# Patient Record
Sex: FEMALE | Race: WHITE | NOT HISPANIC OR LATINO | Employment: UNEMPLOYED | URBAN - METROPOLITAN AREA
[De-identification: names, ages, dates, MRNs, and addresses within clinical notes are randomized per-mention and may not be internally consistent; named-entity substitution may affect disease eponyms.]

---
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
## 2021-03-07 NOTE — PROGRESS NOTES
"3/30/2019 Dimension 3, 4, 5 and 6  Group Chart Note - Co-facilitated with Jaqui Barroso RN.  Number of clients attending the group:  4      Ej Dunaway attended 3 hour Dual Process group covering the following topics Relapse Prevention.  Client was Actively participating, Attentive and Engaged.  Client's response:  Ct engaged in the viewing and processing of \"Beautiful Boy.\" Post-viewing of the film, ct elaborated on how the film resonated with her substance use patterns, lifestyle choices, and family relationships. Ct participated in the development of listing out relapse warning signs and signs of relapse. Ct also began the development of her own personal relapse prevention plan.    " The patient's HeartMate LVAD was interrogated 3/7/2021  * Speed 9600 rpm   * Pulsatility index 6-6.5   * Power 4.2-6.3 Manuel   * Flow 5.2-6.4 L/minute   Fluid status: euvolemic   Alarms were reviewed, with no LVAD alarms or signs of pump malfunction.   The driveline exit site was covered, with dressing c/d/i.   All external components were inspected and showed no evidence of damage or malfunction, none replaced.   No changes to VAD settings made.      Shelia Means DNP, FNP-BC, CHFN  Advanced Heart Failure Nurse Practitioner  [facility]

## 2021-04-09 NOTE — PROGRESS NOTES
"      Assessment & Plan     Chest pain, unspecified type  Does not appear cardiac from EKG and history.  - EKG 12-lead complete w/read - Clinics    Costochondritis  Discussed at home treatment strategies.      Screen for STD (sexually transmitted disease)    - HIV Antigen Antibody Combo  - Hepatitis Antibody A IgG  - Hepatitis A antibody IgM  - Hepatitis C antibody  - Hepatitis B Surface Antibody  - Hepatitis B surface antigen    Screening examination for pulmonary tuberculosis    - Quantiferon TB Gold Plus    Encounter for pregnancy test, result unknown    - HCG Qual, Urine ([id number]); Future             BMI:   Estimated body mass index is 35.51 kg/m  as calculated from the following:    Height as of this encounter: 1.676 m (5' 6\").    Weight as of this encounter: 99.8 kg (220 lb).           No follow-ups on file.    Shaheen Montero PA-C  [facility]   Ej is a 20 year old who presents for the following health issues     HPI     Chest Pain  Onset/Duration: 4 days   Description:   Location: right side   Character: sharp and achey  Radiation: abdomen one episode   Duration: pt pt mainly in the evening not sure about duration maybe 30 mins   Intensity: moderate  Progression of Symptoms: same  Accompanying Signs & Symptoms:  Shortness of breath: YES  Sweating: no  Nausea/vomiting: YES- not hungry   Lightheadedness: no  Palpitations: no   Fever/Chills: no  Cough: no           Heartburn: maybe not sure   History:   Family history of heart disease: does not know   Tobacco use: no  Previous similar symptoms: no   Precipitating factors:   Worse with exertion: no  Worse with deep breaths: YES- when walking one flight of stair pt states she feels out of breath            Related to eating: no           Better with burping: no  Alleviating factors:   Therapies tried and outcome: pt has been taking ibuprofen     Does need some lab work for [facility]      Review of Systems " "  Constitutional, HEENT, cardiovascular, pulmonary, gi and gu systems are negative, except as otherwise noted.      Objective    /72   Pulse 77   Temp 97.7  F (36.5  C) (Skin)   Resp 16   Ht 1.676 m (5' 6\")   Wt 99.8 kg (220 lb)   SpO2 97%   BMI 35.51 kg/m    Body mass index is 35.51 kg/m .  Physical Exam   GENERAL: alert and no distress  EYES: Eyes grossly normal to inspection  RESP: lungs clear to auscultation - no rales, rhonchi or wheezes  CV: regular rate and rhythm, normal S1 S2, no S3 or S4, no murmur, click or rub, no peripheral edema and peripheral pulses strong  MS: some intercostal tenderness  SKIN: no suspicious lesions or rashes  PSYCH: mentation appears normal, affect normal/bright    EKG - Reviewed and interpreted by me sinus bradycardia, normal axis, normal intervals, no acute ST/T changes c/w ischemia, no LVH by voltage criteria                  "

## 2021-04-12 ENCOUNTER — OFFICE VISIT (OUTPATIENT)
Dept: FAMILY MEDICINE | Facility: CLINIC | Age: 21
End: 2021-04-12
Payer: MEDICAID

## 2021-04-12 VITALS
BODY MASS INDEX: 35.36 KG/M2 | WEIGHT: 220 LBS | DIASTOLIC BLOOD PRESSURE: 72 MMHG | HEIGHT: 66 IN | RESPIRATION RATE: 16 BRPM | SYSTOLIC BLOOD PRESSURE: 113 MMHG | TEMPERATURE: 97.7 F | OXYGEN SATURATION: 97 % | HEART RATE: 77 BPM

## 2021-04-12 DIAGNOSIS — M94.0 COSTOCHONDRITIS: ICD-10-CM

## 2021-04-12 DIAGNOSIS — Z32.00 ENCOUNTER FOR PREGNANCY TEST, RESULT UNKNOWN: ICD-10-CM

## 2021-04-12 DIAGNOSIS — Z11.3 SCREEN FOR STD (SEXUALLY TRANSMITTED DISEASE): ICD-10-CM

## 2021-04-12 DIAGNOSIS — R07.9 CHEST PAIN, UNSPECIFIED TYPE: Primary | ICD-10-CM

## 2021-04-12 DIAGNOSIS — Z11.1 SCREENING EXAMINATION FOR PULMONARY TUBERCULOSIS: ICD-10-CM

## 2021-04-12 PROCEDURE — 87389 HIV-1 AG W/HIV-1&-2 AB AG IA: CPT | Performed by: PHYSICIAN ASSISTANT

## 2021-04-12 PROCEDURE — 86481 TB AG RESPONSE T-CELL SUSP: CPT | Performed by: PHYSICIAN ASSISTANT

## 2021-04-12 PROCEDURE — 86706 HEP B SURFACE ANTIBODY: CPT | Performed by: PHYSICIAN ASSISTANT

## 2021-04-12 PROCEDURE — 86709 HEPATITIS A IGM ANTIBODY: CPT | Performed by: PHYSICIAN ASSISTANT

## 2021-04-12 PROCEDURE — 93000 ELECTROCARDIOGRAM COMPLETE: CPT | Performed by: PHYSICIAN ASSISTANT

## 2021-04-12 PROCEDURE — 86803 HEPATITIS C AB TEST: CPT | Performed by: PHYSICIAN ASSISTANT

## 2021-04-12 PROCEDURE — 87340 HEPATITIS B SURFACE AG IA: CPT | Performed by: PHYSICIAN ASSISTANT

## 2021-04-12 PROCEDURE — 99214 OFFICE O/P EST MOD 30 MIN: CPT | Performed by: PHYSICIAN ASSISTANT

## 2021-04-12 PROCEDURE — 86708 HEPATITIS A ANTIBODY: CPT | Performed by: PHYSICIAN ASSISTANT

## 2021-04-12 PROCEDURE — 36415 COLL VENOUS BLD VENIPUNCTURE: CPT | Performed by: PHYSICIAN ASSISTANT

## 2021-04-12 RX ORDER — PANTOPRAZOLE SODIUM 20 MG/1
40 TABLET, DELAYED RELEASE ORAL DAILY
COMMUNITY
End: 2021-10-26

## 2021-04-12 RX ORDER — IBUPROFEN 800 MG/1
800 TABLET, FILM COATED ORAL EVERY 8 HOURS PRN
COMMUNITY
End: 2021-10-26

## 2021-04-12 RX ORDER — BENZTROPINE MESYLATE 2 MG/1
2 TABLET ORAL 2 TIMES DAILY
COMMUNITY
End: 2021-10-26

## 2021-04-12 RX ORDER — CLOZAPINE 100 MG/1
100 TABLET ORAL
COMMUNITY
Start: 2021-02-01 | End: 2022-03-23

## 2021-04-12 RX ORDER — SENNOSIDES 8.6 MG
650 CAPSULE ORAL EVERY 8 HOURS PRN
COMMUNITY
End: 2021-10-26

## 2021-04-12 ASSESSMENT — MIFFLIN-ST. JEOR: SCORE: 1789.66

## 2021-04-14 LAB
GAMMA INTERFERON BACKGROUND BLD IA-ACNC: 0.16 IU/ML
M TB IFN-G CD4+ BCKGRND COR BLD-ACNC: 9.84 IU/ML
M TB TUBERC IFN-G BLD QL: NEGATIVE
MITOGEN IGNF BCKGRD COR BLD-ACNC: 0 IU/ML
MITOGEN IGNF BCKGRD COR BLD-ACNC: 0 IU/ML

## 2021-04-15 LAB
HAV IGG SER QL IA: REACTIVE
HAV IGM SERPL QL IA: NONREACTIVE
HBV SURFACE AB SERPL IA-ACNC: 2.9 M[IU]/ML
HBV SURFACE AG SERPL QL IA: NONREACTIVE
HCV AB SERPL QL IA: NONREACTIVE
HIV 1+2 AB+HIV1 P24 AG SERPL QL IA: NONREACTIVE

## 2021-04-16 NOTE — RESULT ENCOUNTER NOTE
Please call with results.    All normal.  The Hep A IgI is due to vaccination.  These do need to be faxed to [facility]:    Attn: Kamilah Corbett RN  [phone]    Jesus Montero PA-C

## 2021-06-03 NOTE — PROGRESS NOTES
DERMATOLOGY EXCISION PROCEDURE NOTE    Dermatology Problem List:  1.Atypical and dysplastic nevi  -Compound nevus with junctional atypia, extends to margin, right mid back s/p excision 6/3/21  - Compound nevus with moderate dysplasia, right lateral ankle  -s/p biopsy 7/18/2016  2. Actinic keratosis  -Efudex initiated 4/9/21  -s/p cryotherapy  3. Erythematous scaly patch on the bilateral cheeks  -KATHRINE ordered  -hydrocortisone 2.5% cream BID x 2 weeks    NAME OF PROCEDURE: Excision intermediate layered linear closure  Staff surgeon: Rudolph Presley DO  Fellow: Lamont Mejia MD  Resident: Keren Bradley MD  Scrub Nurse: Meagan Ashby LPN    PRE-OPERATIVE DIAGNOSIS:  Compound nevus with junctional atypia  POST-OPERATIVE DIAGNOSIS: same   FINAL EXCISION SIZE(DEFECT SIZE): 1.6X1.6 cm, with 5 mm margin   FINAL REPAIR LENGTH: 3.9 cm     INDICATIONS: This patient presented with a 0.6cm compound nevus with junctional atypia of the right mid back. Excision was indicated. We discussed the principles of treatment and most likely complications including scarring, bleeding, infection, incomplete excision, wound dehiscence, pain, nerve damage, and recurrence. Informed consent was obtained and the patient underwent the procedure as follows:    PROCEDURE: The patient was taken to the operative suite. Time-out was performed.  The treatment area was anesthetized with 1% lidocaine and epinephrine (1:100,000). The area was prepped with Chlorhexidine and rinsed with sterile saline and draped with sterile towels. The lesion was delineated and excised down to subcutaneous fat in a elliptical manner. Hemostasis was obtained by electrocoagulation.     REPAIR: An intermediate layered linear closure was selected as the procedure which would maximally preserve both function and cosmesis.    After the excision of the tumor, the area was carefully undermined. Hemostasis was obtained with electrocoagulation.  Closure was oriented so that the wound  Writer was approached by pt during dual process group, pt noted that she had urine incontinence and needed to go change. Ct also asked if she could shower. Writer allowed this, and  staff assisted ct with this process.    was in the patient's natural skin tension lines. The subcutaneous and dermal layers were then closed with 4-0 Monocryl sutures. The epidermis was then carefully approximated along the length of the wound using 4-0 Monocryl running subcuticular sutures.     The final wound length was 3.9 cm. A total of 6 ml of anesthesia was administered for all surgical sites. Estimated blood loss was less than 10 ml for all surgical sites. A sterile pressure dressing was applied and wound care instructions, with a written handout, were given. The [facility] and ambulatory.    Follow-up as needed for wound evaluation.       Anatomic Pathology Results: pending      Staff Involved:  Staff/residnet    Dr. Presley present for entire procedure    Keren Bradley PGY3  Dermatology Resident  pager      Staff Physician Comments:   I saw and evaluated the patient with the resident and I agree with the assessment and plan. I was present for the key portions of the above major procedure and examination.    Rudolph Presley DO    Department of Dermatology  [facility]: Phone: [phone], Fax:[phone]  [facility]: Phone: [phone], Fax: [phone]

## 2021-06-09 NOTE — PROGRESS NOTES
"    Assessment & Plan     Dysuria    - UA reflex to Microscopic and Culture  - Wet prep  - Urine Microscopic    Yeast infection of the vagina  Is improving today, so will give another 24-48 hours.  If symptoms persist or worsen, will treat symptomatically for yeast.  - fluconazole (DIFLUCAN) 150 MG tablet; Take 1 tablet (150 mg) by mouth once for 1 dose             BMI:   Estimated body mass index is 36.15 kg/m  as calculated from the following:    Height as of this encounter: 1.676 m (5' 6\").    Weight as of this encounter: 101.6 kg (224 lb).           Return in about 1 week (around 6/17/2021) for If symptoms persist or worsen.    [facility] is [facility] 20 year [facility] who presents for the following health issues     HPI     Vaginal Symptoms  Onset/Duration: 6 days  Description:  Vaginal Discharge: white   Itching (Pruritis): YES  Burning sensation:  no  Odor: YES  Accompanying Signs & Symptoms:  Urinary symptoms: no  Abdominal pain: no  Fever: no  History:   Sexually active: no  New Partner: no  Possibility of Pregnancy:  no  Recent antibiotic use: no  Previous vaginitis issues: YES  Precipitating or alleviating factors: None  Therapies tried and outcome: none          Review of Systems   Constitutional, HEENT, cardiovascular, pulmonary, gi and gu systems are negative, except as otherwise noted.      Objective    /74   Pulse 76   Resp 16   Ht 1.676 m (5' 6\")   Wt 101.6 kg (224 lb)   LMP 06/01/2021 (Exact Date)   SpO2 94%   Breastfeeding No   BMI 36.15 kg/m    Body mass index is 36.15 kg/m .  Physical Exam   GENERAL: alert and no distress  EYES: Eyes grossly normal to inspection  PSYCH: mentation appears normal and affect normal/bright    Results for orders placed or performed in visit on 06/10/21 (from the past 24 hour(s))   UA reflex to Microscopic and Culture    Specimen: Midstream Urine   Result Value Ref Range    Color Urine Yellow     " Appearance Urine Clear     Glucose Urine Negative NEG^Negative mg/dL    Bilirubin Urine Negative NEG^Negative    Ketones Urine Negative NEG^Negative mg/dL    Specific Gravity Urine 1.020 1.003 - 1.035    Blood Urine Small (A) NEG^Negative    pH Urine 7.0 5.0 - 7.0 pH    Protein Albumin Urine Negative NEG^Negative mg/dL    Urobilinogen Urine 0.2 0.2 - 1.0 EU/dL    Nitrite Urine Negative NEG^Negative    Leukocyte Esterase Urine Negative NEG^Negative    Source Midstream Urine    Urine Microscopic   Result Value Ref Range    WBC Urine 0 - 5 OTO5^0 - 5 /HPF    RBC Urine 2-5 (A) OTO2^O - 2 /HPF    Squamous Epithelial /LPF Urine Moderate (A) FEW^Few /LPF   Wet prep    Specimen: Vagina   Result Value Ref Range    Specimen Description Vagina     Wet Prep No Trichomonas seen     Wet Prep No clue cells seen     Wet Prep No yeast seen     Wet Prep Few  WBC'S seen

## 2021-06-10 ENCOUNTER — OFFICE VISIT (OUTPATIENT)
Dept: FAMILY MEDICINE | Facility: CLINIC | Age: 21
End: 2021-06-10
Payer: MEDICAID

## 2021-06-10 VITALS
SYSTOLIC BLOOD PRESSURE: 118 MMHG | OXYGEN SATURATION: 94 % | BODY MASS INDEX: 36 KG/M2 | WEIGHT: 224 LBS | HEIGHT: 66 IN | RESPIRATION RATE: 16 BRPM | HEART RATE: 76 BPM | DIASTOLIC BLOOD PRESSURE: 74 MMHG

## 2021-06-10 DIAGNOSIS — B37.31 YEAST INFECTION OF THE VAGINA: ICD-10-CM

## 2021-06-10 DIAGNOSIS — R30.0 DYSURIA: Primary | ICD-10-CM

## 2021-06-10 LAB
ALBUMIN UR-MCNC: NEGATIVE MG/DL
APPEARANCE UR: CLEAR
BILIRUB UR QL STRIP: NEGATIVE
COLOR UR AUTO: YELLOW
GLUCOSE UR STRIP-MCNC: NEGATIVE MG/DL
HGB UR QL STRIP: ABNORMAL
KETONES UR STRIP-MCNC: NEGATIVE MG/DL
LEUKOCYTE ESTERASE UR QL STRIP: NEGATIVE
NITRATE UR QL: NEGATIVE
NON-SQ EPI CELLS #/AREA URNS LPF: ABNORMAL /LPF
PH UR STRIP: 7 PH (ref 5–7)
RBC #/AREA URNS AUTO: ABNORMAL /HPF
SOURCE: ABNORMAL
SP GR UR STRIP: 1.02 (ref 1–1.03)
SPECIMEN SOURCE: NORMAL
UROBILINOGEN UR STRIP-ACNC: 0.2 EU/DL (ref 0.2–1)
WBC #/AREA URNS AUTO: ABNORMAL /HPF
WET PREP SPEC: NORMAL

## 2021-06-10 PROCEDURE — 81001 URINALYSIS AUTO W/SCOPE: CPT | Performed by: PHYSICIAN ASSISTANT

## 2021-06-10 PROCEDURE — 87210 SMEAR WET MOUNT SALINE/INK: CPT | Performed by: PHYSICIAN ASSISTANT

## 2021-06-10 PROCEDURE — 99213 OFFICE O/P EST LOW 20 MIN: CPT | Performed by: PHYSICIAN ASSISTANT

## 2021-06-10 RX ORDER — FLUCONAZOLE 150 MG/1
150 TABLET ORAL ONCE
Qty: 1 TABLET | Refills: 0 | Status: SHIPPED | OUTPATIENT
Start: 2021-06-10 | End: 2021-06-10

## 2021-06-10 ASSESSMENT — MIFFLIN-ST. JEOR: SCORE: 1807.81

## 2021-06-10 ASSESSMENT — PATIENT HEALTH QUESTIONNAIRE - PHQ9
SUM OF ALL RESPONSES TO PHQ QUESTIONS 1-9: 3
SUM OF ALL RESPONSES TO PHQ QUESTIONS 1-9: 3
10. IF YOU CHECKED OFF ANY PROBLEMS, HOW DIFFICULT HAVE THESE PROBLEMS MADE IT FOR YOU TO DO YOUR WORK, TAKE CARE OF THINGS AT HOME, OR GET ALONG WITH OTHER PEOPLE: NOT DIFFICULT AT ALL

## 2021-07-26 ENCOUNTER — OFFICE VISIT (OUTPATIENT)
Dept: FAMILY MEDICINE | Facility: CLINIC | Age: 21
End: 2021-07-26
Payer: MEDICAID

## 2021-07-26 VITALS
HEIGHT: 66 IN | SYSTOLIC BLOOD PRESSURE: 112 MMHG | WEIGHT: 235.6 LBS | HEART RATE: 72 BPM | TEMPERATURE: 98.6 F | DIASTOLIC BLOOD PRESSURE: 71 MMHG | BODY MASS INDEX: 37.86 KG/M2 | OXYGEN SATURATION: 98 %

## 2021-07-26 DIAGNOSIS — F39 MOOD DISORDER (H): ICD-10-CM

## 2021-07-26 DIAGNOSIS — F19.20 CHEMICAL DEPENDENCY (H): ICD-10-CM

## 2021-07-26 DIAGNOSIS — K13.70 ORAL LESION: ICD-10-CM

## 2021-07-26 DIAGNOSIS — M72.2 PLANTAR FASCIITIS: Primary | ICD-10-CM

## 2021-07-26 PROCEDURE — 99214 OFFICE O/P EST MOD 30 MIN: CPT | Performed by: FAMILY MEDICINE

## 2021-07-26 RX ORDER — IBUPROFEN 400 MG/1
400 TABLET, FILM COATED ORAL EVERY 6 HOURS PRN
Qty: 30 TABLET | Refills: 0 | Status: SHIPPED | OUTPATIENT
Start: 2021-07-26 | End: 2023-01-02

## 2021-07-26 RX ORDER — BENZTROPINE MESYLATE 1 MG/1
1 TABLET ORAL 2 TIMES DAILY
COMMUNITY
Start: 2021-05-18 | End: 2021-10-26

## 2021-07-26 ASSESSMENT — MIFFLIN-ST. JEOR: SCORE: 1852.48

## 2021-07-26 NOTE — PROGRESS NOTES
"    Assessment & Plan     Chemical dependency (H)  Is in a program ([employer]) and doing really well. Continue to support    Mood disorder (H)  Per chart review, she was diagnosed with a thought disorder in January while she was also using. At this point doing really well with Clozaril. Would suggest she get CBC at her next appointment with us.     Plantar fasciitis  Classic and likely due to her increased exercise, particularly running on the treadmill without shoes on.  See AVS for details of plan, including continuous arch support, achilles stretches and decrease in her running  - ibuprofen (ADVIL/MOTRIN) 400 MG tablet; Take 1 tablet (400 mg) by mouth every 6 hours as needed for moderate pain    Oral lesion  Needs to see dentistry.         32 minutes spent on the date of the encounter doing chart review, history and exam, documentation and further activities per the note       BMI:   Estimated body mass index is 38.61 kg/m  as calculated from the following:    Height as of this encounter: 1.664 m (5' 5.5\").    Weight as of this encounter: 106.9 kg (235 lb 9.6 oz).           No follow-ups on file.    Argelia Bennett MD  [facility] EDEL Pagan is a 20 year old who presents for the following health issues     HPI     At [facility] - this is going well. Sober since been there and her mental health is much improved.     Feet: when wakes up in the am, and goes to put her feet down, to stand up, hurts a bit and not all the right feelings are there. If she sits for a long time, her feet have to readjust and it hurts a bit. Worst in the ams. Couple of weeks.   Goes on a lot of walks, now is running more as of a week.  Can do about 1.75 miles at a time.  20 min walking and 10 min running.   Is not using any medications for this.     Teeth -   Need wisdom teeth work.  Her gum between her teeth seem swollen and when she eats she chews on it.  Wondering what we can do. " "      PMHx:  Admission to [facility] in 1/2021 - prior to her admission to [facility]. Is doing really well on Clozaril 100 mg daily:    PRINCIPAL DISCHARGE DIAGNOSIS:   Schizophrenia     SECONDARY DIAGNOSES:  1. Unspecified depression.  2. History of attention deficit/hyperactivity disorder, history of reactive attachment disorder, and history of Posttraumatic stress disorder.  3. Methamphetamine use disorder, in early remission.  4. Alcohol use disorder, in early remission.   5. Cannabis use disorder, in early remission.          Review of Systems         Objective    /71   Pulse 72   Temp 98.6  F (37  C) (Oral)   Ht 1.664 m (5' 5.5\")   Wt 106.9 kg (235 lb 9.6 oz)   LMP 06/01/2021 (Within Days)   SpO2 98%   Breastfeeding No   BMI 38.61 kg/m    Body mass index is 38.61 kg/m .  Physical Exam   OP - right posterior oral bucal mucosa, right between the upper and lower wisdom teeth shows some mild whitening and swelling. No sign of infection. Both teeth look a bit impacted.   Feet - normal pulses, no deformities  Tender under the arch, more anteriorly, for both feet. Able to dorsiflex to about 90' at the ankle.                   "

## 2021-07-26 NOTE — PATIENT INSTRUCTIONS
Patient Education   Here is the plan from today's visit    1. Chemical dependency (H)  Doing well!!    2. Mood disorder (H)  Doing well!    3. Plantar fasciitis  Going to do stretches - twice a day about 10 stretches on each side, 5 with the knee bent and 5 with the knee straight  Stop the running. Once better, slowly start adding running back  Arch support whenever you are up and walking  Also can use ice to cool things off and massage to stretch it.   - ibuprofen (ADVIL/MOTRIN) 400 MG tablet; Take 1 tablet (400 mg) by mouth every 6 hours as needed for moderate pain  Dispense: 30 tablet; Refill: 0    4. Oral lesion  Need to see dentist unfortunately.  Listerine if you want to rinse out regularly       Please call or return to clinic if your symptoms don't go away.    Follow up plan  No follow-ups on file.    Thank you for coming to [facility] today.  COVID-19 Vaccine:  If you are eligible for the COVID-19 vaccine, you can schedule via [employer] or call [facility] at [phone]. If you need assistance with scheduling, please speak to a Care Coordinator or your provider.   Lab Testing:  **If you had lab testing today and your results are reassuring or normal they will be mailed to you or sent through [employer] within 7 days.   **If the lab tests need quick action we will call you with the results.  **If you are having labs done on a different day, please call [phone] to schedule at [facility] or [phone] for other [facility] locations.   The phone number we will call with results is # [phone] (home) . If this is not the best number please call our clinic and change the number.  Medication Refills:  If you need any refills please call your pharmacy and they will contact us.   If you need to  your refill at a new pharmacy, please contact the new pharmacy directly. The new pharmacy will help you get your medications transferred faster.   Scheduling:  If you have any  concerns about today's visit or wish to schedule another appointment please call our office during normal business hours [phone] (8-5:00 M-F)  If a referral was made to a [facility] and you don't get a call from central scheduling please call [phone].  If a Mammogram was ordered for you at [facility] call [phone] to schedule or change your appointment.  If you had an EKG/XRay/CT/Ultrasound/MRI ordered the number is [phone] to schedule or change your radiology appointment.   Medical Concerns:  If you have urgent medical concerns please call [phone] at any time of the day.    Argelia Bennett MD

## 2021-09-19 ENCOUNTER — HEALTH MAINTENANCE LETTER (OUTPATIENT)
Age: 21
End: 2021-09-19

## 2021-10-19 PROBLEM — F32.9 MAJOR DEPRESSION: Status: ACTIVE | Noted: 2019-12-12

## 2021-11-01 ENCOUNTER — OFFICE VISIT (OUTPATIENT)
Dept: FAMILY MEDICINE | Facility: CLINIC | Age: 21
End: 2021-11-01
Payer: MEDICAID

## 2021-11-01 VITALS
HEART RATE: 70 BPM | OXYGEN SATURATION: 94 % | DIASTOLIC BLOOD PRESSURE: 78 MMHG | BODY MASS INDEX: 40.27 KG/M2 | TEMPERATURE: 99.1 F | SYSTOLIC BLOOD PRESSURE: 122 MMHG | WEIGHT: 250.6 LBS | HEIGHT: 66 IN

## 2021-11-01 DIAGNOSIS — E66.813 CLASS 3 SEVERE OBESITY DUE TO EXCESS CALORIES WITHOUT SERIOUS COMORBIDITY WITH BODY MASS INDEX (BMI) OF 40.0 TO 44.9 IN ADULT (H): Primary | ICD-10-CM

## 2021-11-01 DIAGNOSIS — R52 PAIN: ICD-10-CM

## 2021-11-01 DIAGNOSIS — E66.01 CLASS 3 SEVERE OBESITY DUE TO EXCESS CALORIES WITHOUT SERIOUS COMORBIDITY WITH BODY MASS INDEX (BMI) OF 40.0 TO 44.9 IN ADULT (H): Primary | ICD-10-CM

## 2021-11-01 RX ORDER — TOPIRAMATE 25 MG/1
25 TABLET, FILM COATED ORAL DAILY
COMMUNITY
End: 2022-03-23

## 2021-11-01 ASSESSMENT — ANXIETY QUESTIONNAIRES
IF YOU CHECKED OFF ANY PROBLEMS ON THIS QUESTIONNAIRE, HOW DIFFICULT HAVE THESE PROBLEMS MADE IT FOR YOU TO DO YOUR WORK, TAKE CARE OF THINGS AT HOME, OR GET ALONG WITH OTHER PEOPLE: NOT DIFFICULT AT ALL
1. FEELING NERVOUS, ANXIOUS, OR ON EDGE: NOT AT ALL
5. BEING SO RESTLESS THAT IT IS HARD TO SIT STILL: NOT AT ALL
6. BECOMING EASILY ANNOYED OR IRRITABLE: SEVERAL DAYS
3. WORRYING TOO MUCH ABOUT DIFFERENT THINGS: NOT AT ALL
2. NOT BEING ABLE TO STOP OR CONTROL WORRYING: NOT AT ALL
GAD7 TOTAL SCORE: 1
7. FEELING AFRAID AS IF SOMETHING AWFUL MIGHT HAPPEN: NOT AT ALL

## 2021-11-01 ASSESSMENT — MIFFLIN-ST. JEOR: SCORE: 1923.46

## 2021-11-01 ASSESSMENT — PATIENT HEALTH QUESTIONNAIRE - PHQ9
5. POOR APPETITE OR OVEREATING: NOT AT ALL
SUM OF ALL RESPONSES TO PHQ QUESTIONS 1-9: 6

## 2021-11-01 NOTE — NURSING NOTE
"ROOM:1    Preferred Name: Ej     21 year old  Chief Complaint   Patient presents with     Flank Pain     stopped last week, had pins and needles, comes and goes, no known injury       Blood pressure 122/78, pulse 70, temperature 99.1  F (37.3  C), temperature source Oral, height 1.676 m (5' 6\"), weight 113.7 kg (250 lb 9.6 oz), SpO2 94 %, not currently breastfeeding. Body mass index is 40.45 kg/m .      Patient Active Problem List   Diagnosis     Urinary incontinence, unspecified type     Frequent UTI     ADHD (attention deficit hyperactivity disorder), combined type     Obesity     Foster care child     Chemical dependency (H)     Depo-Provera contraceptive status     Anxiety disorder     Moderate major depression (H)     BV (bacterial vaginosis)     Mixed stress and urge urinary incontinence       Wt Readings from Last 2 Encounters:   11/01/21 113.7 kg (250 lb 9.6 oz)   07/26/21 106.9 kg (235 lb 9.6 oz) (>99 %, Z= 2.38)*     * Growth percentiles are based on CDC (Girls, 2-20 Years) data.     BP Readings from Last 3 Encounters:   11/01/21 122/78   07/26/21 112/71   06/10/21 118/74       Allergies   Allergen Reactions     Seasonal Allergies        Current Outpatient Medications   Medication     cloZAPine (CLOZARIL) 100 MG tablet     ibuprofen (ADVIL/MOTRIN) 400 MG tablet     omeprazole (PRILOSEC) 20 MG DR capsule     topiramate (TOPAMAX) 25 MG tablet     No current facility-administered medications for this visit.       Social History     Tobacco Use     Smoking status: Former Smoker     Packs/day: 0.50     Types: Cigarettes     Smokeless tobacco: Never Used   Vaping Use     Vaping Use: Former   Substance Use Topics     Alcohol use: Not Currently     Alcohol/week: 0.0 standard drinks     Drug use: Not Currently     Types: Marijuana, Methamphetamines, Other       Honoring Choices - Health Care Directive Guide offered to patient at time of visit.    Health Maintenance Due   Topic Date Due     ADVANCE CARE PLANNING  " Never done     DEPRESSION ACTION PLAN  Never done     Pneumococcal Vaccine: Pediatrics (0 to 5 Years) and At-Risk Patients (6 to 64 Years) (1 of 2 - PPSV23) 09/21/2007     COVID-19 Vaccine (1) Never done     PREVENTIVE CARE VISIT  09/13/2020     INFLUENZA VACCINE (1) 09/01/2021       Immunization History   Administered Date(s) Administered     Comvax (HIB/HepB) 2001, 01/23/2002, 10/08/2002     DTAP (<7y) 09/04/2007     DTaP / Hep B / IPV 2001, 01/23/2002, 04/11/2002, 01/23/2003, 04/03/2003     HEPA 11/18/2013, 08/29/2014     HPV 11/18/2013, 08/29/2014, 11/24/2014     Influenza (IIV3) PF 11/05/2012, 10/01/2013     Influenza Vaccine IM > 6 months Valent IIV4 (Alfuria,Fluzone) 09/24/2014, 12/03/2018, 09/13/2019     MMR 09/04/2007     MMR/V 10/08/2002     Meningococcal (Menactra ) 11/18/2013, 09/13/2019     Pedvax-hib 2000, 01/23/2002, 10/08/2002     Pneumo Conj 13-V (2010&after) 2001, 04/11/2002, 04/03/2003     Poliovirus, inactivated (IPV) 2001, 01/23/2002, 04/03/2003, 09/04/2007     TDAP Vaccine (Adacel) 11/05/2012, 03/15/2017     Tdap (Adacel,Boostrix) 03/15/2017     Varicella 10/08/2002, 09/04/2007       No results found for: PAP      Recent Labs   Lab Test 06/03/20  1227 09/17/19  0834 09/17/19  0831 09/29/15  2010 06/11/15  1032 06/11/15  1032 11/24/14  1551   A1C 5.3  --   --   --   --  5.7  --    LDL  --  79  --   --   --  72  --    HDL  --  39*  --   --   --  45*  --    TRIG  --  90*  --   --   --  84  --    ALT 19 20  --   --   --  21  --    CR 0.69  --   --  0.63*  --   --   --    GFRESTIMATED >90  --   --   --   --   --   --    GFRESTBLACK >90  --   --   --   --   --   --    ALBUMIN 4.0  --   --  4.3   < > 3.7  --    POTASSIUM 3.5  --   --  4.0  --   --   --    TSH  --   --  3.62  --   --   --  2.10    < > = values in this interval not displayed.       PHQ-2 ( 1999 Pfizer) 7/26/2021 4/19/2019   Q1: Little interest or pleasure in doing things 1 0   Q2: Feeling down, depressed or  hopeless 1 0   PHQ-2 Score 2 0       PHQ-9 SCORE 6/3/2020 12/16/2020 6/10/2021 11/1/2021   PHQ-9 Total Score MyChart - - 3 (Minimal depression) -   PHQ-9 Total Score 14 9 3 6       NICO-7 SCORE 12/12/2019 6/3/2020 11/1/2021   Total Score 9 (mild anxiety) - -   Total Score 9 14 1       No flowsheet data found.      Sheri Spence, [facility]  November 1, 2021 10:25 AM

## 2021-11-01 NOTE — PROGRESS NOTES
"Patient: Ej Dunaway YOB: 2000    Date of Exam: November/1/2021    Please be advised that this client resides in a facility in which narcotic medications are not permitted. If pain management is needed, please prescribe an alternative medication.     Ej Dunaway is a 21 year old who presents for the following    Patient presents with:  Flank Pain: stopped last week, had pins and needles, comes and goes, no known injury  Ej states the right side pain started about 11 days ago, it was a sensation on her right side and slightly on her right lower abdomen.  This lasted about 4 days and she has not had this sensation for 7 days.   When asked if it felt external or internal, it was difficult for her to define.  She denied any change in bowel or bladder habits, she had no blood in the urine.  She does have a history of UTIs, she said this is very different than those symptoms.    Ej is concerned about a 70# weight gain in the past 9 months.  She states that she spoke to the doctor who prescribes her clozapine and they stated it could be that medication and recommended she check in with a primary provider.  Ej is requesting a medication to \"counteract\" her clozapine.  She does not eat bread or noodles, she goes for a walk about 3 times per week.  She does not believe that she has access to nutrition counseling at [facility].      Do you need any refills on your Medications today? No    Review Of Systems   ROS: 10 point ROS neg other than the symptoms noted above in the HPI.      General Physical Exam:  Vitals: /78   Pulse 70   Temp 99.1  F (37.3  C) (Oral)   Ht 1.676 m (5' 6\")   Wt 113.7 kg (250 lb 9.6 oz)   LMP  (LMP Unknown)   SpO2 94%   BMI 40.45 kg/m      Skin:  No visible or palpable abnormality of the skin or abdomen in the area pointed out by Ej.  She pointed to the right side and a small area of the right lower abdomen.      If prescribed a controlled " substance, may client take medication home when discharged from [facility]? No.    Additional Comments:N/A    Assessment / Plan:     Diagnosis Comments   1. Class 3 severe obesity due to excess calories without serious comorbidity with body mass index (BMI) of 40.0 to 44.9 in adult (H)  Nutrition Referral    2. Pain  resolved     We talked about the possibility of varicella zoster causing a sensation, but I see no evidence of that.  Because there are no symptoms today and haven't been for a week, especially no urinary tract symptoms, Ej prefers not to do a UA today.      I gave Ej the number to call to set up the nutrition counseling appointment.     Referrals Made:   NO REFERRALS MADE TODAY  If a referral was made to a [facility] and you don't get a call from central scheduling please call [phone].  If a Mammogram was ordered for you at [facility] call [phone] to schedule or change your appointment.  If you had an XRay/CT/Ultrasound/MRI ordered the number is [phone] to schedule or change your radiology appointment.     Follow up as needed.    Medication changes made at today's visit: MEDICATIONS:        - Continue other medications without change    Dali Maloney NP November 1, 2021     Arrival Time: 10 15 AM  Departure Time: 11 00 AM

## 2021-11-02 ASSESSMENT — ANXIETY QUESTIONNAIRES: GAD7 TOTAL SCORE: 1

## 2021-11-24 ENCOUNTER — NURSE TRIAGE (OUTPATIENT)
Dept: NURSING | Facility: CLINIC | Age: 21
End: 2021-11-24
Payer: MEDICAID

## 2021-11-24 NOTE — TELEPHONE ENCOUNTER
Triage call    Patient called to report she has burning and frequency with urination.  She has been drinking a lot of water  which has helped with the burning.  She also complains of  Pins and needles in her abdomen it is intermittent and she thinks it is the bladder infection.    Per Protocol  see in the office today or tomorrow,Care advice given.  Verbalizes understanding and agrees with plan.  Transferred to scheduling wants to wait till Monday to go to the doctor rather than go to Urgent care.  Transferred to scheduling    Renate Delaney RN   [facility] Nurse Advisor  1:03 PM 11/24/2021    COVID 19 Nurse Triage Plan/Patient Instructions    Please be aware that novel coronavirus (COVID-19) may be circulating in the community. If you develop symptoms such as fever, cough, or SOB or if you have concerns about the presence of another infection including coronavirus (COVID-19), please contact your health care provider or visit [email or web address].     Disposition/Instructions    In-Person Visit with provider recommended. Reference Visit Selection Guide.    Thank you for taking steps to prevent the spread of this virus.  o Limit your contact with others.  o Wear a simple mask to cover your cough.  o Wash your hands well and often.    Resources    [facility]: About COVID-19: [email or web address]    CDC: What to Do If You're Sick: www.cdc.gov/coronavirus/2019-ncov/about/steps-when-sick.html    CDC: Ending Home Isolation: www.cdc.gov/coronavirus/2019-ncov/hcp/disposition-in-home-patients.html     CDC: Caring for Someone: www.cdc.gov/coronavirus/2019-ncov/if-you-are-sick/care-for-someone.html     [facility]: Interim Guidance for Hospital Discharge to Home: www.health.[facility].[place]/diseases/coronavirus/hcp/hospdischarge.pdf    [facility] clinical trials (COVID-19 research studies): [email or web address]/umn-clinical-trials     Below are the COVID-19 hotlines at the [place]  Department of Health ([facility]). Interpreters are available.   o For health questions: Call 072-146-0193 or 1-304.734.8239 (7 a.m. to 7 p.m.)  o For questions about schools and childcare: Call 918-824-5447 or 1-149.990.6593 (7 a.m. to 7 p.m.)    Reason for Disposition    Patient wants to be seen    Additional Information    Negative: Followed a genital area injury    Negative: Followed a genital area injury (penis, scrotum)    Negative: Vaginal discharge    Negative: Pus (white, yellow) or bloody discharge from end of penis    Negative: Discomfort (pain, burning or stinging) when passing urine and pregnant    Negative: Discomfort (pain, burning or stinging) when passing urine and female    Negative: Discomfort (pain, burning or stinging) when passing urine and male    Negative: Pain or itching in the vulvar area    Negative: Pain in scrotum is main symptom    Negative: Blood in the urine is main symptom    Negative: Symptoms arising from use of a urinary catheter (Kimbrough or Coude)    Negative: Unable to urinate (or only a few drops) > 4 hours and bladder feels very full (e.g., palpable bladder or strong urge to urinate)    Negative: Decreased urination and drinking very little and dehydration suspected (e.g., dark urine, no urine > 12 hours, very dry mouth, very lightheaded)    Negative: Patient sounds very sick or weak to the triager    Negative: Side (flank) or lower back pain present    Negative: Can't control passage of urine (i.e., urinary incontinence) and new onset (< 2 weeks) or worsening    Negative: Urinating more frequently than usual (i.e., frequency)    Negative: Bad or foul-smelling urine    Negative: Fever > 100.4 F (38.0 C)    Protocols used: URINARY SYMPTOMS-A-OH

## 2022-01-09 ENCOUNTER — HEALTH MAINTENANCE LETTER (OUTPATIENT)
Age: 22
End: 2022-01-09

## 2022-02-04 NOTE — PROGRESS NOTES
Assessment & Plan     Dysuria  We checked a urinalysis today which showed small leukocyte Estrace, but no blood or nitrites.  We also checked a wet prep which was unremarkable.  I recommended we send the urine for culture and I encouraged her to stay well-hydrated since this helps relieve her symptoms.  She will be notified of the culture results when they are available.  - UA with Microscopic reflex to Culture; Future  - UA with Microscopic reflex to Culture  - Urine Microscopic  - Urine Culture  - Wet prep - lab collect; Future  - Wet prep - lab collect    Chemical dependency (H)  She will continue treatment through [facility].    Class 3 severe obesity due to excess calories without serious comorbidity with body mass index (BMI) of 40.0 to 44.9 in adult (H)  She will continue to work on lifestyle modifications to help with weight loss.                     Return in about 4 weeks (around 3/9/2022) for Follow up if symptoms not improving..    Link Retana, [facility]   Ej is a 21 year old who presents for the following health issues     HPI   Answers for HPI/ROS submitted by the patient on 2/9/2022  If you checked off any problems, how difficult have these problems made it for you to do your work, take care of things at home, or get along with other people?: Somewhat difficult  PHQ9 TOTAL SCORE: 9  Chronicity: chronic  Onset: more than 1 month ago  Frequency: intermittently  Progression since onset: waxing and waning  Pain quality: burning  Pain - numeric: 5/10  Fever: no fever  Fever duration: less than 1 day  Sexually active?: No  History of pyelonephritis?: No  discharge: No  hesitancy: No  possible pregnancy: No  sweats: No      Pt. States that she already tested positive for a bladder infection several months ago and was taking antibiotics. But was unable to get refills.      Genitourinary - Female  Onset/Duration: Approximately 9 months ago she reports  being diagnosed with a bladder infection.  She took antibiotics, but the symptoms never resolved.  She describes having dysuria with urinary frequency symptoms off and on.  When she drinks more fluids the symptoms improve.  She denies vaginal discharge or vaginal odor.  She denies seeing blood in her urine or any vaginal irritation or itching symptoms.  She is not currently feeling ill.  She is not having fevers.  Occasionally she has pain in the lower back that tends to come and go.  Symptoms are not radiating down the legs.  She reports not being sexually active for over a year.  Description:   Painful urination (Dysuria): no           Frequency: YES  Blood in urine (Hematuria): no  Delay in urine (Hesitency): no  Intensity: 5/10  Progression of Symptoms:  waxing and waning  Accompanying Signs & Symptoms:  Fever/chills: no  Flank pain: YES  Nausea and vomiting: no  Vaginal symptoms: none  Abdominal/Pelvic Pain: no  History:   History of frequent UTI s: YES  History of kidney stones: no  Sexually Active: no  Possibility of pregnancy: No  Precipitating or alleviating factors: Drinking a lot of water, alleviates the burning sensation   Therapies tried and outcome: None         Review of Systems   Constitutional: Negative for chills.   Gastrointestinal: Negative for nausea and vomiting.   Genitourinary: Positive for flank pain, frequency and urgency. Negative for hematuria.            Objective    /82   Pulse 103   Temp 98.9  F (37.2  C) (Temporal)   LMP 02/02/2022   SpO2 95%   There is no height or weight on file to calculate BMI.  Physical Exam   GENERAL: healthy, alert and no distress  EYES: Eyes grossly normal to inspection, PERRL and conjunctivae and sclerae normal  NECK: no adenopathy, no asymmetry, masses, or scars and thyroid normal to palpation  RESP: lungs clear to auscultation - no rales, rhonchi or wheezes  CV: regular rate and rhythm, normal S1 S2, no S3 or S4, no murmur, click or rub, no  peripheral edema and peripheral pulses strong  ABDOMEN: soft, trace tenderness in the suprapubic region without rebounding, guarding or rigidity.  No hepatosplenomegaly, no masses and bowel sounds normal  MS: no gross musculoskeletal defects noted, no edema.  No CVA tenderness.  Nontender over the thoracic and lumbar spine.  There is trace tenderness in the lower lumbar paraspinal muscles bilaterally.  SKIN: no suspicious lesions or rashes  NEURO: Normal strength and tone, mentation intact and speech normal  PSYCH: mentation appears normal, affect normal/bright

## 2022-02-09 ENCOUNTER — OFFICE VISIT (OUTPATIENT)
Dept: FAMILY MEDICINE | Facility: CLINIC | Age: 22
End: 2022-02-09
Payer: MEDICAID

## 2022-02-09 VITALS
HEART RATE: 103 BPM | TEMPERATURE: 98.9 F | OXYGEN SATURATION: 95 % | SYSTOLIC BLOOD PRESSURE: 134 MMHG | DIASTOLIC BLOOD PRESSURE: 82 MMHG

## 2022-02-09 DIAGNOSIS — E66.813 CLASS 3 SEVERE OBESITY DUE TO EXCESS CALORIES WITHOUT SERIOUS COMORBIDITY WITH BODY MASS INDEX (BMI) OF 40.0 TO 44.9 IN ADULT (H): ICD-10-CM

## 2022-02-09 DIAGNOSIS — F19.20 CHEMICAL DEPENDENCY (H): ICD-10-CM

## 2022-02-09 DIAGNOSIS — E66.01 CLASS 3 SEVERE OBESITY DUE TO EXCESS CALORIES WITHOUT SERIOUS COMORBIDITY WITH BODY MASS INDEX (BMI) OF 40.0 TO 44.9 IN ADULT (H): ICD-10-CM

## 2022-02-09 DIAGNOSIS — R30.0 DYSURIA: Primary | ICD-10-CM

## 2022-02-09 LAB
ALBUMIN UR-MCNC: NEGATIVE MG/DL
APPEARANCE UR: ABNORMAL
BILIRUB UR QL STRIP: NEGATIVE
CLUE CELLS: ABNORMAL
COLOR UR AUTO: YELLOW
GLUCOSE UR STRIP-MCNC: NEGATIVE MG/DL
HGB UR QL STRIP: NEGATIVE
KETONES UR STRIP-MCNC: NEGATIVE MG/DL
LEUKOCYTE ESTERASE UR QL STRIP: ABNORMAL
NITRATE UR QL: NEGATIVE
PH UR STRIP: 5.5 [PH] (ref 5–7)
RBC #/AREA URNS AUTO: ABNORMAL /HPF
SP GR UR STRIP: >=1.03 (ref 1–1.03)
SQUAMOUS #/AREA URNS AUTO: ABNORMAL /LPF
TRICHOMONAS, WET PREP: ABNORMAL
UROBILINOGEN UR STRIP-ACNC: 0.2 E.U./DL
WBC #/AREA URNS AUTO: ABNORMAL /HPF
WBC'S/HIGH POWER FIELD, WET PREP: ABNORMAL
YEAST, WET PREP: ABNORMAL

## 2022-02-09 PROCEDURE — 87086 URINE CULTURE/COLONY COUNT: CPT | Performed by: FAMILY MEDICINE

## 2022-02-09 PROCEDURE — 87088 URINE BACTERIA CULTURE: CPT | Performed by: FAMILY MEDICINE

## 2022-02-09 PROCEDURE — 99214 OFFICE O/P EST MOD 30 MIN: CPT | Performed by: FAMILY MEDICINE

## 2022-02-09 PROCEDURE — 87210 SMEAR WET MOUNT SALINE/INK: CPT | Performed by: FAMILY MEDICINE

## 2022-02-09 PROCEDURE — 81001 URINALYSIS AUTO W/SCOPE: CPT | Performed by: FAMILY MEDICINE

## 2022-02-09 ASSESSMENT — PATIENT HEALTH QUESTIONNAIRE - PHQ9
SUM OF ALL RESPONSES TO PHQ QUESTIONS 1-9: 9
10. IF YOU CHECKED OFF ANY PROBLEMS, HOW DIFFICULT HAVE THESE PROBLEMS MADE IT FOR YOU TO DO YOUR WORK, TAKE CARE OF THINGS AT HOME, OR GET ALONG WITH OTHER PEOPLE: SOMEWHAT DIFFICULT
SUM OF ALL RESPONSES TO PHQ QUESTIONS 1-9: 9

## 2022-02-09 ASSESSMENT — ENCOUNTER SYMPTOMS
FLANK PAIN: 1
FREQUENCY: 1
VOMITING: 0
CHILLS: 0
HEMATURIA: 0
NAUSEA: 0
SWEATS: 0

## 2022-02-10 ASSESSMENT — PATIENT HEALTH QUESTIONNAIRE - PHQ9: SUM OF ALL RESPONSES TO PHQ QUESTIONS 1-9: 9

## 2022-02-11 ENCOUNTER — TELEPHONE (OUTPATIENT)
Dept: FAMILY MEDICINE | Facility: CLINIC | Age: 22
End: 2022-02-11
Payer: MEDICAID

## 2022-02-11 LAB
BACTERIA UR CULT: ABNORMAL
BACTERIA UR CULT: ABNORMAL

## 2022-02-11 RX ORDER — AMOXICILLIN 875 MG
875 TABLET ORAL 2 TIMES DAILY
Qty: 14 TABLET | Refills: 0 | Status: SHIPPED | OUTPATIENT
Start: 2022-02-11 | End: 2022-02-18

## 2022-02-11 NOTE — TELEPHONE ENCOUNTER
Tried to call pt - VM not setup  Will need to recall  PATI Lloyd, DO   2/11/2022  2:45 PM CST Back to Top        Please contact this patient and let her know that the urine culture is positive for group B strep. This can be present in the normal maru, but I recommend treating since she is having symptoms consistent with a UTI. I sent the Rx to [facility].  Thank you, DE

## 2022-03-23 ENCOUNTER — OFFICE VISIT (OUTPATIENT)
Dept: FAMILY MEDICINE | Facility: CLINIC | Age: 22
End: 2022-03-23
Payer: MEDICAID

## 2022-03-23 VITALS — SYSTOLIC BLOOD PRESSURE: 137 MMHG | DIASTOLIC BLOOD PRESSURE: 83 MMHG | TEMPERATURE: 99.8 F | HEART RATE: 116 BPM

## 2022-03-23 DIAGNOSIS — Z11.3 SCREENING FOR STDS (SEXUALLY TRANSMITTED DISEASES): Primary | ICD-10-CM

## 2022-03-23 DIAGNOSIS — N39.0 URINARY TRACT INFECTION WITHOUT HEMATURIA, SITE UNSPECIFIED: ICD-10-CM

## 2022-03-23 DIAGNOSIS — R11.0 NAUSEA: ICD-10-CM

## 2022-03-23 DIAGNOSIS — R10.9 FLANK PAIN: ICD-10-CM

## 2022-03-23 LAB
ALBUMIN UR-MCNC: NEGATIVE MG/DL
APPEARANCE UR: CLEAR
BACTERIA #/AREA URNS HPF: ABNORMAL /HPF
BILIRUB UR QL STRIP: NEGATIVE
CLUE CELLS: ABNORMAL
COLOR UR AUTO: YELLOW
GLUCOSE UR STRIP-MCNC: NEGATIVE MG/DL
HGB UR QL STRIP: ABNORMAL
KETONES UR STRIP-MCNC: NEGATIVE MG/DL
LEUKOCYTE ESTERASE UR QL STRIP: ABNORMAL
NITRATE UR QL: NEGATIVE
PH UR STRIP: 6.5 [PH] (ref 5–7)
RBC #/AREA URNS AUTO: ABNORMAL /HPF
SP GR UR STRIP: <=1.005 (ref 1–1.03)
SQUAMOUS #/AREA URNS AUTO: ABNORMAL /LPF
TRICHOMONAS, WET PREP: ABNORMAL
UROBILINOGEN UR STRIP-ACNC: 0.2 E.U./DL
WBC #/AREA URNS AUTO: ABNORMAL /HPF
WBC'S/HIGH POWER FIELD, WET PREP: ABNORMAL
YEAST, WET PREP: ABNORMAL

## 2022-03-23 PROCEDURE — 99213 OFFICE O/P EST LOW 20 MIN: CPT | Performed by: FAMILY MEDICINE

## 2022-03-23 PROCEDURE — 87591 N.GONORRHOEAE DNA AMP PROB: CPT | Performed by: FAMILY MEDICINE

## 2022-03-23 PROCEDURE — 87210 SMEAR WET MOUNT SALINE/INK: CPT | Performed by: FAMILY MEDICINE

## 2022-03-23 PROCEDURE — 81001 URINALYSIS AUTO W/SCOPE: CPT | Performed by: FAMILY MEDICINE

## 2022-03-23 PROCEDURE — 87186 SC STD MICRODIL/AGAR DIL: CPT | Mod: 91 | Performed by: FAMILY MEDICINE

## 2022-03-23 PROCEDURE — 87491 CHLMYD TRACH DNA AMP PROBE: CPT | Performed by: FAMILY MEDICINE

## 2022-03-23 PROCEDURE — 87086 URINE CULTURE/COLONY COUNT: CPT | Performed by: FAMILY MEDICINE

## 2022-03-23 RX ORDER — PROMETHAZINE HYDROCHLORIDE 25 MG/1
25 TABLET ORAL EVERY 6 HOURS PRN
Qty: 20 TABLET | Refills: 0 | Status: SHIPPED | OUTPATIENT
Start: 2022-03-23 | End: 2023-01-02

## 2022-03-23 RX ORDER — AMOXICILLIN 500 MG/1
1000 CAPSULE ORAL 2 TIMES DAILY
Qty: 40 CAPSULE | Refills: 0 | Status: SHIPPED | OUTPATIENT
Start: 2022-03-23 | End: 2022-04-02

## 2022-03-23 ASSESSMENT — PAIN SCALES - GENERAL: PAINLEVEL: WORST PAIN (10)

## 2022-03-23 NOTE — PATIENT INSTRUCTIONS
Nausea med as needed    Continue good fluid intake    Start the antibiotics today    We will send you urine culture results    Be seen promptly if symptoms acutely worsen

## 2022-03-23 NOTE — PROGRESS NOTES
Gerardo Pagan is a 21 year old who presents for the following health issues    HPI     Genitourinary - Female  Onset/Duration: Friday  Description:   Painful urination (Dysuria): YES           Frequency: no  Blood in urine (Hematuria): YES  Delay in urine (Hesitency): no  Intensity: moderate  Progression of Symptoms:  worsening  Accompanying Signs & Symptoms:  Fever/chills: YES  Flank pain: YES  Nausea and vomiting: YES  Vaginal symptoms: odor  Abdominal/Pelvic Pain: no  History:   History of frequent UTI s: no  History of kidney stones: no  Sexually Active: YES  Possibility of pregnancy: Don't Know  Precipitating or alleviating factors: None  Therapies tried and outcome:  None      some urinary symptoms a week ago but bad symptoms 5  Days ago    Review of Systems     Pain on left side    Not moving much    No vomiting    Some nausea this am    Drinking lots of cranberry juice and water    Not eating much    Had a few uti in past, mayb more than  Normal    A few months ago last one    No vaginal discharge    Periods normal    Last period finished up 9 days ago, normal               Objective    /83 (BP Location: Left arm, Patient Position: Chair, Cuff Size: Adult Large)   Pulse 116   Temp 99.8  F (37.7  C) (Temporal)   Breastfeeding No   There is no height or weight on file to calculate BMI.  Physical Exam  Constitutional:       Appearance: She is well-developed.   HENT:      Head: Normocephalic and atraumatic.   Eyes:      Conjunctiva/sclera: Conjunctivae normal.   Neck:      Vascular: No carotid bruit.   Cardiovascular:      Rate and Rhythm: Normal rate and regular rhythm.      Heart sounds: Normal heart sounds.   Pulmonary:      Effort: Pulmonary effort is normal. No respiratory distress.      Breath sounds: Normal breath sounds.   Neurological:      Mental Status: She is alert and oriented to person, place, and time.      abd soft, some subj tenderness left upper quadrant and left  flank  Patient denies any rash on torso    No peritoneal signs    Some discomfort in suprapubic area also           ua is dilute but shows a lot of wbc  Also reviewed the ucx from Feb    .ASSESSMENT / PLAN:  (Z11.3) Screening for STDs (sexually transmitted diseases)  (primary encounter diagnosis)  Comment: this pending  ;patient declined blood test std testing  Plan: NEISSERIA GONORRHOEA PCR, CHLAMYDIA TRACHOMATIS        PCR             (R10.9) Flank pain  Comment: basically no red cells.  Patient not tender over kidney proper    Plan: UA macro with reflex to Microscopic and Culture        - Clinc Collect, Wet prep - lab collect, Urine         Microscopic Exam, Urine Culture             (N39.0) Urinary tract infection without hematuria, site unspecified  Comment: will treat.  If urine culture comes back similar to last time this would be appropriate antibiotic   Plan: amoxicillin (AMOXIL) 500 MG capsule             (R11.0) Nausea  Comment: use this prn  Plan: promethazine (PHENERGAN) 25 MG tablet             Be seen promptly if symptoms acutely worsen       I reviewed the patient's medications, allergies, medical history, family history, and social history.    Jonathan Grier MD

## 2022-03-24 LAB
C TRACH DNA SPEC QL NAA+PROBE: NEGATIVE
N GONORRHOEA DNA SPEC QL NAA+PROBE: NEGATIVE

## 2022-03-25 LAB
BACTERIA UR CULT: ABNORMAL
BACTERIA UR CULT: ABNORMAL

## 2022-03-26 ENCOUNTER — TELEPHONE (OUTPATIENT)
Dept: FAMILY MEDICINE | Facility: CLINIC | Age: 22
End: 2022-03-26
Payer: MEDICAID

## 2022-03-26 DIAGNOSIS — N39.0 URINARY TRACT INFECTION WITHOUT HEMATURIA, SITE UNSPECIFIED: Primary | ICD-10-CM

## 2022-03-26 NOTE — RESULT ENCOUNTER NOTE
I tried to call you with these results.  We need to change the antibiotic based on final urine culture results.    You can call me on my cell number [phone].    Jonathan Grier MD

## 2022-03-26 NOTE — RESULT ENCOUNTER NOTE
The urine culture did show a bacteria but we are still awaiting the final result to know if we need to change the antibiotic or not    No evidence of gonorrhea or chlamydia    Jonathan Grier MD

## 2022-03-26 NOTE — TELEPHONE ENCOUNTER
I tried to call patient with culture results.  Patient not available.  I left her my cell number to call back.    Jonathan Grier MD

## 2022-03-28 RX ORDER — NITROFURANTOIN 25; 75 MG/1; MG/1
100 CAPSULE ORAL 2 TIMES DAILY
Qty: 10 CAPSULE | Refills: 0 | Status: SHIPPED | OUTPATIENT
Start: 2022-03-28 | End: 2022-04-08

## 2022-03-28 NOTE — TELEPHONE ENCOUNTER
Patient called me back  Discussed in detail  She does feel better, but prudent to treat with alternative antibiotic given the findings on urine culture  Sent in prescription  Follow up prn symptoms   Stay well hydrated  Jonathan Grier MD

## 2022-04-08 ENCOUNTER — TELEPHONE (OUTPATIENT)
Dept: FAMILY MEDICINE | Facility: CLINIC | Age: 22
End: 2022-04-08
Payer: MEDICAID

## 2022-04-08 DIAGNOSIS — N39.0 URINARY TRACT INFECTION WITHOUT HEMATURIA, SITE UNSPECIFIED: ICD-10-CM

## 2022-04-08 RX ORDER — NITROFURANTOIN 25; 75 MG/1; MG/1
100 CAPSULE ORAL 2 TIMES DAILY
Qty: 10 CAPSULE | Refills: 0 | Status: SHIPPED | OUTPATIENT
Start: 2022-04-08 | End: 2023-01-02

## 2022-04-08 NOTE — TELEPHONE ENCOUNTER
Patient had sent me a message  I called her  She never picked up the prescription  Sent in again  Follow up prn symptoms  Jonathan Grier MD

## 2022-04-29 ENCOUNTER — TRANSFERRED RECORDS (OUTPATIENT)
Dept: HEALTH INFORMATION MANAGEMENT | Facility: CLINIC | Age: 22
End: 2022-04-29
Payer: MEDICAID

## 2022-10-12 ENCOUNTER — E-VISIT (OUTPATIENT)
Dept: URGENT CARE | Facility: CLINIC | Age: 22
End: 2022-10-12
Payer: MEDICAID

## 2022-10-12 ENCOUNTER — NURSE TRIAGE (OUTPATIENT)
Dept: NURSING | Facility: CLINIC | Age: 22
End: 2022-10-12

## 2022-10-12 DIAGNOSIS — R30.0 DIFFICULT OR PAINFUL URINATION: Primary | ICD-10-CM

## 2022-10-12 PROCEDURE — 99207 PR NON-BILLABLE SERV PER CHARTING: CPT | Performed by: NURSE PRACTITIONER

## 2022-10-13 ENCOUNTER — OFFICE VISIT (OUTPATIENT)
Dept: URGENT CARE | Facility: URGENT CARE | Age: 22
End: 2022-10-13
Payer: MEDICAID

## 2022-10-13 VITALS
TEMPERATURE: 98.4 F | HEART RATE: 91 BPM | WEIGHT: 220.2 LBS | BODY MASS INDEX: 35.54 KG/M2 | SYSTOLIC BLOOD PRESSURE: 136 MMHG | OXYGEN SATURATION: 99 % | DIASTOLIC BLOOD PRESSURE: 82 MMHG

## 2022-10-13 DIAGNOSIS — B37.31 YEAST INFECTION OF THE VAGINA: ICD-10-CM

## 2022-10-13 DIAGNOSIS — Z72.51 UNPROTECTED SEX: ICD-10-CM

## 2022-10-13 DIAGNOSIS — N30.00 ACUTE CYSTITIS WITHOUT HEMATURIA: Primary | ICD-10-CM

## 2022-10-13 DIAGNOSIS — R30.0 DYSURIA: ICD-10-CM

## 2022-10-13 LAB
ALBUMIN UR-MCNC: NEGATIVE MG/DL
APPEARANCE UR: ABNORMAL
BACTERIA #/AREA URNS HPF: ABNORMAL /HPF
BILIRUB UR QL STRIP: NEGATIVE
CLUE CELLS: PRESENT
COLOR UR AUTO: YELLOW
GLUCOSE UR STRIP-MCNC: NEGATIVE MG/DL
HCG UR QL: NEGATIVE
HGB UR QL STRIP: ABNORMAL
KETONES UR STRIP-MCNC: NEGATIVE MG/DL
LEUKOCYTE ESTERASE UR QL STRIP: ABNORMAL
NITRATE UR QL: POSITIVE
PH UR STRIP: 6.5 [PH] (ref 5–7)
RBC #/AREA URNS AUTO: ABNORMAL /HPF
SP GR UR STRIP: 1.01 (ref 1–1.03)
SQUAMOUS #/AREA URNS AUTO: ABNORMAL /LPF
TRICHOMONAS, WET PREP: ABNORMAL
UROBILINOGEN UR STRIP-ACNC: 0.2 E.U./DL
WBC #/AREA URNS AUTO: ABNORMAL /HPF
WBC CLUMPS #/AREA URNS HPF: PRESENT /HPF
WBC'S/HIGH POWER FIELD, WET PREP: ABNORMAL
YEAST, WET PREP: PRESENT

## 2022-10-13 PROCEDURE — 87210 SMEAR WET MOUNT SALINE/INK: CPT | Performed by: PHYSICIAN ASSISTANT

## 2022-10-13 PROCEDURE — 81025 URINE PREGNANCY TEST: CPT | Performed by: PHYSICIAN ASSISTANT

## 2022-10-13 PROCEDURE — 87086 URINE CULTURE/COLONY COUNT: CPT | Performed by: PHYSICIAN ASSISTANT

## 2022-10-13 PROCEDURE — 87186 SC STD MICRODIL/AGAR DIL: CPT | Performed by: PHYSICIAN ASSISTANT

## 2022-10-13 PROCEDURE — 81001 URINALYSIS AUTO W/SCOPE: CPT | Performed by: PHYSICIAN ASSISTANT

## 2022-10-13 PROCEDURE — 99214 OFFICE O/P EST MOD 30 MIN: CPT | Performed by: PHYSICIAN ASSISTANT

## 2022-10-13 RX ORDER — FLUCONAZOLE 150 MG/1
150 TABLET ORAL
Qty: 3 TABLET | Refills: 0 | Status: SHIPPED | OUTPATIENT
Start: 2022-10-13 | End: 2022-10-20

## 2022-10-13 RX ORDER — NITROFURANTOIN 25; 75 MG/1; MG/1
100 CAPSULE ORAL 2 TIMES DAILY
Qty: 14 CAPSULE | Refills: 0 | Status: SHIPPED | OUTPATIENT
Start: 2022-10-13 | End: 2022-10-20

## 2022-10-13 ASSESSMENT — ENCOUNTER SYMPTOMS
MUSCULOSKELETAL NEGATIVE: 1
FATIGUE: 0
NAUSEA: 0
FEVER: 0
NEUROLOGICAL NEGATIVE: 1
NECK PAIN: 0
VOMITING: 0
SHORTNESS OF BREATH: 0
FREQUENCY: 1
DYSURIA: 1
MYALGIAS: 0
ACTIVITY CHANGE: 0
RESPIRATORY NEGATIVE: 1
NECK STIFFNESS: 0
CARDIOVASCULAR NEGATIVE: 1
POLYDIPSIA: 0
PALPITATIONS: 0
DIZZINESS: 0
HEMATURIA: 0
ADENOPATHY: 0
CONSTITUTIONAL NEGATIVE: 1
ABDOMINAL PAIN: 0
FLANK PAIN: 0
WEAKNESS: 0
ENDOCRINE NEGATIVE: 1
DIARRHEA: 0
CHILLS: 0
SORE THROAT: 0
GASTROINTESTINAL NEGATIVE: 1
COUGH: 0
LIGHT-HEADEDNESS: 0
HEADACHES: 0
RHINORRHEA: 0
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
## 2022-10-13 NOTE — PATIENT INSTRUCTIONS
Dear Ej Dunaway,    We are sorry you are not feeling well. Based on the responses you provided, it is recommended that you be seen in-person in urgent care so we can better evaluate your symptoms. Please click here to find the nearest urgent care location to you.   You will not be charged for this Visit. Thank you for trusting us with your care.    Arielle Mauricio, CNP

## 2022-10-15 LAB — BACTERIA UR CULT: ABNORMAL

## 2022-11-21 ENCOUNTER — HEALTH MAINTENANCE LETTER (OUTPATIENT)
Age: 22
End: 2022-11-21

## 2022-12-31 ENCOUNTER — E-VISIT (OUTPATIENT)
Dept: FAMILY MEDICINE | Facility: CLINIC | Age: 22
End: 2022-12-31
Payer: MEDICAID

## 2022-12-31 DIAGNOSIS — J02.9 SORE THROAT: Primary | ICD-10-CM

## 2022-12-31 PROCEDURE — 99207 PR NON-BILLABLE SERV PER CHARTING: CPT | Performed by: PHYSICIAN ASSISTANT

## 2022-12-31 NOTE — PATIENT INSTRUCTIONS
Thank you for choosing us for your care. Based on the information provided, I believe you need to be seen in person.  WIth this being the weekend, I do think that urgent care would be your best option. You will not be charged for this eVisit.

## 2023-01-02 ENCOUNTER — VIRTUAL VISIT (OUTPATIENT)
Dept: PEDIATRICS | Facility: CLINIC | Age: 23
End: 2023-01-02
Payer: MEDICAID

## 2023-01-02 DIAGNOSIS — J02.9 SORE THROAT: Primary | ICD-10-CM

## 2023-01-02 PROCEDURE — 99213 OFFICE O/P EST LOW 20 MIN: CPT | Mod: GT | Performed by: PEDIATRICS

## 2023-01-02 NOTE — PATIENT INSTRUCTIONS
What should I do?  We would like to test you for Covid-19 virus and Strep Throat. I have placed orders for these tests.   To schedule: go to your [employer] home page and scroll down to the section that says 'You have an appointment that needs to be scheduled' and click the large green button that says 'Schedule Now' and follow the steps to find the next available openings. It is important that when you are asked what the reason for your appointment is that you mention you need BOTH Covid and Strep tests.    If you are unable to complete these Resermap scheduling steps, please call [phone] to schedule your testing.

## 2023-01-02 NOTE — PROGRESS NOTES
Ej is a 22 year old who is being evaluated via a billable video visit.      How would you like to obtain your AVS? MyChart  If the video visit is dropped, the invitation should be resent by: Text to cell phone: [phone]  Will anyone else be joining your video visit? No          Assessment & Plan     Sore throat  - COVID-19 testing declined  - Streptococcus A Rapid Screen w/Reflex to PCR - Clinic Collect         If tests positive would call in Amoxicillin 500 mg tabs po bid for 10 days  - Influenza A & B Antigen - Clinic Collect        If tests positive, symptomatic treatment only recommended.  Encourage fluids.  Patient education provided, including expected course of illness and symptoms that may occur which would require urgent evalution.         22 minutes spent on the date of the encounter doing chart review, history and exam, documentation and further activities per the note           Return in about 1 week (around 1/9/2023) for recheck, if not improving (face to face).    Cara Matute MD  [facility]   Ej is a 22 year old, presenting for the following health issues:  Throat Problem      HPI     Acute Illness  Acute illness concerns: sore throat   Onset/Duration: 1 day   Symptoms:  Fever: No  Chills/Sweats: No  Headache (location?): YES  Sinus Pressure: No  Conjunctivitis:  No  Ear Pain: no  Rhinorrhea: YES  Congestion: YES  Sore Throat: YES  Cough: no  Wheeze: No  Decreased Appetite: No  Nausea: No  Vomiting: No  Diarrhea: No  Dysuria/Freq.: No  Dysuria or Hematuria: No  Fatigue/Achiness: YES  Sick/Strep Exposure: No  Therapies tried and outcome: tylenol  =====================================================    Ej has had pain and swelling on her right lower jaw/neck and a sore throat for 3 days.  The sore throat started as a pain on the roof of her mouth, and now is a more typical sore throat.  She saw some drainage in the back of her throat.  She has  "felt hot and cold, but has NOT taken her temperature because she did not have a thermometer available.  She is also congested but does not report cough.  She is eating normally, and sleeping normally.  She does report some pain in her lower teeth when she chews.    Ej reports that she is trying to cut down on vaping, working on \"hitting it every hour instead of every minute.\"    Review of Systems   Constitutional, HEENT, cardiovascular, pulmonary, gi and gu systems are negative, except as otherwise noted.      Objective           Vitals:  No vitals were obtained today due to virtual visit.    Physical Exam   GENERAL: Healthy, alert and no distress  EYES: Eyes grossly normal to inspection.  No discharge or erythema, or obvious scleral/conjunctival abnormalities.  RESP: No audible wheeze, cough, or visible cyanosis.  No visible retractions or increased work of breathing.    SKIN: Visible skin clear. No significant rash, abnormal pigmentation or lesions.  NEURO: Cranial nerves grossly intact.  Mentation and speech appropriate for age.  PSYCH: Mentation appears normal, affect normal/bright, judgement and insight intact, normal speech and appearance well-groomed.    Diagnostics: pending            Video-Visit Details    Type of service:  Video Visit   Video Start Time: 8:28 AM  Video End Time:8:40 AM    Originating Location (pt. Location): Home    Distant Location (provider location):  On-site  Platform used for Video Visit: Dante    "

## 2023-01-08 ENCOUNTER — E-VISIT (OUTPATIENT)
Dept: URGENT CARE | Facility: CLINIC | Age: 23
End: 2023-01-08
Payer: MEDICAID

## 2023-01-08 DIAGNOSIS — R09.89 CHEST WALL SYMPTOM OR COMPLAINT: Primary | ICD-10-CM

## 2023-01-08 PROCEDURE — 99207 PR NON-BILLABLE SERV PER CHARTING: CPT | Performed by: PHYSICIAN ASSISTANT

## 2023-01-09 NOTE — PATIENT INSTRUCTIONS
After reviewing your responses, I am unable to make a diagnosis that can be treated online.    You will not be charged for this eVisit.     We are dedicated to helping you achieve your best health and would like to see you in one of our many clinic locations - a primary care provider would be ideal for your concern.    Please use [employer] to schedule a visit with a provider or call [phone] ([phone]) to schedule at any of our locations.    Thanks for choosing?us?as your health care partner,?   ?  Kiara Quijano PA-C, [facility]  1/8/2023  11:50 PM

## 2023-01-26 ENCOUNTER — OFFICE VISIT (OUTPATIENT)
Dept: INTERNAL MEDICINE | Facility: CLINIC | Age: 23
End: 2023-01-26
Payer: MEDICAID

## 2023-01-26 VITALS
TEMPERATURE: 97.9 F | SYSTOLIC BLOOD PRESSURE: 98 MMHG | RESPIRATION RATE: 18 BRPM | HEART RATE: 71 BPM | BODY MASS INDEX: 35.54 KG/M2 | DIASTOLIC BLOOD PRESSURE: 56 MMHG | HEIGHT: 66 IN | OXYGEN SATURATION: 98 %

## 2023-01-26 DIAGNOSIS — N39.0 ACUTE UTI (URINARY TRACT INFECTION): ICD-10-CM

## 2023-01-26 DIAGNOSIS — R31.9 URINARY TRACT INFECTION WITH HEMATURIA, SITE UNSPECIFIED: ICD-10-CM

## 2023-01-26 DIAGNOSIS — N39.0 URINARY TRACT INFECTION WITH HEMATURIA, SITE UNSPECIFIED: ICD-10-CM

## 2023-01-26 DIAGNOSIS — R30.0 DYSURIA: Primary | ICD-10-CM

## 2023-01-26 LAB
RBC #/AREA URNS AUTO: ABNORMAL /HPF
SQUAMOUS #/AREA URNS AUTO: ABNORMAL /LPF
WBC #/AREA URNS AUTO: ABNORMAL /HPF

## 2023-01-26 PROCEDURE — 81015 MICROSCOPIC EXAM OF URINE: CPT | Performed by: INTERNAL MEDICINE

## 2023-01-26 PROCEDURE — 87088 URINE BACTERIA CULTURE: CPT | Performed by: INTERNAL MEDICINE

## 2023-01-26 PROCEDURE — 87086 URINE CULTURE/COLONY COUNT: CPT | Performed by: INTERNAL MEDICINE

## 2023-01-26 PROCEDURE — 99213 OFFICE O/P EST LOW 20 MIN: CPT | Performed by: INTERNAL MEDICINE

## 2023-01-26 RX ORDER — NITROFURANTOIN 25; 75 MG/1; MG/1
100 CAPSULE ORAL 2 TIMES DAILY
Qty: 10 CAPSULE | Refills: 0 | Status: SHIPPED | OUTPATIENT
Start: 2023-01-26 | End: 2023-03-10

## 2023-01-26 RX ORDER — NITROFURANTOIN 25; 75 MG/1; MG/1
100 CAPSULE ORAL 2 TIMES DAILY
Qty: 10 CAPSULE | Refills: 0 | Status: SHIPPED | OUTPATIENT
Start: 2023-01-26 | End: 2023-01-31

## 2023-01-26 ASSESSMENT — ANXIETY QUESTIONNAIRES
7. FEELING AFRAID AS IF SOMETHING AWFUL MIGHT HAPPEN: NOT AT ALL
3. WORRYING TOO MUCH ABOUT DIFFERENT THINGS: SEVERAL DAYS
6. BECOMING EASILY ANNOYED OR IRRITABLE: MORE THAN HALF THE DAYS
7. FEELING AFRAID AS IF SOMETHING AWFUL MIGHT HAPPEN: NOT AT ALL
4. TROUBLE RELAXING: NOT AT ALL
IF YOU CHECKED OFF ANY PROBLEMS ON THIS QUESTIONNAIRE, HOW DIFFICULT HAVE THESE PROBLEMS MADE IT FOR YOU TO DO YOUR WORK, TAKE CARE OF THINGS AT HOME, OR GET ALONG WITH OTHER PEOPLE: NOT DIFFICULT AT ALL
5. BEING SO RESTLESS THAT IT IS HARD TO SIT STILL: NOT AT ALL
GAD7 TOTAL SCORE: 5
8. IF YOU CHECKED OFF ANY PROBLEMS, HOW DIFFICULT HAVE THESE MADE IT FOR YOU TO DO YOUR WORK, TAKE CARE OF THINGS AT HOME, OR GET ALONG WITH OTHER PEOPLE?: NOT DIFFICULT AT ALL
GAD7 TOTAL SCORE: 5
1. FEELING NERVOUS, ANXIOUS, OR ON EDGE: SEVERAL DAYS
GAD7 TOTAL SCORE: 5
2. NOT BEING ABLE TO STOP OR CONTROL WORRYING: SEVERAL DAYS

## 2023-01-26 ASSESSMENT — PATIENT HEALTH QUESTIONNAIRE - PHQ9
SUM OF ALL RESPONSES TO PHQ QUESTIONS 1-9: 7
SUM OF ALL RESPONSES TO PHQ QUESTIONS 1-9: 7
10. IF YOU CHECKED OFF ANY PROBLEMS, HOW DIFFICULT HAVE THESE PROBLEMS MADE IT FOR YOU TO DO YOUR WORK, TAKE CARE OF THINGS AT HOME, OR GET ALONG WITH OTHER PEOPLE: NOT DIFFICULT AT ALL

## 2023-01-26 NOTE — PROGRESS NOTES
Assessment & Plan     Dysuria  This patient has had some recurrent urinary tract infections.  Her current symptoms and an abrupt onset roughly 2 days ago. She's got urinary urgency like gangbalejandro and had to take azo [ phenazopyridine ] just to make it through the day until she had her appointment with me today. Due to the azo [ phenazopyridine ] some aspects of her urine evaluation aren't possibly however there's clearly evidence of pyuria with limited red blood cells, most consistent with cystitis. We discussed the diagnosis and will prescribe antibiotics , we do need to review the urine culture as soon as possible to rule out any chance of a resistant organism  - Urine Microscopic; Future  - Urine Microscopic  - Urine Culture    Urinary tract infection with hematuria, site unspecified  As detailed above   - nitroFURantoin macrocrystal-monohydrate (MACROBID) 100 MG capsule; Take 1 capsule (100 mg) by mouth 2 times daily    Review of the result(s) of each unique test - todays urine studies  Prescription drug management  10 minutes spent on the date of the encounter doing chart review, history and exam, documentation and further activities per the note      No follow-ups on file.    Darien Mejia MD  [facility] CARRILLO Pagan is a 22 year old accompanied by her none, presenting for the following health issues:  Urinary Pain      History of Present Illness       Reason for visit:  Bladder infection  Symptom onset:  1-3 days ago  Symptoms include:  Burning urgency uncomfortble  Symptom intensity:  Moderate  Symptom progression:  Worsening  Had these symptoms before:  No  What makes it worse:  No  What makes it better:  No    She eats 0-1 servings of fruits and vegetables daily.She consumes 5 sweetened beverage(s) daily.She exercises with enough effort to increase her heart rate 10 to 19 minutes per day.  She exercises with enough effort to increase her heart rate 3 or less days per  "week.   She is taking medications regularly.    Today's PHQ-9         PHQ-9 Total Score: 7    PHQ-9 Q9 Thoughts of better off dead/self-harm past 2 weeks :   Not at all    How difficult have these problems made it for you to do your work, take care of things at home, or get along with other people: Not difficult at all  Today's NICO-7 Score: 5       Genitourinary - Female  Onset/Duration: yesterday   Description:   Painful urination (Dysuria): YES           Frequency: YES  Blood in urine (Hematuria): No  Delay in urine (Hesitency): No  Progression of Symptoms:  worsening  Accompanying Signs & Symptoms:  Fever/chills: No  Flank pain: No  Nausea and vomiting: No  Vaginal symptoms: none  Abdominal/Pelvic Pain: No  History:   History of frequent UTI s: YES  History of kidney stones: No  Sexually Active: YES  Possibility of pregnancy: No  Therapies tried and outcome:  Urinary relief otc pill       Review of Systems   Constitutional, HEENT, cardiovascular, pulmonary, gi and gu systems are negative, except as otherwise noted.      Objective    BP 98/56   Pulse 71   Temp 97.9  F (36.6  C) (Oral)   Resp 18   Ht 1.676 m (5' 6\")   LMP 01/23/2023 (Exact Date)   SpO2 98%   BMI 35.54 kg/m    Body mass index is 35.54 kg/m .  Physical Exam   GENERAL: healthy, alert and no distress  EYES: Eyes grossly normal to inspection, PERRL and conjunctivae and sclerae normal  ABDOMEN: soft, nontender, no hepatosplenomegaly, no masses and bowel sounds normal  MS: no gross musculoskeletal defects noted, no edema    Orders Placed This Encounter   Procedures     Urine Microscopic       "

## 2023-01-27 ENCOUNTER — E-VISIT (OUTPATIENT)
Dept: URGENT CARE | Facility: CLINIC | Age: 23
End: 2023-01-27
Payer: MEDICAID

## 2023-01-27 ENCOUNTER — MYC MEDICAL ADVICE (OUTPATIENT)
Dept: PEDIATRICS | Facility: OTHER | Age: 23
End: 2023-01-27

## 2023-01-27 DIAGNOSIS — B00.1 HERPES LABIALIS: Primary | ICD-10-CM

## 2023-01-27 PROCEDURE — 99207 PR NO CHARGE LOS: CPT | Performed by: PHYSICIAN ASSISTANT

## 2023-01-27 RX ORDER — VALACYCLOVIR HYDROCHLORIDE 1 G/1
2000 TABLET, FILM COATED ORAL 2 TIMES DAILY
Qty: 4 TABLET | Refills: 1 | Status: SHIPPED | OUTPATIENT
Start: 2023-01-27 | End: 2023-03-10

## 2023-01-28 LAB
BACTERIA UR CULT: ABNORMAL
BACTERIA UR CULT: NORMAL

## 2023-02-26 ENCOUNTER — E-VISIT (OUTPATIENT)
Dept: URGENT CARE | Facility: CLINIC | Age: 23
End: 2023-02-26
Payer: MEDICAID

## 2023-02-26 DIAGNOSIS — K12.0 CANKER SORE: Primary | ICD-10-CM

## 2023-02-26 PROCEDURE — 99207 PR NO CHARGE LOS: CPT | Performed by: PHYSICIAN ASSISTANT

## 2023-02-28 RX ORDER — TRIAMCINOLONE ACETONIDE 0.1 %
PASTE (GRAM) DENTAL 2 TIMES DAILY
Qty: 5 G | Refills: 1 | Status: SHIPPED | OUTPATIENT
Start: 2023-02-28 | End: 2023-04-28

## 2023-03-01 NOTE — PATIENT INSTRUCTIONS
Dear Gino Flaehrty PA-C    After reviewing your responses, I've been able to diagnose you with cankersores which are common sores inside your mouth that are painful and the exact cause is often unknown. In the future, please include a photo so we are able to appropriately diagnose you.    Based on your responses, I have prescribed topical treatment to treat this. Please follow the instructions on the medication. If you experience irritation of your skin, new rash, or any other new symptoms, you should stop using this medication and contact your primary care provider.    If this treatment does not work for you or your sores are worsening instead of improving or do not resolve in 7 days, please plan to follow-up with your primary care provider. They may be able to offer refills for you for future outbreaks as well.    Thanks for choosing?us?as your health care partner,?   ?   Gino Flaherty PA-C?

## 2023-03-10 ENCOUNTER — VIRTUAL VISIT (OUTPATIENT)
Dept: FAMILY MEDICINE | Facility: CLINIC | Age: 23
End: 2023-03-10
Payer: MEDICAID

## 2023-03-10 DIAGNOSIS — B00.1 HERPES LABIALIS: ICD-10-CM

## 2023-03-10 PROCEDURE — 99213 OFFICE O/P EST LOW 20 MIN: CPT | Mod: VID | Performed by: PHYSICIAN ASSISTANT

## 2023-03-10 RX ORDER — VALACYCLOVIR HYDROCHLORIDE 1 G/1
2000 TABLET, FILM COATED ORAL 2 TIMES DAILY
Qty: 4 TABLET | Refills: 2 | Status: SHIPPED | OUTPATIENT
Start: 2023-03-10 | End: 2023-04-28

## 2023-03-10 ASSESSMENT — PATIENT HEALTH QUESTIONNAIRE - PHQ9
SUM OF ALL RESPONSES TO PHQ QUESTIONS 1-9: 1
10. IF YOU CHECKED OFF ANY PROBLEMS, HOW DIFFICULT HAVE THESE PROBLEMS MADE IT FOR YOU TO DO YOUR WORK, TAKE CARE OF THINGS AT HOME, OR GET ALONG WITH OTHER PEOPLE: NOT DIFFICULT AT ALL
SUM OF ALL RESPONSES TO PHQ QUESTIONS 1-9: 1

## 2023-03-10 NOTE — PROGRESS NOTES
Ej is a 22 year old who is being evaluated via a billable video visit.      How would you like to obtain your AVS? MyChart  If the video visit is dropped, the invitation should be resent by: Text to cell phone: [phone]  Will anyone else be joining your video visit? No          Assessment & Plan     Herpes labialis  Refilled valtrex- helpful in past.  Follow up with pcp for annual exam and pap smear as soon as possible   - valACYclovir (VALTREX) 1000 mg tablet  Dispense: 4 tablet; Refill: 2      Prescription drug management         Patient Instructions   Take valtrex 2 g twice a day for one day  Return urgently if any change in symptoms.    Schedule your annual exam and pap smear as soon as possible        Follow up in 4 week(s) if not improving or any change in symptoms.      HALLE Martinez [facility]    Gerardo Pagan is a 22 year old, presenting for the following health issues:  coldsore      HPI     Concern - Cold sore ( inside of mouth)  Onset: yesterday   Description: bump in mouth   Intensity: mild  Progression of Symptoms:  worsening  Accompanying Signs & Symptoms: none  Previous history of similar problem: yes, use valtrex  Precipitating factors:        Worsened by: none  Alleviating factors:        Improved by: none  Therapies tried and outcome: None      Cold sore - pretty sure starting one on lip as well. Last  outbreak 3 weeks ago and took last  valtrex.  Outbreak every time I have my period. Approximately once a month  First outbreak and took all of them  Stressed about rent but declines other stressors.  Not on any medications for depression or anxiety   chipotle   Review of Systems   Constitutional, HEENT, cardiovascular, pulmonary, gi and gu systems are negative, except as otherwise noted.      Objective           Vitals:  No vitals were obtained today due to virtual visit.    Physical Exam   GENERAL: Healthy, alert and no distress  EYES: Eyes grossly  normal to inspection.  No discharge or erythema, or obvious scleral/conjunctival abnormalities.  RESP: No audible wheeze, cough, or visible cyanosis.  No visible retractions or increased work of breathing.    SKIN: Visible skin clear. No significant rash, abnormal pigmentation or lesions.  NEURO: Cranial nerves grossly intact.  Mentation and speech appropriate for age.  PSYCH: Mentation appears normal, affect normal/bright, judgement and insight intact, normal speech and appearance well-groomed.                Video-Visit Details    Type of service:  Video Visit   Video Start Time: 8:40 AM  Video End Time:8:44 AM    Originating Location (pt. Location): Home    Distant Location (provider location):  On-site  Platform used for Video Visit: [employer]    Answers for HPI/ROS submitted by the patient on 3/10/2023  If you checked off any problems, how difficult have these problems made it for you to do your work, take care of things at home, or get along with other people?: Not difficult at all  PHQ9 TOTAL SCORE: 1

## 2023-03-10 NOTE — PATIENT INSTRUCTIONS
Take valtrex 2 g twice a day for one day  Return urgently if any change in symptoms or lack of improvement over the next week   Schedule your annual exam and pap smear as soon as possible

## 2023-03-17 ENCOUNTER — E-VISIT (OUTPATIENT)
Dept: URGENT CARE | Facility: CLINIC | Age: 23
End: 2023-03-17
Payer: MEDICAID

## 2023-03-17 DIAGNOSIS — R21 RASH AND NONSPECIFIC SKIN ERUPTION: Primary | ICD-10-CM

## 2023-03-17 PROCEDURE — 99207 PR NON-BILLABLE SERV PER CHARTING: CPT | Performed by: FAMILY MEDICINE

## 2023-03-18 ENCOUNTER — OFFICE VISIT (OUTPATIENT)
Dept: URGENT CARE | Facility: URGENT CARE | Age: 23
End: 2023-03-18
Payer: MEDICAID

## 2023-03-18 VITALS
OXYGEN SATURATION: 97 % | WEIGHT: 230 LBS | HEART RATE: 74 BPM | DIASTOLIC BLOOD PRESSURE: 66 MMHG | TEMPERATURE: 97.8 F | SYSTOLIC BLOOD PRESSURE: 129 MMHG | BODY MASS INDEX: 37.12 KG/M2

## 2023-03-18 DIAGNOSIS — L01.00 IMPETIGO: Primary | ICD-10-CM

## 2023-03-18 DIAGNOSIS — H00.014 HORDEOLUM EXTERNUM OF LEFT UPPER EYELID: ICD-10-CM

## 2023-03-18 PROCEDURE — 99214 OFFICE O/P EST MOD 30 MIN: CPT | Performed by: PHYSICIAN ASSISTANT

## 2023-03-18 RX ORDER — MUPIROCIN 20 MG/G
OINTMENT TOPICAL 3 TIMES DAILY
Qty: 15 G | Refills: 0 | Status: SHIPPED | OUTPATIENT
Start: 2023-03-18 | End: 2023-03-28

## 2023-03-18 RX ORDER — ERYTHROMYCIN 5 MG/G
0.5 OINTMENT OPHTHALMIC 3 TIMES DAILY
Qty: 10.5 G | Refills: 0 | Status: SHIPPED | OUTPATIENT
Start: 2023-03-18 | End: 2023-03-25

## 2023-03-18 NOTE — PATIENT INSTRUCTIONS
Dear Ej Dunaway,  I am concerned with spots close to the eyes - best to be seen in person   We are sorry you are not feeling well. Based on the responses you provided, it is recommended that you be seen in-person in urgent care so we can better evaluate your symptoms. Please click here to find the nearest urgent care location to you.   You will not be charged for this Visit. Thank you for trusting us with your care.    Sofía Alvarado MD

## 2023-03-22 NOTE — PROGRESS NOTES
SUBJECTIVE:  Chief Complaint:   Chief Complaint   Patient presents with     Urgent Care     Sore     C/O sore on left eye for 2 days     History of Present Illness:  Ej Dunaway is a 22 year old female who presents complaining of mild left eye eyelid redness, swelling and lump noted for 1 day(s).   Onset/timing: sudden.    Associated Signs and Symptoms: none  Treatment measures tried include: none  Contact wearer : No      SUBJECTIVE:  Ej Dunaway is a 22 year old female who presents to the clinic today for a rash.  Onset of rash was 1 day(s) ago.   Rash is still present.  Location of the rash: face.  Quality/symptoms of rash: blistering   Symptoms are mild and rash seems to be stable.  Previous history of a similar rash? No  Recent exposure history: none known    Associated symptoms include: nothing.    Past Medical History:   Diagnosis Date     Abnormal lead level in blood     10/15/2002     ADHD (attention deficit hyperactivity disorder), combined type      Adjustment disorder with mixed disturbance of emotions and conduct     Suicidal ideation 6/22/12, placed with new foster parents     Anxiety      Chlamydial infection     8/2015     Conduct disorder     5/2007     Constipation      Depressive disorder      Foster child     Adopted 2013     Personal history of other medical treatment (CODE)     No Comments Provided     Recurrent UTI      Toxic effect of lead and its compounds, accidental (unintentional), initial encounter     2004     Urinary leakage      Urinary tract infection     No Comments Provided     Current Outpatient Medications   Medication Sig Dispense Refill     erythromycin (ROMYCIN) 5 MG/GM ophthalmic ointment Place 0.5 inches Into the left eye 3 times daily for 7 days 10.5 g 0     mupirocin (BACTROBAN) 2 % external ointment Apply topically 3 times daily for 10 days Cream if ointment is not covered 15 g 0     triamcinolone (KENALOG) 0.1 % paste Take by mouth 2 times  daily (Patient not taking: Reported on 3/10/2023) 5 g 1     valACYclovir (VALTREX) 1000 mg tablet Take 2 tablets (2,000 mg) by mouth 2 times daily (Patient not taking: Reported on 3/18/2023) 4 tablet 2     Social History     Tobacco Use     Smoking status: Former     Packs/day: 0.50     Types: Cigarettes     Smokeless tobacco: Never   Substance Use Topics     Alcohol use: Not Currently     Alcohol/week: 0.0 standard drinks       ROS:  Review of systems negative except as stated above.    EXAM:   /66   Pulse 74   Temp 97.8  F (36.6  C) (Tympanic)   Wt 104.3 kg (230 lb)   LMP 03/01/2023 (Exact Date)   SpO2 97%   BMI 37.12 kg/m    GENERAL: alert, no acute distress.  SKIN: small gold crusting rash  GENERAL APPEARANCE: healthy, alert and no distress  EYES:left upper lid with small hordeuolum x2 conjunctiva clear  NECK: supple, non-tender to palpation, no adenopathy noted  RESP: lungs clear to auscultation - no rales, rhonchi or wheezes  CV: regular rates and rhythm, normal S1 S2, no murmur noted  NEURO: Normal strength and tone, sensory exam grossly normal,  normal speech and mentation    ASSESSMENT:  (L01.00) Impetigo  (primary encounter diagnosis)  Plan: mupirocin (BACTROBAN) 2 % external ointment    (H00.014) Hordeolum externum of left upper eyelid  Plan: erythromycin (ROMYCIN) 5 MG/GM ophthalmic         ointment          Follow up with PCP if symptoms worsen or fail to improve

## 2023-04-16 ENCOUNTER — HEALTH MAINTENANCE LETTER (OUTPATIENT)
Age: 23
End: 2023-04-16

## 2023-04-27 ENCOUNTER — HOSPITAL ENCOUNTER (EMERGENCY)
Facility: CLINIC | Age: 23
Discharge: PSYCHIATRIC OR SUBSTANCE USE FACILITY | End: 2023-04-29
Attending: EMERGENCY MEDICINE | Admitting: EMERGENCY MEDICINE
Payer: MEDICAID

## 2023-04-27 ENCOUNTER — TELEPHONE (OUTPATIENT)
Dept: BEHAVIORAL HEALTH | Facility: CLINIC | Age: 23
End: 2023-04-27

## 2023-04-27 DIAGNOSIS — F25.9 SCHIZOAFFECTIVE DISORDER, UNSPECIFIED TYPE (H): ICD-10-CM

## 2023-04-27 DIAGNOSIS — F15.10 METHAMPHETAMINE USE (H): ICD-10-CM

## 2023-04-27 LAB — SARS-COV-2 RNA RESP QL NAA+PROBE: NEGATIVE

## 2023-04-27 PROCEDURE — U0005 INFEC AGEN DETEC AMPLI PROBE: HCPCS | Performed by: EMERGENCY MEDICINE

## 2023-04-27 PROCEDURE — 99285 EMERGENCY DEPT VISIT HI MDM: CPT | Mod: 25 | Performed by: EMERGENCY MEDICINE

## 2023-04-27 PROCEDURE — 99285 EMERGENCY DEPT VISIT HI MDM: CPT | Performed by: EMERGENCY MEDICINE

## 2023-04-27 PROCEDURE — C9803 HOPD COVID-19 SPEC COLLECT: HCPCS | Performed by: EMERGENCY MEDICINE

## 2023-04-27 PROCEDURE — 90791 PSYCH DIAGNOSTIC EVALUATION: CPT

## 2023-04-27 PROCEDURE — 250N000013 HC RX MED GY IP 250 OP 250 PS 637: Performed by: FAMILY MEDICINE

## 2023-04-27 RX ORDER — OLANZAPINE 10 MG/1
10 TABLET, ORALLY DISINTEGRATING ORAL ONCE
Status: COMPLETED | OUTPATIENT
Start: 2023-04-27 | End: 2023-04-27

## 2023-04-27 RX ORDER — HALOPERIDOL 5 MG/1
5 TABLET ORAL AT BEDTIME
Status: DISCONTINUED | OUTPATIENT
Start: 2023-04-27 | End: 2023-04-28

## 2023-04-27 RX ORDER — HALOPERIDOL 5 MG/1
1 TABLET ORAL AT BEDTIME
Status: ON HOLD | COMMUNITY
Start: 2023-04-26 | End: 2023-05-09

## 2023-04-27 RX ADMIN — OLANZAPINE 10 MG: 10 TABLET, ORALLY DISINTEGRATING ORAL at 19:58

## 2023-04-27 ASSESSMENT — COLUMBIA-SUICIDE SEVERITY RATING SCALE - C-SSRS
REASONS FOR IDEATION LIFETIME: COMPLETELY TO END OR STOP THE PAIN (YOU COULDN'T GO ON LIVING WITH THE PAIN OR HOW YOU WERE FEELING)
REASONS FOR IDEATION PAST MONTH: DOES NOT APPLY
TOTAL  NUMBER OF INTERRUPTED ATTEMPTS LIFETIME: NO
1. HAVE YOU WISHED YOU WERE DEAD OR WISHED YOU COULD GO TO SLEEP AND NOT WAKE UP?: YES
ATTEMPT PAST THREE MONTHS: NO
TOTAL  NUMBER OF ABORTED OR SELF INTERRUPTED ATTEMPTS LIFETIME: NO
4. HAVE YOU HAD THESE THOUGHTS AND HAD SOME INTENTION OF ACTING ON THEM?: NO
2. HAVE YOU ACTUALLY HAD ANY THOUGHTS OF KILLING YOURSELF?: NO
6. HAVE YOU EVER DONE ANYTHING, STARTED TO DO ANYTHING, OR PREPARED TO DO ANYTHING TO END YOUR LIFE?: NO
4. HAVE YOU HAD THESE THOUGHTS AND HAD SOME INTENTION OF ACTING ON THEM?: YES
3. HAVE YOU BEEN THINKING ABOUT HOW YOU MIGHT KILL YOURSELF?: YES
2. HAVE YOU ACTUALLY HAD ANY THOUGHTS OF KILLING YOURSELF?: YES
5. HAVE YOU STARTED TO WORK OUT OR WORKED OUT THE DETAILS OF HOW TO KILL YOURSELF? DO YOU INTEND TO CARRY OUT THIS PLAN?: YES
1. IN THE PAST MONTH, HAVE YOU WISHED YOU WERE DEAD OR WISHED YOU COULD GO TO SLEEP AND NOT WAKE UP?: NO
ATTEMPT LIFETIME: YES
5. HAVE YOU STARTED TO WORK OUT OR WORKED OUT THE DETAILS OF HOW TO KILL YOURSELF? DO YOU INTEND TO CARRY OUT THIS PLAN?: NO

## 2023-04-27 ASSESSMENT — ACTIVITIES OF DAILY LIVING (ADL)
ADLS_ACUITY_SCORE: 35
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
## 2023-04-27 NOTE — ED PROVIDER NOTES
[facility] EMERGENCY DEPARTMENT ([facility])    4/27/23      ED PROVIDER NOTE  Demetrius RAMIREZ  History     Chief Complaint   Patient presents with     Hallucinations     BIBA per report pt was picked up at the Rehab center pt has been refusing to take Haldol. Pt was brought to [facility] for progressively worsening Worship hallucinations. Pt was cleared from [facility] and d'cd back to Rehab center. Today the same symptoms happened thus pt is in the ED. Pt took oral Haldol 5mg at 2:45 PM. Per EMS Rehab center will take pt back.     The history is provided by the patient and medical records.     Ej Dunaway is a 22 year old female with prior history of depression, anxiety, RAD, ADHD, and schizoaffective disorder, substance use disorder (Fentanyl, codeine, benzodiazepines, methamphetamines) who presents via ambulance for mental health evaluation.  Per EMS report patient has been at chemical dependency treatment [facility] for 4 days where she refused to take Haldol.  She developed worsening Worship delusions, was brought to [facility] yesterday evening for evaluation of possible psychosis.  She had an assessment.  She was discharged back to [facility] after she was cleared in the emergency department.  Today she had a recurrence of Worship delusions, they tried to get her to take Haldol but she refused this.  Her treatment facility is willing to take her back.  She says that she is Hood's wife, that the devil came forward to steal and destroy, that she is suppose to take down the antichrist.  She is worried she will be prosecuted like Hood. She denies si/hi.  Staff at [facility] feel she is responding to internal stimuli and see her talking out loud to something that is not there.  She has similar admission last May.  She has hx of meth use and last used 4 days ago.         Per [facility] records, patient is currently on probation and has a PO through [place] [place].     Past  "Medical and Surgical History, Medications, Allergies, and Social History were reviewed in the electronic medical record. Review with the patient was attempted but limited due to patient's acute psychiatric illness.      A complete review of systems was attempted but limited due to patient's acute psychiatric illness.    Physical Exam   BP: 131/70  Pulse: 85  Temp: 98.4  F (36.9  C)  Resp: 16  Height: 167.6 cm (5' 6\")  Weight: 104.8 kg (231 lb)  SpO2: 98 %  Physical Exam  Vitals and nursing note reviewed.   HENT:      Head: Normocephalic and atraumatic.      Nose: Nose normal.   Eyes:      General: No scleral icterus.     Extraocular Movements: Extraocular movements intact.   Cardiovascular:      Rate and Rhythm: Normal rate.   Pulmonary:      Effort: Pulmonary effort is normal.   Musculoskeletal:         General: Normal range of motion.      Cervical back: Normal range of motion.   Skin:     Coloration: Skin is not jaundiced or pale.   Neurological:      General: No focal deficit present.      Mental Status: She is alert and oriented to person, place, and time.   Psychiatric:         Attention and Perception: She is inattentive. She perceives auditory hallucinations.         Mood and Affect: Mood is anxious. Mood is not depressed or elated. Affect is not labile, blunt, flat, angry, tearful or inappropriate.         Speech: Speech normal.         Behavior: Behavior is cooperative.         Thought Content: Thought content is paranoid and delusional. Thought content does not include homicidal or suicidal ideation.         Cognition and Memory: Cognition and memory normal.         Judgment: Judgment is inappropriate.           ED Course, Procedures, & Data      Procedures                     Results for orders placed or performed during the hospital encounter of 04/27/23   Asymptomatic COVID-19 Virus (Coronavirus) by PCR Nose     Status: Normal    Specimen: Nose; Swab   Result Value Ref Range    SARS CoV2 PCR Negative " Negative    Narrative    Testing was performed using the Xpert Xpress SARS-CoV-2 Assay on the Cepheid Gene-Xpert Instrument Systems. Additional information about this Emergency Use Authorization (EUA) assay can be found via the Lab Guide. This test should be ordered for the detection of SARS-CoV-2 in individuals who meet SARS-CoV-2 clinical and/or epidemiological criteria as well as from individuals without symptoms or other reasons to suspect COVID-19. Test performance for asymptomatic patients has only been established in anterior nasal swab specimens. This test is for in vitro diagnostic use under the FDA EUA for laboratories certified under CLIA to perform high complexity testing. This test has not been FDA cleared or approved. A negative result does not rule out the presence of PCR inhibitors in the specimen or target RNA concentration below the limit of detection for the assay. The possibility of a false negative should be considered if the patient's recent exposure or clinical presentation suggests COVID-19. This test was validated by the [facility]. This laboratory is certified under the Clinical Laboratory Improvement Amendments (CLIA) as qualified to perform high complexity laboratory testing.       Medications   haloperidol (HALDOL) tablet 5 mg (has no administration in time range)   OLANZapine zydis (zyPREXA) ODT tab 10 mg (10 mg Oral $Given 4/27/23 1958)     Labs Ordered and Resulted from Time of ED Arrival to Time of ED Departure   COVID-19 VIRUS (CORONAVIRUS) BY PCR - Normal       Result Value    SARS CoV2 PCR Negative     HCG QUALITATIVE URINE     No orders to display          Critical care was not performed.     Medical Decision Making  The patient's presentation was of high complexity (a chronic illness severe exacerbation, progression, or side effect of treatment).    The patient's evaluation involved:  an assessment requiring an independent historian (see  separate area of note for details)  discussion of management or test interpretation with another health professional (see separate area of note for details)    The patient's management necessitated high risk (a decision regarding hospitalization).      Assessment & Plan    Ej Dunaway is a 22 year old female with prior history of depression, anxiety, RAD, ADHD, and schizoaffective disorder, substance use disorder (Fentanyl, codeine, benzodiazepines, methamphetamines) who presents via ambulance for mental health evaluation.  She has been at [facility] for a few days and was sent here for evaluation.  She is paranoid and delusional and has no insight into her condition. She last used meth 4 days ago.  Based on her presentation admission was recommended by myself and the DEC .  She wants to leave but is vulnerable in her current state, so 72 hour hold was placed.  She will board in the ED until an inpatient bed is available. She is medically cleared for admission.  Her routine meds were ordered though she has been refusing to take them (haldol).     I have reviewed the nursing notes. I have reviewed the findings, diagnosis, plan and need for follow up with the patient.    New Prescriptions    No medications on file       Final diagnoses:   Schizoaffective disorder, unspecified type (H)   Methamphetamine use (H)     I, Gunjan Mcleod, am serving as a trained medical scribe to document services personally performed by Emily Monzon MD based on the provider's statements to me on April 27, 2023.  This document has been checked and approved by the attending provider.    IEmily MD, was physically present and have reviewed and verified the accuracy of this note documented by Gunjan Mcleod, medical scribe.      Emily Monzon MD     [facility] EMERGENCY DEPARTMENT  4/27/2023     Emily Monzon MD  04/27/23 1803

## 2023-04-27 NOTE — ED TRIAGE NOTES
BIBA per report pt was picked up at the Rehab center pt has been refusing to take Haldol. Pt was brought to [facility] for progressively worsening Anglican hallucinations. Pt was cleared from [facility] and d'cd back to Rehab center. Today the same symptoms happened thus pt is in the ED. Pt took oral Haldol 5mg at 2:45 PM. Per EMS Rehab center will take pt back.   Triage Assessment     Row Name 04/27/23 1019       Triage Assessment (Adult)    Airway WDL WDL       Respiratory WDL    Respiratory WDL WDL       Skin Circulation/Temperature WDL    Skin Circulation/Temperature WDL WDL       Cardiac WDL    Cardiac WDL WDL       Peripheral/Neurovascular WDL    Peripheral Neurovascular WDL WDL       Cognitive/Neuro/Behavioral WDL    Cognitive/Neuro/Behavioral WDL WDL

## 2023-04-28 LAB
AMPHETAMINES UR QL SCN: NORMAL
ANION GAP SERPL CALCULATED.3IONS-SCNC: 9 MMOL/L (ref 7–15)
APAP SERPL-MCNC: <5 UG/ML (ref 10–30)
BARBITURATES UR QL SCN: NORMAL
BASOPHILS # BLD AUTO: 0.1 10E3/UL (ref 0–0.2)
BASOPHILS NFR BLD AUTO: 0 %
BENZODIAZ UR QL SCN: NORMAL
BUN SERPL-MCNC: 14.4 MG/DL (ref 6–20)
BZE UR QL SCN: NORMAL
CALCIUM SERPL-MCNC: 8.9 MG/DL (ref 8.6–10)
CANNABINOIDS UR QL SCN: NORMAL
CHLORIDE SERPL-SCNC: 105 MMOL/L (ref 98–107)
CREAT SERPL-MCNC: 0.67 MG/DL (ref 0.51–0.95)
DEPRECATED HCO3 PLAS-SCNC: 24 MMOL/L (ref 22–29)
EOSINOPHIL # BLD AUTO: 0.1 10E3/UL (ref 0–0.7)
EOSINOPHIL NFR BLD AUTO: 1 %
ERYTHROCYTE [DISTWIDTH] IN BLOOD BY AUTOMATED COUNT: 12 % (ref 10–15)
ETHANOL SERPL-MCNC: <0.01 G/DL
GFR SERPL CREATININE-BSD FRML MDRD: >90 ML/MIN/1.73M2
GLUCOSE SERPL-MCNC: 99 MG/DL (ref 70–99)
HCG UR QL: NEGATIVE
HCT VFR BLD AUTO: 39.7 % (ref 35–47)
HGB BLD-MCNC: 13.2 G/DL (ref 11.7–15.7)
IMM GRANULOCYTES # BLD: 0 10E3/UL
IMM GRANULOCYTES NFR BLD: 0 %
LYMPHOCYTES # BLD AUTO: 4.6 10E3/UL (ref 0.8–5.3)
LYMPHOCYTES NFR BLD AUTO: 36 %
MCH RBC QN AUTO: 28.4 PG (ref 26.5–33)
MCHC RBC AUTO-ENTMCNC: 33.2 G/DL (ref 31.5–36.5)
MCV RBC AUTO: 86 FL (ref 78–100)
MONOCYTES # BLD AUTO: 0.9 10E3/UL (ref 0–1.3)
MONOCYTES NFR BLD AUTO: 7 %
NEUTROPHILS # BLD AUTO: 7 10E3/UL (ref 1.6–8.3)
NEUTROPHILS NFR BLD AUTO: 56 %
NRBC # BLD AUTO: 0 10E3/UL
NRBC BLD AUTO-RTO: 0 /100
OPIATES UR QL SCN: NORMAL
PLATELET # BLD AUTO: 371 10E3/UL (ref 150–450)
POTASSIUM SERPL-SCNC: 4.3 MMOL/L (ref 3.4–5.3)
RBC # BLD AUTO: 4.64 10E6/UL (ref 3.8–5.2)
SALICYLATES SERPL-MCNC: <0.3 MG/DL
SODIUM SERPL-SCNC: 138 MMOL/L (ref 136–145)
WBC # BLD AUTO: 12.6 10E3/UL (ref 4–11)

## 2023-04-28 PROCEDURE — 250N000013 HC RX MED GY IP 250 OP 250 PS 637: Performed by: PSYCHIATRY & NEUROLOGY

## 2023-04-28 PROCEDURE — 36415 COLL VENOUS BLD VENIPUNCTURE: CPT | Performed by: FAMILY MEDICINE

## 2023-04-28 PROCEDURE — 81025 URINE PREGNANCY TEST: CPT | Performed by: EMERGENCY MEDICINE

## 2023-04-28 PROCEDURE — 99222 1ST HOSP IP/OBS MODERATE 55: CPT | Performed by: PSYCHIATRY & NEUROLOGY

## 2023-04-28 PROCEDURE — 80048 BASIC METABOLIC PNL TOTAL CA: CPT | Performed by: FAMILY MEDICINE

## 2023-04-28 PROCEDURE — 80179 DRUG ASSAY SALICYLATE: CPT | Performed by: FAMILY MEDICINE

## 2023-04-28 PROCEDURE — 80307 DRUG TEST PRSMV CHEM ANLYZR: CPT | Performed by: EMERGENCY MEDICINE

## 2023-04-28 PROCEDURE — 250N000013 HC RX MED GY IP 250 OP 250 PS 637: Performed by: FAMILY MEDICINE

## 2023-04-28 PROCEDURE — 85025 COMPLETE CBC W/AUTO DIFF WBC: CPT | Performed by: FAMILY MEDICINE

## 2023-04-28 PROCEDURE — 80143 DRUG ASSAY ACETAMINOPHEN: CPT | Performed by: FAMILY MEDICINE

## 2023-04-28 PROCEDURE — 82077 ASSAY SPEC XCP UR&BREATH IA: CPT | Performed by: FAMILY MEDICINE

## 2023-04-28 RX ORDER — HALOPERIDOL 5 MG/1
5 TABLET ORAL ONCE
Status: COMPLETED | OUTPATIENT
Start: 2023-04-28 | End: 2023-04-28

## 2023-04-28 RX ORDER — BENZTROPINE MESYLATE 0.5 MG/1
0.5 TABLET ORAL EVERY 4 HOURS PRN
Status: DISCONTINUED | OUTPATIENT
Start: 2023-04-28 | End: 2023-04-29 | Stop reason: HOSPADM

## 2023-04-28 RX ORDER — HALOPERIDOL 5 MG/1
5 TABLET ORAL 2 TIMES DAILY
Status: DISCONTINUED | OUTPATIENT
Start: 2023-04-28 | End: 2023-04-29 | Stop reason: HOSPADM

## 2023-04-28 RX ADMIN — HALOPERIDOL 5 MG: 5 TABLET ORAL at 19:32

## 2023-04-28 RX ADMIN — HALOPERIDOL 5 MG: 5 TABLET ORAL at 14:48

## 2023-04-28 ASSESSMENT — ACTIVITIES OF DAILY LIVING (ADL)
ADLS_ACUITY_SCORE: 35

## 2023-04-28 NOTE — PROGRESS NOTES
Triage & Transition Services, Extended Care    Client Name: Ej Dunaway    Date: April 28, 2023  Service Type:  Group Therapy  Session Start Time:  1130   Session End Time: 1140  Session Length: 10  Site Location: [facility]  Attendees: Patient and other group members  Facilitator: Art Therapy     Topic:   Art Therapy    Intervention:    Group process: support, challenge, affirm, psycho-education.     Response:  Patient did participate in group. Behavior in group was appropriate. Patient shared she had to leave group due to rude peer.       Adela Tobias LMFT

## 2023-04-28 NOTE — ED NOTES
Call received from patient placement, spoke with Michael, states [facility] is will to review patient once results for their requested lab work is resulted. [facility] is requesting in addition to the UPT and Dau6 to have blood work drawn to include an ETOH level, Tylenol and ASA levels, CMP and a CBC.

## 2023-04-28 NOTE — PHARMACY
This writer spoke with Brandy at [facility] (phone number [phone]). Brandy reported Ej had not been given any medications at that facility and had arrived only a few days prior to her presentation to the ED. Brandy also reported her facility did not give Ej the Haldol decanoate injections and was not able to given any injections.    Randi Macedo, PharmD, BCPP  Behavioral Health Pharmacist  [facility] ([facility])  Emergency Room Ascom: *[phone]

## 2023-04-28 NOTE — ED NOTES
Pt given transport form Notice of non covered service to sign for transport to [facility] facility.Pt refused signature.

## 2023-04-28 NOTE — ED NOTES
Report to next shift. Updated about treatment plan and need for a urine in order to be evaluated by [place].

## 2023-04-28 NOTE — CONSULTS
PSYCHIATRIC CONSULTATION    Requesting Physician: Cruzito Pa MD    Admission Date: 04/27/2023  Date of Service: 04/38/2023    The patient was seen, her chart reviewed, report to follow    Dx: Substance-induced Psychotic Disorder        R/O Schizoaffective Disorder, bipolar type        MDD by history        ADHD        Stimulant Use Disorder        Opioid Use Disorder    Plan: The patient should be admitted to Inpatient Psychiatry for further evaluation and treatment. I will hold Strattera and give her Haldol 5 mg BID. Cogentin will also be ordered 0.5 mg Q4H PRN for EPSE.    Thanks,    James Matamoros MD

## 2023-04-28 NOTE — ED NOTES
" is at the patient's bedside to draw labs. Pt is calm, cooperative and consenting. Pt informed of need for urine and patient responded, \"I can give one when I need to go.\" Pt given lemon lime soda upon request.   "
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
## 2023-04-28 NOTE — PHARMACY-ADMISSION MEDICATION HISTORY
Pharmacist Admission Medication History    Admission medication history is complete. The information provided in this note is only as accurate as the sources available at the time of the update.    Medication reconciliation/reorder completed by provider prior to medication history? Yes    Information Source(s): Caregiver and Hospital records via phone    Pertinent Information: Maureen reported Ej had not been given any medications at [facility]. (See previous note) On 04/26/2026, visit at [facility] Emergency Department prescribed Haldol 5 mg PO at bedtime upon discharge.    Changes made to PTA medication list:    Added: None    Deleted: None    Changed: None     Allergies reviewed with patient and updates made in EHR: no    Medication History Completed By: Randi Macedo RPH 4/28/2023 3:20 PM    Prior to Admission medications    Medication Sig Last Dose Taking? Auth Provider Long Term End Date   haloperidol (HALDOL) 5 MG tablet Take 1 tablet by mouth At Bedtime Not started yet Yes Reported, Patient Yes

## 2023-04-28 NOTE — TELEPHONE ENCOUNTER
R: No appropriate beds within [place].  Bed search update 11:20PM    [facility]: @ cap per website- Per Flavia, no beds available  [facility]: @ cap per website  [facility]: @ cap per website- Per Lud[facility], no beds available  [facility]: @ cap per website  [facility]: @ cap per website  [facility]: @ cap per website  [facility]: @ cap per website  [facility]:  @ cap per website  [facility]: 5 beds posted.  Mixed unit with children.   Per Antoinette, no intakes at this time due to acuity on unit.   [facility]: @ cap per website  [facility]: @ cap per website  [facility]: @ cap per website  [facility]:  @ cap per website  [facility]: @ cap per website  [facility]: 2 beds posted- [facility] is willing to review pt for placement when we get labs  [facility]: @ cap per website   [facility]:5 LOW ACUITY beds available.  No 72 HH.  Voluntary only.   [facility]: 6 beds posted  [facility]: @ cap per website  [facility]: 2 beds posted.  Per Sherry on [facility]: 1 bed available.  Voluntary pts only.  Mixed unit with children.   Not appropriate  [facility]: 10 beds posted  Out of state.    [facility]: 1 beds available.   Mixed unit with children.      Pt remains on PPS work list awaiting appropriate placement.

## 2023-04-28 NOTE — TELEPHONE ENCOUNTER
4:25am Intake received a call from [facility]. This pt was declined by Cal[facility]by and Dana due to pt acuity.
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
## 2023-04-28 NOTE — TELEPHONE ENCOUNTER
S: [facility]go[facility] , DEC  Lupe calling at 7:30PM about a 22 year old/Female presenting with psychosis, pt is religiously preoccupied.        B: Pt arrived via EMS. Presenting problem, stressors: Psychosis.  Pt believes she is jeremias's .  Pt reports she is going to be persecuted like jeremias was because it is almost the end of times.  Pt thinks some people are the antichrist.  Pt reports her thoughts of the devil occur when she takes her medications.    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 Yes: [facility] May 2022  Pt denies SI   Hx of suicide attempt? Yes  Pt has a remote hx of SIB via cutting  Pt denies HI   Pt denies hallucinations .  Substance use facility reports she is having hallucinations and screaming at the devil out of her window and is having conversation with entities that are not there.  Pt RARS Score: 3    Hx of aggression/violence, sexual offenses, legal concerns, Epic care plan? describe: No  Current concerns for aggression this visit? No  Does pt have a history of Civil Commitment? Yes, most recent commitment [facility] 2021 and 2022  Is Pt their own guardian? Yes    Pt is prescribed medication. Is patient medication compliant? No  Pt denies OP services   CD concerns: Actively using/consuming Pt used meth 4 days ago and Fentanyl 2 weeks ago.  Acute or chronic medical concerns: None  Does Pt present with specific needs, assistive devices, or exclusionary criteria? None      Pt is ambulatory  Pt is able to perform ADLs independently      A: Pt to be reviewed for [facility] admission. Pt is on a 72HH, initiated 5:53PM 4/27  Preferred placement: Statewide    COVID:Negative  Utox: Ordered, not yet collected   CMP: N/A  CBC: N/A  HCG: Ordered, not yet collected    R: Patient cleared and ready for behavioral bed placement: Yes  Pt placed  on [facility] worklist? Yes

## 2023-04-28 NOTE — ED NOTES
IP MH Referral Acuity Rating Score (RARS)    LMHP complete at referral to IP MH, with DEC; and, daily while awaiting IP MH placement. Call score to PPS.    CRITERIA SCORING Total    New 72 HH and Involuntary for IP MH (not adolescent) 1/1   Boarding over 24 hours 0/1   Vulnerable adult at least 55+ with multiple co morbidities; or, Patient age 11 or under 0/1   Suicide ideation without relief of precipitating factors 0/1   Current plan for suicide 0/1   Current plan for homicide 0/1   Imminent risk or actual attempt to seriously harm another without relief of factors precipitating the attempt 0/1   Severe dysfunction in daily living (ex: complete neglect for self care, extreme disruption in vegetative function, extreme deterioration in social interactions) 1/1   Recent (last 2 weeks) or current physical aggression in the ED 0/1   Restraints or seclusion episode in ED 0/1   Verbal aggression, agitation, yelling, etc., while in the ED 0/1   Active psychosis with psychomotor agitation or catatonia 0/1   Need for constant or near constant redirection (from leaving, from others, etc).  1/1   Intrusive or disruptive behaviors 0/1   TOTAL Acuity Total Score: 3     SHAHNAZ Rouse

## 2023-04-28 NOTE — ED NOTES
Spoke with ER RN charge to update about necessary lab work to review by [place]. ER charge will speak with the provider to have the orders placed.

## 2023-04-28 NOTE — ED PROVIDER NOTES
"[facility] ED Mental Health Handoff Note:       Brief HPI:  This is a 22 year old female signed out to me by Dr. Monzon.  See initial ED Provider note for full details of the presentation. Interval history is pertinent for no new events.    Home meds reviewed and ordered/administered: Yes    Medically stable for inpatient mental health admission: Yes.    Evaluated by mental health: Yes. The recommendation is for inpatient mental health treatment. Bed search in process    Safety concerns: At the time I received sign out, there were no safety concerns.    Hold Status:  Active Orders   Legal    Emergency Hospitalization Hold (72 Hr Hold)     Frequency: Effective Now     Start Date/Time: 04/27/23 0347      Number of Occurrences: Until Specified            Exam:   Patient Vitals for the past 24 hrs:   BP Temp Temp src Pulse Resp SpO2 Height Weight   04/27/23 1612 131/70 98.4  F (36.9  C) Oral 85 16 98 % 1.676 m (5' 6\") 104.8 kg (231 lb)           ED Course:    Medications   haloperidol (HALDOL) tablet 5 mg (has no administration in time range)            There were no significant events during my shift.    Patient was signed out to the oncoming provider      Impression:    ICD-10-CM    1. Schizoaffective disorder, unspecified type (H)  F25.9       2. Methamphetamine use (H)  F15.10           Plan:    1. Awaiting inpatient mental health admission/transfer.      RESULTS:   Results for orders placed or performed during the hospital encounter of 04/27/23 (from the past 24 hour(s))   Asymptomatic COVID-19 Virus (Coronavirus) by PCR Nose     Status: Normal    Collection Time: 04/27/23  4:18 PM    Specimen: Nose; Swab   Result Value Ref Range    SARS CoV2 PCR Negative Negative    Narrative    Testing was performed using the Xpert Xpress SARS-CoV-2 Assay on the Cepheid Gene-Xpert Instrument Systems. Additional information about this Emergency Use Authorization (EUA) assay can be found via the Lab Guide. This test should be " ordered for the detection of SARS-CoV-2 in individuals who meet SARS-CoV-2 clinical and/or epidemiological criteria as well as from individuals without symptoms or other reasons to suspect COVID-19. Test performance for asymptomatic patients has only been established in anterior nasal swab specimens. This test is for in vitro diagnostic use under the FDA EUA for laboratories certified under CLIA to perform high complexity testing. This test has not been FDA cleared or approved. A negative result does not rule out the presence of PCR inhibitors in the specimen or target RNA concentration below the limit of detection for the assay. The possibility of a false negative should be considered if the patient's recent exposure or clinical presentation suggests COVID-19. This test was validated by the [facility]. This laboratory is certified under the Clinical Laboratory Improvement Amendments (CLIA) as qualified to perform high complexity laboratory testing.               MD Jaylan Del Cid David, MD  04/27/23 1902

## 2023-04-28 NOTE — ED NOTES
Pt has had an uneventful night. Pt has slept through the shift with no s/s of acute distress. Pt was woken twice with verbal stimuli as which time the patient remained calm and cooperative. Patient allowed her blood to be drawn and has continued to deny needing to urinate and thus provide a urine sample. No safety events or concerns this shift. Will pass onto day shift the need for a urine sample. Will continue to monitor.

## 2023-04-28 NOTE — ED NOTES
Pt is alert and ambulatory and has been pacing the unit today. Pt denies SI/HI/AVH but appears to be responding to internal stimuli and intermittently covers her appearing to try to block sound. Pt ate well for meals and is able to make needs known. No distress noted.   Additional Safety/Bands:

## 2023-04-28 NOTE — PLAN OF CARE
jE Dunaway  April 27, 2023  Plan of Care Hand-off Note     Patient Care Path: Inpatient Mental Health    Plan for Care:     It is the recommendation of this writer that pt be admitted to the hospital for acute stabilization of mental health symptoms due to her current level of psychosis, including auditory hallucinations, delusions, and paranoia, and inability to care for herself in the community at this time.    Critical Safety Issues: psychosis    Overview:  This patient is a child/adolescent: No    This patient has additional special visitor precautions: No    Legal Status: 72 HH expiring 05/02/23 @ 5:53 PM    Contacts:   Ana Bazzi, , [facility], PH: [phone]    Psychiatry Consult:  Adult Psychiatry Consult requested related to 72 HH. Psychiatry IP Consult Order Placed: Yes    Updated RN and Attending Provider regarding plan of care.    Lupe Verdugo LGSW

## 2023-04-28 NOTE — ED NOTES
"Pt presents to [facility] calm, pleasant, and cooperative. Pt oriented to unit, BEC process explained on arrival. Pt denies SI/HI/SIB and hallucinations although does appear to be responding to internal stimuli. When writer asked Pt if they had any negative emotions or feelings, Pt explained \"no, I'm just dealing with a spiritual war between me, the antichrist, the devil, and demons, but God is protecting me so I'm good\". Pt requested a PRN to help \"calm her mind down\", so Pt was given Zyprexa.   Pt has been behaviorally in control throughout shift.   "

## 2023-04-28 NOTE — PROGRESS NOTES
"Triage & Transition Services, Extended Care     Ej Dunaway  April 28, 2023    Ej is followed related to Long wait time for admission. Please see initial DEC Crisis Assessment completed for complete assessment information. Medical record is reviewed. While patient is in the ED, care team is working towards Learn and Demonstrate at [employer] Focused on Crisis Stabilization.     Individually met with Patient. She was engaged but guarded in interaction and presented with a flat affect. She reported she is \"better\" and \"no longer think I am Hood's wife\". However, as interaction continued, she started to decompensate. Delusions remain present as she states she is involved in \"spiritual warfare\" and referenced \"drugs opening me up to the devil\". She spontaneously gets up from her chair and shifts her weight between her feet. She becomes increasingly closed off throughout interaction responding with one word answers.She does states [facility].    She is observed on the unit to be responding to internal stimuli. Her hands are covering her ears and she is pacing on the unit. She is observed to be leaning face first into walls at times.    [facility] requesting return call for Ana Bazzi, , [facility], PH: [phone] at 10:48am  [facility] requesting return call for Brandy Crane Director of Mental Health Services, [facility], at [phone] at 10:53am    Received callback from Brandy. She reported the treatment team has significant concerns for Patient. States prior to coming to the hospital, the staff did not trust Patient to be by herself or alone with staff so she was on a 2:1. Patient was observed to be \"screaming at the devil\", having an \"intense gaze\" and experiencing auditory hallucinations. She kept thinking Brandy was talking to her when Rafaelaia was not and stating that Miguel Angelysia was \"speaking to " "me spiritually\".  Patient was also preoccupied making sure she was not hurting people asking staff and other residents \"I didn't hurt you did I?\" Patient also had a marked decreased in hygiene and has not been taking care of herself. She was experiencing paranoia thinking everying was related to the antichrist.   Brandy notes that Patient has done well on a Haldol injection in the past and does better on the injectable rather than pills. Last date of known drug use per Brandy was testing positive for alcohol and meth on 4/22 at [facility].  Patient is able to return to [facility] when she is stable.    Plan:  Inpatient Mental Health: Patient continues to be experiencing delusions with Jew preoccupations present. She continues to experience auditory hallucinations. She does not appear to have insight into symptomology. She is on a 72HH that expires 05/02 at 1753. Patient has been accepted to [facility].    Plan for Care reviewed with Assigned Medical Provider? Yes. Provider, Dr. Pa, response: agreeable.    Extended Care will follow and meet with patient/family/care team as able or requested.     Corinne Romitti, [facility], Extended Care   [phone]          "

## 2023-04-28 NOTE — ED NOTES
Report received from previous shift. Assumed care of patient sleeping with no s/s of acute distress. + Equal chest rise and fall. Will continue to monitor.

## 2023-04-29 ENCOUNTER — HOSPITAL ENCOUNTER (INPATIENT)
Facility: HOSPITAL | Age: 23
LOS: 10 days | Discharge: NURSING, REHAB OR LONG-TERM CARE | End: 2023-05-09
Attending: STUDENT IN AN ORGANIZED HEALTH CARE EDUCATION/TRAINING PROGRAM | Admitting: STUDENT IN AN ORGANIZED HEALTH CARE EDUCATION/TRAINING PROGRAM
Payer: MEDICAID

## 2023-04-29 VITALS
OXYGEN SATURATION: 97 % | SYSTOLIC BLOOD PRESSURE: 137 MMHG | DIASTOLIC BLOOD PRESSURE: 93 MMHG | HEIGHT: 66 IN | RESPIRATION RATE: 16 BRPM | WEIGHT: 231 LBS | HEART RATE: 82 BPM | TEMPERATURE: 98.2 F | BODY MASS INDEX: 37.12 KG/M2

## 2023-04-29 DIAGNOSIS — F29 PSYCHOSIS, UNSPECIFIED PSYCHOSIS TYPE (H): Primary | ICD-10-CM

## 2023-04-29 PROCEDURE — 204N000001 HC R&B MH ICU

## 2023-04-29 PROCEDURE — 250N000013 HC RX MED GY IP 250 OP 250 PS 637: Performed by: PSYCHIATRY & NEUROLOGY

## 2023-04-29 PROCEDURE — 99223 1ST HOSP IP/OBS HIGH 75: CPT | Mod: AI | Performed by: NURSE PRACTITIONER

## 2023-04-29 PROCEDURE — 250N000013 HC RX MED GY IP 250 OP 250 PS 637: Performed by: NURSE PRACTITIONER

## 2023-04-29 RX ORDER — BENZTROPINE MESYLATE 0.5 MG/1
0.5 TABLET ORAL 3 TIMES DAILY PRN
Status: DISCONTINUED | OUTPATIENT
Start: 2023-04-29 | End: 2023-05-01

## 2023-04-29 RX ORDER — MAGNESIUM HYDROXIDE/ALUMINUM HYDROXICE/SIMETHICONE 120; 1200; 1200 MG/30ML; MG/30ML; MG/30ML
30 SUSPENSION ORAL EVERY 4 HOURS PRN
Status: DISCONTINUED | OUTPATIENT
Start: 2023-04-29 | End: 2023-05-09 | Stop reason: HOSPADM

## 2023-04-29 RX ORDER — HALOPERIDOL 5 MG/1
5 TABLET ORAL AT BEDTIME
Status: DISCONTINUED | OUTPATIENT
Start: 2023-04-29 | End: 2023-05-01

## 2023-04-29 RX ORDER — OLANZAPINE 10 MG/1
10 TABLET ORAL 3 TIMES DAILY PRN
Status: DISCONTINUED | OUTPATIENT
Start: 2023-04-29 | End: 2023-04-29

## 2023-04-29 RX ORDER — ACETAMINOPHEN 325 MG/1
650 TABLET ORAL EVERY 4 HOURS PRN
Status: DISCONTINUED | OUTPATIENT
Start: 2023-04-29 | End: 2023-05-09 | Stop reason: HOSPADM

## 2023-04-29 RX ORDER — DIPHENHYDRAMINE HCL 50 MG
50 CAPSULE ORAL EVERY 8 HOURS PRN
Status: DISCONTINUED | OUTPATIENT
Start: 2023-04-29 | End: 2023-05-09 | Stop reason: HOSPADM

## 2023-04-29 RX ORDER — DIPHENHYDRAMINE HYDROCHLORIDE 50 MG/ML
50 INJECTION INTRAMUSCULAR; INTRAVENOUS EVERY 8 HOURS PRN
Status: DISCONTINUED | OUTPATIENT
Start: 2023-04-29 | End: 2023-05-09 | Stop reason: HOSPADM

## 2023-04-29 RX ORDER — LORAZEPAM 1 MG/1
2 TABLET ORAL EVERY 8 HOURS PRN
Status: DISCONTINUED | OUTPATIENT
Start: 2023-04-29 | End: 2023-05-09 | Stop reason: HOSPADM

## 2023-04-29 RX ORDER — LORAZEPAM 2 MG/ML
2 INJECTION INTRAMUSCULAR EVERY 8 HOURS PRN
Status: DISCONTINUED | OUTPATIENT
Start: 2023-04-29 | End: 2023-05-09 | Stop reason: HOSPADM

## 2023-04-29 RX ORDER — HALOPERIDOL 5 MG/1
5 TABLET ORAL EVERY 8 HOURS PRN
Status: DISCONTINUED | OUTPATIENT
Start: 2023-04-29 | End: 2023-05-09 | Stop reason: HOSPADM

## 2023-04-29 RX ORDER — HALOPERIDOL 5 MG/ML
5 INJECTION INTRAMUSCULAR EVERY 8 HOURS PRN
Status: DISCONTINUED | OUTPATIENT
Start: 2023-04-29 | End: 2023-05-09 | Stop reason: HOSPADM

## 2023-04-29 RX ORDER — LANOLIN ALCOHOL/MO/W.PET/CERES
3 CREAM (GRAM) TOPICAL
Status: DISCONTINUED | OUTPATIENT
Start: 2023-04-29 | End: 2023-05-09 | Stop reason: HOSPADM

## 2023-04-29 RX ORDER — OLANZAPINE 10 MG/2ML
10 INJECTION, POWDER, FOR SOLUTION INTRAMUSCULAR 3 TIMES DAILY PRN
Status: DISCONTINUED | OUTPATIENT
Start: 2023-04-29 | End: 2023-04-29

## 2023-04-29 RX ORDER — HYDROXYZINE HYDROCHLORIDE 25 MG/1
50 TABLET, FILM COATED ORAL EVERY 4 HOURS PRN
Status: DISCONTINUED | OUTPATIENT
Start: 2023-04-29 | End: 2023-05-09 | Stop reason: HOSPADM

## 2023-04-29 RX ADMIN — HALOPERIDOL 5 MG: 5 TABLET ORAL at 13:29

## 2023-04-29 RX ADMIN — LORAZEPAM 2 MG: 1 TABLET ORAL at 13:29

## 2023-04-29 RX ADMIN — HYDROXYZINE HYDROCHLORIDE 50 MG: 25 TABLET, FILM COATED ORAL at 12:26

## 2023-04-29 RX ADMIN — DIPHENHYDRAMINE HYDROCHLORIDE 50 MG: 50 CAPSULE ORAL at 13:29

## 2023-04-29 RX ADMIN — HALOPERIDOL 5 MG: 5 TABLET ORAL at 08:02

## 2023-04-29 ASSESSMENT — ACTIVITIES OF DAILY LIVING (ADL)
DRESS: SCRUBS (BEHAVIORAL HEALTH)
ADLS_ACUITY_SCORE: 28
HYGIENE/GROOMING: INDEPENDENT
ADLS_ACUITY_SCORE: 35
ADLS_ACUITY_SCORE: 35
ADLS_ACUITY_SCORE: 28
ADLS_ACUITY_SCORE: 45
ADLS_ACUITY_SCORE: 28
ADLS_ACUITY_SCORE: 35
ADLS_ACUITY_SCORE: 35
ADLS_ACUITY_SCORE: 28
ADLS_ACUITY_SCORE: 28

## 2023-04-29 ASSESSMENT — LIFESTYLE VARIABLES
AUDIT-C TOTAL SCORE: 4
SKIP TO QUESTIONS 9-10: 0

## 2023-04-29 NOTE — ED NOTES
Pt is alert, ambulatory and calm. Pt discharging to [facility], leaving with transport. Pt denies SI/HI but appears to be responding to internal stimuli. Leaving unit with all belongings. No distress noted.

## 2023-04-29 NOTE — ED NOTES
Pt had an uneventful night. Pt slept soundly throughout the night waking only once to utilize the restroom and have a snack. Pt has been tolerating oral intake. Pt has remained calm and cooperative and displayed no s/s of acute distress during 15 minute rounds. Pt has had no safety events or concerns this shift. Transport to [facility] pending. Will continue to monitor patient.

## 2023-04-29 NOTE — ED PROVIDER NOTES
[facility] ED Mental Health Handoff Note:       Brief HPI:  This is a 22 year old female signed out to me.  See initial ED Provider note for full details of the presentation. Interval history is pertinent for continued paranoia plan for possible admission to [place]..    Home meds reviewed and ordered/administered: Yes    Medically stable for inpatient mental health admission: Yes.    Evaluated by mental health: Yes. The recommendation is for inpatient mental health treatment. Bed search in process    Safety concerns: At the time I received sign out, there were no safety concerns.    Hold Status:  Active Orders   Legal    Emergency Hospitalization Hold (72 Hr Hold)     Frequency: Effective Now     Start Date/Time: 04/27/23 8843      Number of Occurrences: Until Specified           Exam:   Patient Vitals for the past 24 hrs:   BP Temp Temp src Pulse Resp SpO2   04/28/23 1927 128/76 98.2  F (36.8  C) Oral 94 16 97 %   04/28/23 1023 120/64 98.6  F (37  C) Oral 91 16 97 %           ED Course:    Medications   haloperidol (HALDOL) tablet 5 mg (5 mg Oral $Given 4/28/23 1932)   benztropine (COGENTIN) tablet 0.5 mg (has no administration in time range)   OLANZapine zydis (zyPREXA) ODT tab 10 mg (10 mg Oral $Given 4/27/23 1958)   haloperidol (HALDOL) tablet 5 mg (5 mg Oral $Given 4/28/23 1448)            There were no significant events during my shift.    Patient was signed out to the oncoming provider, Dr. Garcia      Impression:    ICD-10-CM    1. Schizoaffective disorder, unspecified type (H)  F25.9       2. Methamphetamine use (H)  F15.10           Plan:    1. Awaiting inpatient mental health admission/transfer.      RESULTS:   Results for orders placed or performed during the hospital encounter of 04/27/23 (from the past 24 hour(s))   Ethyl Alcohol Level     Status: Normal    Collection Time: 04/28/23  1:13 AM   Result Value Ref Range    Alcohol ethyl <0.01 <=0.01 g/dL   Acetaminophen level     Status: Abnormal     Collection Time: 04/28/23  1:13 AM   Result Value Ref Range    Acetaminophen <5.0 (L) 10.0 - 30.0 ug/mL   Salicylate level     Status: Normal    Collection Time: 04/28/23  1:13 AM   Result Value Ref Range    Salicylate <0.3   mg/dL   Basic metabolic panel     Status: Normal    Collection Time: 04/28/23  1:13 AM   Result Value Ref Range    Sodium 138 136 - 145 mmol/L    Potassium 4.3 3.4 - 5.3 mmol/L    Chloride 105 98 - 107 mmol/L    Carbon Dioxide (CO2) 24 22 - 29 mmol/L    Anion Gap 9 7 - 15 mmol/L    Urea Nitrogen 14.4 6.0 - 20.0 mg/dL    Creatinine 0.67 0.51 - 0.95 mg/dL    Calcium 8.9 8.6 - 10.0 mg/dL    Glucose 99 70 - 99 mg/dL    GFR Estimate >90 >60 mL/min/1.73m2   CBC with platelets differential     Status: Abnormal    Collection Time: 04/28/23  1:13 AM    Narrative    The following orders were created for panel order CBC with platelets differential.  Procedure                               Abnormality         Status                     ---------                               -----------         ------                     CBC with platelets and d...[[id number]]  Abnormal            Final result                 Please view results for these tests on the individual orders.   CBC with platelets and differential     Status: Abnormal    Collection Time: 04/28/23  1:13 AM   Result Value Ref Range    WBC Count 12.6 (H) 4.0 - 11.0 10e3/uL    RBC Count 4.64 3.80 - 5.20 10e6/uL    Hemoglobin 13.2 11.7 - 15.7 g/dL    Hematocrit 39.7 35.0 - 47.0 %    MCV 86 78 - 100 fL    MCH 28.4 26.5 - 33.0 pg    MCHC 33.2 31.5 - 36.5 g/dL    RDW 12.0 10.0 - 15.0 %    Platelet Count 371 150 - 450 10e3/uL    % Neutrophils 56 %    % Lymphocytes 36 %    % Monocytes 7 %    % Eosinophils 1 %    % Basophils 0 %    % Immature Granulocytes 0 %    NRBCs per 100 WBC 0 <1 /100    Absolute Neutrophils 7.0 1.6 - 8.3 10e3/uL    Absolute Lymphocytes 4.6 0.8 - 5.3 10e3/uL    Absolute Monocytes 0.9 0.0 - 1.3 10e3/uL    Absolute Eosinophils 0.1 0.0  - 0.7 10e3/uL    Absolute Basophils 0.1 0.0 - 0.2 10e3/uL    Absolute Immature Granulocytes 0.0 <=0.4 10e3/uL    Absolute NRBCs 0.0 10e3/uL   Urine Drugs of Abuse Screen     Status: Normal    Collection Time: 04/28/23 10:37 AM    Narrative    The following orders were created for panel order Urine Drugs of Abuse Screen.  Procedure                               Abnormality         Status                     ---------                               -----------         ------                     Drug abuse screen 1 urin...[[id number]]  Normal              Final result                 Please view results for these tests on the individual orders.   HCG qualitative urine     Status: Normal    Collection Time: 04/28/23 10:37 AM   Result Value Ref Range    hCG Urine Qualitative Negative Negative   Drug abuse screen 1 urine (ED)     Status: Normal    Collection Time: 04/28/23 10:37 AM   Result Value Ref Range    Amphetamines Urine Screen Negative Screen Negative    Barbituates Urine Screen Negative Screen Negative    Benzodiazepine Urine Screen Negative Screen Negative    Cannabinoids Urine Screen Negative Screen Negative    Cocaine Urine Screen Negative Screen Negative    Opiates Urine Screen Negative Screen Negative             MD Ember Mesa Eric Girard, MD  04/28/23 9955
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
## 2023-04-29 NOTE — PLAN OF CARE
ADMISSION NOTE    Reason for admission psychosis.  Safety concerns no.  Risk for or history of violence not at this time.   Full skin assessment: Yes-superficial healed scars to bilat arms and thighs.     Patient arrived on unit from [facility] ED accompanied by transport staff and [facility] security officers. on 4/29/2023  2:16 PM.   Status on arrival: appears visibly anxious--appears responding to internal stimuli-states her voices are demon's that tell her she has the ema of satroxy. Pt asks this writer if she believes in Hood and asks if this writer hears angels or other voices.   Pt is visibly anxious and agrees to take Hydroxyzine 50 mg.   Asks to use phone to call Yisel (staff at [facility]). Rec'd. Pt states hearing her voice helped. States she is also having visual hallucinations. Shows some insight to her illness while y  talking with provider.   Worried about upcoming court date on 5/4 for DUI in [employer].  Pt denies SI or self harm at this time.   Cooperative with nursing assessment and medications.   Lets needs be known.     /77   Pulse 88   Temp 98  F (36.7  C) (Tympanic)   Resp 16   LMP 03/01/2023 (Exact Date)   SpO2 97%   Patient given tour of unit and Welcome to  unit papers given to patient, wanding completed, belongings inventoried, and admission assessment completed.   Patient's legal status on arrival is 72 hr hold. Appropriate legal rights discussed with and copy given to patient. Patient Bill of Rights discussed with and copy given to patient.   Patient denies SI, HI, and thoughts of self harm and contracts for safety while on unit.      Rola Johnston, PATI  4/29/2023  2:16 PM      Problem: Adult Behavioral Health Plan of Care  Goal: Patient-Specific Goal (Individualization)  Description: Pt. Will follow recommendations of treatment team during hospital stay.  Pt. Will sleep 6-8 hours a night during hospital stay.   Pt. Will maintain ADLs with out prompting during  hospital stay.  Pt. Will attend > 50% of unit programing when appropriate.   Pt. Will be free from self-harm during hospital stay.     4/29/23 Pt. Is a do not wean at this time do to needing decreased stimuli. To be assessed daily by treatment team.   Outcome: Progressing      Problem: Psychotic Symptoms  Goal: Psychotic Symptoms  Description: Signs and symptoms of listed problems will be absent or manageable.  Outcome: Progressing     Problem: Thought Process Alteration  Goal: Optimal Thought Clarity  Outcome: Progressing   Goal Outcome Evaluation:    Plan of Care Reviewed With: patient      Face to face end of shift report communicated to oncoming shift.     Rola Johnston RN  4/29/2023  2:26 PM

## 2023-04-29 NOTE — PROGRESS NOTES
04/29/23 1320   Patient Belongings   Did you bring any home meds/supplements to the hospital?  No   Patient Belongings locker;sent to security per site process   Patient Belongings Put in Hospital Secure Location (Security or Locker, etc.) other (see comments)  (Shoes, Socks, Misc Papers)   Belongings Search Yes   Clothing Search Yes   Second Staff Vianney BALTAZAR     List items sent to safe: none  All other belongings put in assigned cubby in belongings room.     I have reviewed my belongings list on admission and verify that it is correct.     Patient signature_______________________________    Second staff witness (if patient unable to sign) ______________________________       I have received all my belongings at discharge.    Patient signature________________________________    MARIPOSA  4/29/2023  1:21 PM

## 2023-04-29 NOTE — PLAN OF CARE
Problem: Adult Behavioral Health Plan of Care  Goal: Patient-Specific Goal (Individualization)  Description: Pt. Will follow recommendations of treatment team during hospital stay.  Pt. Will sleep 6-8 hours a night during hospital stay.   Pt. Will maintain ADLs with out prompting during hospital stay.  Pt. Will attend > 50% of unit programing when appropriate.   Pt. Will be free from self-harm during hospital stay.     4/29/23 Pt. Is a do not wean at this time do to needing decreased stimuli. To be assessed daily by treatment team.   Outcome: Progressing  Note: 15:40: Received end of shift report from Rola BRAVO RN. Pt displaying s/s of sleep upon arrival---     18:00: Pt awoke for dinner, polite demeanor, denies any  concerns or complaints, returned back to sleep--- ate 100% of dinner-    22:40: Pt has been displaying s/s of quality, sound sleep since approximately 18:15-- Held scheduled haldol r/t pt sleeping--     Face to face end of shift report to be communicated to on-coming [facility] staff.     Dora Sotelo RN  4/29/2023  11:09 PM      Pt awoke from sleep requesting pudding et ice water-- Thought process is clear, linear, logical            Problem: Psychotic Symptoms  Goal: Psychotic Symptoms  Description: Signs and symptoms of listed problems will be absent or manageable.  Outcome: Progressing     Problem: Thought Process Alteration  Goal: Optimal Thought Clarity  Outcome: Progressing   Goal Outcome Evaluation:

## 2023-04-29 NOTE — ED NOTES
Pt appears to be sleeping soundly. Transport rescheduled for 7:30am Sat morning to [facility].

## 2023-04-29 NOTE — ED PROVIDER NOTES
[facility] ED Mental Health Handoff Note:       Brief HPI:  This is a 22 year old female signed out to me by Dr. Garcia.  See initial ED Provider note for full details of the presentation. Interval history is pertinent for anticipated transfer to [place] today.    Home meds reviewed and ordered/administered: Yes    Medically stable for inpatient mental health admission: Yes.    Evaluated by mental health: Yes. The recommendation is for inpatient mental health treatment. Bed search in process    Safety concerns: At the time I received sign out, there were no safety concerns.    Hold Status:  Active Orders   Legal    Emergency Hospitalization Hold (72 Hr Hold)     Frequency: Effective Now     Start Date/Time: 04/27/23 1753      Number of Occurrences: Until Specified            Exam:   Patient Vitals for the past 24 hrs:   BP Temp Temp src Pulse Resp SpO2   04/28/23 1927 128/76 98.2  F (36.8  C) Oral 94 16 97 %   04/28/23 1023 120/64 98.6  F (37  C) Oral 91 16 97 %         General: patient is resting comfortably   Cardiovascular: regular rate and rhythm  Pulmonary:no respiratory distress    ED Course:    Medications   haloperidol (HALDOL) tablet 5 mg (5 mg Oral $Given 4/28/23 1932)   benztropine (COGENTIN) tablet 0.5 mg (has no administration in time range)   OLANZapine zydis (zyPREXA) ODT tab 10 mg (10 mg Oral $Given 4/27/23 1958)   haloperidol (HALDOL) tablet 5 mg (5 mg Oral $Given 4/28/23 1448)            There were no significant events during my shift.        Impression:    ICD-10-CM    1. Schizoaffective disorder, unspecified type (H)  F25.9       2. Methamphetamine use (H)  F15.10           Plan:    1. Admitted/transferred to inpatient mental health bed.      RESULTS:   Results for orders placed or performed during the hospital encounter of 04/27/23 (from the past 24 hour(s))   Urine Drugs of Abuse Screen     Status: Normal    Collection Time: 04/28/23 10:37 AM    Narrative    The following orders were  created for panel order Urine Drugs of Abuse Screen.  Procedure                               Abnormality         Status                     ---------                               -----------         ------                     Drug abuse screen 1 urin...[[id number]]  Normal              Final result                 Please view results for these tests on the individual orders.   HCG qualitative urine     Status: Normal    Collection Time: 04/28/23 10:37 AM   Result Value Ref Range    hCG Urine Qualitative Negative Negative   Drug abuse screen 1 urine (ED)     Status: Normal    Collection Time: 04/28/23 10:37 AM   Result Value Ref Range    Amphetamines Urine Screen Negative Screen Negative    Barbituates Urine Screen Negative Screen Negative    Benzodiazepine Urine Screen Negative Screen Negative    Cannabinoids Urine Screen Negative Screen Negative    Cocaine Urine Screen Negative Screen Negative    Opiates Urine Screen Negative Screen Negative             MD Dewey Sampson, Magaly Guerra MD  04/29/23 155

## 2023-04-30 PROCEDURE — 250N000013 HC RX MED GY IP 250 OP 250 PS 637: Performed by: PSYCHIATRY & NEUROLOGY

## 2023-04-30 PROCEDURE — 124N000001 HC R&B MH

## 2023-04-30 PROCEDURE — 250N000013 HC RX MED GY IP 250 OP 250 PS 637: Performed by: NURSE PRACTITIONER

## 2023-04-30 RX ORDER — HALOPERIDOL 10 MG/1
10 TABLET ORAL ONCE
Status: COMPLETED | OUTPATIENT
Start: 2023-04-30 | End: 2023-04-30

## 2023-04-30 RX ADMIN — HALOPERIDOL 5 MG: 5 TABLET ORAL at 20:17

## 2023-04-30 RX ADMIN — HALOPERIDOL 10 MG: 10 TABLET ORAL at 13:12

## 2023-04-30 RX ADMIN — HYDROXYZINE HYDROCHLORIDE 50 MG: 25 TABLET, FILM COATED ORAL at 11:01

## 2023-04-30 RX ADMIN — MELATONIN 3 MG: at 20:43

## 2023-04-30 RX ADMIN — LORAZEPAM 2 MG: 1 TABLET ORAL at 20:17

## 2023-04-30 RX ADMIN — HALOPERIDOL 5 MG: 5 TABLET ORAL at 09:58

## 2023-04-30 RX ADMIN — DIPHENHYDRAMINE HYDROCHLORIDE 50 MG: 50 CAPSULE ORAL at 20:18

## 2023-04-30 RX ADMIN — HALOPERIDOL 5 MG: 5 TABLET ORAL at 17:54

## 2023-04-30 RX ADMIN — DIPHENHYDRAMINE HYDROCHLORIDE 50 MG: 50 CAPSULE ORAL at 12:36

## 2023-04-30 RX ADMIN — LORAZEPAM 2 MG: 1 TABLET ORAL at 12:36

## 2023-04-30 ASSESSMENT — ACTIVITIES OF DAILY LIVING (ADL)
ADLS_ACUITY_SCORE: 28
HYGIENE/GROOMING: INDEPENDENT
ADLS_ACUITY_SCORE: 28
ADLS_ACUITY_SCORE: 28
DRESS: SCRUBS (BEHAVIORAL HEALTH)
ADLS_ACUITY_SCORE: 28

## 2023-04-30 NOTE — PLAN OF CARE
Face to face end of shift report will be communicated to oncyogesh RN.     Problem: Adult Behavioral Health Plan of Care  Goal: Patient-Specific Goal (Individualization)  Description: Pt. Will follow recommendations of treatment team during hospital stay.  Pt. Will sleep 6-8 hours a night during hospital stay.   Pt. Will maintain ADLs with out prompting during hospital stay.  Pt. Will attend > 50% of unit programing when appropriate.   Pt. Will be free from self-harm during hospital stay.     4/29/23 Pt. Is a do not wean at this time do to needing decreased stimuli. To be assessed daily by treatment team.   Outcome: Progressing     Problem: Psychotic Symptoms  Goal: Psychotic Symptoms  Description: Signs and symptoms of listed problems will be absent or manageable.  Outcome: Progressing     Problem: Thought Process Alteration  Goal: Optimal Thought Clarity  Outcome: Progressing   Face to face end of shift report obtained from PATI John. Pt observed resting in bed.  Pt appears to be sleeping in bed with eyes closed. 15 minutes and PRN safety checks completed with no noted complains. No delusional comments noted or reported so far this shift.    0600-Pt appeared to had slept 6.5 hours. Pt got up once, asked for a snack and returned to bed.

## 2023-04-30 NOTE — PLAN OF CARE
Problem: Adult Behavioral Health Plan of Care  Goal: Patient-Specific Goal (Individualization)  Description: Pt. Will follow recommendations of treatment team during hospital stay.  Pt. Will sleep 6-8 hours a night during hospital stay.   Pt. Will maintain ADLs with out prompting during hospital stay.  Pt. Will attend > 50% of unit programing when appropriate.   Pt. Will be free from self-harm during hospital stay.     Outcome: Progressing    Alert, VSS, denies pain.  Endorses auditory hallucinations-states she continues to hear demons and angels. Pt states she is unsure what is real.  Requests Haldol-rec'd --states the med is helping but the dose is to low.   Restless and anxious-pt is allowed to wean to open unit to attend a group. Pt does well and is moved to private room.  Continues to report high anxiety and states the voices are loud this afternoon. Pt is frequently seeking staff asking for direction. Provider notified-one time dose for prn Haldol 10 mg rec'd.   Cooperative with nursing assessment and medications.   Denies SI, SIB or hI.      Problem: Psychotic Symptoms  Goal: Psychotic Symptoms  Description: Signs and symptoms of listed problems will be absent or manageable.  Outcome: Progressing     Problem: Thought Process Alteration  Goal: Optimal Thought Clarity  Outcome: Progressing   Goal Outcome Evaluation:    Plan of Care Reviewed With: patient        Face to face end of shift report communicated to oncoming shift.     Rola Johnston RN  4/30/2023  1:54 PM

## 2023-04-30 NOTE — PLAN OF CARE
"  Problem: Adult Behavioral Health Plan of Care  Goal: Patient-Specific Goal (Individualization)  Description: Pt. Will follow recommendations of treatment team during hospital stay.  Pt. Will sleep 6-8 hours a night during hospital stay.   Pt. Will maintain ADLs with out prompting during hospital stay.  Pt. Will attend > 50% of unit programing when appropriate.   Pt. Will be free from self-harm during hospital stay.     Outcome: Progressing  Note: 15:40: Received end of shift report from Rola BRAVO RN. Pt out in dayroom working on coloring project.   17:55: PRN haldol 5 mg given for increasing voices-- describes voices as \"good et evil' \"Hood is telling me we need to get \" \"Hood is my one et only\" music helps, \" Meth helps me get brings me get closer to the spiritual world\"      SPIRITUAL CONSULT ordered per pt request for spiritual regious concerns    20:25: PRN lorazepam 2 mg, PRN diphenhydramine 50 mg given with scheduled 5 mg haldol--- participated in majority of PM groups. States \"haldol is helping, they are not as bad\" requesting medication for depression, concentration \"mood stabilizer or something\" Pt returned to room for bed.     Observed behavior appropriate with staff et peers. Withdrawn activity, somewhat anxious re: social anxiety et noise.     Face to face end of shift report communicated to on-coming [facility] staff.     Dora Sotelo RN  4/30/2023  11:23 PM      Problem: Psychotic Symptoms  Goal: Psychotic Symptoms  Description: Signs and symptoms of listed problems will be absent or manageable.  Outcome: Progressing     Problem: Thought Process Alteration  Goal: Optimal Thought Clarity  Outcome: Progressing   Goal Outcome Evaluation:                        "

## 2023-05-01 PROCEDURE — 250N000013 HC RX MED GY IP 250 OP 250 PS 637: Performed by: PSYCHIATRY & NEUROLOGY

## 2023-05-01 PROCEDURE — 99233 SBSQ HOSP IP/OBS HIGH 50: CPT | Mod: GT | Performed by: PSYCHIATRY & NEUROLOGY

## 2023-05-01 PROCEDURE — 124N000001 HC R&B MH

## 2023-05-01 PROCEDURE — 250N000013 HC RX MED GY IP 250 OP 250 PS 637: Performed by: NURSE PRACTITIONER

## 2023-05-01 RX ORDER — HALOPERIDOL DECANOATE 100 MG/ML
100 INJECTION INTRAMUSCULAR ONCE
Status: COMPLETED | OUTPATIENT
Start: 2023-05-05 | End: 2023-05-05

## 2023-05-01 RX ORDER — BENZTROPINE MESYLATE 0.5 MG/1
0.5 TABLET ORAL 3 TIMES DAILY
Status: DISCONTINUED | OUTPATIENT
Start: 2023-05-01 | End: 2023-05-09 | Stop reason: HOSPADM

## 2023-05-01 RX ORDER — HALOPERIDOL DECANOATE 100 MG/ML
100 INJECTION INTRAMUSCULAR ONCE
Status: COMPLETED | OUTPATIENT
Start: 2023-05-02 | End: 2023-05-02

## 2023-05-01 RX ORDER — GABAPENTIN 300 MG/1
600 CAPSULE ORAL 3 TIMES DAILY PRN
Status: DISCONTINUED | OUTPATIENT
Start: 2023-05-01 | End: 2023-05-09 | Stop reason: HOSPADM

## 2023-05-01 RX ADMIN — DIPHENHYDRAMINE HYDROCHLORIDE 50 MG: 50 CAPSULE ORAL at 18:08

## 2023-05-01 RX ADMIN — HALOPERIDOL 7.5 MG: 5 TABLET ORAL at 10:29

## 2023-05-01 RX ADMIN — BENZTROPINE MESYLATE 0.5 MG: 0.5 TABLET ORAL at 13:21

## 2023-05-01 RX ADMIN — BENZTROPINE MESYLATE 0.5 MG: 0.5 TABLET ORAL at 11:10

## 2023-05-01 RX ADMIN — HALOPERIDOL 5 MG: 5 TABLET ORAL at 18:08

## 2023-05-01 RX ADMIN — HALOPERIDOL 7.5 MG: 5 TABLET ORAL at 20:10

## 2023-05-01 RX ADMIN — GABAPENTIN 600 MG: 300 CAPSULE ORAL at 11:10

## 2023-05-01 RX ADMIN — MELATONIN 3 MG: at 20:10

## 2023-05-01 RX ADMIN — HYDROXYZINE HYDROCHLORIDE 50 MG: 25 TABLET, FILM COATED ORAL at 12:31

## 2023-05-01 RX ADMIN — HALOPERIDOL 7.5 MG: 5 TABLET ORAL at 13:21

## 2023-05-01 RX ADMIN — LORAZEPAM 2 MG: 1 TABLET ORAL at 18:08

## 2023-05-01 RX ADMIN — BENZTROPINE MESYLATE 0.5 MG: 0.5 TABLET ORAL at 20:10

## 2023-05-01 ASSESSMENT — ACTIVITIES OF DAILY LIVING (ADL)
ADLS_ACUITY_SCORE: 28
DRESS: SCRUBS (BEHAVIORAL HEALTH)
HYGIENE/GROOMING: INDEPENDENT
ADLS_ACUITY_SCORE: 28

## 2023-05-01 NOTE — PLAN OF CARE
Problem: Adult Behavioral Health Plan of Care  Goal: Patient-Specific Goal (Individualization)  Description: Pt. Will follow recommendations of treatment team during hospital stay.  Pt. Will sleep 6-8 hours a night during hospital stay.   Pt. Will maintain ADLs with out prompting during hospital stay.  Pt. Will attend > 50% of unit programing when appropriate.   Pt. Will be free from self-harm during hospital stay.     Outcome: Progressing     Problem: Thought Process Alteration  Goal: Optimal Thought Clarity  Outcome: Progressing     Face to face shift report received from Dora KRUEGER. Rounding completed, pt observed.     Pt appeared to sleep most of this shift.    Face to face report will be communicated to oncyogesh KRUEGER.    Magaly Maloney RN  5/1/2023  5:46 AM
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
## 2023-05-01 NOTE — PROGRESS NOTES
"  [facility] PSYCHIATRY  -  PROGRESS NOTE     ID   Name: Ej Dunaway  MRN#: [id number]     SUBJECTIVE   Prior to interviewing the patient, I met with nursing and reviewed patient's clinical condition. We discussed clinical care both before and after the interview. I have reviewed the patient's clinical course by review of records including previous notes, labs, and vital signs.     Per nursing, the patient had the following behavioral events over the last 24-hours: requiring haldol PRN    On psychiatric interview, she is met in the milieu. Reports haloperidol is helping. Wishes to transition to haldol dec.  Denies SI.  Willing to sign in voluntary and then transition to [facility] thereafter. No outward delusions.         MEDICATIONS   Scheduled Meds:    haloperidol  7.5 mg Oral TID     PRN Meds:.acetaminophen, alum & mag hydroxide-simethicone, benztropine, haloperidol **AND** LORazepam **AND** diphenhydrAMINE, haloperidol lactate **AND** LORazepam **AND** diphenhydrAMINE, hydrOXYzine, melatonin, nicotine     ALLERGIES   Allergies   Allergen Reactions     Seasonal Allergies         VITALS   Vitals: /88   Pulse 84   Temp 97.5  F (36.4  C) (Tympanic)   Resp 18   LMP 03/01/2023 (Exact Date)   SpO2 98%      MENTAL STATUS EXAM   Appearance:  awake, alert, adequately groomed, dressed in hospital scrubs and appeared as age stated  Attitude:  cooperative  Eye Contact:  good  Mood:  \"okay\"  Affect:  mood congruent  Speech:  clear, coherent  Psychomotor Behavior:  no evidence of tardive dyskinesia, dystonia, or tics  Thought Process:  tangental  Associations:  no loose associations  Thought Content:  appears to be responding to internal stimuli  Insight:  limited  Judgment:  limited  Oriented to:  time, person, and place  Attention Span and Concentration:  limited  Recent and Remote Memory:  fair  Fund of Knowledge: appropriate  Muscle Strength and Tone: normal  Gait and Station: " Normal       LABS   No results found for this or any previous visit (from the past 24 hour(s)).      ASSESSMENT   Ej is a 22 year old single female who was brought to the [facility] ED by staff from her  treatment facility. Has been at [facility] since 4/24. Today she had a recurrence of Spiritism delusions, they tried to get her to take Haldol but she refused this.  Her treatment facility is willing to take her back.  She says that she is Hood's wife, that the devil came forward to steal and destroy, that she is suppose to take down the antichrist.  She is worried she will be prosecuted like Hood. She denies si/hi.  Staff at [facility] feel she is responding to internal stimuli and see her talking out loud to something that is not there.  She has similar admission last May.  She has hx of meth use and last used 4 days ago.      Daily Progress: responding well to haloperidol. Wishes to transition to haldol dec. Will initiate 7.5 mg haldol TID.            DIAGNOSTIC FORMULATION   1.  Unspecified psychosis  2.  Rule out schizoaffective disorder, bipolar type vs substance induced psychosis  3.  History of Attention-deficit hyperactivity disorder.  4.  Amphetamine use disorder.  5.  Opiate use disorder.     PLAN     Location: Unit 5  Legal Status: Orders Placed This Encounter      Legal status Voluntary    Safety Assessment:    Behavioral Orders   Procedures     Code 1 - Restrict to Unit     Routine Programming     As clinically indicated     Status 15     Every 15 minutes.          PTA medications continued/changed:   -none    New medications tried and stopped:   -haldol --> increased to 7.5 mg TID (5/1/23)  -gabapentin PRN    New medications initiated:   -none    Today's Changes:  - drop 72 hour hold, willing to sign in  -increase gabapentin to 600 mg  -haloperidol 7.5 mg TID with plan to transition to haldol dec    Programming: Patient will be treated in a therapeutic milieu with appropriate  individual and group therapies. Education will be provided on diagnoses, medications, and treatments. Encouraged behavioral activation and participation in group programming.     Medical diagnoses:  Per medicine    Disposition: pending clinical course        TREATMENT TEAM CARE PLAN     Progress: Continued symptoms.    Continued Stay Criteria/Rationale: Continued symptoms without sufficient improvement/resolution.    Medical/Physical: See above.    Precautions: See above.     Plan: Continue inpatient care with unit support and medication management.    Rationale for change in precautions or plan: NA due to no change.    Participants: Sam Coombs MD, Nursing, SW, OT.    The patient's care was discussed with the treatment team and chart notes were reviewed.       ATTESTATION    Sam Coombs MD  [facility]   Psychiatry    Video Visit: Patient has given verbal consent for video visit?: Yes  Type of Service: video visit for mental health treatment  Reason for Video Visit: COVID-19 [place]  Originating Site (patient location): [facility]  Distant Site (provider location): Remote Location  Mode of Communication: Video Conference via [employer]  Time of Service: Date: May 1, 2023 , Start: 08:00 end: 09:00

## 2023-05-01 NOTE — PLAN OF CARE
"  Problem: Adult Behavioral Health Plan of Care  Goal: Patient-Specific Goal (Individualization)  Description: Pt. Will follow recommendations of treatment team during hospital stay.  Pt. Will sleep 6-8 hours a night during hospital stay.   Pt. Will maintain ADLs with out prompting during hospital stay.  Pt. Will attend > 50% of unit programing when appropriate.   Pt. Will be free from self-harm during hospital stay.     Outcome: Progressing    A&O, VSS, denies pain. Endorses auditory hallucinations states the demons are telling her\" dark things\".   Pt requests Haldol be increased as she would like to get back on Haldol dec inj. States the haldol is helping but it's not high enough dose. MD aware-pt has new orders and will start Haldol dec tomorrow. Pt is seeking out staff frequently today. Much time spent with pt; also direct pt to attend groups.   Pt [facility]. Signs voluntary paperwork.  Lets needs be known.   Problem: Thought Process Alteration  Goal: Optimal Thought Clarity  Outcome: Progressing     Problem: Psychotic Symptoms  Goal: Psychotic Symptoms  Description: Signs and symptoms of listed problems will be absent or manageable.  Outcome: Progressing   Goal Outcome Evaluation:    Plan of Care Reviewed With: patient                   "

## 2023-05-01 NOTE — MEDICATION SCRIBE - ADMISSION MEDICATION HISTORY
"Medication Scribe Admission Medication History    Admission medication history is complete. The information provided in this note is only as accurate as the sources available at the time of the update.    Medication reconciliation/reorder completed by provider prior to medication history? Yes    Information Source(s): Clinic records via N/A    Pertinent Information: see below- med review performed by [facility] from transferring facility. No conflicting data from any available outside sources.      Med rec performed by [facility] at transferring ED 4/28/23: \"Pertinent Information: Maureen reported Ej had not been given any medications at [facility]. (See previous note) On 04/26/2026, visit at [facility] Emergency Department prescribed Haldol 5 mg PO at bedtime upon discharge\"    Changes made to PTA medication list:    Added: None    Deleted: None    Changed: None    Medication Affordability: did not assess.     Allergies reviewed with patient and updates made in EHR: no    Medication History Completed By: Aneta Vidales 5/1/2023 10:17 AM    Prior to Admission medications    Medication Sig Last Dose Taking? Auth Provider Long Term End Date   haloperidol (HALDOL) 5 MG tablet Take 1 tablet by mouth At Bedtime 4/29/2023 at 0802 ER dose  Reported, Patient Yes        "

## 2023-05-01 NOTE — DISCHARGE INSTRUCTIONS
Behavioral Discharge Planning and Instructions    Summary: Pt was brought to the [facility] ED by staff from her CD treatment facility due to a recurrence of Anabaptist delusions.    Main Diagnosis: 1.  Unspecified psychosis  2.  Rule out schizoaffective disorder, bipolar type vs substance induced psychosis  3.  History of Attention-deficit hyperactivity disorder.  4.  Amphetamine use disorder.  5.  Opiate use disorder.    Health Care Follow-up:     [facility]  Psychiatry- Miladys Holmstron- Thursday June 1st @ 10:00 AM  [address] [place] [address] [address]  [place] [address]  Phone: [phone]    Attend all scheduled appointments with your outpatient providers. Call at least 24 hours in advance if you need to reschedule an appointment to ensure continued access to your outpatient providers.     Major Treatments, Procedures and Findings:  You were provided with: a psychiatric assessment, assessed for medical stability, medication evaluation and/or management, group therapy, family therapy, individual therapy, CD evaluation/assessment, milieu management, and medical interventions    Symptoms to Report: feeling more aggressive, increased confusion, losing more sleep, mood getting worse, or thoughts of suicide    Early warning signs can include: increased depression or anxiety sleep disturbances increased thoughts or behaviors of suicide or self-harm  increased unusual thinking, such as paranoia or hearing voices    Safety and Wellness:  Take all medicines as directed.  Make no changes unless your doctor suggests them.      Follow treatment recommendations.  Refrain from alcohol and non-prescribed drugs.  Ask your support system to help you reduce your access to items that could harm yourself or others. Items could include:  Firearms  Medicines (both prescribed and over-the-counter)  Knives and other sharp objects  Ropes and like materials  Car keys  If there is a concern for safety,  "call 911. If there is a concern for safety, call 911.    Resources:   Crisis Intervention: 710.233.7515 or 357-990-7357 (TTY: 714.959.6866).  Call anytime for help.  National [place] on Mental Illness (www.[place].corona.org): 256.533.8857 or 018-068-5249.  [place] Association for Children's Mental Health (www.mac.org): 808.397.4685.  Alcoholics Anonymous (www.alcoholics-anonymous.org): Check your phone book for your local chapter.  Suicide Awareness Voices of Education (SAVE) (www.save.org): 562-786-JTBB (2593)  National Suicide Prevention Line (www.mentalhealthmn.org): 351-240-HNOP (1544)  Mental Health Consumer/Survivor Network of [place] (www.mhcsn.net): 177.733.2745 or 159-929-9878  Mental Health Association of [place] (www.mentalhealth.org): 331.679.5693 or 878-902-6428  Self- Management and Recovery Training., [employer]-- Toll free: 460.649.2006  www.[employer]  Text 4 Life: txt \"LIFE\" to 83816 for immediate support and crisis intervention  Crisis text line: Text \"[place]\" to 334562. Free, confidential, 24/7.  Crisis Intervention: 825.333.7433 or 166-222-3602. Call anytime for help.     General Medication Instructions:   See your medication sheet(s) for instructions.   Take all medicines as directed.  Make no changes unless your doctor suggests them.   Go to all your doctor visits.  Be sure to have all your required lab tests. This way, your medicines can be refilled on time.  Do not use any drugs not prescribed by your doctor.  Avoid alcohol.    Advance Directives:   Scanned document on file with [place]? No scanned doc  Is document scanned? Pt states no documents  Honoring Choices Your Rights Handout: Informed and given  Was more information offered? Pt declined    The Treatment team has appreciated the opportunity to work with you. If you have any questions or concerns about your recent admission, you can contact the unit which can receive your call 24 hours a day, 7 days a week. They will be able to get in touch with a " Provider if needed. The unit number is [phone] .

## 2023-05-02 PROCEDURE — 250N000013 HC RX MED GY IP 250 OP 250 PS 637: Performed by: PSYCHIATRY & NEUROLOGY

## 2023-05-02 PROCEDURE — 250N000013 HC RX MED GY IP 250 OP 250 PS 637: Performed by: NURSE PRACTITIONER

## 2023-05-02 PROCEDURE — 124N000001 HC R&B MH

## 2023-05-02 PROCEDURE — 99233 SBSQ HOSP IP/OBS HIGH 50: CPT | Mod: GT | Performed by: PSYCHIATRY & NEUROLOGY

## 2023-05-02 PROCEDURE — 250N000011 HC RX IP 250 OP 636: Performed by: PSYCHIATRY & NEUROLOGY

## 2023-05-02 RX ORDER — HALOPERIDOL 5 MG/1
5 TABLET ORAL 2 TIMES DAILY
Status: COMPLETED | OUTPATIENT
Start: 2023-05-02 | End: 2023-05-06

## 2023-05-02 RX ADMIN — HYDROXYZINE HYDROCHLORIDE 50 MG: 25 TABLET, FILM COATED ORAL at 17:32

## 2023-05-02 RX ADMIN — HALOPERIDOL 5 MG: 5 TABLET ORAL at 20:11

## 2023-05-02 RX ADMIN — HALOPERIDOL 5 MG: 5 TABLET ORAL at 18:42

## 2023-05-02 RX ADMIN — HALOPERIDOL 7.5 MG: 5 TABLET ORAL at 14:34

## 2023-05-02 RX ADMIN — BENZTROPINE MESYLATE 0.5 MG: 0.5 TABLET ORAL at 14:34

## 2023-05-02 RX ADMIN — LORAZEPAM 2 MG: 1 TABLET ORAL at 18:42

## 2023-05-02 RX ADMIN — BENZTROPINE MESYLATE 0.5 MG: 0.5 TABLET ORAL at 08:41

## 2023-05-02 RX ADMIN — DIPHENHYDRAMINE HYDROCHLORIDE 50 MG: 50 CAPSULE ORAL at 18:42

## 2023-05-02 RX ADMIN — LORAZEPAM 2 MG: 1 TABLET ORAL at 10:19

## 2023-05-02 RX ADMIN — DIPHENHYDRAMINE HYDROCHLORIDE 50 MG: 50 CAPSULE ORAL at 10:19

## 2023-05-02 RX ADMIN — HYDROXYZINE HYDROCHLORIDE 50 MG: 25 TABLET, FILM COATED ORAL at 09:35

## 2023-05-02 RX ADMIN — HALOPERIDOL DECANOATE 100 MG: 100 INJECTION INTRAMUSCULAR at 11:54

## 2023-05-02 RX ADMIN — GABAPENTIN 600 MG: 300 CAPSULE ORAL at 08:41

## 2023-05-02 RX ADMIN — HALOPERIDOL 5 MG: 5 TABLET ORAL at 10:19

## 2023-05-02 RX ADMIN — GABAPENTIN 600 MG: 300 CAPSULE ORAL at 17:32

## 2023-05-02 RX ADMIN — HALOPERIDOL 7.5 MG: 5 TABLET ORAL at 08:41

## 2023-05-02 RX ADMIN — BENZTROPINE MESYLATE 0.5 MG: 0.5 TABLET ORAL at 20:11

## 2023-05-02 ASSESSMENT — ACTIVITIES OF DAILY LIVING (ADL)
DRESS: SCRUBS (BEHAVIORAL HEALTH)
ADLS_ACUITY_SCORE: 28
HYGIENE/GROOMING: INDEPENDENT
ADLS_ACUITY_SCORE: 28

## 2023-05-02 NOTE — PLAN OF CARE
"  Problem: Adult Behavioral Health Plan of Care  Goal: Patient-Specific Goal (Individualization)  Description: Pt. Will follow recommendations of treatment team during hospital stay.  Pt. Will sleep 6-8 hours a night during hospital stay.   Pt. Will maintain ADLs with out prompting during hospital stay.  Pt. Will attend > 50% of unit programing when appropriate.   Pt. Will be free from self-harm during hospital stay.     Outcome: Progressing     Problem: Adult Behavioral Health Plan of Care  Goal: Adheres to Safety Considerations for Self and Others  Intervention: Develop and Maintain Individualized Safety Plan  Recent Flowsheet Documentation  Taken 5/2/2023 1600 by Quiana Juarez RN  Safety Measures: safety rounds completed     Problem: Adult Behavioral Health Plan of Care  Goal: Absence of New-Onset Illness or Injury  Intervention: Identify and Manage Fall Risk  Recent Flowsheet Documentation  Taken 5/2/2023 1600 by Quiana Juarez RN  Safety Measures: safety rounds completed     Problem: Adult Behavioral Health Plan of Care  Goal: Develops/Participates in Therapeutic [place] to Support Successful Transition  Intervention: Foster Therapeutic [place]  Recent Flowsheet Documentation  Taken 5/2/2023 1600 by Quiana Juarez RN  Trust Relationship/Rapport:    care explained    choices provided    emotional support provided    empathic listening provided    questions answered    questions encouraged    reassurance provided    thoughts/feelings acknowledged     Problem: Psychotic Symptoms  Goal: Psychotic Symptoms  Description: Signs and symptoms of listed problems will be absent or manageable.  Outcome: Progressing     Problem: Thought Process Alteration  Goal: Optimal Thought Clarity  Outcome: Progressing    15:45 - Face to face end of shift report was received from day shift rn, pt observed out in [facility] on unit.    17:30 - pt requesting to have \"the B meds that she had before\", clarified as B52, pt not due for " that yet so agrees to take atarax and neurontin for voices, pt declines to say what the voices are saying.    18:45 - pt back at nurses station, had attempted to take shower for her anxiety and was given stress ball, but states even after meds, still having anxiety and voices and feeling upset, requests the b52 now, medicated po.  Pt requesting phone number for [facility] and left message for them to call her here.   20:00 - pt out in lounge for snack, states meds did help her anxiety, is wanting her hs meds soon.  20:30 - pt lying with eyes closed in her bed, respirations non labored.  Face to face end of shift report will be communicated to night shift rn.   Goal Outcome Evaluation:    Plan of Care Reviewed With: patient

## 2023-05-02 NOTE — PLAN OF CARE
Problem: Adult Behavioral Health Plan of Care  Goal: Patient-Specific Goal (Individualization)  Description: Pt. Will follow recommendations of treatment team during hospital stay.  Pt. Will sleep 6-8 hours a night during hospital stay.   Pt. Will maintain ADLs with out prompting during hospital stay.  Pt. Will attend > 50% of unit programing when appropriate.   Pt. Will be free from self-harm during hospital stay.     Outcome: Progressing     Problem: Thought Process Alteration  Goal: Optimal Thought Clarity  Outcome: Progressing     Face to face shift report received from Rola KRUEGER. Rounding completed, pt observed.     Pt appeared to sleep most of this shift.     Face to face report will be communicated to oncyogesh RN.    Magaly Maloney RN  5/2/2023  5:57 AM

## 2023-05-02 NOTE — PLAN OF CARE
"  Problem: Adult Behavioral Health Plan of Care  Goal: Patient-Specific Goal (Individualization)  Description: Pt. Will follow recommendations of treatment team during hospital stay.  Pt. Will sleep 6-8 hours a night during hospital stay.   Pt. Will maintain ADLs with out prompting during hospital stay.  Pt. Will attend > 50% of unit programing when appropriate.   Pt. Will be free from self-harm during hospital stay.     Outcome: Progressing    A&O, VSS, denies pain.   Endorses chronic auditory hallucinations-states they never go away completely but become manageable. Voices she hears are demons that tell her she has \"the ema of the beast\". Pt states I know it is a lie because I'm filled with Gods spirit.\" Remains religiously preoccupied-asks this writer if people on Earth are angels or human.   Pt encouraged to attend groups. Pt usually leaves group after a few minutes-states she leaves because the voices are loud. Pt has a goal of attending an entire roup today which she accomplishes. Pt states I will need to sit through entire group at [facility].    Frequently seeks out staff-pt is redirectable and given coping skills such as writing in  journal, coloring, reading and music for distractions. Prn medication rec'd per request-(see MAR).  Denies : SI, SIB, HI.  Pleasant demeanor. Behaviors appropriate with staff and peers.   Haldol Dec 100 mg rec'd to [facility]. Pt tolerates well.        Problem: Psychotic Symptoms  Goal: Psychotic Symptoms  Description: Signs and symptoms of listed problems will be absent or manageable.  Outcome: Progressing     Problem: Thought Process Alteration  Goal: Optimal Thought Clarity  Outcome: Progressing   Goal Outcome Evaluation:    Plan of Care Reviewed With: patient        Face to face end of shift report communicated to oncoming shift.     Rola Johnston RN  5/2/2023  12:54 PM                 "

## 2023-05-02 NOTE — PROGRESS NOTES
"Spoke with pt's  this morning. He would like to set up a video meeting with pt. Email provided. Gave update per request.       Spoke with [facility] this afternoon. They state they need to speak with the director but will be open to taking the pt back once \"stabalization is achieved\". Let them know the earliest will be Friday for discharge.   "

## 2023-05-02 NOTE — H&P
[facility]    History and Physical  Medical Services       Date of Admission:  4/29/2023  Date of Service (when I saw the patient): 05/02/23    Assessment & Plan      Principal Problem:    Psychosis (H)    Pt medically stable, no acute medical concerns. No chronic medical problems identified. Will sign off. Please consult for any new medical issues or concerns.       Code Status: Full Code    Yana Ding, CNP    Primary Care Physician   Shanika Mo    Chief Complaint   Psych evaluation     History is obtained from the patient and medical chart     History of Present Illness   (per ED) Ej Dunaway is a 22 year old female with prior history of depression, anxiety, RAD, ADHD, and schizoaffective disorder, substance use disorder (Fentanyl, codeine, benzodiazepines, methamphetamines) who presents via ambulance for mental health evaluation.  Per EMS report patient has been at chemical dependency treatment [facility] for 4 days where she refused to take Haldol.  She developed worsening Rastafarian delusions, was brought to [facility] yesterday evening for evaluation of possible psychosis.  She had an assessment.  She was discharged back to [facility] after she was cleared in the emergency department.  Today she had a recurrence of Rastafarian delusions, they tried to get her to take Haldol but she refused this.  Her treatment facility is willing to take her back.  She says that she is Hood's wife, that the devil came forward to steal and destroy, that she is suppose to take down the antichrist.  She is worried she will be prosecuted like Hood. She denies si/hi.  Staff at [facility] feel she is responding to internal stimuli and see her talking out loud to something that is not there.  She has similar admission last May.  She has hx of meth use and last used 4 days ago.      Past Medical History    I have reviewed this patient's medical history and updated it with  pertinent information if needed.   Past Medical History:   Diagnosis Date     Abnormal lead level in blood     10/15/2002     ADHD (attention deficit hyperactivity disorder), combined type      Adjustment disorder with mixed disturbance of emotions and conduct     Suicidal ideation 6/22/12, placed with new foster parents     Anxiety      Chlamydial infection     8/2015     Conduct disorder     5/2007     Constipation      Depressive disorder      Foster child     Adopted 2013     Personal history of other medical treatment (CODE)     No Comments Provided     Recurrent UTI      Toxic effect of lead and its compounds, accidental (unintentional), initial encounter     2004     Urinary leakage      Urinary tract infection     No Comments Provided       Past Surgical History   I have reviewed this patient's surgical history and updated it with pertinent information if needed.  Past Surgical History:   Procedure Laterality Date     NO HISTORY OF SURGERY         Prior to Admission Medications   Prior to Admission Medications   Prescriptions Last Dose Informant Patient Reported? Taking?   haloperidol (HALDOL) 5 MG tablet 4/29/2023 at 0802 ER dose  Yes No   Sig: Take 1 tablet by mouth At Bedtime      Facility-Administered Medications: None     Allergies   Allergies   Allergen Reactions     Seasonal Allergies        Social History   I have reviewed this patient's social history and updated it with pertinent information if needed. Landyjosselyn EMMA Dunaway  reports that she has quit smoking. Her smoking use included cigarettes. She smoked an average of .5 packs per day. She has never used smokeless tobacco. She reports that she does not currently use alcohol. She reports that she does not currently use drugs after having used the following drugs: Marijuana, Methamphetamines, and Other.    Family History   I have reviewed this patient's family history and updated it with pertinent information if needed.   Family History    Problem Relation Age of Onset     Family History Negative Father         Good Health     Substance Abuse Father      Mental Illness Father      Substance Abuse Sister      Substance Abuse Brother      Other - See Comments Mother         Psychiatric illness,schizophrenia     Cervical Cancer Mother      Cancer Mother      Substance Abuse Mother      Mental Illness Mother      Cancer Maternal Grandmother         Cancer,brain     Breast Cancer Maternal Grandmother      Asthma No family hx of      Coronary Artery Disease No family hx of      Mental Illness No family hx of        Review of Systems   CONSTITUTIONAL:  negative  EYES:  negative  HEENT:  negative  RESPIRATORY:  negative  CARDIOVASCULAR:  negative  GASTROINTESTINAL:  negative  GENITOURINARY:  negative  INTEGUMENT/BREAST:  negative  HEMATOLOGIC/LYMPHATIC:  negative  ALLERGIC/IMMUNOLOGIC:  negative  ENDOCRINE:  negative  MUSCULOSKELETAL:  negative  NEUROLOGICAL:  negative    Physical Exam   Temp: 99.1  F (37.3  C) Temp src: Tympanic BP: (!) 138/47 Pulse: 69   Resp: 14 SpO2: 97 % O2 Device: None (Room air)    Vital Signs with Ranges  Temp:  [99  F (37.2  C)-99.1  F (37.3  C)] 99.1  F (37.3  C)  Pulse:  [69-80] 69  Resp:  [14] 14  BP: (138)/(47-63) 138/47  SpO2:  [97 %-98 %] 97 %  0 lbs 0 oz    Constitutional: awake, alert, cooperative, no apparent distress, and appears stated age, vitals stable   Eyes: Lids and lashes normal, pupils equal, round and reactive to light, extra ocular muscles intact, sclera clear, conjunctiva normal  ENT: Normocephalic, without obvious abnormality, atraumatic, external ears without lesions, oral pharynx with moist mucous membranes, no erythema or exudates  Hematologic / Lymphatic: no cervical lymphadenopathy  Respiratory: No increased work of breathing, good air exchange, clear to auscultation bilaterally, no crackles or wheezing  Cardiovascular: Normal apical impulse, regular rate and rhythm, normal S1 and S2, no S3 or S4, and no  murmur noted  GI: normal bowel sounds, soft, non-distended, non-tender, no masses palpated, no hepatosplenomegally  Genitounirinary: deferred  Skin: normal skin color, texture, turgor and no redness, warmth, or swelling  Musculoskeletal: There is no redness, warmth, or swelling of the joints.  Full range of motion noted.   Neurologic: Awake, alert, oriented to name, place and time.    Neuropsychiatric: General:  Blunted, calm and normal eye contact    Data   Data reviewed today:   Recent Labs   Lab 04/28/23  0113   WBC 12.6*   HGB 13.2   MCV 86         POTASSIUM 4.3   CHLORIDE 105   CO2 24   BUN 14.4   CR 0.67   ANIONGAP 9   CHELI 8.9   GLC 99       No results found for this or any previous visit (from the past 24 hour(s)).

## 2023-05-03 PROCEDURE — 250N000013 HC RX MED GY IP 250 OP 250 PS 637: Performed by: PSYCHIATRY & NEUROLOGY

## 2023-05-03 PROCEDURE — 250N000013 HC RX MED GY IP 250 OP 250 PS 637: Performed by: NURSE PRACTITIONER

## 2023-05-03 PROCEDURE — 99232 SBSQ HOSP IP/OBS MODERATE 35: CPT | Performed by: NURSE PRACTITIONER

## 2023-05-03 PROCEDURE — 124N000001 HC R&B MH

## 2023-05-03 RX ADMIN — HALOPERIDOL 5 MG: 5 TABLET ORAL at 20:15

## 2023-05-03 RX ADMIN — GABAPENTIN 600 MG: 300 CAPSULE ORAL at 09:32

## 2023-05-03 RX ADMIN — LORAZEPAM 2 MG: 1 TABLET ORAL at 18:10

## 2023-05-03 RX ADMIN — BENZTROPINE MESYLATE 0.5 MG: 0.5 TABLET ORAL at 20:15

## 2023-05-03 RX ADMIN — DIPHENHYDRAMINE HYDROCHLORIDE 50 MG: 50 CAPSULE ORAL at 18:10

## 2023-05-03 RX ADMIN — HALOPERIDOL 5 MG: 5 TABLET ORAL at 18:10

## 2023-05-03 RX ADMIN — LORAZEPAM 2 MG: 1 TABLET ORAL at 10:11

## 2023-05-03 RX ADMIN — MELATONIN 3 MG: at 20:16

## 2023-05-03 RX ADMIN — HALOPERIDOL 5 MG: 5 TABLET ORAL at 08:20

## 2023-05-03 RX ADMIN — BENZTROPINE MESYLATE 0.5 MG: 0.5 TABLET ORAL at 14:57

## 2023-05-03 RX ADMIN — GABAPENTIN 600 MG: 300 CAPSULE ORAL at 17:16

## 2023-05-03 RX ADMIN — BENZTROPINE MESYLATE 0.5 MG: 0.5 TABLET ORAL at 08:20

## 2023-05-03 RX ADMIN — DIPHENHYDRAMINE HYDROCHLORIDE 50 MG: 50 CAPSULE ORAL at 10:12

## 2023-05-03 RX ADMIN — HYDROXYZINE HYDROCHLORIDE 50 MG: 25 TABLET, FILM COATED ORAL at 19:07

## 2023-05-03 RX ADMIN — HALOPERIDOL 5 MG: 5 TABLET ORAL at 10:12

## 2023-05-03 ASSESSMENT — ACTIVITIES OF DAILY LIVING (ADL)
DRESS: SCRUBS (BEHAVIORAL HEALTH)
HYGIENE/GROOMING: INDEPENDENT
ADLS_ACUITY_SCORE: 28
HYGIENE/GROOMING: INDEPENDENT
ADLS_ACUITY_SCORE: 28

## 2023-05-03 NOTE — PLAN OF CARE
Problem: Adult Behavioral Health Plan of Care  Goal: Patient-Specific Goal (Individualization)  Description: Pt. Will follow recommendations of treatment team during hospital stay.  Pt. Will sleep 6-8 hours a night during hospital stay.   Pt. Will maintain ADLs with out prompting during hospital stay.  Pt. Will attend > 50% of unit programing when appropriate.   Pt. Will be free from self-harm during hospital stay.     Outcome: Progressing     Problem: Thought Process Alteration  Goal: Optimal Thought Clarity  Outcome: Progressing     Face to face shift report received from Quiana KRUEGER. Rounding completed, pt observed.     Pt appeared to sleep 6 hours on and off this shift.     Face to face report will be communicated to oncoming RN.    Magaly Maloney RN  5/3/2023  5:52 AM

## 2023-05-03 NOTE — PROGRESS NOTES
"  [facility] PSYCHIATRY  -  PROGRESS NOTE     ID   Name: Ej Dunaway  MRN#: [id number]     SUBJECTIVE   Prior to interviewing the patient, I met with nursing and reviewed patient's clinical condition. We discussed clinical care both before and after the interview. I have reviewed the patient's clinical course by review of records including previous notes, labs, and vital signs.     Per nursing, the patient had the following behavioral events over the last 24-hours: none    On psychiatric interview, she is met in the milieu. She states she is doing better. She notes that her thinking is \"Clearer\" and states \"I feel more like myself\". She remains agreealbe to go back to [place] [facility]. Willing to get haldol dec today. Understands second loading will be this upcoming Friday.        MEDICATIONS   Scheduled Meds:    benztropine  0.5 mg Oral TID     haloperidol  5 mg Oral BID     [START ON 5/5/2023] haloperidol decanoate  100 mg Intramuscular Once     PRN Meds:.acetaminophen, alum & mag hydroxide-simethicone, haloperidol **AND** LORazepam **AND** diphenhydrAMINE, haloperidol lactate **AND** LORazepam **AND** diphenhydrAMINE, gabapentin, hydrOXYzine, melatonin, nicotine     ALLERGIES   Allergies   Allergen Reactions     Seasonal Allergies         VITALS   Vitals: /58   Pulse 83   Temp 98  F (36.7  C) (Tympanic)   Resp 14   LMP 03/01/2023 (Exact Date)   SpO2 97%      MENTAL STATUS EXAM   Appearance:  awake, alert, adequately groomed, dressed in hospital scrubs and appeared as age stated  Attitude:  cooperative  Eye Contact:  good  Mood:  \"okaygood\"  Affect:  mood congruent  Speech:  clear, coherent  Psychomotor Behavior:  no evidence of tardive dyskinesia, dystonia, or tics  Thought Process:  tangental  Associations:  no loose associations  Thought Content:  appears to be responding to internal stimuli  Insight:  limited  Judgment:  limited  Oriented to:  time, person, and place  Attention " Span and Concentration:  limited  Recent and Remote Memory:  fair  Fund of Knowledge: appropriate  Muscle Strength and Tone: normal  Gait and Station: Normal       LABS   No results found for this or any previous visit (from the past 24 hour(s)).      ASSESSMENT   Ej is a 22 year old single female who was brought to the [facility] ED by staff from her  treatment facility. Has been at [facility] since 4/24. Today she had a recurrence of Spiritism delusions, they tried to get her to take Haldol but she refused this.  Her treatment facility is willing to take her back.  She says that she is Hood's wife, that the devil came forward to steal and destroy, that she is suppose to take down the antichrist.  She is worried she will be prosecuted like Hood. She denies si/hi.  Staff at [facility] feel she is responding to internal stimuli and see her talking out loud to something that is not there.  She has similar admission last May.  She has hx of meth use and last used 4 days ago.      Daily Progress: will order haldol dec at 100 mg IM today. Lower haldol oral to 5 mg BID for 8 doses and give second loading dose on Friday.            DIAGNOSTIC FORMULATION   1.  Unspecified psychosis  2.  Rule out schizoaffective disorder, bipolar type vs substance induced psychosis  3.  History of Attention-deficit hyperactivity disorder.  4.  Amphetamine use disorder.  5.  Opiate use disorder.     PLAN     Location: Unit 5  Legal Status: Orders Placed This Encounter      Legal status Voluntary    Safety Assessment:    Behavioral Orders   Procedures     Code 1 - Restrict to Unit     Routine Programming     As clinically indicated     Status 15     Every 15 minutes.          PTA medications continued/changed:   -none    New medications tried and stopped:   -haldol --> increased to 7.5 mg TID (5/1/23)  -gabapentin PRN    New medications initiated:   -none    Today's Changes:  Haldol dec 100 mg q daily starting today (5/2/23),  next haldol dec 100 mg on Friday 5/, then 200 mg dec q monthly thereafter  -reduce haldol oral to 5 mg BID for 8  More doses  -anticipate transfer back to [place] [facility]     Programming: Patient will be treated in a therapeutic milieu with appropriate individual and group therapies. Education will be provided on diagnoses, medications, and treatments. Encouraged behavioral activation and participation in group programming.     Medical diagnoses:  Per medicine    Disposition: pending clinical course        TREATMENT TEAM CARE PLAN     Progress: Continued symptoms.    Continued Stay Criteria/Rationale: Continued symptoms without sufficient improvement/resolution.    Medical/Physical: See above.    Precautions: See above.     Plan: Continue inpatient care with unit support and medication management.    Rationale for change in precautions or plan: NA due to no change.    Participants: Sam Coombs MD, Nursing, SW, OT.    The patient's care was discussed with the treatment team and chart notes were reviewed.       ATTESTATION    Sam Coombs MD  [facility]   Psychiatry    Video Visit: Patient has given verbal consent for video visit?: Yes  Type of Service: video visit for mental health treatment  Reason for Video Visit: COVID-19 [place]  Originating Site (patient location): [facility]  Distant Site (provider location): Remote Location  Mode of Communication: Video Conference via [employer]  Time of Service: Date: May 2, 2023 , Start: 08:00 end: 09:00

## 2023-05-03 NOTE — PROGRESS NOTES
"VAISHNAVI MEJIA  Patient requested visit with .  Patient was cooperative to share some of her life story.  Previously chemically dependant. She stayed one year with [facility] and was released.  Now back again at  but only a few days. Patient says she is \"spiritual\" in a relationship with [employer] but not [employer].  Patient did not continue further exploration at this time.  "

## 2023-05-03 NOTE — PLAN OF CARE
"  Problem: Adult Behavioral Health Plan of Care  Goal: Patient-Specific Goal (Individualization)  Description: Pt. Will follow recommendations of treatment team during hospital stay.  Pt. Will sleep 6-8 hours a night during hospital stay.   Pt. Will maintain ADLs with out prompting during hospital stay.  Pt. Will attend > 50% of unit programing when appropriate.   Pt. Will be free from self-harm during hospital stay.     Outcome: Progressing    A&O, VSS, denies pain.   Pt is sleeping well at night-states when she wakes the voices start. Pt requesting medications appropriately.   Attending and participating in groups.  interactions with peers and staff appropriate-though frequently seeks staff \"due to boredom.\" hopeful to discharge back to [facility] on Friday.   denies SI, SIB, or HI.        Problem: Psychotic Symptoms  Goal: Psychotic Symptoms  Description: Signs and symptoms of listed problems will be absent or manageable.  Outcome: Progressing     Problem: Thought Process Alteration  Goal: Optimal Thought Clarity  Outcome: Progressing   Goal Outcome Evaluation:    Plan of Care Reviewed With: patient         Face to face end of shift report communicated to oncoming shift.     Rola Johnston RN  5/3/2023  11:40 AM                   "

## 2023-05-03 NOTE — PROGRESS NOTES
"  [facility] PSYCHIATRY  -  PROGRESS NOTE     ID   Name: Ej Dunaway  MRN#: [id number]     SUBJECTIVE   Prior to interviewing the patient, I met with nursing and reviewed patient's clinical condition. We discussed clinical care both before and after the interview. I have reviewed the patient's clinical course by review of records including previous notes, labs, and vital signs.     Per nursing, the patient had the following behavioral events over the last 24-hours: none    On psychiatric interview, she is met with in her room. She states she is doing \"way better.\". She notes that her thinking is \"Clearer\" and states \"I feel more like myself\". She denies auditory hallucinations at this time though nursing notes indicate auditory hallucinations and delusional thinking. She remains agreeable to go back to [facility]. Understands second loading will be this upcoming Friday. Denies side effects or physical pain.         MEDICATIONS   Scheduled Meds:    benztropine  0.5 mg Oral TID     haloperidol  5 mg Oral BID     [START ON 5/5/2023] haloperidol decanoate  100 mg Intramuscular Once     PRN Meds:.acetaminophen, alum & mag hydroxide-simethicone, haloperidol **AND** LORazepam **AND** diphenhydrAMINE, haloperidol lactate **AND** LORazepam **AND** diphenhydrAMINE, gabapentin, hydrOXYzine, melatonin, nicotine     ALLERGIES   Allergies   Allergen Reactions     Seasonal Allergies         VITALS   Vitals: /55 (BP Location: Right arm)   Pulse 78   Temp 99.3  F (37.4  C) (Tympanic)   Resp 14   LMP 03/01/2023 (Exact Date)   SpO2 98%      MENTAL STATUS EXAM   Appearance:  awake, alert, adequately groomed, dressed in hospital scrubs and appeared as age stated  Attitude:  cooperative  Eye Contact:  good  Mood:  \"better\"  Affect:  mood congruent  Speech:  clear, coherent  Psychomotor Behavior:  no evidence of tardive dyskinesia, dystonia, or tics  Thought Process:  tangental  Associations:  no loose " associations  Thought Content:  appears to be responding to internal stimuli  Insight:  limited  Judgment:  limited  Oriented to:  time, person, and place  Attention Span and Concentration:  limited  Recent and Remote Memory:  fair  Fund of Knowledge: appropriate  Muscle Strength and Tone: normal  Gait and Station: Normal       LABS   No results found for this or any previous visit (from the past 24 hour(s)).      ASSESSMENT   Ej is a 22 year old single female who was brought to the [facility] ED by staff from her  treatment facility. Has been at [facility] since 4/24. Today she had a recurrence of Baptist delusions, they tried to get her to take Haldol but she refused this.  Her treatment facility is willing to take her back.  She says that she is Hood's wife, that the devil came forward to steal and destroy, that she is suppose to take down the antichrist.  She is worried she will be prosecuted like Hood. She denies si/hi.  Staff at [facility] feel she is responding to internal stimuli and see her talking out loud to something that is not there.  She has similar admission last May.  She has hx of meth use and last used 4 days ago.      Daily Progress: Tolerating medication. Improving though still having auditory hallucinations and delusional thinking.        DIAGNOSTIC FORMULATION   1.  Unspecified psychosis  2.  Rule out schizoaffective disorder, bipolar type vs substance induced psychosis  3.  History of Attention-deficit hyperactivity disorder.  4.  Amphetamine use disorder.  5.  Opiate use disorder.     PLAN     Location: Unit 5  Legal Status: Orders Placed This Encounter      Legal status Voluntary    Safety Assessment:    Behavioral Orders   Procedures     Code 1 - Restrict to Unit     Routine Programming     As clinically indicated     Status 15     Every 15 minutes.          PTA medications continued/changed:   -none    New medications tried and stopped:   -haldol --> increased to 7.5 mg  TID (5/1/23)  -gabapentin PRN    New medications initiated:   -none    Today's Changes:  -Haldol dec 100 mg q daily starting today (5/2/23), next haldol dec 100 mg on Friday 5/5, then 200 mg dec q monthly thereafter  -reduce haldol oral to 5 mg BID for 8  More doses  -anticipate transfer back to [facility]- No earlier than Friday 5/4  No changes made to above plan    Programming: Patient will be treated in a therapeutic milieu with appropriate individual and group therapies. Education will be provided on diagnoses, medications, and treatments. Encouraged behavioral activation and participation in group programming.     Medical diagnoses:  Per medicine    Disposition: Return to [facility], no earlier than Friday       ATTESTATION    Rosi Lima NP

## 2023-05-04 PROCEDURE — 99232 SBSQ HOSP IP/OBS MODERATE 35: CPT | Performed by: NURSE PRACTITIONER

## 2023-05-04 PROCEDURE — 250N000013 HC RX MED GY IP 250 OP 250 PS 637: Performed by: PSYCHIATRY & NEUROLOGY

## 2023-05-04 PROCEDURE — 250N000013 HC RX MED GY IP 250 OP 250 PS 637: Performed by: NURSE PRACTITIONER

## 2023-05-04 PROCEDURE — 124N000001 HC R&B MH

## 2023-05-04 RX ADMIN — HYDROXYZINE HYDROCHLORIDE 50 MG: 25 TABLET, FILM COATED ORAL at 17:31

## 2023-05-04 RX ADMIN — HALOPERIDOL 5 MG: 5 TABLET ORAL at 08:03

## 2023-05-04 RX ADMIN — LORAZEPAM 2 MG: 1 TABLET ORAL at 16:21

## 2023-05-04 RX ADMIN — DIPHENHYDRAMINE HYDROCHLORIDE 50 MG: 50 CAPSULE ORAL at 09:20

## 2023-05-04 RX ADMIN — GABAPENTIN 600 MG: 300 CAPSULE ORAL at 14:05

## 2023-05-04 RX ADMIN — GABAPENTIN 600 MG: 300 CAPSULE ORAL at 08:04

## 2023-05-04 RX ADMIN — MELATONIN 3 MG: at 20:10

## 2023-05-04 RX ADMIN — HALOPERIDOL 5 MG: 5 TABLET ORAL at 20:09

## 2023-05-04 RX ADMIN — BENZTROPINE MESYLATE 0.5 MG: 0.5 TABLET ORAL at 14:05

## 2023-05-04 RX ADMIN — BENZTROPINE MESYLATE 0.5 MG: 0.5 TABLET ORAL at 08:04

## 2023-05-04 RX ADMIN — BENZTROPINE MESYLATE 0.5 MG: 0.5 TABLET ORAL at 20:09

## 2023-05-04 RX ADMIN — LORAZEPAM 2 MG: 1 TABLET ORAL at 09:20

## 2023-05-04 RX ADMIN — DIPHENHYDRAMINE HYDROCHLORIDE 50 MG: 50 CAPSULE ORAL at 16:21

## 2023-05-04 RX ADMIN — HALOPERIDOL 5 MG: 5 TABLET ORAL at 09:20

## 2023-05-04 RX ADMIN — HALOPERIDOL 5 MG: 5 TABLET ORAL at 16:21

## 2023-05-04 ASSESSMENT — ACTIVITIES OF DAILY LIVING (ADL)
ADLS_ACUITY_SCORE: 28
ADLS_ACUITY_SCORE: 28
DRESS: SCRUBS (BEHAVIORAL HEALTH);INDEPENDENT
ADLS_ACUITY_SCORE: 28
ORAL_HYGIENE: INDEPENDENT
ADLS_ACUITY_SCORE: 28
HYGIENE/GROOMING: INDEPENDENT
ADLS_ACUITY_SCORE: 28
LAUNDRY: UNABLE TO COMPLETE
DRESS: SCRUBS (BEHAVIORAL HEALTH)
ADLS_ACUITY_SCORE: 28
ADLS_ACUITY_SCORE: 28
HYGIENE/GROOMING: INDEPENDENT
ADLS_ACUITY_SCORE: 28

## 2023-05-04 NOTE — PROGRESS NOTES
Spoke with [facility], they state they won't be able to do a Friday discharge but are open to Monday or Tuesday depending on if she has a psychiatrist set up. Provider notified.

## 2023-05-04 NOTE — PLAN OF CARE
Problem: Adult Behavioral Health Plan of Care  Goal: Patient-Specific Goal (Individualization)  Description: Pt. Will follow recommendations of treatment team during hospital stay.  Pt. Will sleep 6-8 hours a night during hospital stay.   Pt. Will maintain ADLs with out prompting during hospital stay.  Pt. Will attend > 50% of unit programing when appropriate.   Pt. Will be free from self-harm during hospital stay.     Outcome: Progressing     Face to face shift report received from Melisa KRUEGER. Rounding completed, pt observed.     Pt appeared to sleep 5 hours on and off this shift.     Face to face report will be communicated to oncoming RN.    Magaly Maloney RN  5/4/2023  6:12 AM

## 2023-05-04 NOTE — PLAN OF CARE
"PT denies SI/HI. She does endorse feeling like Hood's wife but is open to the idea that it can be a delusion and part of her psychosis. She asked me if \"you nurses communicate with each other with hand gestures\" and I said do you think we do, and she said \"I think its my psychosis making me think weird sometimes.\"     She had several requests for PRN medications   1716-She took gabapentin for anxiety   1810-Benadryl, halidol, and ativan for \"hearing demons\"  1907 -She took atarax for continued anxiety  2016- She took melatonin with her HS medications    She does get bored and restless and was encouraged to attend groups.     Face to face end of shift report communicated to oncoming RN     Melisa Isabel RN  5/3/2023  10:48 PM                         Problem: Adult Behavioral Health Plan of Care  Goal: Patient-Specific Goal (Individualization)  Description: Pt. Will follow recommendations of treatment team during hospital stay.  Pt. Will sleep 6-8 hours a night during hospital stay.   Pt. Will maintain ADLs with out prompting during hospital stay.  Pt. Will attend > 50% of unit programing when appropriate.   Pt. Will be free from self-harm during hospital stay.     Outcome: Progressing     Problem: Psychotic Symptoms  Goal: Psychotic Symptoms  Description: Signs and symptoms of listed problems will be absent or manageable.  Outcome: Progressing     Problem: Thought Process Alteration  Goal: Optimal Thought Clarity  Outcome: Progressing   Goal Outcome Evaluation:    Plan of Care Reviewed With: patient                   "

## 2023-05-04 NOTE — PLAN OF CARE
"  Problem: Adult Behavioral Health Plan of Care  Goal: Patient-Specific Goal (Individualization)  Description: Pt. Will follow recommendations of treatment team during hospital stay.  Pt. Will sleep 6-8 hours a night during hospital stay.   Pt. Will maintain ADLs with out prompting during hospital stay.  Pt. Will attend > 50% of unit programing when appropriate.   Pt. Will be free from self-harm during hospital stay.     Outcome: Progressing    A&O, VSS, denies pain.   Pt requests prn medication upon waking. Pt given AM medications and pt attends group-following groups pt states she feels she needs prn medication. Prn rec'd.   Later in shift pt states that she is worried because the voices are quiet. Pt states if is weird because her \"head is quiet\". Reinforce that this is a good sign medication is working.   denies  Si,  Sib, or HI. Pt hoping she can discharge back to [facility] soon.      Problem: Psychotic Symptoms  Goal: Psychotic Symptoms  Description: Signs and symptoms of listed problems will be absent or manageable.  Outcome: Progressing     Problem: Thought Process Alteration  Goal: Optimal Thought Clarity  Outcome: Progressing   Goal Outcome Evaluation:    Plan of Care Reviewed With: patient         Face to face end of shift report communicated to oncoming shift.     Rola Johnston RN  5/4/2023  1:19 PM                   "

## 2023-05-04 NOTE — PROGRESS NOTES
"  [facility] PSYCHIATRY  -  PROGRESS NOTE     ID   Name: Ej Dunaway  MRN#: [id number]     SUBJECTIVE   Prior to interviewing the patient, I met with nursing and reviewed patient's clinical condition. We discussed clinical care both before and after the interview. I have reviewed the patient's clinical course by review of records including previous notes, labs, and vital signs.     Per nursing, the patient had the following behavioral events over the last 24-hours: none    On psychiatric interview, she is met with in her room. She continues to report doing well, slight increase in anxiety with group attendance. Describes her head as \"quiet\" and states it is \"weird.\" She is hopeful she can return to [facility] soon. Understands she will receive her second Haldol Dec loading dose tomorrow. Denies side effects or physical pain.         MEDICATIONS   Scheduled Meds:    benztropine  0.5 mg Oral TID     haloperidol  5 mg Oral BID     [START ON 5/5/2023] haloperidol decanoate  100 mg Intramuscular Once     PRN Meds:.acetaminophen, alum & mag hydroxide-simethicone, haloperidol **AND** LORazepam **AND** diphenhydrAMINE, haloperidol lactate **AND** LORazepam **AND** diphenhydrAMINE, gabapentin, hydrOXYzine, melatonin, nicotine     ALLERGIES   Allergies   Allergen Reactions     Seasonal Allergies         VITALS   Vitals: /98   Pulse 102   Temp 97.6  F (36.4  C) (Tympanic)   Resp 15   LMP 03/01/2023 (Exact Date)   SpO2 98%      MENTAL STATUS EXAM   Appearance:  awake, alert, adequately groomed, dressed in hospital scrubs and appeared as age stated  Attitude:  cooperative  Eye Contact:  good  Mood:  \"better\"  Affect:  mood congruent  Speech:  clear, coherent  Psychomotor Behavior:  no evidence of tardive dyskinesia, dystonia, or tics  Thought Process:  linear, goal oriented   Associations:  no loose associations  Thought Content:  hallucinations improving  Insight:  limited  Judgment:  " limited  Oriented to:  time, person, and place  Attention Span and Concentration:  limited  Recent and Remote Memory:  fair  Fund of Knowledge: appropriate  Muscle Strength and Tone: normal  Gait and Station: Normal       LABS   No results found for this or any previous visit (from the past 24 hour(s)).      ASSESSMENT   Ej is a 22 year old single female who was brought to the [facility] ED by staff from her  treatment facility. Has been at [facility] since 4/24. Today she had a recurrence of Rastafarian delusions, they tried to get her to take Haldol but she refused this.  Her treatment facility is willing to take her back.  She says that she is Hood's wife, that the devil came forward to steal and destroy, that she is suppose to take down the antichrist.  She is worried she will be prosecuted like Hood. She denies si/hi.  Staff at [facility] feel she is responding to internal stimuli and see her talking out loud to something that is not there.  She has similar admission last May.  She has hx of meth use and last used 4 days ago.      Daily Progress: Tolerating medication. Improving though still having intermittent auditory hallucinations and delusional thinking.        DIAGNOSTIC FORMULATION   1.  Unspecified psychosis  2.  Rule out schizoaffective disorder, bipolar type vs substance induced psychosis  3.  History of Attention-deficit hyperactivity disorder.  4.  Amphetamine use disorder.  5.  Opiate use disorder.     PLAN     Location: Unit 5  Legal Status: Orders Placed This Encounter      Legal status Voluntary    Safety Assessment:    Behavioral Orders   Procedures     Code 1 - Restrict to Unit     Routine Programming     As clinically indicated     Status 15     Every 15 minutes.          PTA medications continued/changed:   -none    New medications tried and stopped:   -haldol --> increased to 7.5 mg TID (5/1/23)  -gabapentin PRN    New medications initiated:   -none    Today's  Changes:  -Haldol dec 100 mg q daily starting today (5/2/23), next haldol dec 100 mg on Friday 5/5, then 200 mg dec q monthly thereafter  -reduce haldol oral to 5 mg BID for 8  More doses  -anticipate transfer back to [facility]- No earlier than Friday 5/4  No changes made to above plan    Programming: Patient will be treated in a therapeutic milieu with appropriate individual and group therapies. Education will be provided on diagnoses, medications, and treatments. Encouraged behavioral activation and participation in group programming.     Medical diagnoses:  Per medicine    Disposition: Return to [facility], likely Monday or Tuesday       ATTESTATION    Rosi Lima NP

## 2023-05-04 NOTE — PLAN OF CARE
"SHIFT SUMMARY:   Anxious, restless and cooperative. Religiously fixated - asked if writer has \"the ema of the beast, you know, a chip attached to your soul. Do you believe artificial intelligence has a conscience?\" At 16:21, administered by other, PRN PO Haldol 5 mg, ativan 2 mg and benadryl 50 mg related to feeling agitated. Administered PRN PO 50 mg hydroxyzine related to 5/10 anxiety.     Problem: Adult Behavioral Health Plan of Care  Goal: Patient-Specific Goal (Individualization)  Description: Pt. Will follow recommendations of treatment team during hospital stay.  Pt. Will sleep 6-8 hours a night during hospital stay.   Pt. Will maintain ADLs with out prompting during hospital stay.  Pt. Will attend > 50% of unit programing when appropriate.   Pt. Will be free from self-harm during hospital stay.     Outcome: Progressing     Problem: Psychotic Symptoms  Goal: Psychotic Symptoms  Description: Signs and symptoms of listed problems will be absent or manageable.  Outcome: Progressing     Problem: Thought Process Alteration  Goal: Optimal Thought Clarity  Outcome: Progressing   Goal Outcome Evaluation:                        "

## 2023-05-05 PROCEDURE — 99232 SBSQ HOSP IP/OBS MODERATE 35: CPT | Mod: GT | Performed by: PSYCHIATRY & NEUROLOGY

## 2023-05-05 PROCEDURE — 250N000013 HC RX MED GY IP 250 OP 250 PS 637: Performed by: NURSE PRACTITIONER

## 2023-05-05 PROCEDURE — 250N000013 HC RX MED GY IP 250 OP 250 PS 637: Performed by: PSYCHIATRY & NEUROLOGY

## 2023-05-05 PROCEDURE — 124N000001 HC R&B MH

## 2023-05-05 PROCEDURE — 250N000011 HC RX IP 250 OP 636: Performed by: PSYCHIATRY & NEUROLOGY

## 2023-05-05 RX ORDER — ATOMOXETINE 25 MG/1
25 CAPSULE ORAL DAILY
Status: DISCONTINUED | OUTPATIENT
Start: 2023-05-05 | End: 2023-05-07

## 2023-05-05 RX ADMIN — HYDROXYZINE HYDROCHLORIDE 50 MG: 25 TABLET, FILM COATED ORAL at 10:15

## 2023-05-05 RX ADMIN — ATOMOXETINE 25 MG: 25 CAPSULE ORAL at 09:11

## 2023-05-05 RX ADMIN — BENZTROPINE MESYLATE 0.5 MG: 0.5 TABLET ORAL at 14:58

## 2023-05-05 RX ADMIN — DIPHENHYDRAMINE HYDROCHLORIDE 50 MG: 50 CAPSULE ORAL at 15:56

## 2023-05-05 RX ADMIN — LORAZEPAM 2 MG: 1 TABLET ORAL at 15:56

## 2023-05-05 RX ADMIN — HALOPERIDOL DECANOATE 100 MG: 100 INJECTION INTRAMUSCULAR at 10:10

## 2023-05-05 RX ADMIN — HALOPERIDOL 5 MG: 5 TABLET ORAL at 08:32

## 2023-05-05 RX ADMIN — MELATONIN 3 MG: at 20:11

## 2023-05-05 RX ADMIN — HALOPERIDOL 5 MG: 5 TABLET ORAL at 15:56

## 2023-05-05 RX ADMIN — BENZTROPINE MESYLATE 0.5 MG: 0.5 TABLET ORAL at 08:32

## 2023-05-05 RX ADMIN — HALOPERIDOL 5 MG: 5 TABLET ORAL at 20:11

## 2023-05-05 RX ADMIN — BENZTROPINE MESYLATE 0.5 MG: 0.5 TABLET ORAL at 20:11

## 2023-05-05 RX ADMIN — HYDROXYZINE HYDROCHLORIDE 50 MG: 25 TABLET, FILM COATED ORAL at 19:44

## 2023-05-05 RX ADMIN — GABAPENTIN 600 MG: 300 CAPSULE ORAL at 19:44

## 2023-05-05 RX ADMIN — ACETAMINOPHEN 325MG 650 MG: 325 TABLET ORAL at 08:32

## 2023-05-05 ASSESSMENT — ACTIVITIES OF DAILY LIVING (ADL)
DRESS: SCRUBS (BEHAVIORAL HEALTH);INDEPENDENT
ADLS_ACUITY_SCORE: 28
LAUNDRY: UNABLE TO COMPLETE
ADLS_ACUITY_SCORE: 28
HYGIENE/GROOMING: INDEPENDENT
ADLS_ACUITY_SCORE: 28
ORAL_HYGIENE: INDEPENDENT
ADLS_ACUITY_SCORE: 28
HYGIENE/GROOMING: INDEPENDENT
DRESS: SCRUBS (BEHAVIORAL HEALTH)
ADLS_ACUITY_SCORE: 28

## 2023-05-05 NOTE — PLAN OF CARE
Problem: Adult Behavioral Health Plan of Care  Goal: Patient-Specific Goal (Individualization)  Description: Pt. Will follow recommendations of treatment team during hospital stay.  Pt. Will sleep 6-8 hours a night during hospital stay.   Pt. Will maintain ADLs with out prompting during hospital stay.  Pt. Will attend > 50% of unit programing when appropriate.   Pt. Will be free from self-harm during hospital stay.     Outcome: Progressing     Problem: Psychotic Symptoms  Goal: Psychotic Symptoms  Description: Signs and symptoms of listed problems will be absent or manageable.  Outcome: Progressing    A&O, VSS, pain 1/10 described as HA--Tylenol 650 mg with pt denying pain on recheck.   Full range affect, with clear conversations. States she is feeling happy today. Active on unit, participating in groups.   States the AH are much improved and she is only hearing them intermittently anymore.  [facility].   Haldol Dec 100 mg to L-hip.  Denies criteria including: SI, SIB, HI.     Problem: Thought Process Alteration  Goal: Optimal Thought Clarity  Outcome: Progressing   Goal Outcome Evaluation:    Plan of Care Reviewed With: patient      Face to face end of shift report communicated to oncoming shift.     Rola Johnston RN  5/5/2023  11:17 AM

## 2023-05-05 NOTE — PROGRESS NOTES
"  [facility] PSYCHIATRY  -  PROGRESS NOTE     ID   Name: Ej Dunaway  MRN#: [id number]     SUBJECTIVE   Prior to interviewing the patient, I met with nursing and reviewed patient's clinical condition. We discussed clinical care both before and after the interview. I have reviewed the patient's clinical course by review of records including previous notes, labs, and vital signs.     Per nursing, the patient had the following behavioral events over the last 24-hours: none    On psychiatric interview, she is met in the milieu. Reports she is doing \"good\". dissapointed she can't return to [facility] today and has to wait the weekend.  She is aware of second loading shot today. No side effects from haldol. Requests to restart Strattera. Will plan to do this this weekend and have her dismiss early next week. Denies SI.        MEDICATIONS   Scheduled Meds:    atomoxetine  25 mg Oral Daily     benztropine  0.5 mg Oral TID     haloperidol  5 mg Oral BID     haloperidol decanoate  100 mg Intramuscular Once     PRN Meds:.acetaminophen, alum & mag hydroxide-simethicone, haloperidol **AND** LORazepam **AND** diphenhydrAMINE, haloperidol lactate **AND** LORazepam **AND** diphenhydrAMINE, gabapentin, hydrOXYzine, melatonin, nicotine     ALLERGIES   Allergies   Allergen Reactions     Seasonal Allergies         VITALS   Vitals: BP (!) 119/49   Pulse 73   Temp 97.6  F (36.4  C) (Tympanic)   Resp 14   LMP 03/01/2023 (Exact Date)   SpO2 98%      MENTAL STATUS EXAM   Appearance:  awake, alert, adequately groomed, dressed in hospital scrubs and appeared as age stated  Attitude:  cooperative  Eye Contact:  good  Mood:  \"good\"  Affect:  mood congruent  Speech:  clear, coherent  Psychomotor Behavior:  no evidence of tardive dyskinesia, dystonia, or tics  Thought Process:  linear, goal oriented   Associations:  no loose associations  Thought Content:  hallucinations improving  Insight:  limited  Judgment:  " limited  Oriented to:  time, person, and place  Attention Span and Concentration:  limited  Recent and Remote Memory:  fair  Fund of Knowledge: appropriate  Muscle Strength and Tone: normal  Gait and Station: Normal       LABS   No results found for this or any previous visit (from the past 24 hour(s)).      ASSESSMENT   Ej is a 22 year old single female who was brought to the [facility] ED by staff from her  treatment facility. Has been at [facility] since 4/24. Today she had a recurrence of Taoism delusions, they tried to get her to take Haldol but she refused this.  Her treatment facility is willing to take her back.  She says that she is Hood's wife, that the devil came forward to steal and destroy, that she is suppose to take down the antichrist.  She is worried she will be prosecuted like Hood. She denies si/hi.  Staff at [facility] feel she is responding to internal stimuli and see her talking out loud to something that is not there.  She has similar admission last May.  She has hx of meth use and last used 4 days ago.      Daily Progress: restart strattera today. Will get second loading dose of haldol IM today. No side effects       DIAGNOSTIC FORMULATION   1.  Unspecified psychosis  2.  Rule out schizoaffective disorder, bipolar type vs substance induced psychosis  3.  History of Attention-deficit hyperactivity disorder.  4.  Amphetamine use disorder.  5.  Opiate use disorder.     PLAN     Location: Unit 5  Legal Status: Orders Placed This Encounter      Legal status Voluntary    Safety Assessment:    Behavioral Orders   Procedures     Code 1 - Restrict to Unit     Routine Programming     As clinically indicated     Status 15     Every 15 minutes.          PTA medications continued/changed:   -none    New medications tried and stopped:   -haldol --> increased to 7.5 mg TID (5/1/23)  -gabapentin PRN    New medications initiated:   -none    Today's Changes:  -Haldol dec 100 mg q daily  starting today (5/2/23), next haldol dec 100 mg on Friday 5/5, then 200 mg dec q monthly thereafter  -reduce haldol oral to 5 mg BID for 8  More doses  -anticipate transfer back to [facility]- next Monday or tuesday      Programming: Patient will be treated in a therapeutic milieu with appropriate individual and group therapies. Education will be provided on diagnoses, medications, and treatments. Encouraged behavioral activation and participation in group programming.     Medical diagnoses:  Per medicine    Disposition: Return to [facility], likely Monday or Tuesday       ATTESTATION    Sam Coombs MD  [facility]   Psychiatry    Video Visit: Patient has given verbal consent for video visit?: Yes  Type of Service: video visit for mental health treatment  Reason for Video Visit: COVID-19 [place]  Originating Site (patient location): [facility]  Distant Site (provider location): Remote Location  Mode of Communication: Video Conference via [employer]  Time of Service: Date: 05/05/2023 , Start: 08:00 end: 08:30

## 2023-05-05 NOTE — PLAN OF CARE
Problem: Adult Behavioral Health Plan of Care  Goal: Patient-Specific Goal (Individualization)  Description: Pt. Will follow recommendations of treatment team during hospital stay.  Pt. Will sleep 6-8 hours a night during hospital stay.   Pt. Will maintain ADLs with out prompting during hospital stay.  Pt. Will attend > 50% of unit programing when appropriate.   Pt. Will be free from self-harm during hospital stay.     Outcome: Progressing     Face to face shift report received from Marquis KRUEGER. Rounding completed, pt observed.     Pt appeared to sleep most of this shift.    Face to face report will be communicated to oncoming RN.    Magaly Maloney RN  5/5/2023  6:18 AM

## 2023-05-05 NOTE — PLAN OF CARE
"SHIFT SUMMARY:  Anxious and cooperative. PRN PO benadryl 50 mg, ativan 2 mg with haldol 5 mg administered per order related to increased auditory hallucinations and anxiety. Declined to describe what the voices are saying other than \"I know what they're saying.\"    CARE CONTINUES FROM PM SHIFT.    Observed resting with eyes closed, respirations even, unlabored and WNL. Able to reposition self and make needs known. 15-minute checks in place. Slept approximately 6 hours.       Problem: Adult Behavioral Health Plan of Care  Goal: Patient-Specific Goal (Individualization)  Description: Pt. Will follow recommendations of treatment team during hospital stay.  Pt. Will sleep 6-8 hours a night during hospital stay.   Pt. Will maintain ADLs with out prompting during hospital stay.  Pt. Will attend > 50% of unit programing when appropriate.   Pt. Will be free from self-harm during hospital stay.     Outcome: Progressing     Problem: Psychotic Symptoms  Goal: Psychotic Symptoms  Description: Signs and symptoms of listed problems will be absent or manageable.  Outcome: Progressing     Problem: Thought Process Alteration  Goal: Optimal Thought Clarity  Outcome: Progressing   Goal Outcome Evaluation:    Plan of Care Reviewed With: patient                   "

## 2023-05-05 NOTE — PROGRESS NOTES
Scheduled follow up with [facility],  Psychiatry- Miladys Morales- Thursday June 1st @ 10:00 AM. Pt will receive her injection June 1st at [facility] by the nurse. Called [facility] and left message regarding update.

## 2023-05-06 PROCEDURE — 250N000013 HC RX MED GY IP 250 OP 250 PS 637: Performed by: NURSE PRACTITIONER

## 2023-05-06 PROCEDURE — 99232 SBSQ HOSP IP/OBS MODERATE 35: CPT | Mod: GT | Performed by: PSYCHIATRY & NEUROLOGY

## 2023-05-06 PROCEDURE — 250N000013 HC RX MED GY IP 250 OP 250 PS 637: Performed by: PSYCHIATRY & NEUROLOGY

## 2023-05-06 PROCEDURE — 124N000001 HC R&B MH

## 2023-05-06 RX ADMIN — HALOPERIDOL 5 MG: 5 TABLET ORAL at 07:44

## 2023-05-06 RX ADMIN — LORAZEPAM 2 MG: 1 TABLET ORAL at 16:19

## 2023-05-06 RX ADMIN — ACETAMINOPHEN 325MG 650 MG: 325 TABLET ORAL at 18:47

## 2023-05-06 RX ADMIN — GABAPENTIN 600 MG: 300 CAPSULE ORAL at 18:47

## 2023-05-06 RX ADMIN — HYDROXYZINE HYDROCHLORIDE 50 MG: 25 TABLET, FILM COATED ORAL at 17:37

## 2023-05-06 RX ADMIN — LORAZEPAM 2 MG: 1 TABLET ORAL at 07:44

## 2023-05-06 RX ADMIN — BENZTROPINE MESYLATE 0.5 MG: 0.5 TABLET ORAL at 08:54

## 2023-05-06 RX ADMIN — DIPHENHYDRAMINE HYDROCHLORIDE 50 MG: 50 CAPSULE ORAL at 16:19

## 2023-05-06 RX ADMIN — BENZTROPINE MESYLATE 0.5 MG: 0.5 TABLET ORAL at 20:16

## 2023-05-06 RX ADMIN — HALOPERIDOL 5 MG: 5 TABLET ORAL at 16:19

## 2023-05-06 RX ADMIN — BENZTROPINE MESYLATE 0.5 MG: 0.5 TABLET ORAL at 14:05

## 2023-05-06 RX ADMIN — DIPHENHYDRAMINE HYDROCHLORIDE 50 MG: 50 CAPSULE ORAL at 07:45

## 2023-05-06 RX ADMIN — ATOMOXETINE 25 MG: 25 CAPSULE ORAL at 08:54

## 2023-05-06 RX ADMIN — HALOPERIDOL 5 MG: 5 TABLET ORAL at 08:54

## 2023-05-06 RX ADMIN — MELATONIN 3 MG: at 20:17

## 2023-05-06 ASSESSMENT — ACTIVITIES OF DAILY LIVING (ADL)
ORAL_HYGIENE: INDEPENDENT
ADLS_ACUITY_SCORE: 28
ADLS_ACUITY_SCORE: 28
ORAL_HYGIENE: INDEPENDENT
DRESS: SCRUBS (BEHAVIORAL HEALTH);INDEPENDENT
ADLS_ACUITY_SCORE: 28
ADLS_ACUITY_SCORE: 28
HYGIENE/GROOMING: INDEPENDENT
DRESS: SCRUBS (BEHAVIORAL HEALTH)
ADLS_ACUITY_SCORE: 28
ADLS_ACUITY_SCORE: 28
HYGIENE/GROOMING: INDEPENDENT
ADLS_ACUITY_SCORE: 28
LAUNDRY: UNABLE TO COMPLETE
ADLS_ACUITY_SCORE: 28
LAUNDRY: UNABLE TO COMPLETE
ADLS_ACUITY_SCORE: 28
ADLS_ACUITY_SCORE: 28

## 2023-05-06 NOTE — PLAN OF CARE
Problem: Adult Behavioral Health Plan of Care  Goal: Patient-Specific Goal (Individualization)  Description: Pt. Will follow recommendations of treatment team during hospital stay.  Pt. Will sleep 6-8 hours a night during hospital stay.   Pt. Will maintain ADLs with out prompting during hospital stay.  Pt. Will attend > 50% of unit programing when appropriate.   Pt. Will be free from self-harm during hospital stay.     Outcome: Progressing   Goal Outcome Evaluation:    Plan of Care Reviewed With: patient       Patient is Alert, able to make needs known. VSS, afebrile. Assessment and vitals as charted. Denies pain. Patient has been in room much of shift. Reports anxiety and irritability. States she does not want to be here anymore, and wants to move on the the next phase of care. Wants to be around more Christians.  She did receive PRN haldol, benadryl, ativan x 1 for agitation, racing thoughts. Endorses auditory hallucinations. Denies command hallucinations. Does report it is has improved. Steady gait. No falls. Appetite fair. Has not attended a group thus far in shift.     Face to face report given with opportunity to observe patient.    Report given to PM, RN.     Olimpia Carey RN   5/6/2023  2:57 PM

## 2023-05-06 NOTE — PROGRESS NOTES
"  [facility] PSYCHIATRY  -  PROGRESS NOTE     ID   Name: Ej Dunaway  MRN#: [id number]     SUBJECTIVE   Prior to interviewing the patient, I met with nursing and reviewed patient's clinical condition. We discussed clinical care both before and after the interview. I have reviewed the patient's clinical course by review of records including previous notes, labs, and vital signs.     Per nursing, the patient had the following behavioral events over the last 24-hours: none    On psychiatric interview, she is met within her room. She states she is \"tired\" this AM. Tolerated initiation of strattera. Denies SI. Looking forward to transition to [facility]. Denies any physical pain.        MEDICATIONS   Scheduled Meds:    atomoxetine  25 mg Oral Daily     benztropine  0.5 mg Oral TID     PRN Meds:.acetaminophen, alum & mag hydroxide-simethicone, haloperidol **AND** LORazepam **AND** diphenhydrAMINE, haloperidol lactate **AND** LORazepam **AND** diphenhydrAMINE, gabapentin, hydrOXYzine, melatonin, nicotine     ALLERGIES   Allergies   Allergen Reactions     Seasonal Allergies         VITALS   Vitals: /62   Pulse 78   Temp 97.1  F (36.2  C) (Tympanic)   Resp 18   Wt 109.1 kg (240 lb 8 oz)   LMP 03/01/2023 (Exact Date)   SpO2 98%   BMI 38.82 kg/m       MENTAL STATUS EXAM   Appearance:  awake, alert, adequately groomed, dressed in hospital scrubs and appeared as age stated  Attitude:  cooperative  Eye Contact:  good  Mood:  \"tired\"  Affect: euthymic  Speech:  clear, coherent  Psychomotor Behavior:  no evidence of tardive dyskinesia, dystonia, or tics  Thought Process:  linear, goal oriented   Associations:  no loose associations  Thought Content:  hallucinations improving  Insight:  limited  Judgment:  limited  Oriented to:  time, person, and place  Attention Span and Concentration:  limited  Recent and Remote Memory:  fair  Fund of Knowledge: appropriate  Muscle Strength and Tone: " normal  Gait and Station: Normal       LABS   No results found for this or any previous visit (from the past 24 hour(s)).      ASSESSMENT   Ej is a 22 year old single female who was brought to the [facility] ED by staff from her  treatment facility. Has been at [facility] since 4/24. Today she had a recurrence of Evangelical delusions, they tried to get her to take Haldol but she refused this.  Her treatment facility is willing to take her back.  She says that she is Hood's wife, that the devil came forward to steal and destroy, that she is suppose to take down the antichrist.  She is worried she will be prosecuted like Hood. She denies si/hi.  Staff at [facility] feel she is responding to internal stimuli and see her talking out loud to something that is not there.  She has similar admission last May.  She has hx of meth use and last used 4 days ago.      Daily Progress: toelrated initiation of strattera. Tolerated second loading dose of haldol dec 100 mg on 05/06/23       DIAGNOSTIC FORMULATION   1.  Unspecified psychosis  2.  Rule out schizoaffective disorder, bipolar type vs substance induced psychosis  3.  History of Attention-deficit hyperactivity disorder.  4.  Amphetamine use disorder.  5.  Opiate use disorder.     PLAN     Location: Unit 5  Legal Status: Orders Placed This Encounter      Legal status Voluntary    Safety Assessment:    Behavioral Orders   Procedures     Code 1 - Restrict to Unit     Routine Programming     As clinically indicated     Status 15     Every 15 minutes.          PTA medications continued/changed:   -none    New medications tried and stopped:   -haldol --> increased to 7.5 mg TID (5/1/23)  -gabapentin PRN    New medications initiated:   -none    Today's Changes:  -Haldol dec 100 mg q daily starting today (5/2/23), next haldol dec 100 mg  Friday 5/5, then 200 mg dec q monthly thereafter  -reduce haldol oral to 5 mg BID for 8  More doses  -anticipate transfer back to  [facility]- next Monday or tuesday      Programming: Patient will be treated in a therapeutic milieu with appropriate individual and group therapies. Education will be provided on diagnoses, medications, and treatments. Encouraged behavioral activation and participation in group programming.     Medical diagnoses:  Per medicine    Disposition: Return to [facility], likely Monday or Tuesday       ATTESTATION    Sam Coombs MD  [facility]   Psychiatry    Video Visit: Patient has given verbal consent for video visit?: Yes  Type of Service: video visit for mental health treatment  Reason for Video Visit: COVID-19 [place]  Originating Site (patient location): [facility]  Distant Site (provider location): Remote Location  Mode of Communication: Video Conference via [employer]  Time of Service: Date: 05/06/2023 , Start: 08:00 end: 08:30

## 2023-05-07 PROCEDURE — 250N000013 HC RX MED GY IP 250 OP 250 PS 637: Performed by: PSYCHIATRY & NEUROLOGY

## 2023-05-07 PROCEDURE — 99232 SBSQ HOSP IP/OBS MODERATE 35: CPT | Mod: GT | Performed by: PSYCHIATRY & NEUROLOGY

## 2023-05-07 PROCEDURE — 250N000013 HC RX MED GY IP 250 OP 250 PS 637: Performed by: NURSE PRACTITIONER

## 2023-05-07 PROCEDURE — 124N000001 HC R&B MH

## 2023-05-07 RX ORDER — ATOMOXETINE 18 MG/1
18 CAPSULE ORAL ONCE
Status: COMPLETED | OUTPATIENT
Start: 2023-05-07 | End: 2023-05-07

## 2023-05-07 RX ORDER — ATOMOXETINE 40 MG/1
40 CAPSULE ORAL DAILY
Status: DISCONTINUED | OUTPATIENT
Start: 2023-05-08 | End: 2023-05-09 | Stop reason: HOSPADM

## 2023-05-07 RX ADMIN — BENZTROPINE MESYLATE 0.5 MG: 0.5 TABLET ORAL at 20:01

## 2023-05-07 RX ADMIN — MELATONIN 3 MG: at 20:01

## 2023-05-07 RX ADMIN — GABAPENTIN 600 MG: 300 CAPSULE ORAL at 19:09

## 2023-05-07 RX ADMIN — DIPHENHYDRAMINE HYDROCHLORIDE 50 MG: 50 CAPSULE ORAL at 19:48

## 2023-05-07 RX ADMIN — HALOPERIDOL 5 MG: 5 TABLET ORAL at 07:52

## 2023-05-07 RX ADMIN — GABAPENTIN 600 MG: 300 CAPSULE ORAL at 13:11

## 2023-05-07 RX ADMIN — LORAZEPAM 2 MG: 1 TABLET ORAL at 19:48

## 2023-05-07 RX ADMIN — DIPHENHYDRAMINE HYDROCHLORIDE 50 MG: 50 CAPSULE ORAL at 07:52

## 2023-05-07 RX ADMIN — HYDROXYZINE HYDROCHLORIDE 50 MG: 25 TABLET, FILM COATED ORAL at 14:53

## 2023-05-07 RX ADMIN — BENZTROPINE MESYLATE 0.5 MG: 0.5 TABLET ORAL at 08:33

## 2023-05-07 RX ADMIN — ATOMOXETINE 18 MG: 18 CAPSULE ORAL at 09:27

## 2023-05-07 RX ADMIN — LORAZEPAM 2 MG: 1 TABLET ORAL at 07:52

## 2023-05-07 RX ADMIN — BENZTROPINE MESYLATE 0.5 MG: 0.5 TABLET ORAL at 13:11

## 2023-05-07 RX ADMIN — HALOPERIDOL 5 MG: 5 TABLET ORAL at 19:48

## 2023-05-07 RX ADMIN — ATOMOXETINE 25 MG: 25 CAPSULE ORAL at 08:33

## 2023-05-07 ASSESSMENT — ACTIVITIES OF DAILY LIVING (ADL)
HYGIENE/GROOMING: INDEPENDENT
ADLS_ACUITY_SCORE: 28
LAUNDRY: UNABLE TO COMPLETE
HYGIENE/GROOMING: INDEPENDENT
ORAL_HYGIENE: INDEPENDENT
ADLS_ACUITY_SCORE: 28
DRESS: SCRUBS (BEHAVIORAL HEALTH);INDEPENDENT
ADLS_ACUITY_SCORE: 28

## 2023-05-07 NOTE — PLAN OF CARE
"SHIFT SUMMARY: Religiously fixated - \" to Hood.\"  Dismissive when attempting therapeutic communication. Denies suicidal ideation and pain. Free of self-harm. Restless. Flat affect.     PRN PO neurontin 600 mg administered related to increased anxiety. PRN PO haldol 5 mg, benadryl 50 mg and ativan 2 mg administered for feelings of agitation/anxiety; Effective.       Problem: Adult Behavioral Health Plan of Care  Goal: Patient-Specific Goal (Individualization)  Description: Pt. Will follow recommendations of treatment team during hospital stay.  Pt. Will sleep 6-8 hours a night during hospital stay.   Pt. Will maintain ADLs with out prompting during hospital stay.  Pt. Will attend > 50% of unit programing when appropriate.   Pt. Will be free from self-harm during hospital stay.     Outcome: Progressing     Problem: Psychotic Symptoms  Goal: Psychotic Symptoms  Description: Signs and symptoms of listed problems will be absent or manageable.  Outcome: Progressing     Problem: Thought Process Alteration  Goal: Optimal Thought Clarity  Outcome: Progressing   Goal Outcome Evaluation:    Plan of Care Reviewed With: patient                   "

## 2023-05-07 NOTE — PLAN OF CARE
Problem: Adult Behavioral Health Plan of Care  Goal: Adheres to Safety Considerations for Self and Others  Outcome: Progressing     Problem: Psychotic Symptoms  Goal: Psychotic Symptoms  Description: Signs and symptoms of listed problems will be absent or manageable.  Outcome: Progressing     Problem: Psychotic Symptoms  Goal: Social and Therapeutic (Psychotic Symptoms)  Description: Signs and symptoms of listed problems will be absent or manageable.  Outcome: Progressing     Problem: Thought Process Alteration  Goal: Optimal Thought Clarity  Outcome: Progressing  07:30 - Face to face end of shift report received from night shift rn, pt observed out in hallway ambulating.  09:00 - pt ambulating in hallway with staff for c/o restlessness, states she wants to sleep today away so tomorrow will come faster and she may be discharged to [facility].  States she is looking forward to going there.  Reports seeing demons in some people's eye here states that she only wants to talk to people who have been saved and when asked if she may get lonely if she puts such restrictions on people she states she is the wife of Hood and she can talk with him.  Denies being suicidal or depressed, states just feels restless today.  Note that pt's room with laundry and items scattered around, encouraged her to organize her room and that may help with her restlessness.  Was medicated with B52 per her request this am, she states this does help with the voices especially the demons. Denies pain, cooperative with plan of care and takes meds as prescribed.  09:30 - Pt out in lounge with peers, states that voices are gone now.  13:30 - Pt was medicated with prn neurontin for c/o anxiety 5/10  Then presented back at nurses' station and states she would like to go home today, states she could get her dad to come and get her.  Explained to pt she would have to give 12 hour notice, so pt states she will wait until tomorrow.  C/O being bored  "here and not having anyone to talk to as she won't talk to anyone who isn't \"saved\".  Pt to her room with headphones.  14:20 - face to face end of shift report will be communicated to evening shift rn.  15:00 = Pt medicated with atarax po, states voices are gone now but still endorses that Hood talks to her and she is his wife.                        "

## 2023-05-07 NOTE — PROGRESS NOTES
"  [facility] PSYCHIATRY  -  PROGRESS NOTE     ID   Name: Ej Dunaway  MRN#: [id number]     SUBJECTIVE   Prior to interviewing the patient, I met with nursing and reviewed patient's clinical condition. We discussed clinical care both before and after the interview. I have reviewed the patient's clinical course by review of records including previous notes, labs, and vital signs.     Per nursing, the patient had the following behavioral events over the last 24-hours: none    On psychiatric interview, she is met within her room. She states she is less tired. Reports she is better able to focus with STrattera. Agrees to increase to 40 mg. She is hopeful she can go to [facility] tomorrow. She is in agreement with monthly haldol dec injections. Encouraged to go to groups today. Denies SI. Denies physical pain.        MEDICATIONS   Scheduled Meds:    atomoxetine  18 mg Oral Once     [START ON 5/8/2023] atomoxetine  40 mg Oral Daily     benztropine  0.5 mg Oral TID     PRN Meds:.acetaminophen, alum & mag hydroxide-simethicone, haloperidol **AND** LORazepam **AND** diphenhydrAMINE, haloperidol lactate **AND** LORazepam **AND** diphenhydrAMINE, gabapentin, hydrOXYzine, melatonin, nicotine     ALLERGIES   Allergies   Allergen Reactions     Seasonal Allergies         VITALS   Vitals: /56 (BP Location: Left arm)   Pulse 81   Temp 98.2  F (36.8  C) (Tympanic)   Resp 14   Wt 109.1 kg (240 lb 8 oz)   LMP 03/01/2023 (Exact Date)   SpO2 98%   BMI 38.82 kg/m       MENTAL STATUS EXAM   Appearance:  awake, alert, adequately groomed, dressed in hospital scrubs and appeared as age stated  Attitude:  cooperative  Eye Contact:  good  Mood:  good\"  Affect: euthymic  Speech:  clear, coherent  Psychomotor Behavior:  no evidence of tardive dyskinesia, dystonia, or tics  Thought Process:  linear, goal oriented   Associations:  no loose associations  Thought Content:  hallucinations improving  Insight:  " limited  Judgment:  limited  Oriented to:  time, person, and place  Attention Span and Concentration:  limited  Recent and Remote Memory:  fair  Fund of Knowledge: appropriate  Muscle Strength and Tone: normal  Gait and Station: Normal       LABS   No results found for this or any previous visit (from the past 24 hour(s)).      ASSESSMENT   Ej is a 22 year old single female who was brought to the [facility] ED by staff from her  treatment facility. Has been at [facility] since 4/24. Today she had a recurrence of Latter-day delusions, they tried to get her to take Haldol but she refused this.  Her treatment facility is willing to take her back.  She says that she is Hood's wife, that the devil came forward to steal and destroy, that she is suppose to take down the antichrist.  She is worried she will be prosecuted like Hood. She denies si/hi.  Staff at [facility] feel she is responding to internal stimuli and see her talking out loud to something that is not there.  She has similar admission last May.  She has hx of meth use and last used 4 days ago.      Daily Progress: increase Strattera to 40 mg q daily, will gvie her another 18 mg dose this AM (already received 25)     DIAGNOSTIC FORMULATION   1.  Unspecified psychosis  2.  Rule out schizoaffective disorder, bipolar type vs substance induced psychosis  3.  History of Attention-deficit hyperactivity disorder.  4.  Amphetamine use disorder.  5.  Opiate use disorder.     PLAN     Location: Unit 5  Legal Status: Orders Placed This Encounter      Legal status Voluntary    Safety Assessment:    Behavioral Orders   Procedures     Code 1 - Restrict to Unit     Routine Programming     As clinically indicated     Status 15     Every 15 minutes.          PTA medications continued/changed:   -none    New medications tried and stopped:   -haldol --> increased to 7.5 mg TID (5/1/23)  -gabapentin PRN    New medications initiated:   -none    Today's  Changes:  -Haldol dec 100 mg q daily starting today (5/2/23), next haldol dec 100 mg  Friday 5/5, then 200 mg dec q monthly thereafter  -reduce haldol oral to 5 mg BID for 8  More doses  -anticipate transfer back to [facility]- next Monday or tuesday      Programming: Patient will be treated in a therapeutic milieu with appropriate individual and group therapies. Education will be provided on diagnoses, medications, and treatments. Encouraged behavioral activation and participation in group programming.     Medical diagnoses:  Per medicine    Disposition: Return to [facility], likely Monday or Tuesday       ATTESTATION    Sam Coombs MD  [facility]   Psychiatry    Video Visit: Patient has given verbal consent for video visit?: Yes  Type of Service: video visit for mental health treatment  Reason for Video Visit: COVID-19 [place]  Originating Site (patient location): [facility]  Distant Site (provider location): Remote Location  Mode of Communication: Video Conference via [employer]  Time of Service: Date: 05/07/2023 , Start: 08:00 end: 08:30

## 2023-05-07 NOTE — PLAN OF CARE
SHIFT SUMMARY: Anxious and restless. Frequent requests for PRNs. Auditory hallucinations continue, but declines to describe the content. Does not  appear responding to internal stimuli. Flat affect.     At 16:19, administered PRN PO haldol 5 mg, ativan 2 mg and benadryl 50 mg related to feeling agitated and anxious.  At 17:37, administered PRN PO hydroxyzine 50 mg related to increased anxiety.  PRN PO tylenol and gabapentin for pain relief and anxiety at 18:47.     Observed resting with eyes closed, respirations even, unlabored and WNL. Able to reposition self and make needs known. 15-minute checks in place. Slept approximately 6 hours.       Problem: Adult Behavioral Health Plan of Care  Goal: Patient-Specific Goal (Individualization)  Description: Pt. Will follow recommendations of treatment team during hospital stay.  Pt. Will sleep 6-8 hours a night during hospital stay.   Pt. Will maintain ADLs with out prompting during hospital stay.  Pt. Will attend > 50% of unit programing when appropriate.   Pt. Will be free from self-harm during hospital stay.     Outcome: Progressing     Problem: Psychotic Symptoms  Goal: Psychotic Symptoms  Description: Signs and symptoms of listed problems will be absent or manageable.  Outcome: Progressing  Goal: Social and Therapeutic (Psychotic Symptoms)  Description: Signs and symptoms of listed problems will be absent or manageable.  Outcome: Progressing     Problem: Thought Process Alteration  Goal: Optimal Thought Clarity  Outcome: Progressing   Goal Outcome Evaluation:

## 2023-05-08 PROCEDURE — 124N000001 HC R&B MH

## 2023-05-08 PROCEDURE — 250N000013 HC RX MED GY IP 250 OP 250 PS 637: Performed by: NURSE PRACTITIONER

## 2023-05-08 PROCEDURE — 250N000013 HC RX MED GY IP 250 OP 250 PS 637: Performed by: PSYCHIATRY & NEUROLOGY

## 2023-05-08 RX ADMIN — BENZTROPINE MESYLATE 0.5 MG: 0.5 TABLET ORAL at 13:23

## 2023-05-08 RX ADMIN — GABAPENTIN 600 MG: 300 CAPSULE ORAL at 15:44

## 2023-05-08 RX ADMIN — HYDROXYZINE HYDROCHLORIDE 50 MG: 25 TABLET, FILM COATED ORAL at 17:58

## 2023-05-08 RX ADMIN — HALOPERIDOL 5 MG: 5 TABLET ORAL at 16:31

## 2023-05-08 RX ADMIN — BENZTROPINE MESYLATE 0.5 MG: 0.5 TABLET ORAL at 08:06

## 2023-05-08 RX ADMIN — LORAZEPAM 2 MG: 1 TABLET ORAL at 16:31

## 2023-05-08 RX ADMIN — LORAZEPAM 2 MG: 1 TABLET ORAL at 08:37

## 2023-05-08 RX ADMIN — HALOPERIDOL 5 MG: 5 TABLET ORAL at 08:36

## 2023-05-08 RX ADMIN — ATOMOXETINE 40 MG: 40 CAPSULE ORAL at 08:06

## 2023-05-08 RX ADMIN — DIPHENHYDRAMINE HYDROCHLORIDE 50 MG: 50 CAPSULE ORAL at 08:37

## 2023-05-08 RX ADMIN — DIPHENHYDRAMINE HYDROCHLORIDE 50 MG: 50 CAPSULE ORAL at 16:31

## 2023-05-08 ASSESSMENT — ACTIVITIES OF DAILY LIVING (ADL)
ORAL_HYGIENE: INDEPENDENT
LAUNDRY: UNABLE TO COMPLETE
ADLS_ACUITY_SCORE: 28
DRESS: SCRUBS (BEHAVIORAL HEALTH);INDEPENDENT
HYGIENE/GROOMING: INDEPENDENT
ADLS_ACUITY_SCORE: 28
ADLS_ACUITY_SCORE: 28
ORAL_HYGIENE: INDEPENDENT
ADLS_ACUITY_SCORE: 28
DRESS: SCRUBS (BEHAVIORAL HEALTH);INDEPENDENT
ADLS_ACUITY_SCORE: 28
HYGIENE/GROOMING: INDEPENDENT

## 2023-05-08 NOTE — PLAN OF CARE
"  Problem: Adult Behavioral Health Plan of Care  Goal: Patient-Specific Goal (Individualization)  Description: Pt. Will follow recommendations of treatment team during hospital stay.  Pt. Will sleep 6-8 hours a night during hospital stay.   Pt. Will maintain ADLs with out prompting during hospital stay.  Pt. Will attend > 50% of unit programing when appropriate.   Pt. Will be free from self-harm during hospital stay.     Outcome: Progressing  Note: 07:35: Received end of shift report from PATI Jay. Pt awake amb in hallway.     08:10: Pt requesting to try go without prn haldol, prn lorazepam, prn diphenhydramine, \"I don't want to go back to sleep.\" Compliant with scheduled AM medications. Statements of wanting to discharge--     08:30: Pt requesting prn b52 for \"voices\"     09:30: Effective prn relief; displaying s/s of sleep--     13:45: Naps on et off majority of shift, participating in group @ present, denies SI/HI, frequently questions  re: discharge time.     Face to face end of shift report to be communicated to on-coming PM staff.     Dora Sotelo RN  5/8/2023  2:48 PM      Problem: Psychotic Symptoms  Goal: Psychotic Symptoms  Description: Signs and symptoms of listed problems will be absent or manageable.  Outcome: Progressing     Problem: Thought Process Alteration  Goal: Optimal Thought Clarity  Outcome: Progressing   Goal Outcome Evaluation:                        "

## 2023-05-08 NOTE — PLAN OF CARE
Face to face end of shift report received from PATI Cho. Rounding completed. Patient observed in bed with eyes closed and regular respirations.     Sheila Ledesma RN  5/8/2023  12:33 AM

## 2023-05-08 NOTE — CARE PLAN
Face to face end of shift report received from Sheila Ledesma RN. Rounding completed. Pt observed in bed with eyes closed and regular respirations. Appears to have slept approximately 8h this shift.

## 2023-05-08 NOTE — PROGRESS NOTES
Called and scheduled ride (no other options available) with [facility] for tomorrow 5/9 @ 8:00 AM.     Called [facility] and left messaged regarding discharge.

## 2023-05-09 VITALS
OXYGEN SATURATION: 98 % | TEMPERATURE: 96.6 F | DIASTOLIC BLOOD PRESSURE: 44 MMHG | HEART RATE: 101 BPM | BODY MASS INDEX: 38.82 KG/M2 | WEIGHT: 240.5 LBS | RESPIRATION RATE: 16 BRPM | SYSTOLIC BLOOD PRESSURE: 121 MMHG

## 2023-05-09 PROCEDURE — 250N000013 HC RX MED GY IP 250 OP 250 PS 637: Performed by: PSYCHIATRY & NEUROLOGY

## 2023-05-09 PROCEDURE — 250N000013 HC RX MED GY IP 250 OP 250 PS 637: Performed by: NURSE PRACTITIONER

## 2023-05-09 PROCEDURE — 99239 HOSP IP/OBS DSCHRG MGMT >30: CPT | Mod: GT | Performed by: PSYCHIATRY & NEUROLOGY

## 2023-05-09 RX ORDER — ATOMOXETINE 40 MG/1
40 CAPSULE ORAL DAILY
Qty: 30 CAPSULE | Refills: 0 | Status: ON HOLD | OUTPATIENT
Start: 2023-05-09 | End: 2023-06-05

## 2023-05-09 RX ORDER — BENZTROPINE MESYLATE 0.5 MG/1
0.5 TABLET ORAL 3 TIMES DAILY
Qty: 90 TABLET | Refills: 0 | Status: ON HOLD | OUTPATIENT
Start: 2023-05-09 | End: 2023-06-05

## 2023-05-09 RX ORDER — HALOPERIDOL DECANOATE 100 MG/ML
200 INJECTION INTRAMUSCULAR
Qty: 2 ML | Refills: 0 | Status: SHIPPED | OUTPATIENT
Start: 2023-06-02 | End: 2023-05-09

## 2023-05-09 RX ORDER — HALOPERIDOL DECANOATE 100 MG/ML
200 INJECTION INTRAMUSCULAR
Qty: 2 ML | Refills: 0 | Status: ON HOLD | OUTPATIENT
Start: 2023-06-02 | End: 2023-06-05

## 2023-05-09 RX ORDER — ATOMOXETINE 40 MG/1
40 CAPSULE ORAL DAILY
Qty: 30 CAPSULE | Refills: 0 | Status: SHIPPED | OUTPATIENT
Start: 2023-05-09 | End: 2023-05-09

## 2023-05-09 RX ORDER — BENZTROPINE MESYLATE 0.5 MG/1
0.5 TABLET ORAL 3 TIMES DAILY
Qty: 90 TABLET | Refills: 0 | Status: SHIPPED | OUTPATIENT
Start: 2023-05-09 | End: 2023-05-09

## 2023-05-09 RX ORDER — HALOPERIDOL 5 MG/1
5 TABLET ORAL DAILY PRN
Qty: 30 TABLET | Refills: 0 | Status: ON HOLD | OUTPATIENT
Start: 2023-05-09 | End: 2023-06-05

## 2023-05-09 RX ORDER — HALOPERIDOL 5 MG/1
5 TABLET ORAL DAILY PRN
Qty: 30 TABLET | Refills: 0 | Status: SHIPPED | OUTPATIENT
Start: 2023-05-09 | End: 2023-05-09

## 2023-05-09 RX ADMIN — LORAZEPAM 2 MG: 1 TABLET ORAL at 08:02

## 2023-05-09 RX ADMIN — ATOMOXETINE 40 MG: 40 CAPSULE ORAL at 06:51

## 2023-05-09 RX ADMIN — HALOPERIDOL 5 MG: 5 TABLET ORAL at 08:02

## 2023-05-09 RX ADMIN — DIPHENHYDRAMINE HYDROCHLORIDE 50 MG: 50 CAPSULE ORAL at 08:02

## 2023-05-09 RX ADMIN — BENZTROPINE MESYLATE 0.5 MG: 0.5 TABLET ORAL at 06:52

## 2023-05-09 ASSESSMENT — ACTIVITIES OF DAILY LIVING (ADL)
ADLS_ACUITY_SCORE: 28

## 2023-05-09 NOTE — PLAN OF CARE
Pt is discharging at the recommendation of the treatment team. Pt is discharging to [facility] transported by [facility] . Pt denies having any thoughts of hurting themself or anyone else. Pt denies anxiety or depression. Pt has follow up with treatment. Discharge instructions, including; demographic sheet, psychiatric evaluation, discharge summary, and AVS were faxed to these next level of care providers.

## 2023-05-09 NOTE — PLAN OF CARE
Problem: Adult Behavioral Health Plan of Care  Goal: Patient-Specific Goal (Individualization)  Description: Pt. Will follow recommendations of treatment team during hospital stay.  Pt. Will sleep 6-8 hours a night during hospital stay.   Pt. Will maintain ADLs with out prompting during hospital stay.  Pt. Will attend > 50% of unit programing when appropriate.   Pt. Will be free from self-harm during hospital stay.     Outcome: Progressing   Goal Outcome Evaluation:                       Face to face shift report received from RN. Rounding completed, pt observed. Client rested in room for 7 hours with eyes closed and respirations noted.Face to face report will be communicated to oncoming RN.    Neal Dejesus RN  5/9/2023  6:13 AM

## 2023-05-09 NOTE — DISCHARGE SUMMARY
[facility] PSYCHIATRY  DISCHARGE SUMMARY     DISCHARGE DATA     Ej Dunaway MRN# [id number]   Age: 22 year old YOB: 2000     Date of Admission: 4/29/2023  Date of Discharge: May 9, 2023  Discharge Provider: Sam Coombs MD       REASON FOR ADMISSION   Ej is a 22 year old single female who was brought to the [facility] ED by staff from her  treatment facility. Has been at [facility] since 4/24. Today she had a recurrence of Restoration delusions, they tried to get her to take Haldol but she refused this.  Her treatment facility is willing to take her back.  She says that she is Hood's wife, that the devil came forward to steal and destroy, that she is suppose to take down the antichrist.  She is worried she will be prosecuted like Hood. She denies si/hi.  Staff at [facility] feel she is responding to internal stimuli and see her talking out loud to something that is not there.  She has similar admission last May.  She has hx of meth use and last used 4 days ago.         DISCHARGE DIAGNOSES   1.  Unspecified psychosis  2.  Rule out schizoaffective disorder, bipolar type vs substance induced psychosis  3.  History of Attention-deficit hyperactivity disorder.  4.  Amphetamine use disorder.  5.  Opiate use disorder.     HOSPITAL COURSE   Patient was admitted to unit 5 due to the aforementioned presentation. The patient was placed under 15 minute checks to ensure patient safety. The patient participated in unit programming and groups as able.    Legal status during hospitalization was involuntary  Switched to voluntary to stabilize on medication. .Ms. Irvin Dunaway did not require seclusion/restraint during hospitalization.     We reviewed with Ms. Irvin Dunaway current and past medication trials including duration, dose, response and side effects. During this hospitalization, the following changes to the patient's psychotropic medications were made:    Was  restarted on haloperidol as she had previously been on this medication in the past and tolerated well.  She was transitioned to haldol decanoate, receiving 100 mg loading dose on 5/2/23 and again on 5/5/23.  Cogentin 0.5 mg TID was started for EPS.  She is to transition to 200 mg haldal decanoate q 28 days with next dose due on 6/2/3.  She was re-started on Strattera for ADHD symptoms and titrated to 40 mg q daily.     Ms. Irvin Dunaway progressed gradually related to response to medication changes, confidence in skills to manage symptoms and abstaining from further substance use . By the time of discharge, her symptoms had improved adequately.  Psychosis improved with adequate outpatient plan in place., Anxiety improved with increased confidence in ability to manage symptoms. and Psychotropic medications utilized were found to be helpful without significant adverse side effects. The treatment goals (long-term goal/discharge criteria) were reached.     With the aforementioned changes and supports the patient noticed improvement in their symptoms and felt sufficiently ready for discharge. As a result, Ej Dunaway was discharged. At the time of discharge, Ej Dunaway was determined to not be a danger to self or others. The patient was also medically stable for discharge. At the current time of discharge, the patient does not meet criteria for involuntary hospitalization. On the day of discharge, the patient reports that they do not have suicidal or homicidal ideation. Steps taken to minimize risk include: assessing patient s behavior and thought process daily during hospital stay, discharging patient with adequate plan for follow up for mental and physical health and discussing safety plan of returning to the hospital should the patient ever have thoughts of harming themselves or others. Therefore, based on all available evidence including the factors cited above, the patient does  not appear to be at imminent risk for self-harm, and is appropriate for outpatient level of care. The acute crisis is resolved and Ej is discharging in improved condition. Though Ej's acute crisis has resolved, there remains a higher risk of suicide over the long term compared to the general population. Factors that may be associated with relapse include worsening of depressive symptoms, alcohol use, illicit substance use, not taking medications, lack of mental health follow-up appointments and work/family/relationship stressors. Ej Dunaway has a plan in place to mitigate these stressors, is hopeful about the future ,and is not assessed to be a imminent risk to self or anyone else and thus is not assessed to be holdable.  Ej has received maximal benefit from the current hospital stay. Ej Dunaway set to discharge.        DISCHARGE MEDICATIONS     Current Discharge Medication List      START taking these medications    Details   atomoxetine (STRATTERA) 40 MG capsule Take 1 capsule (40 mg) by mouth daily for 30 days  Qty: 30 capsule, Refills: 0    Associated Diagnoses: Psychosis, unspecified psychosis type (H)      benztropine (COGENTIN) 0.5 MG tablet Take 1 tablet (0.5 mg) by mouth 3 times daily for 30 days  Qty: 90 tablet, Refills: 0    Associated Diagnoses: Psychosis, unspecified psychosis type (H)      haloperidol decanoate (HALDOL DECANOATE) 100 MG/ML injection Inject 200 mg into the muscle every 28 days  Qty: 2 mL, Refills: 0    Associated Diagnoses: Psychosis, unspecified psychosis type (H)         CONTINUE these medications which have CHANGED    Details   haloperidol (HALDOL) 5 MG tablet Take 1 tablet (5 mg) by mouth daily as needed for agitation (anxiety / agitation/ break through psychosis)  Qty: 30 tablet, Refills: 0    Associated Diagnoses: Psychosis, unspecified psychosis type (H)              VITALS   Vitals: BP (!) 121/44   Pulse 101   Temp (!) 96.6  F (35.9  C)  "(Tympanic)   Resp 16   Wt 109.1 kg (240 lb 8 oz)   LMP 03/01/2023 (Exact Date)   SpO2 98%   BMI 38.82 kg/m       MENTAL STATUS EXAM   Appearance: Alert, oriented, dressed in hospital scrubs, appears stated age   Attitude: Cooperative   Eye Contact: Good  Mood: \"Better\"  Affect: Full range of affect  Speech: Normal rate and rhythm   Psychomotor Behavior: No tremor, rigidity, or psychomotor abnormality   Thought Process: Logical, goal directed   Associations: No loose associations   Thought Content: Denies SI or plan. No SIB. Denies A/V hallucinations. No evidence of delusional thought.  Insight: Good  Judgment: Good  Oriented to: Person, place, and time  Attention Span and Concentration: Intact  Recent and Remote Memory: Intact  Language: English with appropriate syntax and vocabulary  Fund of Knowledge: Average  Muscle Strength and Tone: Grossly normal  Gait and Station: Grossly normal       DISCHARGE PLAN     1.  Education given regarding diagnostic and treatment options with risks, benefits and alternatives with adequate verbalization of understanding.  2.  Discharge to inpatient chemical dependency treatment center. Upon detailed review of risk factors, patient amenable for release.   3.  Continue aforementioned medications and associated medication changes with follow-up by outpatient provider.  4.  Crisis management planning in place.    5.  Nursing and  to review further discharge recommendations.   6.  Active issues: No active medical issues requiring immediate follow-up care.  7.  Patient is being discharged with the following appointments as detailed below:    See AVS        DISCHARGE SERVICES PROVIDED     80 minutes spent on discharge services, including:  Final examination of patient.  Review and discussion of hospital stay.  Instructions for continued outpatient care/goals.  Preparation of discharge records.  Preparation of medications refills and new prescriptions.  Preparation of " applicable referral forms.        ATTESTATION   Sam Coombs MD  [facility]   Psychiatry    Video Visit: Patient has given verbal consent for video visit?: Yes  Type of Service: video visit for mental health treatment  Reason for Video Visit: COVID-19 [place]  Originating Site (patient location): [facility]  Distant Site (provider location): Remote Location  Mode of Communication: Video Conference via [employer]  Time of Service: Date: May 9, 2023 , Start: 06:00 end: 07:00     LABS THIS ADMISSION     No results found for any visits on 04/29/23.

## 2023-05-09 NOTE — PLAN OF CARE
Discharge Note    Patient Discharged to rehabilitation facility [facility] on 5/9/2023 08:10 AM via [employer] accompanied by LEAH.     Patient informed of discharge instructions in AVS. patient verbalizes understanding and denies having any questions pertaining to AVS. Patient stable at time of discharge. Patient denies SI, HI, and thoughts of self harm at time of discharge. All personal belongings returned to patient. Discharge prescriptions to be sent via electronic communication to [facility]; awaiting return phone call for clarification. Psych evaluation, history and physical, AVS, and discharge summary faxed to next level of care- per SW.     Dora Sotelo, RN  5/9/2023  9:21 AM      Problem: Adult Behavioral Health Plan of Care  Goal: Plan of Care Review  Outcome: Met     Problem: Adult Behavioral Health Plan of Care  Goal: Patient-Specific Goal (Individualization)  Description: Pt. Will follow recommendations of treatment team during hospital stay.  Pt. Will sleep 6-8 hours a night during hospital stay.   Pt. Will maintain ADLs with out prompting during hospital stay.  Pt. Will attend > 50% of unit programing when appropriate.   Pt. Will be free from self-harm during hospital stay.     5/9/2023 0919 by Dora Sotelo, RN  Outcome: Met     Problem: Psychotic Symptoms  Goal: Psychotic Symptoms  Description: Signs and symptoms of listed problems will be absent or manageable.  Outcome: Met   Goal Outcome Evaluation:

## 2023-05-09 NOTE — PLAN OF CARE
Problem: Adult Behavioral Health Plan of Care  Goal: Patient-Specific Goal (Individualization)  Description: Pt. Will follow recommendations of treatment team during hospital stay.  Pt. Will sleep 6-8 hours a night during hospital stay.   Pt. Will maintain ADLs with out prompting during hospital stay.  Pt. Will attend > 50% of unit programing when appropriate.   Pt. Will be free from self-harm during hospital stay.     Outcome: Progressing  Note:     1544 Patient to nurses station complaining of anxiety and requested and given Neurontin 600 mg po for complaints.    1631 Patient to nursing station complaining of anxiety and agitation requested and given Benadryl 50 mg po with Haldol 5 mg po and Ativan 2 mg po for complaints.    1758 Patient to nursing station complaining of anxiety related to a phone call from ex-boyfriend. Requested and given Hydroxyzine 50 mg po and 1-1 with writer. Patient religiously preoccupied and repeatedly asking writer about beliefs.    2145 Patient is in bed with eyes closed and reg resps. HS Cogentin held for sleep.    Face to face end of shift report communicated to 11-7 shift RN.     Sheila Broderick RN  5/8/2023  9:46 PM          Problem: Psychotic Symptoms  Goal: Psychotic Symptoms  Description: Signs and symptoms of listed problems will be absent or manageable.  Outcome: Progressing     Problem: Thought Process Alteration  Goal: Optimal Thought Clarity  Outcome: Progressing     Problem: Suicidal Behavior  Goal: Suicidal Behavior is Absent or Managed  Outcome: Progressing      Goal Outcome Evaluation:    Plan of Care Reviewed With: patient

## 2023-05-09 NOTE — PLAN OF CARE
Problem: Adult Behavioral Health Plan of Care  Goal: Patient-Specific Goal (Individualization)  Description: Pt. Will follow recommendations of treatment team during hospital stay.  Pt. Will sleep 6-8 hours a night during hospital stay.   Pt. Will maintain ADLs with out prompting during hospital stay.  Pt. Will attend > 50% of unit programing when appropriate.   Pt. Will be free from self-harm during hospital stay.     Note: 07:30: Received end of shift report from PATI De Jesus. Pt eating early breakfast, requesting shower supplies--anxiously awaiting discharge.      Problem: Suicidal Behavior  Goal: Suicidal Behavior is Absent or Managed  Outcome: Met     Problem: Thought Process Alteration  Goal: Optimal Thought Clarity  Outcome: Met     Problem: Psychotic Symptoms  Goal: Social and Therapeutic (Psychotic Symptoms)  Description: Signs and symptoms of listed problems will be absent or manageable.  Outcome: Met     Problem: Psychotic Symptoms  Goal: Psychotic Symptoms  Description: Signs and symptoms of listed problems will be absent or manageable.  Outcome: Met     Problem: Adult Behavioral Health Plan of Care  Goal: Develops/Participates in Therapeutic [place] to Support Successful Transition  Outcome: Met     Problem: Adult Behavioral Health Plan of Care  Goal: Adheres to Safety Considerations for Self and Others  Outcome: Met   Goal Outcome Evaluation:    Plan of Care Reviewed With: patient

## 2023-05-10 ENCOUNTER — TELEPHONE (OUTPATIENT)
Dept: BEHAVIORAL HEALTH | Facility: HOSPITAL | Age: 23
End: 2023-05-10

## 2023-05-10 NOTE — TELEPHONE ENCOUNTER
She was discharged to home on  5/9/23 from [facility]. I called today regarding the patient's discharge.    No answer was received. Unable to leave a message.

## 2023-05-11 ENCOUNTER — TELEPHONE (OUTPATIENT)
Dept: BEHAVIORAL HEALTH | Facility: CLINIC | Age: 23
End: 2023-05-11

## 2023-05-11 ENCOUNTER — HOSPITAL ENCOUNTER (EMERGENCY)
Facility: CLINIC | Age: 23
Discharge: STILL ADMITTED OR ADMINISTRATIVE | End: 2023-05-13
Attending: EMERGENCY MEDICINE | Admitting: EMERGENCY MEDICINE
Payer: MEDICAID

## 2023-05-11 DIAGNOSIS — F22 DELUSIONAL DISORDER (H): ICD-10-CM

## 2023-05-11 LAB
ALBUMIN SERPL BCG-MCNC: 4.1 G/DL (ref 3.5–5.2)
ALP SERPL-CCNC: 89 U/L (ref 35–104)
ALT SERPL W P-5'-P-CCNC: 17 U/L (ref 10–35)
AMPHETAMINES UR QL SCN: NORMAL
ANION GAP SERPL CALCULATED.3IONS-SCNC: 13 MMOL/L (ref 7–15)
AST SERPL W P-5'-P-CCNC: 12 U/L (ref 10–35)
BARBITURATES UR QL SCN: NORMAL
BASOPHILS # BLD AUTO: 0 10E3/UL (ref 0–0.2)
BASOPHILS NFR BLD AUTO: 0 %
BENZODIAZ UR QL SCN: NORMAL
BILIRUB SERPL-MCNC: 0.2 MG/DL
BUN SERPL-MCNC: 13.2 MG/DL (ref 6–20)
BZE UR QL SCN: NORMAL
CALCIUM SERPL-MCNC: 9.4 MG/DL (ref 8.6–10)
CANNABINOIDS UR QL SCN: NORMAL
CHLORIDE SERPL-SCNC: 103 MMOL/L (ref 98–107)
CREAT SERPL-MCNC: 0.55 MG/DL (ref 0.51–0.95)
DEPRECATED HCO3 PLAS-SCNC: 22 MMOL/L (ref 22–29)
EOSINOPHIL # BLD AUTO: 0.2 10E3/UL (ref 0–0.7)
EOSINOPHIL NFR BLD AUTO: 1 %
ERYTHROCYTE [DISTWIDTH] IN BLOOD BY AUTOMATED COUNT: 12.1 % (ref 10–15)
ETHANOL SERPL-MCNC: <0.01 G/DL
ETHANOL UR QL SCN: NORMAL
GFR SERPL CREATININE-BSD FRML MDRD: >90 ML/MIN/1.73M2
GLUCOSE SERPL-MCNC: 115 MG/DL (ref 70–99)
HCG UR QL: NEGATIVE
HCT VFR BLD AUTO: 41.5 % (ref 35–47)
HGB BLD-MCNC: 13.6 G/DL (ref 11.7–15.7)
IMM GRANULOCYTES # BLD: 0 10E3/UL
IMM GRANULOCYTES NFR BLD: 0 %
LYMPHOCYTES # BLD AUTO: 3.9 10E3/UL (ref 0.8–5.3)
LYMPHOCYTES NFR BLD AUTO: 28 %
MCH RBC QN AUTO: 28.4 PG (ref 26.5–33)
MCHC RBC AUTO-ENTMCNC: 32.8 G/DL (ref 31.5–36.5)
MCV RBC AUTO: 87 FL (ref 78–100)
MONOCYTES # BLD AUTO: 1 10E3/UL (ref 0–1.3)
MONOCYTES NFR BLD AUTO: 8 %
NEUTROPHILS # BLD AUTO: 8.6 10E3/UL (ref 1.6–8.3)
NEUTROPHILS NFR BLD AUTO: 63 %
NRBC # BLD AUTO: 0 10E3/UL
NRBC BLD AUTO-RTO: 0 /100
OPIATES UR QL SCN: NORMAL
PLATELET # BLD AUTO: 492 10E3/UL (ref 150–450)
POTASSIUM SERPL-SCNC: 4.1 MMOL/L (ref 3.4–5.3)
PROT SERPL-MCNC: 7.1 G/DL (ref 6.4–8.3)
RBC # BLD AUTO: 4.79 10E6/UL (ref 3.8–5.2)
SARS-COV-2 RNA RESP QL NAA+PROBE: NEGATIVE
SODIUM SERPL-SCNC: 138 MMOL/L (ref 136–145)
WBC # BLD AUTO: 13.9 10E3/UL (ref 4–11)

## 2023-05-11 PROCEDURE — 80053 COMPREHEN METABOLIC PANEL: CPT | Performed by: EMERGENCY MEDICINE

## 2023-05-11 PROCEDURE — 36415 COLL VENOUS BLD VENIPUNCTURE: CPT | Performed by: EMERGENCY MEDICINE

## 2023-05-11 PROCEDURE — 82077 ASSAY SPEC XCP UR&BREATH IA: CPT | Performed by: EMERGENCY MEDICINE

## 2023-05-11 PROCEDURE — 250N000013 HC RX MED GY IP 250 OP 250 PS 637: Performed by: EMERGENCY MEDICINE

## 2023-05-11 PROCEDURE — C9803 HOPD COVID-19 SPEC COLLECT: HCPCS | Performed by: EMERGENCY MEDICINE

## 2023-05-11 PROCEDURE — 90791 PSYCH DIAGNOSTIC EVALUATION: CPT

## 2023-05-11 PROCEDURE — 99284 EMERGENCY DEPT VISIT MOD MDM: CPT | Performed by: EMERGENCY MEDICINE

## 2023-05-11 PROCEDURE — 87635 SARS-COV-2 COVID-19 AMP PRB: CPT | Performed by: EMERGENCY MEDICINE

## 2023-05-11 PROCEDURE — 85014 HEMATOCRIT: CPT | Performed by: EMERGENCY MEDICINE

## 2023-05-11 PROCEDURE — 81025 URINE PREGNANCY TEST: CPT | Performed by: EMERGENCY MEDICINE

## 2023-05-11 PROCEDURE — 80307 DRUG TEST PRSMV CHEM ANLYZR: CPT | Performed by: EMERGENCY MEDICINE

## 2023-05-11 PROCEDURE — 99285 EMERGENCY DEPT VISIT HI MDM: CPT | Mod: 25 | Performed by: EMERGENCY MEDICINE

## 2023-05-11 RX ORDER — BENZTROPINE MESYLATE 0.5 MG/1
0.5 TABLET ORAL 3 TIMES DAILY
Status: DISCONTINUED | OUTPATIENT
Start: 2023-05-11 | End: 2023-05-12

## 2023-05-11 RX ORDER — OLANZAPINE 5 MG/1
5 TABLET, ORALLY DISINTEGRATING ORAL ONCE
Status: COMPLETED | OUTPATIENT
Start: 2023-05-11 | End: 2023-05-11

## 2023-05-11 RX ORDER — HALOPERIDOL 5 MG/1
5 TABLET ORAL DAILY PRN
Status: DISCONTINUED | OUTPATIENT
Start: 2023-05-11 | End: 2023-05-13 | Stop reason: HOSPADM

## 2023-05-11 RX ORDER — ATOMOXETINE 40 MG/1
40 CAPSULE ORAL DAILY
Status: DISCONTINUED | OUTPATIENT
Start: 2023-05-11 | End: 2023-05-13 | Stop reason: HOSPADM

## 2023-05-11 RX ADMIN — NICOTINE POLACRILEX 2 MG: 2 GUM, CHEWING BUCCAL at 23:02

## 2023-05-11 RX ADMIN — BENZTROPINE MESYLATE 0.5 MG: 0.5 TABLET ORAL at 19:49

## 2023-05-11 RX ADMIN — NICOTINE POLACRILEX 2 MG: 2 GUM, CHEWING BUCCAL at 14:02

## 2023-05-11 RX ADMIN — NICOTINE POLACRILEX 2 MG: 2 GUM, CHEWING BUCCAL at 17:42

## 2023-05-11 RX ADMIN — OLANZAPINE 5 MG: 5 TABLET, ORALLY DISINTEGRATING ORAL at 11:52

## 2023-05-11 RX ADMIN — NICOTINE POLACRILEX 2 MG: 2 GUM, CHEWING BUCCAL at 19:48

## 2023-05-11 ASSESSMENT — ACTIVITIES OF DAILY LIVING (ADL)
ADLS_ACUITY_SCORE: 35

## 2023-05-11 ASSESSMENT — COLUMBIA-SUICIDE SEVERITY RATING SCALE - C-SSRS: SUICIDE, SINCE LAST CONTACT: NO

## 2023-05-11 NOTE — ED TRIAGE NOTES
[facility] is [facility] at [facility] but has been having increased auditory hallucination. Patient has been hearing Hood talk to her.      Triage Assessment     Row Name 05/11/23 1027       Triage Assessment (Adult)    Airway WDL WDL       Respiratory WDL    Respiratory WDL WDL       Skin Circulation/Temperature WDL    Skin Circulation/Temperature WDL WDL       Cardiac WDL    Cardiac WDL WDL       Peripheral/Neurovascular WDL    Peripheral Neurovascular WDL WDL       Cognitive/Neuro/Behavioral WDL    Cognitive/Neuro/Behavioral WDL WDL

## 2023-05-11 NOTE — TELEPHONE ENCOUNTER
R: The patient is currently in the [facility] ED awaiting placement. Patient on an ARTHUR hold    Intake afternoon Bed Search (Done at 2:30 PM ) Facilities that have not updated their [facility] access site in the last 12 hours have been contacted for bed availability.   [facility] is posting 0 beds.   [facility] is posting 0 beds.  [facility] is posting 0 beds.  [facility] is posting 0 beds.  [facility] is posting 0 beds.  [facility] is posting 0 beds.  [facility] is posting 0 beds.  [facility] is posting 0 beds. Low acuity.    [facility] is posting 0 bed. Mixed unit 12+. Low acuity only.   [facility] is posting 0 beds. No aggression.   [facility] is posting 0 beds.   [facility] is posting 0 beds. Low acuity only.  [facility] is posting 0 beds. Low acuity only.   [facility] is posting 3 beds. 0 acute, 0 med psych, 3 mood disorder- very low acuity. Patient is not appropriate for open bed: Requires CBC and CMP, requested these labs- no orders in  [facility] is posting 0 beds. Low acuity only. 72 hr hold required.   [facility] is posting 2 beds. Low acuity. Patient is not appropriate for open bed: Requires CBC and CMP, requested these labs- no orders in    [facility] is posting 4 beds. Vol only, No Hx of aggression, violence, or assault. No sexual offenders. No 72 hr holds. Patient is not appropriate for open bed: on a ARTHUR hold, possible 72HH, facility requires additional labs   [facility] is posting 8 beds. (Must have the cognitive ability to do programming. No aggressive or violent behavior or recent HX in the last 2 yrs. MH must be primary). Patient is not appropriate for open bed: facility requires additional labs   [facility] is posting 0 beds. Low acuity only. Violence and aggression capped.   [facility] is posting 3 beds. Low acuity, Neg Covid. Patient is not appropriate for open bed: facility  requires additional labs   [facility] is posting 1 bed. Covid neg. Vol only. Combined adolescent and adult unit. No aggressive or violent behavior. No registered sex offenders. Patient is not appropriate for open bed: on a ARTHUR hold, possible 72HH, facility requires additional labs   [facility] is posting 18 beds. No Covid needed. Call for details. Patient is not appropriate for open bed: on a ARTHUR hold, possible 72HH, facility requires additional labs   [facility] is posting 2 beds. Mixed Unit (13+). Low acuity only. Patient is not appropriate for open bed: patient cannot consent to own transportation home     Patient is not appropriate for open bed: Requires CBC and CMP, requested these labs- no orders in    [facility] and [place] location at capacity. The patient remains on the worklist. Intake continues to identify appropriate inpatient psychiatry placement.

## 2023-05-11 NOTE — PHARMACY-ADMISSION MEDICATION HISTORY
Pharmacist Admission Medication History    Admission medication history is complete. The information provided in this note is only as accurate as the sources available at the time of the update.    Medication reconciliation/reorder completed by provider prior to medication history? No    Information Source(s): Patient and Hospital records via in-person    Pertinent Information: Ej was discharged from the hospital on 05/09/2023.  Her next dose of Haldol decanoate is due on 06/02/2023.    Changes made to PTA medication list:    Added: None    Deleted: None    Changed: None    Medication History Completed By: Randi Macedo [facility] 5/11/2023 2:52 PM    Prior to Admission medications    Medication Sig Last Dose Taking? Auth Provider Long Term End Date   atomoxetine (STRATTERA) 40 MG capsule Take 1 capsule (40 mg) by mouth daily 5/9/2023 Yes Sam Coombs MD     benztropine (COGENTIN) 0.5 MG tablet Take 1 tablet (0.5 mg) by mouth 3 times daily 5/9/2023 Yes Sam Coombs MD Yes    haloperidol (HALDOL) 5 MG tablet Take 1 tablet (5 mg) by mouth daily as needed for agitation (anxiety / agitation/ break through psychosis)  Yes Sam Coombs MD Yes    haloperidol decanoate (HALDOL DECANOATE) 100 MG/ML injection Inject 200 mg into the muscle every 28 days 5/5/2023 Yes Sam Coombs MD Yes      
23cm

## 2023-05-11 NOTE — TELEPHONE ENCOUNTER
"S: [facility] , DEC  Aide calling at 11:57 AM about a 22 year old/Female presenting with psychosis     B: Pt arrived via EMS. Presenting problem, stressors: Patient was admitted to [facility] 4/29-5/9 and [facility]  called 911 for evaluation and 911 brought pt to  [facility] who then discharged the patient. After discharge, the patient [facility] to [facility] who did not agree to take the patient back. It is unclear where the patient stayed overnight, today 5/11/2023 the patient arrived at  again and  sent the patient back to the ED. Per collateral from the  charge RN, the patient is not presenting at baseline, highly vulnerable and the facility cannot keep the patient safe.     Patient is religiously preoccupied, not engaged in the DEC assessment, staring intensely and appears to respond to internal stimuli. Patient states they are a follower of nash and knows things about the world, responses are slow and delayed. Patient at some point in conversation stated \"I am not a pig\". Patient is worried about the antichrist in the world and will pursue christians, discusses spirital wareware and now does not want to talk about Orthodoxy. Patient is non-linear and disorganized in speech.     Pt affect in ED: Flat, staring, responding to internal stimuli, follower of nash and needs to know things about the world, slow and delayed (I am not a pig) antichrist in the world and will pursue christians, spirital wareware and now does not want to talk about Orthodoxy  Pt Dx: Unspecified Psychosis, r/o schizoaffective disorder and substance induced psychosis,   ADHD, ampethamine use disorder and opioid use disorder  Previous [facility] hx? Yes: [facility] discharged on 5/9  Pt unable to be assessed for SI due to current presentation    Hx of suicide attempt? Yes: unsure of when   Pt unable to be assessed for SIB due to current presentation    Pt unable to be assessed for HI due to current " presentation    Pt unable to be assessed for hallucinations due to current presentation , appears likely due to staring and responding to internal stimuli    Pt RARS Score: 4 5/11/2023 12:04 PM     Hx of aggression/violence, sexual offenses, legal concerns, Epic care plan? describe: none  Current concerns for aggression this visit? No  Does pt have a history of Civil Commitment? Yes, most recent commitment Deyanira Bernardo in 2021. 2022  Is Pt their own guardian? Yes    Pt is prescribed medication. Is patient medication compliant? Unsure of medication compliance  Pt denies OP services outside of [facility]   CD concerns: unsure about drug use, was not forth coming regarding use  Acute or chronic medical concerns: none  Does Pt present with specific needs, assistive devices, or exclusionary criteria? None    Pt is ambulatory  Pt is able to perform ADLs independently    A: Pt to be reviewed for [facility] admission. Patient on an ARTHUR and possibly 72HH if appropriate  Preferred placement: [facility], not [facility] due to transportation     COVID:Negative  Utox: In process   CMP: Not ordered, intake requested lab  CBC: Not ordered, intake requested lab  HCG: In process     R: Patient cleared and ready for behavioral bed placement: Yes  Pt placed on IPMH worklist? Yes    12:17 PM Intake requested cmp, cbc and etoh for review at outside facilities.

## 2023-05-11 NOTE — ED NOTES
IP MH Referral Acuity Rating Score (RARS)    LMHP complete at referral to IP MH, with DEC; and, daily while awaiting IP MH placement. Call score to PPS.  CRITERIA SCORING   New 72 HH and Involuntary for IP MH (not adolescent) 1/1   Boarding over 24 hours 0/1   Vulnerable adult at least 55+ with multiple co morbidities; or, Patient age 11 or under 0/1   Suicide ideation without relief of precipitating factors 0/1   Current plan for suicide 0/1   Current plan for homicide 0/1   Imminent risk or actual attempt to seriously harm another without relief of factors precipitating the attempt 0/1   Severe dysfunction in daily living (ex: complete neglect for self care, extreme disruption in vegetative function, extreme deterioration in social interactions) 1/1   Recent (last 2 weeks) or current physical aggression in the ED 0/1   Restraints or seclusion episode in ED 0/1   Verbal aggression, agitation, yelling, etc., while in the ED 0/1   Active psychosis with psychomotor agitation or catatonia 1/1   Need for constant or near constant redirection (from leaving, from others, etc).  1/1   Intrusive or disruptive behaviors 0/1   TOTAL Acuity Total Score: 4
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
## 2023-05-11 NOTE — PLAN OF CARE
Ej uDnaway  May 11, 2023  Plan of Care Hand-off Note     Patient Care Path: Inpatient Mental Health    Plan for Care:     Patient unable to fully engage in assessment due to psychosis.  Patient has a history of multiple suicide attempts and hx of SIB, cutting.  Patient appears to be actively attending to internal stimuli and is religiously pre-occupied.  Inpatient mental health recommended due to level of psychosis including auditory hallucinations, delusions, and paranoia, level of functioning, and inability to care for herself.    Critical Safety Issues: psychosis    Overview:  This patient is a child/adolescent: No    This patient has additional special visitor precautions: No    Legal Status: ARTHUR    Contacts:   TBD    Psychiatry Consult:  Adult Psychiatry Consult requested related to medications, assistance with stabilization. Psychiatry IP Consult Order Placed: Yes    Updated Attending Provider, Dr Kat, regarding plan of care.    ROMAN TovarSW

## 2023-05-12 ENCOUNTER — TELEPHONE (OUTPATIENT)
Dept: BEHAVIORAL HEALTH | Facility: CLINIC | Age: 23
End: 2023-05-12
Payer: MEDICAID

## 2023-05-12 PROCEDURE — 250N000013 HC RX MED GY IP 250 OP 250 PS 637: Performed by: EMERGENCY MEDICINE

## 2023-05-12 PROCEDURE — 99245 OFF/OP CONSLTJ NEW/EST HI 55: CPT

## 2023-05-12 PROCEDURE — 250N000013 HC RX MED GY IP 250 OP 250 PS 637

## 2023-05-12 RX ORDER — BENZTROPINE MESYLATE 1 MG/1
1 TABLET ORAL 3 TIMES DAILY
Status: DISCONTINUED | OUTPATIENT
Start: 2023-05-12 | End: 2023-05-13 | Stop reason: HOSPADM

## 2023-05-12 RX ORDER — HALOPERIDOL 5 MG/1
5 TABLET ORAL DAILY
Status: DISCONTINUED | OUTPATIENT
Start: 2023-05-12 | End: 2023-05-13 | Stop reason: HOSPADM

## 2023-05-12 RX ORDER — OLANZAPINE 10 MG/1
10 TABLET, ORALLY DISINTEGRATING ORAL EVERY 8 HOURS PRN
Status: DISCONTINUED | OUTPATIENT
Start: 2023-05-12 | End: 2023-05-13 | Stop reason: HOSPADM

## 2023-05-12 RX ORDER — HYDROXYZINE HYDROCHLORIDE 25 MG/1
25 TABLET, FILM COATED ORAL 3 TIMES DAILY PRN
Status: DISCONTINUED | OUTPATIENT
Start: 2023-05-12 | End: 2023-05-13 | Stop reason: HOSPADM

## 2023-05-12 RX ORDER — HALOPERIDOL 2 MG/1
2 TABLET ORAL EVERY 6 HOURS PRN
Status: DISCONTINUED | OUTPATIENT
Start: 2023-05-12 | End: 2023-05-12

## 2023-05-12 RX ORDER — OLANZAPINE 10 MG/2ML
10 INJECTION, POWDER, FOR SOLUTION INTRAMUSCULAR 3 TIMES DAILY PRN
Status: DISCONTINUED | OUTPATIENT
Start: 2023-05-12 | End: 2023-05-13 | Stop reason: HOSPADM

## 2023-05-12 RX ADMIN — NICOTINE POLACRILEX 2 MG: 2 GUM, CHEWING BUCCAL at 14:48

## 2023-05-12 RX ADMIN — NICOTINE POLACRILEX 2 MG: 2 GUM, CHEWING BUCCAL at 09:23

## 2023-05-12 RX ADMIN — BENZTROPINE MESYLATE 0.5 MG: 0.5 TABLET ORAL at 09:06

## 2023-05-12 RX ADMIN — HALOPERIDOL 5 MG: 5 TABLET ORAL at 12:26

## 2023-05-12 RX ADMIN — Medication 5 MG: at 21:55

## 2023-05-12 RX ADMIN — ATOMOXETINE 40 MG: 40 CAPSULE ORAL at 09:06

## 2023-05-12 RX ADMIN — HALOPERIDOL 7.5 MG: 5 TABLET ORAL at 21:55

## 2023-05-12 RX ADMIN — NICOTINE POLACRILEX 2 MG: 2 GUM, CHEWING BUCCAL at 18:30

## 2023-05-12 RX ADMIN — HYDROXYZINE HYDROCHLORIDE 25 MG: 25 TABLET, FILM COATED ORAL at 19:07

## 2023-05-12 RX ADMIN — NICOTINE POLACRILEX 2 MG: 2 GUM, CHEWING BUCCAL at 08:46

## 2023-05-12 RX ADMIN — BENZTROPINE MESYLATE 1 MG: 1 TABLET ORAL at 15:23

## 2023-05-12 RX ADMIN — NICOTINE POLACRILEX 2 MG: 2 GUM, CHEWING BUCCAL at 16:49

## 2023-05-12 RX ADMIN — NICOTINE POLACRILEX 2 MG: 2 GUM, CHEWING BUCCAL at 07:16

## 2023-05-12 RX ADMIN — BENZTROPINE MESYLATE 1 MG: 1 TABLET ORAL at 20:03

## 2023-05-12 ASSESSMENT — ACTIVITIES OF DAILY LIVING (ADL)
ADLS_ACUITY_SCORE: 35
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
## 2023-05-12 NOTE — TELEPHONE ENCOUNTER
[facility] Access Inpatient Bed Call Log 5/12/2023 1:21 AM      Intake has called facilities that have not updated their bed status within the last 12 hours.     Adults:         [facility] is posting 0 beds.      [facility] is posting 0 beds. [phone]      [facility] is posting 0 beds. [phone]     [facility] is posting 0 beds. [phone]     [facility] is posting 0 beds. [phone]     [facility] is posting 0 bed. [phone]      [facility] is posting 0 beds. [phone]     [facility] is posting 0 beds. [phone]     [facility] is posting 0 beds. [phone]     [facility] is posting 0 beds. [phone]       [facility] is posting 1 beds. Mixed unit 12+. Low acuity only.  [phone]     [facility] is posting 0 beds. No aggression.  [phone]     [facility] is posting 0 beds. [phone]      [facility] is posting 0 beds. [phone]      [facility] is posting 0 bed. [phone]      [facility] is posting 4 beds. Low acuity. [phone]     [facility] is posting 0 beds. 72 hr hold preferred. [phone]     [facility] is posting 3 bed.  [phone]        [facility] is posting 2 bed. Voluntary only- no holds/commitments, No Hx of aggression, violence, or assault. No sexual offenders. No 72 hr holds. [phone]     [facility] is posting 7 beds. (Must have the cognitive ability to do programming. No aggressive or violent behavior or recent HX in the last 2 yrs. MH must be primary.) [phone]    [facility] is posting 0 beds. Low acuity only. Violence and aggression capped. [phone]      [facility] is posting 0 bed. Low acuity, Neg Covid. [phone]     [facility] is posting 0 beds. Covid neg. Vol only. Combined adolescent and adult unit. No aggressive or violent behavior. No registered sex offenders. [phone]     [facility] posting 0 beds. Negative covid.      [facility] is posting 18 beds. Call for details. [phone]      [facility] is posting 2 beds. [phone]       Pt remains on work list pending appropriate bed availability.
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
## 2023-05-12 NOTE — ED NOTES
IP MH Referral Acuity Rating Score (RARS)     LMHP complete at referral to IP MH, with DEC; and, daily while awaiting IP MH placement. Call score to PPS.  CRITERIA SCORING   New 72 HH and Involuntary for IP MH (not adolescent) 1/1   Boarding over 24 hours 1/1   Vulnerable adult at least 55+ with multiple co morbidities; or, Patient age 11 or under 0/1   Suicide ideation without relief of precipitating factors 0/1   Current plan for suicide 0/1   Current plan for homicide 0/1   Imminent risk or actual attempt to seriously harm another without relief of factors precipitating the attempt 0/1   Severe dysfunction in daily living (ex: complete neglect for self care, extreme disruption in vegetative function, extreme deterioration in social interactions) 1/1   Recent (last 2 weeks) or current physical aggression in the ED 0/1   Restraints or seclusion episode in ED 0/1   Verbal aggression, agitation, yelling, etc., while in the ED 0/1   Active psychosis with psychomotor agitation or catatonia 1/1   Need for constant or near constant redirection (from leaving, from others, etc).  1/1   Intrusive or disruptive behaviors 0/1   TOTAL Acuity Total Score: 5

## 2023-05-12 NOTE — PROGRESS NOTES
Triage & Transition Services, Extended Care     Ej Dunaway  May 12, 2023    Ej is followed related to long wait time for admission. Please see initial DEC Crisis Assessment completed for complete assessment information. Medical record is reviewed.  While patient is in the ED, care team is working towards Learn and Demonstrate at [facility] [employer] Skill Focused on Crisis Stabilization.     Attempted to meet with Patient but she was sleeping and would not arouse full for writer. She did arouse enough to confirm she was still voluntary for admission.     Plan:  Inpatient Mental Health: Patient is currently experiencing psychosis and needs inpatient level of care for safety and stabilization. She would ne assessed for appropriateness of a 72HH should she request to discharge.     Plan for Care reviewed with Assigned Medical Provider? Yes. Provider, Dr. Nenita Carrion, response: agreeable.    Extended Care will follow and meet with patient/family/care team as able or requested.     Corinne Romitti, [facility], Extended Care   [phone]

## 2023-05-12 NOTE — TELEPHONE ENCOUNTER
R:  No appropriate beds within [place].  Bed Search update Statewide 11:15PM    Pt currently being reviewed for placement with [facility].  Information faxed.  Awaiting call back.     [facility]: @ cap per website  [facility]: @cap per website.  Per Rere, at capacity.  [facility]: @ cap per website-Per Trey, at capacity.  [facility]: @ cap per website.  Per Rere, at capacity.  [facility]: @ cap per website  [facility]: @ cap per website.  Per Rere, at capacity.  [facility]: @ cap per website  [facility]:  @ cap per website.  Per Rere, at capacity.  [facility]: @ cap per website.   Mixed unit with children.    [facility]: @ cap per website  [facility]: @ cap per website  [facility]: 1 beds posted.  Per Rere, at capacity  [facility]:  @ cap per website  [facility]:  4 LOW ACUITY beds.  Mood disorder.  Per Intake, not appropriate  [facility]: @ cap per website   [facility]: 4 LOW ACUITY beds posted.  Per Intake, not appropriate  [facility]: 4 LOW ACUITY beds available.  Voluntary pts only.    [facility]: 8 LOW ACUITY beds posted.  Agreeable to review pt.  Faxing over info at 11:15PM.  Awaiting call back.    [facility]: @ cap per website  [facility]: 1 LOW ACUITY bed posted.  Per Rosalina Vazquez, on divert.  Try back tomorrow.  [facility]: 1 bed available.  Voluntary pts only.  Mixed unit with children.     [facility]: 18 beds posted  Out of state.    [facility]: 2 beds available.   Mixed unit with children.       Pt remains on PPS work list awaiting appropriate placement.

## 2023-05-12 NOTE — TELEPHONE ENCOUNTER
4:19 PM  Called migel abdi pts MRN for reviewing       Intake Bed Search Afternoon  (Done at 3:35 PM) Facilities that have not updated their [facility] access site in the last 12 hours have been contacted for bed availability.   [facility] is posting 0 beds.   [facility] is posting 0 beds.  [facility] is posting 0 beds.  [facility] is posting 0 beds.  [facility] is posting 0 beds.  [facility] is posting 0 beds.  [facility] is posting 0 beds.  [facility] is posting 0 beds. Low acuity.    [facility] is posting 1 bed. Mixed unit 12+. Low acuity only. Patient is not appropriate for open bed due to acuity   [facility] is posting 0 beds. No aggression.   [facility] is posting 0 beds.   [facility] is posting 2 beds. Low acuity only. Patient is not appropriate for open bed due to acuity   [facility] is posting 1 beds. Low acuity only.  Patient is not appropriate for open bed due to acuity   [facility] is posting 4 beds. 0 acute, 0 med psych, 0 mood disorder- very low acuity.  Patient is not appropriate for open bed due to acuity   [facility] is posting 2 beds. Low acuity only. 72 hr hold required.   [facility] is posting 3 beds. Low acuity. Patient is not appropriate for open bed due to acuity     [facility] is posting 2 beds. Vol only, No Hx of aggression, violence, or assault. No sexual offenders. No 72 hr holds.     [facility] is posting 7 beds. (Must have the cognitive ability to do programming. No aggressive or violent behavior or recent HX in the last 2 yrs. MH must be primary).  [facility] [place] is posting 0 beds. Low acuity only. Violence and aggression capped.     [facility] is posting 0 beds. Low acuity, Neg Covid.   [facility] is posting 0 beds. Covid neg. Vol only. Combined adolescent and adult unit. No aggressive or violent behavior. No registered sex offenders.   [facility]  is posting 18 beds. No Covid needed. Call for details.pt requesting statewide placement   [facility] is posting 4 beds. Mixed Unit (13+). Low acuity only. Patient is not appropriate for open bed due to acuity

## 2023-05-12 NOTE — ED NOTES
"2000 -pt aware of wet prep and chlamydia sample orders. Pt stated she is not ready at this time. Will inform RN when ready for collection kits.     2050- Instructions on proper collection provided. Pt stated, \"I understand and done it before.\" Wet prep and chlamydia collection kits given to pt. \" Pt then stated, \"I don't want to do it anymore and don't believe in those thing.\" \"I threw it in the trash.\"     MD notified.   "

## 2023-05-13 ENCOUNTER — HOSPITAL ENCOUNTER (INPATIENT)
Facility: HOSPITAL | Age: 23
LOS: 24 days | Discharge: HOME | End: 2023-06-06
Attending: STUDENT IN AN ORGANIZED HEALTH CARE EDUCATION/TRAINING PROGRAM | Admitting: STUDENT IN AN ORGANIZED HEALTH CARE EDUCATION/TRAINING PROGRAM
Payer: MEDICAID

## 2023-05-13 VITALS
TEMPERATURE: 98.1 F | SYSTOLIC BLOOD PRESSURE: 136 MMHG | BODY MASS INDEX: 36.96 KG/M2 | HEART RATE: 104 BPM | HEIGHT: 66 IN | DIASTOLIC BLOOD PRESSURE: 86 MMHG | WEIGHT: 230 LBS | OXYGEN SATURATION: 97 % | RESPIRATION RATE: 16 BRPM

## 2023-05-13 DIAGNOSIS — F17.200 TOBACCO USE DISORDER: ICD-10-CM

## 2023-05-13 DIAGNOSIS — F25.0 SCHIZOAFFECTIVE DISORDER, BIPOLAR TYPE (H): Primary | ICD-10-CM

## 2023-05-13 DIAGNOSIS — F90.2 ADHD (ATTENTION DEFICIT HYPERACTIVITY DISORDER), COMBINED TYPE: ICD-10-CM

## 2023-05-13 DIAGNOSIS — F29 PSYCHOSIS, UNSPECIFIED PSYCHOSIS TYPE (H): ICD-10-CM

## 2023-05-13 PROCEDURE — 250N000013 HC RX MED GY IP 250 OP 250 PS 637: Performed by: PSYCHIATRY & NEUROLOGY

## 2023-05-13 PROCEDURE — 250N000013 HC RX MED GY IP 250 OP 250 PS 637: Performed by: EMERGENCY MEDICINE

## 2023-05-13 PROCEDURE — 124N000001 HC R&B MH

## 2023-05-13 PROCEDURE — 250N000013 HC RX MED GY IP 250 OP 250 PS 637

## 2023-05-13 RX ORDER — OLANZAPINE 10 MG/1
10 TABLET ORAL 3 TIMES DAILY PRN
Status: DISCONTINUED | OUTPATIENT
Start: 2023-05-13 | End: 2023-05-15

## 2023-05-13 RX ORDER — HALOPERIDOL DECANOATE 100 MG/ML
200 INJECTION INTRAMUSCULAR
Status: DISCONTINUED | OUTPATIENT
Start: 2023-06-02 | End: 2023-05-30

## 2023-05-13 RX ORDER — LANOLIN ALCOHOL/MO/W.PET/CERES
3 CREAM (GRAM) TOPICAL
Status: DISCONTINUED | OUTPATIENT
Start: 2023-05-13 | End: 2023-06-06 | Stop reason: HOSPADM

## 2023-05-13 RX ORDER — HALOPERIDOL 5 MG/1
7.5 TABLET ORAL AT BEDTIME
Status: ON HOLD | COMMUNITY
End: 2023-05-15

## 2023-05-13 RX ORDER — POLYETHYLENE GLYCOL 3350 17 G/17G
17 POWDER, FOR SOLUTION ORAL DAILY PRN
Status: DISCONTINUED | OUTPATIENT
Start: 2023-05-13 | End: 2023-06-06 | Stop reason: HOSPADM

## 2023-05-13 RX ORDER — HALOPERIDOL 5 MG/1
5 TABLET ORAL DAILY PRN
Status: DISCONTINUED | OUTPATIENT
Start: 2023-05-13 | End: 2023-05-15

## 2023-05-13 RX ORDER — MAGNESIUM HYDROXIDE/ALUMINUM HYDROXICE/SIMETHICONE 120; 1200; 1200 MG/30ML; MG/30ML; MG/30ML
30 SUSPENSION ORAL EVERY 4 HOURS PRN
Status: DISCONTINUED | OUTPATIENT
Start: 2023-05-13 | End: 2023-06-06 | Stop reason: HOSPADM

## 2023-05-13 RX ORDER — HYDROXYZINE HYDROCHLORIDE 25 MG/1
25 TABLET, FILM COATED ORAL EVERY 4 HOURS PRN
Status: DISCONTINUED | OUTPATIENT
Start: 2023-05-13 | End: 2023-06-06 | Stop reason: HOSPADM

## 2023-05-13 RX ORDER — ACETAMINOPHEN 325 MG/1
650 TABLET ORAL EVERY 4 HOURS PRN
Status: DISCONTINUED | OUTPATIENT
Start: 2023-05-13 | End: 2023-06-06 | Stop reason: HOSPADM

## 2023-05-13 RX ORDER — HALOPERIDOL 5 MG/1
5 TABLET ORAL DAILY
Status: ON HOLD | COMMUNITY
End: 2023-05-15

## 2023-05-13 RX ORDER — OLANZAPINE 10 MG/2ML
10 INJECTION, POWDER, FOR SOLUTION INTRAMUSCULAR 3 TIMES DAILY PRN
Status: DISCONTINUED | OUTPATIENT
Start: 2023-05-13 | End: 2023-05-15

## 2023-05-13 RX ORDER — ATOMOXETINE 40 MG/1
40 CAPSULE ORAL DAILY
Status: DISCONTINUED | OUTPATIENT
Start: 2023-05-13 | End: 2023-06-06 | Stop reason: HOSPADM

## 2023-05-13 RX ORDER — BENZTROPINE MESYLATE 0.5 MG/1
0.5 TABLET ORAL 3 TIMES DAILY
Status: DISCONTINUED | OUTPATIENT
Start: 2023-05-13 | End: 2023-05-14

## 2023-05-13 RX ADMIN — ATOMOXETINE 40 MG: 40 CAPSULE ORAL at 07:56

## 2023-05-13 RX ADMIN — OLANZAPINE 10 MG: 10 TABLET, FILM COATED ORAL at 21:43

## 2023-05-13 RX ADMIN — BENZTROPINE MESYLATE 0.5 MG: 0.5 TABLET ORAL at 20:26

## 2023-05-13 RX ADMIN — BENZTROPINE MESYLATE 1 MG: 1 TABLET ORAL at 07:56

## 2023-05-13 RX ADMIN — NICOTINE POLACRILEX 4 MG: 4 GUM, CHEWING ORAL at 17:35

## 2023-05-13 RX ADMIN — MELATONIN 3 MG: at 20:26

## 2023-05-13 RX ADMIN — BENZTROPINE MESYLATE 0.5 MG: 0.5 TABLET ORAL at 13:13

## 2023-05-13 RX ADMIN — NICOTINE POLACRILEX 4 MG: 4 GUM, CHEWING ORAL at 12:43

## 2023-05-13 RX ADMIN — HALOPERIDOL 5 MG: 5 TABLET ORAL at 07:56

## 2023-05-13 ASSESSMENT — ACTIVITIES OF DAILY LIVING (ADL)
DRESSING/BATHING_DIFFICULTY: NO
ADLS_ACUITY_SCORE: 28
WALKING_OR_CLIMBING_STAIRS_DIFFICULTY: NO
ADLS_ACUITY_SCORE: 35
ADLS_ACUITY_SCORE: 35
ADLS_ACUITY_SCORE: 28
ADLS_ACUITY_SCORE: 35
WEAR_GLASSES_OR_BLIND: NO
TOILETING_ISSUES: NO
ADLS_ACUITY_SCORE: 28
CHANGE_IN_FUNCTIONAL_STATUS_SINCE_ONSET_OF_CURRENT_ILLNESS/INJURY: NO
ADLS_ACUITY_SCORE: 28
DIFFICULTY_EATING/SWALLOWING: NO
CONCENTRATING,_REMEMBERING_OR_MAKING_DECISIONS_DIFFICULTY: NO
ADLS_ACUITY_SCORE: 35
FALL_HISTORY_WITHIN_LAST_SIX_MONTHS: NO
ADLS_ACUITY_SCORE: 28
ADLS_ACUITY_SCORE: 28
DOING_ERRANDS_INDEPENDENTLY_DIFFICULTY: NO

## 2023-05-13 NOTE — TELEPHONE ENCOUNTER
R:  [facility] called Intake @ 7:48pm to inform of pt admit to [facility].    Pt placed in queue @ 7:50pm  Pt added to admit board @ 7:52pm and megania completed

## 2023-05-13 NOTE — PLAN OF CARE
"Face to face shift report received from Melisa KRUEGER. Rounding completed, pt observed in room at the start of the shift.    Patient remains in MHICU throughout the evening. Alert and making needs known. Pleasant during conversation with this writer continuing to be religiously preoccupied. Compliant with scheduled medication and nursing assessment. Reports increased anxiety and fear stating she was afraid of demons. Offered pharmacologic intervention and patient declined stating \"I will just keep reading my bible.\" Denies depression, hallucinations, SI/HI, and pain stating \"I will be fine if I trust in the lord.\" Appears restless walking around her room. Eating well for meals. Signed PARKER for mother. Weaned to open unit for group and remained appropriate. Requested and received Melatonin 3 mg at 2026 and returned to MHICU for the night.    2143: Requested and received Zyprexa 10 mg for auditory hallucinations stating \"I feel like pulling my hair out.\" Requested for Haldol shortly after asking staff to stay with her until she falls asleep. Patient states she is afraid the demons are going to take over. Reminded patient she is safe here on the unit as well as doing safety checks throughout the night. Received a snack at this time. Encouraged patient to lay down and let Zyprexa start working.    Problem: Adult Behavioral Health Plan of Care  Goal: Patient-Specific Goal (Individualization)  Description: Pt will be compliant with treatment team recommendations during hospitalization.   Pt will report adequate amounts of sleep on NOC shift (5-8 hours.)  Pt will participate in greater than 50% of daily groups.  5/13/23: MH-ICU upon admit r/t decreased stimuli, reliously preoccupied, auditory hallucinations. Treatment team to assess daily.      Outcome: Progressing     Problem: Thought Process Alteration  Goal: Optimal Thought Clarity  Description: Patient will have clear linear conversations while on the unit.  Outcome: " Progressing    Face to face report will be communicated to oncoming RN.    Kanwal Kimball RN  5/13/2023

## 2023-05-13 NOTE — PROGRESS NOTES
05/13/23 1146   Patient Belongings   Did you bring any home meds/supplements to the hospital?  No   Patient Belongings locker   Patient Belongings Remaining with Patient none   Patient Belongings Put in Hospital Secure Location (Security or Locker, etc.) clothing;shoes   Belongings Search Yes   Clothing Search Yes   Second Staff nuvia   Comment jeans, garcia shirt, black larry jacket, white shoes   List items sent to safe: cricket cell phone (cracks and chips in screen)    All other belongings put in assigned cubby in belongings room.     I have reviewed my belongings list on admission and verify that it is correct.     Patient signature_______________________________    Second staff witness (if patient unable to sign) ______________________________     I have received all my belongings at discharge.    Patient signature________________________________    January  5/13/2023  11:47 AM

## 2023-05-13 NOTE — PLAN OF CARE
"  ADMISSION NOTE    Reason for admission Religiously preoccupied with   Safety concerns no.  Risk for or history of violence no.   Full skin assessment: completed  Patient arrived on unit from [facility] ED accompanied by transport staff and security on 5/13/2023  11:40 AM.   Status on arrival:Preoccupied but cooperative  /72   Pulse 91   Temp 98.1  F (36.7  C) (Temporal)   Resp 16   Ht 1.676 m (5' 6\")   LMP 03/01/2023 (Exact Date)   SpO2 98%   BMI 37.12 kg/m    Patient given tour of unit and Welcome to  unit papers given to patient, wanding completed, belongings inventoried, and admission assessment completed.   Patient's legal status on arrival is voluntary. Appropriate legal rights discussed with and copy given to patient. Patient Bill of Rights discussed with and copy given to patient.   Patient denies SI, HI, and thoughts of self harm and agrees to safety while on unit.      Melisa Isabel RN  5/13/2023  2:46 PM          PER CHART \"She was admitted to [facility] from 4/29-5/9/23.  Patient returned to [facility] on 5/9 and she was sent to [facility] on 5/10.  [facility] discharged patient and [facility] would not accept patient back.  It is unclear where patient stayed last night.  Patient presented to [facility] admissions this morning and they referred patient to the hospital.\"     PT arrived pacing and restless, seen talking to herself intermittently in her room. When I ask her why she is back she reports \"[employer] thinks I need more time, I am not well enough to be there and I am not enough of myself yet.\" She is seen pacing, and moving her feet while standing in one place.  She takes her scheduled cogentin. She did not take Strattera as she already took it prior to admission in the ED.  She wants to take a nap, but lays awake in bed.  She denies SI/HI.     Face to face end of shift report communicated to oncoming RN    Melisa Isabel, " RN  5/13/2023  2:54 PM                         Problem: Adult Behavioral Health Plan of Care  Goal: Patient-Specific Goal (Individualization)  Description: Pt will be compliant with treatment team recommendations during hospitalization.   Pt will report adequate amounts of sleep on NOC shift (5-8 hours.)  Pt will participate in greater than 50% of daily groups.  5/13/23: MH-ICU upon admit r/t decreased stimuli, reliously preoccupied, auditory hallucinations. Treatment team to assess daily.      Outcome: Progressing     Problem: Thought Process Alteration  Goal: Optimal Thought Clarity  Outcome: Progressing   Goal Outcome Evaluation:

## 2023-05-14 PROCEDURE — 124N000001 HC R&B MH

## 2023-05-14 PROCEDURE — 99223 1ST HOSP IP/OBS HIGH 75: CPT | Mod: AI | Performed by: PSYCHIATRY & NEUROLOGY

## 2023-05-14 PROCEDURE — 250N000013 HC RX MED GY IP 250 OP 250 PS 637: Performed by: PSYCHIATRY & NEUROLOGY

## 2023-05-14 RX ORDER — BENZTROPINE MESYLATE 1 MG/1
1 TABLET ORAL 3 TIMES DAILY
Status: DISCONTINUED | OUTPATIENT
Start: 2023-05-14 | End: 2023-06-06 | Stop reason: HOSPADM

## 2023-05-14 RX ADMIN — NICOTINE POLACRILEX 4 MG: 4 GUM, CHEWING ORAL at 10:31

## 2023-05-14 RX ADMIN — BENZTROPINE MESYLATE 1 MG: 1 TABLET ORAL at 08:08

## 2023-05-14 RX ADMIN — NICOTINE POLACRILEX 4 MG: 4 GUM, CHEWING ORAL at 09:04

## 2023-05-14 RX ADMIN — OLANZAPINE 10 MG: 10 TABLET, FILM COATED ORAL at 11:44

## 2023-05-14 RX ADMIN — OLANZAPINE 10 MG: 10 TABLET, FILM COATED ORAL at 15:59

## 2023-05-14 RX ADMIN — OLANZAPINE 10 MG: 10 TABLET, FILM COATED ORAL at 23:41

## 2023-05-14 RX ADMIN — MELATONIN 3 MG: at 23:41

## 2023-05-14 RX ADMIN — BENZTROPINE MESYLATE 1 MG: 1 TABLET ORAL at 13:08

## 2023-05-14 RX ADMIN — ATOMOXETINE 40 MG: 40 CAPSULE ORAL at 08:08

## 2023-05-14 ASSESSMENT — ACTIVITIES OF DAILY LIVING (ADL)
ADLS_ACUITY_SCORE: 28
ORAL_HYGIENE: INDEPENDENT
DRESS: SCRUBS (BEHAVIORAL HEALTH);INDEPENDENT
ADLS_ACUITY_SCORE: 28
LAUNDRY: UNABLE TO COMPLETE
ADLS_ACUITY_SCORE: 28
HYGIENE/GROOMING: INDEPENDENT

## 2023-05-14 NOTE — PLAN OF CARE
Problem: Thought Process Alteration  Goal: Optimal Thought Clarity  Description: Patient will have clear linear conversations while on the unit.  Outcome: Progressing     Problem: Adult Behavioral Health Plan of Care  Goal: Patient-Specific Goal (Individualization)  Description: Pt will be compliant with treatment team recommendations during hospitalization.   Pt will report adequate amounts of sleep on NOC shift (5-8 hours.)  Pt will participate in greater than 50% of daily groups.  5/13/23: MH-ICU upon admit r/t decreased stimuli, reliously preoccupied, auditory hallucinations. Treatment team to assess daily.  Outcome: Progressing       Face to Face report received from PATI Schofield.  Rounding completed. Patient observed in bed with eyes closed appearing to sleep for 7 hours with even & unlabored respirations noted.  15 minute and PRN checks all night. No complaints offered. Will continue to monitor.      Abigail Lopez RN  5/14/2023  6:26 AM

## 2023-05-14 NOTE — PLAN OF CARE
"PT in bed at beginning shift, she has had continued to have intrusive AH about demons. I offered her Zyprexa right away this morning at 0832 and she reported to me, \"I have hood I am ok.\" She tried to attend group and told me was preoccupied that she was spiritually attacking people with her mind and did not want to hurt anyone and demons were telling her they were going to take her over. I spoke to her again about taking some zyprexa and she reported she again \"had hood\" I told her it was ok to have Hood and also take medication to help her and she was agreeable to take the Zyprexa at 1144. We walked and talked in the hallway and she reports that the Zyprexa helps her and that the halidol does not.       Problem: Adult Behavioral Health Plan of Care  Goal: Patient-Specific Goal (Individualization)  Description: Pt will be compliant with treatment team recommendations during hospitalization.   Pt will report adequate amounts of sleep on NOC shift (5-8 hours.)  Pt will participate in greater than 50% of daily groups.  5/14/23: MH-ICU upon admit r/t decreased stimuli, reliously preoccupied, auditory hallucinations. Treatment team to assess daily.      Outcome: Progressing     Problem: Thought Process Alteration  Goal: Optimal Thought Clarity  Description: Patient will have clear linear conversations while on the unit.  Outcome: Progressing   Goal Outcome Evaluation:    Plan of Care Reviewed With: patient                   "

## 2023-05-14 NOTE — PLAN OF CARE
Face to face shift report received from Melisa KRUEGER. Rounding completed, pt observed laying in bed at the start of the shift.    Patient in and out of room throughout the evening. Weaning appropriately to open unit and returning to MHICU with no problem. Eating well for meals. Alert and making needs known. Pleasant during conversation with this writer appearing less religiously preoccupied today. Compliant with nursing assessment. Reports anxiety this shift. Declines pharmacologic intervention at this time. Denies depression, SI/HI, and pain. Requested and received Zyprexa 10 mg at 1559 for auditory hallucinations with adequate relief. Requested shower supplies and went to bed at 1800. Slept for remainder of the shift. Held cogentin due to patient sleeping.     Problem: Adult Behavioral Health Plan of Care  Goal: Patient-Specific Goal (Individualization)  Description: Pt will be compliant with treatment team recommendations during hospitalization.   Pt will report adequate amounts of sleep on NOC shift (5-8 hours.)  Pt will participate in greater than 50% of daily groups.    5/14/23: MH-ICU upon admit r/t decreased stimuli, reliously preoccupied, auditory hallucinations. Treatment team to assess daily.      Outcome: Progressing     Problem: Thought Process Alteration  Goal: Optimal Thought Clarity  Description: Patient will have clear linear conversations while on the unit.  Outcome: Progressing    Face to face report will be communicated to oncoming RN.    Kanwal Kimball RN  5/14/2023

## 2023-05-15 PROCEDURE — 124N000001 HC R&B MH

## 2023-05-15 PROCEDURE — 250N000013 HC RX MED GY IP 250 OP 250 PS 637: Performed by: PSYCHIATRY & NEUROLOGY

## 2023-05-15 PROCEDURE — 250N000013 HC RX MED GY IP 250 OP 250 PS 637: Performed by: STUDENT IN AN ORGANIZED HEALTH CARE EDUCATION/TRAINING PROGRAM

## 2023-05-15 RX ORDER — OLANZAPINE 10 MG/1
10 TABLET ORAL 3 TIMES DAILY PRN
Status: DISCONTINUED | OUTPATIENT
Start: 2023-05-15 | End: 2023-06-06 | Stop reason: HOSPADM

## 2023-05-15 RX ORDER — LORAZEPAM 1 MG/1
2 TABLET ORAL EVERY 8 HOURS PRN
Status: DISCONTINUED | OUTPATIENT
Start: 2023-05-15 | End: 2023-06-06 | Stop reason: HOSPADM

## 2023-05-15 RX ORDER — HALOPERIDOL 5 MG/ML
5 INJECTION INTRAMUSCULAR EVERY 8 HOURS PRN
Status: DISCONTINUED | OUTPATIENT
Start: 2023-05-15 | End: 2023-06-06 | Stop reason: HOSPADM

## 2023-05-15 RX ORDER — HALOPERIDOL 5 MG/1
5 TABLET ORAL EVERY 8 HOURS PRN
Status: DISCONTINUED | OUTPATIENT
Start: 2023-05-15 | End: 2023-06-06 | Stop reason: HOSPADM

## 2023-05-15 RX ORDER — LORAZEPAM 2 MG/ML
2 INJECTION INTRAMUSCULAR EVERY 8 HOURS PRN
Status: DISCONTINUED | OUTPATIENT
Start: 2023-05-15 | End: 2023-06-06 | Stop reason: HOSPADM

## 2023-05-15 RX ORDER — OLANZAPINE 10 MG/2ML
10 INJECTION, POWDER, FOR SOLUTION INTRAMUSCULAR 3 TIMES DAILY PRN
Status: DISCONTINUED | OUTPATIENT
Start: 2023-05-15 | End: 2023-06-06 | Stop reason: HOSPADM

## 2023-05-15 RX ORDER — DIPHENHYDRAMINE HYDROCHLORIDE 50 MG/ML
50 INJECTION INTRAMUSCULAR; INTRAVENOUS EVERY 8 HOURS PRN
Status: DISCONTINUED | OUTPATIENT
Start: 2023-05-15 | End: 2023-06-06 | Stop reason: HOSPADM

## 2023-05-15 RX ORDER — DIPHENHYDRAMINE HCL 50 MG
50 CAPSULE ORAL EVERY 8 HOURS PRN
Status: DISCONTINUED | OUTPATIENT
Start: 2023-05-15 | End: 2023-06-06 | Stop reason: HOSPADM

## 2023-05-15 RX ADMIN — LORAZEPAM 2 MG: 1 TABLET ORAL at 22:29

## 2023-05-15 RX ADMIN — BENZTROPINE MESYLATE 1 MG: 1 TABLET ORAL at 13:42

## 2023-05-15 RX ADMIN — BENZTROPINE MESYLATE 1 MG: 1 TABLET ORAL at 08:23

## 2023-05-15 RX ADMIN — ACETAMINOPHEN 325MG 650 MG: 325 TABLET ORAL at 18:53

## 2023-05-15 RX ADMIN — DIPHENHYDRAMINE HYDROCHLORIDE 50 MG: 50 CAPSULE ORAL at 22:30

## 2023-05-15 RX ADMIN — OLANZAPINE 10 MG: 10 TABLET, FILM COATED ORAL at 17:58

## 2023-05-15 RX ADMIN — HALOPERIDOL 5 MG: 5 TABLET ORAL at 08:35

## 2023-05-15 RX ADMIN — OLANZAPINE 10 MG: 10 TABLET, FILM COATED ORAL at 06:47

## 2023-05-15 RX ADMIN — MELATONIN 3 MG: at 20:23

## 2023-05-15 RX ADMIN — NICOTINE POLACRILEX 4 MG: 4 GUM, CHEWING ORAL at 08:59

## 2023-05-15 RX ADMIN — ATOMOXETINE 40 MG: 40 CAPSULE ORAL at 08:23

## 2023-05-15 RX ADMIN — HALOPERIDOL 5 MG: 5 TABLET ORAL at 22:30

## 2023-05-15 RX ADMIN — NICOTINE POLACRILEX 4 MG: 4 GUM, CHEWING ORAL at 12:12

## 2023-05-15 RX ADMIN — BENZTROPINE MESYLATE 1 MG: 1 TABLET ORAL at 20:22

## 2023-05-15 RX ADMIN — OLANZAPINE 10 MG: 10 TABLET, FILM COATED ORAL at 11:34

## 2023-05-15 RX ADMIN — HYDROXYZINE HYDROCHLORIDE 25 MG: 25 TABLET, FILM COATED ORAL at 15:51

## 2023-05-15 ASSESSMENT — ACTIVITIES OF DAILY LIVING (ADL)
ADLS_ACUITY_SCORE: 28
ORAL_HYGIENE: INDEPENDENT
ADLS_ACUITY_SCORE: 28
DRESS: SCRUBS (BEHAVIORAL HEALTH);INDEPENDENT
ADLS_ACUITY_SCORE: 28
LAUNDRY: UNABLE TO COMPLETE
ADLS_ACUITY_SCORE: 28
HYGIENE/GROOMING: INDEPENDENT
LAUNDRY: UNABLE TO COMPLETE
HYGIENE/GROOMING: INDEPENDENT
ADLS_ACUITY_SCORE: 28
ORAL_HYGIENE: INDEPENDENT
ADLS_ACUITY_SCORE: 28
DRESS: INDEPENDENT;SCRUBS (BEHAVIORAL HEALTH)

## 2023-05-15 NOTE — PLAN OF CARE
"  Problem: Adult Behavioral Health Plan of Care  Goal: Patient-Specific Goal (Individualization)  Description: Pt will be compliant with treatment team recommendations during hospitalization.   Pt will report adequate amounts of sleep on NOC shift (5-8 hours.)  Pt will participate in greater than 50% of daily groups.    5/14/23: MH-ICU upon admit r/t decreased stimuli, reliously preoccupied, auditory hallucinations. Treatment team to assess daily.  Outcome: Progressing     Problem: Thought Process Alteration  Goal: Optimal Thought Clarity  Description: Patient will have clear linear conversations while on the unit.  Outcome: Not Progressing       Face to Face report received from PATI Schofield.  Rounding completed. Patient observed in bed with eyes closed appearing to sleep for 6.5 hours with even & unlabored respirations noted.  15 minute and PRN checks all night. No complaints offered. Will continue to monitor.    PRNs:      2340:Melatonin 3 mg for sleep.  Intervention effective as patient is asleep by 2400  2340: Zyprexa 10 mg for voices telling her she is a demon but she knows she is an asad.    0647:  Zyprexa 10 mg \"to calm the voices\"    Face to face end of shift report [facility].     Abigail Lopez RN  5/15/2023  2:33 AM       "

## 2023-05-15 NOTE — MEDICATION SCRIBE - ADMISSION MEDICATION HISTORY
Medication Scribe Admission Medication History    Admission medication history is complete. The information provided in this note is only as accurate as the sources available at the time of the update.    Medication reconciliation/reorder completed by provider prior to medication history? Yes    Information Source(s): [facility] via N/A    Pertinent Information: pt recently discharged 5/9, then seen in the ER until current admission. No med changes to PTA meds made in the interim.     Changes made to PTA medication list:    Added: None    Deleted: meds added at admission (duplicates)    Changed: None    Medication Affordability: unable to assess     Allergies reviewed with patient and updates made in EHR: no    Medication History Completed By: Aneta Vidales 5/15/2023 9:19 AM    Prior to Admission medications    Medication Sig Last Dose Taking? Auth Provider Long Term End Date   atomoxetine (STRATTERA) 40 MG capsule Take 1 capsule (40 mg) by mouth daily 5/13/2023 at 0800 ER dose Yes Sam Coombs MD     benztropine (COGENTIN) 0.5 MG tablet Take 1 tablet (0.5 mg) by mouth 3 times daily 5/13/2023 at 0800 ER dose 1 mg Yes Sam Coombs MD Yes    haloperidol (HALDOL) 5 MG tablet Take 1 tablet (5 mg) by mouth daily as needed for agitation (anxiety / agitation/ break through psychosis) 5/13/2023 at 0800 ER dose Yes Sam Coombs MD Yes    haloperidol decanoate (HALDOL DECANOATE) 100 MG/ML injection Inject 200 mg into the muscle every 28 days 5/5/2023 Yes Sam Coombs MD Yes

## 2023-05-15 NOTE — PLAN OF CARE
Addended by: CONY RECIO on: 10/13/2021 05:35 PM     Modules accepted: Orders     SHIFT SUMMARY:  Religiously-fixated. Anxious, restless, but cooperative. PRN PO hydroxyzine 25 mg administered per order related to anxiety. Weaned with appropriate boundaries. Benadryl 50 mg, ativan 2 mg and haldol 5 mg administered at 22:30 related to agitation/anxiety. Reported ear ache at the end of the shift.       Problem: Adult Behavioral Health Plan of Care  Goal: Patient-Specific Goal (Individualization)  Description: Pt will be compliant with treatment team recommendations during hospitalization.   Pt will report adequate amounts of sleep on NOC shift (5-8 hours.)  Pt will participate in greater than 50% of daily groups.    5/14/23: MH-ICU upon admit r/t decreased stimuli, reliously preoccupied, auditory hallucinations. Treatment team to assess daily.      Outcome: Progressing     Problem: Thought Process Alteration  Goal: Optimal Thought Clarity  Description: Patient will have clear linear conversations while on the unit.  Outcome: Progressing   Goal Outcome Evaluation:
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
## 2023-05-15 NOTE — PLAN OF CARE
"  Problem: Adult Behavioral Health Plan of Care  Goal: Patient-Specific Goal (Individualization)  Description: Pt will be compliant with treatment team recommendations during hospitalization.   Pt will report adequate amounts of sleep on NOC shift (5-8 hours.)  Pt will participate in greater than 50% of daily groups.    5/14/23: MH-ICU upon admit r/t decreased stimuli, reliously preoccupied, auditory hallucinations. Treatment team to assess daily. Wean as Appropriate.  Outcome: Progressing     Pt up at the start of the shift. Pt asked to come out to the open unit for breakfast. Pt asked for nicotine gum right away. Pt reports pain but refuses any medications. Pt states \"they seem to want to keep giving me medications\".  Pt appears agitated, going in and out of the MHICU throughout the morning, pt knocks on the plexiglass to the nurses station often even when told that nursing will get to her in a few minutes. Pt asked for her zyprexa after medications were passed but she had already been given zyprexa at 0647. Pt stated that the voices were telling her negative things today. Pt given her haldol 5mg prn dose at 0835.   Pt continues to pace, knock on the plexiglass and appears restless, walking in place when she is talking to staff. Pt asked for zyprexa again, pt given zyprexa 10mg at 1134. Pt asked about her anxiety and the last time she used. Pt became agitated and stated \"you all know why I am agitated\". Nursing told pt that this is the first time I had her as a nurse and her  also stated that he hadn't had her before today and we were not aware of why she is having this much anxiety, pt stated \"fine then I don't know either\".       Problem: Thought Process Alteration  Goal: Optimal Thought Clarity  Description: Patient will have clear linear conversations while on the unit.  Outcome: Progressing   Goal Outcome Evaluation:     Plan of Care Reviewed With: patient       Face to face end of shift report " communicated to evening shift RN.     Danica Higuera RN  5/15/2023  10:05 AM
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
## 2023-05-15 NOTE — DISCHARGE INSTRUCTIONS
Behavioral Discharge Planning and Instructions    Summary: Ej Dunaway is a 22 year old female with a past psychiatric history notable for schizoaffective disorder versus substance induced psychosis, polysubstance dependence, ADHD.    Main Diagnosis: #schizoaffective disorder, bipolar type  #ADHD  #Opioid Use Disorder  #Stimulant Use Disorder    Health Care Follow-up:     [facility]  Case Manger: Veronica Nunn @ [phone]  [address] [place] [id number]  [phone]   Fax    [facility]  Psychiatry- Jessica Walls- Thursday June 15th @ 12:00 PM  [address]   [facility] [address]   Phone: [phone]      Attend all scheduled appointments with your outpatient providers. Call at least 24 hours in advance if you need to reschedule an appointment to ensure continued access to your outpatient providers.     Major Treatments, Procedures and Findings:  You were provided with: a psychiatric assessment, assessed for medical stability, medication evaluation and/or management, group therapy, family therapy, individual therapy, CD evaluation/assessment, milieu management, and medical interventions    Symptoms to Report: feeling more aggressive, increased confusion, losing more sleep, mood getting worse, or thoughts of suicide    Early warning signs can include: increased depression or anxiety sleep disturbances increased thoughts or behaviors of suicide or self-harm  increased unusual thinking, such as paranoia or hearing voices    Safety and Wellness:  Take all medicines as directed.  Make no changes unless your doctor suggests them.      Follow treatment recommendations.  Refrain from alcohol and non-prescribed drugs.  Ask your support system to help you reduce your access to items that could harm yourself or others. Items could include:  Firearms  Medicines (both prescribed and over-the-counter)  Knives and other sharp objects  Ropes and like  "materials  Car keys  If there is a concern for safety, call 911. If there is a concern for safety, call 911.    Resources:   Crisis Intervention: 149.588.1578 or 262-359-5930 (TTY: 627.636.6693).  Call anytime for help.  National [place] on Mental Illness (www.[place].corona.org): 392.215.2424 or 355-205-3006.  [place] Association for Children's Mental Health (www.mac.org): 757.513.6048.  Alcoholics Anonymous (www.alcoholics-anonymous.org): Check your phone book for your local chapter.  Suicide Awareness Voices of Education (SAVE) (www.save.org): 726-357-UVEF (6878)  National Suicide Prevention Line (www.mentalhealthmn.org): 267-376-MTAQ (9908)  Mental Health Consumer/Survivor Network of [place] (www.mhcsn.net): 111.917.6513 or 655-929-1050  Mental Health Association of [place] (www.mentalhealth.org): 263.764.7888 or 851-198-5485  Self- Management and Recovery Training., SMART-- Toll free: 237.842.9687  www.[employer].Anita Margarita  Text 4 Life: txt \"LIFE\" to 74470 for immediate support and crisis intervention  Crisis text line: Text \"[place]\" to 139969. Free, confidential, 24/7.  Crisis Intervention: 206.580.7002 or 639-291-7146. Call anytime for help.     General Medication Instructions:   See your medication sheet(s) for instructions.   Take all medicines as directed.  Make no changes unless your doctor suggests them.   Go to all your doctor visits.  Be sure to have all your required lab tests. This way, your medicines can be refilled on time.  Do not use any drugs not prescribed by your doctor.  Avoid alcohol.    Advance Directives:   Scanned document on file with [facility]? No scanned doc  Is document scanned? Pt states no documents  Honoring Choices Your Rights Handout: Informed and given  Was more information offered? Pt declined    The Treatment team has appreciated the opportunity to work with you. If you have any questions or concerns about your recent admission, you can contact the unit which can receive your call 24 hours a day, 7 days " a week. They will be able to get in touch with a Provider if needed. The unit number is [phone] .

## 2023-05-16 PROCEDURE — 99233 SBSQ HOSP IP/OBS HIGH 50: CPT | Performed by: STUDENT IN AN ORGANIZED HEALTH CARE EDUCATION/TRAINING PROGRAM

## 2023-05-16 PROCEDURE — 250N000013 HC RX MED GY IP 250 OP 250 PS 637: Performed by: STUDENT IN AN ORGANIZED HEALTH CARE EDUCATION/TRAINING PROGRAM

## 2023-05-16 PROCEDURE — 124N000001 HC R&B MH

## 2023-05-16 PROCEDURE — 250N000013 HC RX MED GY IP 250 OP 250 PS 637: Performed by: PSYCHIATRY & NEUROLOGY

## 2023-05-16 RX ORDER — PROPRANOLOL HYDROCHLORIDE 10 MG/1
10 TABLET ORAL 3 TIMES DAILY
Status: DISCONTINUED | OUTPATIENT
Start: 2023-05-16 | End: 2023-05-18

## 2023-05-16 RX ADMIN — BENZTROPINE MESYLATE 1 MG: 1 TABLET ORAL at 08:10

## 2023-05-16 RX ADMIN — BENZTROPINE MESYLATE 1 MG: 1 TABLET ORAL at 15:57

## 2023-05-16 RX ADMIN — LORAZEPAM 2 MG: 1 TABLET ORAL at 20:26

## 2023-05-16 RX ADMIN — HALOPERIDOL 5 MG: 5 TABLET ORAL at 20:26

## 2023-05-16 RX ADMIN — LORAZEPAM 2 MG: 1 TABLET ORAL at 12:38

## 2023-05-16 RX ADMIN — ATOMOXETINE 40 MG: 40 CAPSULE ORAL at 08:10

## 2023-05-16 RX ADMIN — PROPRANOLOL HYDROCHLORIDE 10 MG: 10 TABLET ORAL at 20:26

## 2023-05-16 RX ADMIN — DIPHENHYDRAMINE HYDROCHLORIDE 50 MG: 50 CAPSULE ORAL at 12:38

## 2023-05-16 RX ADMIN — BENZTROPINE MESYLATE 1 MG: 1 TABLET ORAL at 20:26

## 2023-05-16 RX ADMIN — OLANZAPINE 10 MG: 10 TABLET, FILM COATED ORAL at 11:13

## 2023-05-16 RX ADMIN — NICOTINE POLACRILEX 4 MG: 4 GUM, CHEWING ORAL at 19:24

## 2023-05-16 RX ADMIN — DIPHENHYDRAMINE HYDROCHLORIDE 50 MG: 50 CAPSULE ORAL at 20:26

## 2023-05-16 RX ADMIN — HALOPERIDOL 5 MG: 5 TABLET ORAL at 12:38

## 2023-05-16 RX ADMIN — OLANZAPINE 10 MG: 10 TABLET, FILM COATED ORAL at 17:58

## 2023-05-16 RX ADMIN — HYDROXYZINE HYDROCHLORIDE 25 MG: 25 TABLET, FILM COATED ORAL at 17:58

## 2023-05-16 ASSESSMENT — ACTIVITIES OF DAILY LIVING (ADL)
ADLS_ACUITY_SCORE: 28

## 2023-05-16 NOTE — PLAN OF CARE
Problem: Adult Behavioral Health Plan of Care  Goal: Patient-Specific Goal (Individualization)  Description: Pt will be compliant with treatment team recommendations during hospitalization.   Pt will report adequate amounts of sleep on NOC shift (5-8 hours.)  Pt will participate in greater than 50% of daily groups.    5/15- wean as appropriate        5/16/2023 0012 by Magaly Maloney RN  Outcome: Progressing     Face to face shift report received from Marquis KRUEGER. Rounding completed, pt observed.     Pt appeared to sleep most of this shift.    Face to face report will be communicated to oncoming RN.    Magaly Maloney RN  5/16/2023  5:50 AM

## 2023-05-16 NOTE — PROGRESS NOTES
Spoke with pt this morning. She is tracking well and pleasant within our conversation. She states she is doing better today and requests [facility]'s number to see if she can try and get back in. Number provided to pt. Let pt know that we would start to look at other treatments once she gets her answer from [facility]. Pt is agreeable and [place] area.

## 2023-05-16 NOTE — PLAN OF CARE
Problem: Adult Behavioral Health Plan of Care  Goal: Patient-Specific Goal (Individualization)  Description: Pt will be compliant with treatment team recommendations during hospitalization.   Pt will report adequate amounts of sleep on NOC shift (5-8 hours.)  Pt will participate in greater than 50% of daily groups.    5/15- wean as appropriate    Pt in MHICU at the start of the shift. Pt states she is feeling better today and got a good night's sleep. Pt weaned to the open unit, ate in the lounge and attended group this morning. Pt became anxious around 1030 stating she was hearing voices. Pt states her voices are telling her that she has the ema of the demon and feels as if there is someone in her body. Pt given zyprexa 10mg and hydroxyzine 25mg at 1758.  Pt stated that the meds never decreased her anxiety or the voices. Pt asked for haldol. Pt given haldol 5mg, benadryl 50mg and ativan 2mg at 2026. Pt reported feeling calmer after about an hour. Pt asked multiple staff to walk with her, tried going to group and tried laying in her bed to distract herself from the voices. Pt religiously fixated asking multiple staff if they believe in god. Pt believes there is a chip in her brain and staff are listening to her thoughts.     Outcome: Progressing     Problem: Thought Process Alteration  Goal: Optimal Thought Clarity  Description: Patient will have clear linear conversations while on the unit.  Outcome: Progressing   Goal Outcome Evaluation:

## 2023-05-17 PROCEDURE — 124N000001 HC R&B MH

## 2023-05-17 PROCEDURE — 250N000013 HC RX MED GY IP 250 OP 250 PS 637: Performed by: STUDENT IN AN ORGANIZED HEALTH CARE EDUCATION/TRAINING PROGRAM

## 2023-05-17 PROCEDURE — 99221 1ST HOSP IP/OBS SF/LOW 40: CPT | Performed by: NURSE PRACTITIONER

## 2023-05-17 PROCEDURE — 250N000013 HC RX MED GY IP 250 OP 250 PS 637: Performed by: PSYCHIATRY & NEUROLOGY

## 2023-05-17 PROCEDURE — 99233 SBSQ HOSP IP/OBS HIGH 50: CPT | Performed by: STUDENT IN AN ORGANIZED HEALTH CARE EDUCATION/TRAINING PROGRAM

## 2023-05-17 RX ADMIN — PROPRANOLOL HYDROCHLORIDE 10 MG: 10 TABLET ORAL at 08:29

## 2023-05-17 RX ADMIN — BENZTROPINE MESYLATE 1 MG: 1 TABLET ORAL at 08:29

## 2023-05-17 RX ADMIN — MELATONIN 3 MG: at 21:04

## 2023-05-17 RX ADMIN — HYDROXYZINE HYDROCHLORIDE 25 MG: 25 TABLET, FILM COATED ORAL at 12:21

## 2023-05-17 RX ADMIN — PROPRANOLOL HYDROCHLORIDE 10 MG: 10 TABLET ORAL at 20:51

## 2023-05-17 RX ADMIN — ATOMOXETINE 40 MG: 40 CAPSULE ORAL at 08:29

## 2023-05-17 RX ADMIN — BENZTROPINE MESYLATE 1 MG: 1 TABLET ORAL at 20:51

## 2023-05-17 RX ADMIN — NICOTINE POLACRILEX 4 MG: 4 GUM, CHEWING ORAL at 21:03

## 2023-05-17 RX ADMIN — OLANZAPINE 10 MG: 10 TABLET, FILM COATED ORAL at 17:46

## 2023-05-17 RX ADMIN — PROPRANOLOL HYDROCHLORIDE 10 MG: 10 TABLET ORAL at 14:21

## 2023-05-17 RX ADMIN — OLANZAPINE 10 MG: 10 TABLET, FILM COATED ORAL at 09:03

## 2023-05-17 RX ADMIN — BENZTROPINE MESYLATE 1 MG: 1 TABLET ORAL at 14:22

## 2023-05-17 ASSESSMENT — ACTIVITIES OF DAILY LIVING (ADL)
ADLS_ACUITY_SCORE: 28
DRESS: SCRUBS (BEHAVIORAL HEALTH);INDEPENDENT
HYGIENE/GROOMING: INDEPENDENT
ADLS_ACUITY_SCORE: 28
LAUNDRY: UNABLE TO COMPLETE
HYGIENE/GROOMING: INDEPENDENT
ADLS_ACUITY_SCORE: 28
LAUNDRY: UNABLE TO COMPLETE
ORAL_HYGIENE: INDEPENDENT
ORAL_HYGIENE: INDEPENDENT
ADLS_ACUITY_SCORE: 28
ADLS_ACUITY_SCORE: 28
DRESS: SCRUBS (BEHAVIORAL HEALTH);INDEPENDENT

## 2023-05-17 NOTE — PLAN OF CARE
"  Problem: Adult Behavioral Health Plan of Care  Goal: Patient-Specific Goal (Individualization)  Description: Pt will be compliant with treatment team recommendations during hospitalization.   Pt will report adequate amounts of sleep on NOC shift (5-8 hours.)  Pt will participate in greater than 50% of daily groups.    5/15- wean as appropriate  Outcome: Progressing     Pt in her room at the start of the shift. Pt weaned to the open unit for breakfast. Pt reports voices throughout the shift. Pt requested a prn at 0904 and was given zyprexa 10mg. Pt complained again about voices and started asking staff if \"they believe\" at 1221. Pt given hydroxyzine 25mg. Pt paced and went in and out of her room throughout the shift.             Problem: Thought Process Alteration  Goal: Optimal Thought Clarity  Description: Patient will have clear linear conversations while on the unit.  Outcome: Progressing   Goal Outcome Evaluation:    Plan of Care Reviewed With: patient          Face to face end of shift report communicated to evening shift RN.     Danica Higuera RN  5/17/2023  2:34 PM              "
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
## 2023-05-17 NOTE — PROGRESS NOTES
"[facility] PSYCHIATRY  PROGRESS NOTE     SUBJECTIVE     Prior to interviewing the patient, I met with nursing and reviewed patient's clinical condition. We discussed clinical care both before and after the interview. I have reviewed the patient's clinical course by review of records including previous notes, labs, and vital signs.     Per nursing, the patient had the following behavioral events over the last 24-hours: The patient has been weaning out appropriately. She continues to have considerable psychosis.    On psychiatric interview, the patient was found in the lounge. She had recently attended group. She notes that she continues to have AH, multiple voices. In addition, she had ideas of reference and somatic passivity. She is concerned that \"she is being demonically attacked and that there are bad spirits.\" Discussed her Cheondoism beliefs offering support. She denies any CAH telling her to hurt others or herself.    The patient notes that she feels restless. She consents to starting propranolol after discussing B/R/SE, including sedation, dizziness, and falls.       The patient notes some constipation. Discussed having prn MiraLax.       MEDICATIONS   Scheduled Meds:    atomoxetine  40 mg Oral Daily     benztropine  1 mg Oral TID     [START ON 6/2/2023] haloperidol decanoate  200 mg Intramuscular Q28 Days     propranolol  10 mg Oral TID     PRN Meds:.acetaminophen, alum & mag hydroxide-simethicone, haloperidol **AND** LORazepam **AND** diphenhydrAMINE, haloperidol lactate **AND** LORazepam **AND** diphenhydrAMINE, hydrOXYzine, melatonin, nicotine, OLANZapine **OR** OLANZapine, polyethylene glycol     ALLERGIES   Allergies   Allergen Reactions     Seasonal Allergies         MENTAL STATUS EXAM   Vitals: /76   Pulse 92   Temp 98.4  F (36.9  C) (Tympanic)   Resp 14   Ht 1.676 m (5' 6\")   LMP 03/01/2023 (Exact Date)   SpO2 97%   BMI 37.12 kg/m      Appearance: Alert, oriented, dressed in hospital " "scrubs  Attitude: Cooperative   Eye Contact: Good  Mood: \"Alright\"  Affect: Full range   Speech: Normal rate and rhythm   Psychomotor Behavior: No TD or rigidity. Akathisia   Thought Process: Linear, some tangentiality   Associations: No loose associations   Thought Content: Denies SI, plan, or SIB. AH. Delusional thought regarding chip implants, ideas of reference, somatic passivity  Insight: Fair  Judgment: Fair  Oriented to: Person, place, and time  Attention Span and Concentration: Intact  Recent and Remote Memory: Intact  Language: English with appropriate syntax and vocabulary  Fund of Knowledge: Average   Muscle Strength and Tone: Grossly normal  Gait and Station: Grossly normal       LABS   No results found for this or any previous visit (from the past 24 hour(s)).      IMPRESSION     Ej Dunaway is a 22 year old female with a past psychiatric history notable for schizoaffective disorder versus substance induced psychosis, polysubstance dependence, ADHD. Multiple prior inpatient psychiatric hospitalizations. Prior SA. Multiple prior CD treatments.  Recently hospitalized here at [facility] and dismissed on 5/10/23.  Within 24 hours, was back in an emergency department and then dismissed and then re-presented again to an emergency department grossly psychotic and disorganized.  Was supposed to be attending CD treatment at [facility]. Patient was subsequently transferred and accepted for inpatient psychiatric hospitalization at [facility] for further safety and further stabilization.    Today: The patient is less psychotic and disorganized than admission, receiving multiple prn agents, including Haldol/Ativan/Benadryl. The patient has numerous first rank symptoms with her trying Clozapine in the past. Exploring best treatment option at this point with Clozapine being considered again. For now, seeing if Haldol prn " is sufficient, thus either adding additional Haldol to her current AMBRIZ either through oral add on or higher dose. Will order Propranolol now given considerable akathisia.        DIAGNOSES     #schizoaffective disorder, bipolar type, multiple episodes, in acute episode  #ADHD  #Opioid Use Disorder  #Stimulant Use Disorder       PLAN     Location: Unit 5  Legal Status: Orders Placed This Encounter      Voluntary    Safety Assessment:    Behavioral Orders   Procedures     Code 1 - Restrict to Unit     Routine Programming     As clinically indicated     Status 15     Every 15 minutes.      PTA medications held:   -none     PTA medications continued/changed:   -haldol decanoate 200 mg AMBRIZ every 28 days, next injection on 6/2   -haldol 5 mg PRN switched to Haldol/Benadryl/Ativan prn   -cogentin 0.5 mg TID -> increases to 1.0 mg TID to address akathisia      New medications initiated:   -propranolol 10 mg TID     Today's Changes:    - Start propranolol 10 mg TID    Programming: Patient will be treated in a therapeutic milieu with appropriate individual and group therapies. Education will be provided on diagnoses, medications, and treatments.     Medical diagnoses:  Per medicine    Consult: None  Tests: None    Anticipated LOS: > 7 days   Disposition: CD treatment versus IRTS versus home with outpatient services       TREATMENT TEAM CARE PLAN     Progress: Continued symptoms.    Continued Stay Criteria/Rationale: Continued symptoms without sufficient improvement/resolution.    Medical/Physical: See above.    Precautions: See above.     Plan: Continue inpatient care with unit support and medication management.    Rationale for change in precautions or plan: NA due to no change.    Participants: Meng Gauthier, DO, Nursing, SW, OT.    The patient's care was discussed with the treatment team and chart notes were reviewed.       ATTESTATION      Dr. Meng Gauthier  Psychiatrist

## 2023-05-17 NOTE — PLAN OF CARE
Problem: Adult Behavioral Health Plan of Care  Goal: Patient-Specific Goal (Individualization)  Description: Pt will be compliant with treatment team recommendations during hospitalization.   Pt will report adequate amounts of sleep on NOC shift (5-8 hours.)  Pt will participate in greater than 50% of daily groups.    5/15- wean as appropriate        Outcome: Progressing     Problem: Thought Process Alteration  Goal: Optimal Thought Clarity  Description: Patient will have clear linear conversations while on the unit.  Outcome: Progressing     Face to face shift report received from Yusra RN. Rounding completed, pt observed.     Pt appeared to sleep most of this shift.     Face to face report will be communicated to oncoming RN.    Magaly Maloney RN  5/17/2023  5:58 AM

## 2023-05-18 PROCEDURE — 124N000001 HC R&B MH

## 2023-05-18 PROCEDURE — 250N000013 HC RX MED GY IP 250 OP 250 PS 637: Performed by: PSYCHIATRY & NEUROLOGY

## 2023-05-18 PROCEDURE — 99233 SBSQ HOSP IP/OBS HIGH 50: CPT | Performed by: STUDENT IN AN ORGANIZED HEALTH CARE EDUCATION/TRAINING PROGRAM

## 2023-05-18 PROCEDURE — 250N000013 HC RX MED GY IP 250 OP 250 PS 637: Performed by: STUDENT IN AN ORGANIZED HEALTH CARE EDUCATION/TRAINING PROGRAM

## 2023-05-18 RX ORDER — HALOPERIDOL 5 MG/1
5 TABLET ORAL AT BEDTIME
Status: DISCONTINUED | OUTPATIENT
Start: 2023-05-18 | End: 2023-05-19

## 2023-05-18 RX ORDER — PROPRANOLOL HYDROCHLORIDE 20 MG/1
20 TABLET ORAL 2 TIMES DAILY
Status: DISCONTINUED | OUTPATIENT
Start: 2023-05-18 | End: 2023-06-02

## 2023-05-18 RX ADMIN — PROPRANOLOL HYDROCHLORIDE 20 MG: 20 TABLET ORAL at 20:20

## 2023-05-18 RX ADMIN — LORAZEPAM 2 MG: 1 TABLET ORAL at 09:34

## 2023-05-18 RX ADMIN — NICOTINE POLACRILEX 4 MG: 4 GUM, CHEWING ORAL at 17:28

## 2023-05-18 RX ADMIN — ACETAMINOPHEN 325MG 650 MG: 325 TABLET ORAL at 00:14

## 2023-05-18 RX ADMIN — BENZTROPINE MESYLATE 1 MG: 1 TABLET ORAL at 08:21

## 2023-05-18 RX ADMIN — ATOMOXETINE 40 MG: 40 CAPSULE ORAL at 08:21

## 2023-05-18 RX ADMIN — PROPRANOLOL HYDROCHLORIDE 10 MG: 10 TABLET ORAL at 08:21

## 2023-05-18 RX ADMIN — HALOPERIDOL 5 MG: 5 TABLET ORAL at 09:35

## 2023-05-18 RX ADMIN — HALOPERIDOL 5 MG: 5 TABLET ORAL at 18:03

## 2023-05-18 RX ADMIN — DIPHENHYDRAMINE HYDROCHLORIDE 50 MG: 50 CAPSULE ORAL at 09:34

## 2023-05-18 RX ADMIN — DIPHENHYDRAMINE HYDROCHLORIDE 50 MG: 50 CAPSULE ORAL at 18:03

## 2023-05-18 RX ADMIN — HALOPERIDOL 5 MG: 5 TABLET ORAL at 20:19

## 2023-05-18 RX ADMIN — LORAZEPAM 2 MG: 1 TABLET ORAL at 18:03

## 2023-05-18 RX ADMIN — BENZTROPINE MESYLATE 1 MG: 1 TABLET ORAL at 20:20

## 2023-05-18 RX ADMIN — OLANZAPINE 10 MG: 10 TABLET, FILM COATED ORAL at 16:32

## 2023-05-18 RX ADMIN — BENZTROPINE MESYLATE 1 MG: 1 TABLET ORAL at 14:40

## 2023-05-18 ASSESSMENT — ACTIVITIES OF DAILY LIVING (ADL)
ADLS_ACUITY_SCORE: 28
DRESS: INDEPENDENT
ADLS_ACUITY_SCORE: 28
HYGIENE/GROOMING: INDEPENDENT
LAUNDRY: UNABLE TO COMPLETE
ORAL_HYGIENE: INDEPENDENT

## 2023-05-18 NOTE — PLAN OF CARE
"  Problem: Adult Behavioral Health Plan of Care  Goal: Patient-Specific Goal (Individualization)  Description: Pt will be compliant with treatment team recommendations during hospitalization.   Pt will report adequate amounts of sleep on NOC shift (5-8 hours.)  Pt will participate in greater than 50% of daily groups.    5/15- wean as appropriate    Outcome: Progressing   Goal Outcome Evaluation:    Plan of Care Reviewed With: patient      Patient is Alert, able to make needs known. VSS, afebrile. Assessment and vitals as charted. Denies pain.   Patient has been weaning out from MHICU throughout the shift. She does endorse auditory hallucinations, anxiety. States, \"I hear dark stuff. That I am the ema of the beast.\" Religiously preoccupied. Speech slightly pressured. She did request and receive PRN Zyprexa 10 mg PO at 1746. Slight improvement per patient.   She denies SI, SIB, or HI. Steady gait. No falls.   Also received PRN melatonin for sleep.     Face to face report given with opportunity to observe patient.    Report given to PATI Mckenzie.     Olimpia Carey RN   5/17/2023  11:31 PM      "

## 2023-05-18 NOTE — PROGRESS NOTES
"[facility] PSYCHIATRY  PROGRESS NOTE     SUBJECTIVE     Prior to interviewing the patient, I met with nursing and reviewed patient's clinical condition. We discussed clinical care both before and after the interview. I have reviewed the patient's clinical course by review of records including previous notes, labs, and vital signs.     Per nursing, the patient had the following behavioral events over the last 24-hours: The patient has been weaning out appropriately. She continues to be psychotic. Receiving multiple prn agentsl    On psychiatric interview, the patient was found in the lounge. She notes that she is doing better today. She notes though that she would like a prn Haldol. She notes that this medication is helping. Discussed my recommendation to consider Clozapine given her psychotic symptoms still being prominent. She denies noting though that she will take more Haldol, being scheduled. She consents to using after discussing B/R/SE, including sedation, metabolic, and EPSE, including TD. The patient consents to increasing her propranolol to further address her akathisia with her being aware of the risk of hypotension and dizziness.    She denies any mood disturbance. No physical concerns today.       MEDICATIONS   Scheduled Meds:    atomoxetine  40 mg Oral Daily     benztropine  1 mg Oral TID     [START ON 6/2/2023] haloperidol decanoate  200 mg Intramuscular Q28 Days     propranolol  10 mg Oral TID     PRN Meds:.acetaminophen, alum & mag hydroxide-simethicone, sore throat, haloperidol **AND** LORazepam **AND** diphenhydrAMINE, haloperidol lactate **AND** LORazepam **AND** diphenhydrAMINE, hydrOXYzine, melatonin, nicotine, OLANZapine **OR** OLANZapine, polyethylene glycol     ALLERGIES   Allergies   Allergen Reactions     Seasonal Allergies         MENTAL STATUS EXAM   Vitals: /83   Pulse 97   Temp 97.1  F (36.2  C) (Tympanic)   Resp 14   Ht 1.676 m (5' 6\")   SpO2 94%   BMI 37.12 kg/m  " "    Appearance: Alert, oriented, dressed in hospital scrubs  Attitude: Cooperative   Eye Contact: Good  Mood: \"Fine\"  Affect: Full range   Speech: Normal rate and rhythm   Psychomotor Behavior: No TD or rigidity. Less akathisia   Thought Process: Linear, some tangentiality   Associations: No loose associations   Thought Content: Denies SI, plan, or SIB. AH. Delusional thought regarding chip implants, ideas of reference, somatic passivity, less today  Insight: Limited  Judgment: Fair  Oriented to: Person, place, and time  Attention Span and Concentration: Intact  Recent and Remote Memory: Intact  Language: English with appropriate syntax and vocabulary  Fund of Knowledge: Average   Muscle Strength and Tone: Grossly normal  Gait and Station: Grossly normal       LABS   No results found for this or any previous visit (from the past 24 hour(s)).      CARMELLA Dunaway is a 22 year old female with a past psychiatric history notable for schizoaffective disorder versus substance induced psychosis, polysubstance dependence, ADHD. Multiple prior inpatient psychiatric hospitalizations. Prior SA. Multiple prior CD treatments.  Recently hospitalized here at [facility] and dismissed on 5/10/23.  Within 24 hours, was back in an emergency department and then dismissed and then re-presented again to an emergency department grossly psychotic and disorganized.  Was supposed to be attending CD treatment at [facility]. Patient was subsequently transferred and accepted for inpatient psychiatric hospitalization at [facility] for further safety and further stabilization.    Today: The patient is less psychotic and disorganized than admission, receiving multiple prn agents, including Haldol/Ativan/Benadryl. The patient has numerous first rank symptoms with her trying Clozapine in the past. Additional Haldol has helped some with it " being scheduled at night. Will continue to consider Clozapine with her not being interested in this medication at this point.       DIAGNOSES     #schizoaffective disorder, bipolar type, multiple episodes, in acute episode  #ADHD  #Opioid Use Disorder  #Stimulant Use Disorder       PLAN     Location: Unit 5  Legal Status: Orders Placed This Encounter      Voluntary    Safety Assessment:    Behavioral Orders   Procedures     Code 1 - Restrict to Unit     Routine Programming     As clinically indicated     Status 15     Every 15 minutes.      PTA medications held:   -none     PTA medications continued/changed:   -haldol decanoate 200 mg AMBRIZ every 28 days, next injection on 6/2   -haldol 5 mg PRN switched to Haldol/Benadryl/Ativan prn   -cogentin 0.5 mg TID -> increased to 1.0 mg TID to address akathisia      New medications initiated:   -haldol 5 mg at bedtime as of 5/18  -propranolol 10 mg TID to address akathisia -> 20 mg BID on 5/18     Today's Changes:    - Start Haldol 5 mg at bedtime   - Increase propranolol to 20 mg BID     Future considerations: Clozapine    Programming: Patient will be treated in a therapeutic milieu with appropriate individual and group therapies. Education will be provided on diagnoses, medications, and treatments.     Medical diagnoses:  Per medicine    Consult: None  Tests: None    Anticipated LOS: > 7 days   Disposition: CD treatment versus IRTS        ATTESTATION      Dr. Meng Gauthier  Psychiatrist

## 2023-05-18 NOTE — PROGRESS NOTES
"[facility] PSYCHIATRY  PROGRESS NOTE     SUBJECTIVE     Prior to interviewing the patient, I met with nursing and reviewed patient's clinical condition. We discussed clinical care both before and after the interview. I have reviewed the patient's clinical course by review of records including previous notes, labs, and vital signs.     Per nursing, the patient had the following behavioral events over the last 24-hours: The patient has been weaning out appropriately. She continues to be psychotic. Receiving multiple prn agentsl    On psychiatric interview, the patient was found in the lounge. She notes that she is starting to do better. She notes that \"Hood made her better.\" She notes that the voices are less intense and negative. Discussed role of medication with the patient denying, noting that \"she might not need anymore medications, as miracles do happen.\"    The patient denies any problems with propranolol. She denies any dizziness. She notes feeling calm.     Encouraged the patient to consider taking more scheduled Haldol or adding Clozapine with her denying, saying \"this is it!\"    She denies any physical concerns, passing gas in front of me.       MEDICATIONS   Scheduled Meds:    atomoxetine  40 mg Oral Daily     benztropine  1 mg Oral TID     [START ON 6/2/2023] haloperidol decanoate  200 mg Intramuscular Q28 Days     propranolol  10 mg Oral TID     PRN Meds:.acetaminophen, alum & mag hydroxide-simethicone, sore throat, haloperidol **AND** LORazepam **AND** diphenhydrAMINE, haloperidol lactate **AND** LORazepam **AND** diphenhydrAMINE, hydrOXYzine, melatonin, nicotine, OLANZapine **OR** OLANZapine, polyethylene glycol     ALLERGIES   Allergies   Allergen Reactions     Seasonal Allergies         MENTAL STATUS EXAM   Vitals: /67   Pulse 89   Temp 98.9  F (37.2  C) (Tympanic)   Resp 16   Ht 1.676 m (5' 6\")   LMP 03/01/2023 (Exact Date)   SpO2 98%   BMI 37.12 kg/m      Appearance: Alert, " "oriented, dressed in hospital scrubs  Attitude: Cooperative   Eye Contact: Good  Mood: \"Alright\"  Affect: Full range   Speech: Normal rate and rhythm   Psychomotor Behavior: No TD or rigidity. Less akathisia   Thought Process: Linear, some tangentiality   Associations: No loose associations   Thought Content: Denies SI, plan, or SIB. AH. Delusional thought regarding chip implants, ideas of reference, somatic passivity, less today  Insight: Limited  Judgment: Fair  Oriented to: Person, place, and time  Attention Span and Concentration: Intact  Recent and Remote Memory: Intact  Language: English with appropriate syntax and vocabulary  Fund of Knowledge: Average   Muscle Strength and Tone: Grossly normal  Gait and Station: Grossly normal       LABS   No results found for this or any previous visit (from the past 24 hour(s)).      CARMELLA Dunaway is a 22 year old female with a past psychiatric history notable for schizoaffective disorder versus substance induced psychosis, polysubstance dependence, ADHD. Multiple prior inpatient psychiatric hospitalizations. Prior SA. Multiple prior CD treatments.  Recently hospitalized here at [facility] and dismissed on 5/10/23.  Within 24 hours, was back in an emergency department and then dismissed and then re-presented again to an emergency department grossly psychotic and disorganized.  Was supposed to be attending CD treatment at [facility]. Patient was subsequently transferred and accepted for inpatient psychiatric hospitalization at [facility] for further safety and further stabilization.    Today: The patient is less psychotic and disorganized than admission, receiving multiple prn agents, including Haldol/Ativan/Benadryl. The patient has numerous first rank symptoms with her trying Clozapine in the past. Additional Haldol has helped with the patient not being willing " to increase her AMBRIZ or add oral dosing. Also, discussed Clozapine with her not being interested now. Will continue to recommend scheduling as using prn agents only likely will not be sufficient.       DIAGNOSES     #schizoaffective disorder, bipolar type, multiple episodes, in acute episode  #ADHD  #Opioid Use Disorder  #Stimulant Use Disorder       PLAN     Location: Unit 5  Legal Status: Orders Placed This Encounter      Voluntary    Safety Assessment:    Behavioral Orders   Procedures     Code 1 - Restrict to Unit     Routine Programming     As clinically indicated     Status 15     Every 15 minutes.      PTA medications held:   -none     PTA medications continued/changed:   -haldol decanoate 200 mg AMBRIZ every 28 days, next injection on 6/2   -haldol 5 mg PRN switched to Haldol/Benadryl/Ativan prn   -cogentin 0.5 mg TID -> increased to 1.0 mg TID to address akathisia      New medications initiated:   -propranolol 10 mg TID to address akathisia      Today's Changes:    - None    Future considerations: Haldol 5 mg at bedtime with increase in AMBRIZ to 250 mg every 28 days vs. Clozapine     Programming: Patient will be treated in a therapeutic milieu with appropriate individual and group therapies. Education will be provided on diagnoses, medications, and treatments.     Medical diagnoses:  Per medicine    Consult: None  Tests: None    Anticipated LOS: > 7 days   Disposition: CD treatment versus IRTS versus home with outpatient services       ATTESTATION      Dr. Meng Gauthier  Psychiatrist

## 2023-05-18 NOTE — PLAN OF CARE
"Face to face end of shift report received from Magaly ALBARRAN RN. Rounding completed. Patient observed in bed, awake.     Pt has been cooperative this shift. She is demanding of needs and frequently seeks out staff. Her speech is tangential and focuses on [employer]. Accusing this writer of not believing in [place] and lying to everyone. Informed pt that this writer does not wish to discuss her [employer] beliefs to her which she did accept. She denied any SI/HI and AH/VH. Questioned again whether she had any AH- she stated \"They are just loud thoughts.\" Offered her Zyprexa and she declined. Pt weaned after breakfast. She came to the nurse's station requesting \"Haldol\". Informed her that she would need to take the Ativan and Benadryl with it. She declined stating \"I'll take the Zyprexa instead\". As this writer was speaking with charge nurse pt came back to the nurse's station \"I need that med right now my voices are really loud.\" Pt raised her voice \"Now\". Pt brought Zyprexa \"I don't want that anymore. I want Haldol\". Administered Haldol 5 mg, Benadryl 50 mg, and Ativan 2 mg at 0934. At 1010 pt back to the nurse's station requesting Zyprexa. Informed her that she would not be receiving anymore PRNs for quite some time and needs to allow the other medications to work. Provided pt with headphones. Pt much calmer about an 1.5 hr after PRN administration. She did not request any further PRNs.. She laid down after lunch and remained in her room for the remainder of the shift. Steady gait-no falls. She is able to make her needs known. Frequent rounding.     Problem: Adult Behavioral Health Plan of Care  Goal: Patient-Specific Goal (Individualization)  Description: Pt will be compliant with treatment team recommendations during hospitalization.   Pt will report adequate amounts of sleep on NOC shift (5-8 hours.)  Pt will participate in greater than 50% of daily groups.    5/18- wean as appropriate    Outcome: Not Progressing   "   Problem: Thought Process Alteration  Goal: Optimal Thought Clarity  Description: Patient will have clear linear conversations while on the unit.  Outcome: Not Progressing       Regine Gardner RN  5/18/2023  10:04 AM

## 2023-05-18 NOTE — PLAN OF CARE
"  Problem: Adult Behavioral Health Plan of Care  Goal: Patient-Specific Goal (Individualization)  Description: Pt will be compliant with treatment team recommendations during hospitalization.   Pt will report adequate amounts of sleep on NOC shift (5-8 hours.)  Pt will participate in greater than 50% of daily groups.    5/15- wean as appropriate    Pt in MHICU at the start of the shift. Pt requested to wean to the open unit. Pt socialized with peers, walked in the halls. Pt requested zyprexa 10mg at 1632 for voices. Pt then asked for \"my B52\" within an hour of the zyprexa. Pt encouraged to wait a little while to see if the zyprexa would start working, pt yelled \"its the zyprexa that is causing the voices\". Pt told to talk to the provider about this. Pt came back to the nurses station at 1803 stating she needs her B52. Pt walking in place and appeared physically restless. Pt given haldol 5mg, benadryl 50mg and ativan 2mg at 1803. Pt had a visitor at 1745, pt appeared calm, laughing and socializing. Pt had no akathesia symptoms while visiting.     Outcome: Progressing     Problem: Thought Process Alteration  Goal: Optimal Thought Clarity  Description: Patient will have clear linear conversations while on the unit.  Outcome: Progressing   Goal Outcome Evaluation:         Face to face end of shift report communicated to night shift RN.     Danica Higuera RN  5/18/2023  3:41 PM                    "

## 2023-05-18 NOTE — PLAN OF CARE
Problem: Adult Behavioral Health Plan of Care  Goal: Patient-Specific Goal (Individualization)  Description: Pt will be compliant with treatment team recommendations during hospitalization.   Pt will report adequate amounts of sleep on NOC shift (5-8 hours.)  Pt will participate in greater than 50% of daily groups.    5/15- wean as appropriate        Outcome: Progressing     Problem: Thought Process Alteration  Goal: Optimal Thought Clarity  Description: Patient will have clear linear conversations while on the unit.  Outcome: Progressing     Face to face shift report received from Lauren RN. Rounding completed, pt observed.     Pt awake a the beginning of this shift. Pt appeared to be sleeping after 0100 rounds.     Face to face report will be communicated to oncoming RN.    Magaly Maloney RN  5/18/2023  6:00 AM

## 2023-05-19 PROCEDURE — 250N000013 HC RX MED GY IP 250 OP 250 PS 637: Performed by: PSYCHIATRY & NEUROLOGY

## 2023-05-19 PROCEDURE — 250N000013 HC RX MED GY IP 250 OP 250 PS 637: Performed by: STUDENT IN AN ORGANIZED HEALTH CARE EDUCATION/TRAINING PROGRAM

## 2023-05-19 PROCEDURE — 124N000001 HC R&B MH

## 2023-05-19 PROCEDURE — 99232 SBSQ HOSP IP/OBS MODERATE 35: CPT | Mod: GT | Performed by: STUDENT IN AN ORGANIZED HEALTH CARE EDUCATION/TRAINING PROGRAM

## 2023-05-19 PROCEDURE — 250N000013 HC RX MED GY IP 250 OP 250 PS 637: Performed by: NURSE PRACTITIONER

## 2023-05-19 RX ORDER — HALOPERIDOL 5 MG/1
5 TABLET ORAL 2 TIMES DAILY
Status: DISCONTINUED | OUTPATIENT
Start: 2023-05-19 | End: 2023-05-21

## 2023-05-19 RX ADMIN — NICOTINE POLACRILEX 4 MG: 4 GUM, CHEWING ORAL at 13:15

## 2023-05-19 RX ADMIN — MELATONIN 3 MG: at 20:52

## 2023-05-19 RX ADMIN — PROPRANOLOL HYDROCHLORIDE 20 MG: 20 TABLET ORAL at 08:46

## 2023-05-19 RX ADMIN — NICOTINE POLACRILEX 4 MG: 4 GUM, CHEWING ORAL at 14:50

## 2023-05-19 RX ADMIN — BENZTROPINE MESYLATE 1 MG: 1 TABLET ORAL at 20:52

## 2023-05-19 RX ADMIN — NICOTINE POLACRILEX 4 MG: 4 GUM, CHEWING ORAL at 08:45

## 2023-05-19 RX ADMIN — OLANZAPINE 10 MG: 10 TABLET, FILM COATED ORAL at 15:39

## 2023-05-19 RX ADMIN — HYDROXYZINE HYDROCHLORIDE 25 MG: 25 TABLET, FILM COATED ORAL at 16:14

## 2023-05-19 RX ADMIN — ACETAMINOPHEN 325MG 650 MG: 325 TABLET ORAL at 08:45

## 2023-05-19 RX ADMIN — PROPRANOLOL HYDROCHLORIDE 20 MG: 20 TABLET ORAL at 20:52

## 2023-05-19 RX ADMIN — OLANZAPINE 10 MG: 10 TABLET, FILM COATED ORAL at 21:37

## 2023-05-19 RX ADMIN — LORAZEPAM 2 MG: 1 TABLET ORAL at 08:46

## 2023-05-19 RX ADMIN — HALOPERIDOL 5 MG: 5 TABLET ORAL at 12:30

## 2023-05-19 RX ADMIN — ATOMOXETINE 40 MG: 40 CAPSULE ORAL at 08:45

## 2023-05-19 RX ADMIN — ACETAMINOPHEN 325MG 650 MG: 325 TABLET ORAL at 16:13

## 2023-05-19 RX ADMIN — NICOTINE POLACRILEX 4 MG: 4 GUM, CHEWING ORAL at 17:19

## 2023-05-19 RX ADMIN — HALOPERIDOL 5 MG: 5 TABLET ORAL at 08:46

## 2023-05-19 RX ADMIN — BENZTROPINE MESYLATE 1 MG: 1 TABLET ORAL at 12:31

## 2023-05-19 RX ADMIN — NICOTINE POLACRILEX 4 MG: 4 GUM, CHEWING ORAL at 10:34

## 2023-05-19 RX ADMIN — BENZOCAINE 6 MG-MENTHOL 10 MG LOZENGES 1 LOZENGE: at 18:39

## 2023-05-19 RX ADMIN — BENZTROPINE MESYLATE 1 MG: 1 TABLET ORAL at 08:45

## 2023-05-19 ASSESSMENT — ACTIVITIES OF DAILY LIVING (ADL)
ADLS_ACUITY_SCORE: 28
ADLS_ACUITY_SCORE: 28
HYGIENE/GROOMING: INDEPENDENT
ADLS_ACUITY_SCORE: 28
DRESS: SCRUBS (BEHAVIORAL HEALTH)

## 2023-05-19 NOTE — PLAN OF CARE
Problem: Thought Process Alteration  Goal: Optimal Thought Clarity  Description: Patient will have clear linear conversations while on the unit.  Outcome: Progressing     Problem: Adult Behavioral Health Plan of Care  Goal: Patient-Specific Goal (Individualization)  Description: Pt will be compliant with treatment team recommendations during hospitalization.   Pt will report adequate amounts of sleep on NOC shift (5-8 hours.)  Pt will participate in greater than 50% of daily groups.    5/15- wean as appropriate  Outcome: Progressing     Face to Face report received from Yusra RN.  Rounding completed. Patient observed in bed with eyes closed appearing to sleep for __ hours with even & unlabored respirations noted.  Patient was free from falls and self-harm this shift.  15 minute and PRN checks all night. No complaints offered. Patient is able to make needs known.  Will continue to monitor.    Face to face end [facility] shift [facility]     Abigail Lopez, RN  5/19/2023  2:25 AM

## 2023-05-19 NOTE — PLAN OF CARE
"  Problem: Adult Behavioral Health Plan of Care  Goal: Patient-Specific Goal (Individualization)  Description: Pt will be compliant with treatment team recommendations during hospitalization.   Pt will report adequate amounts of sleep on NOC shift (5-8 hours.)  Pt will participate in greater than 50% of daily groups.      Outcome: Progressing  Note: 07:35: Received end of shfit report from PATI Hayes. Pt requesting medications upon arrival---     Frequent staff seeking behaviors, irritable    08:50: PRN lorazepam 2 mg, PRN haldol 5 mg, PRN diphenhydramine given for endorsing auditory hallucinations, \"they telling me I have chip implanted\" \"I need haldol\"     10:35: Effective prn relief noted; moved out onto open unit    14;55:  into visit pt; states \"he helped me; he ridded me of all my demons\" \"I feel better.\" Visits with peers appropriately. Works on art projects. Participates in AM groups.     Face to face end of shift report communicated to on-coming PM staff.     Dora Sotelo RN  5/19/2023  2:55 PM      Problem: Thought Process Alteration  Goal: Optimal Thought Clarity  Description: Patient will have clear linear conversations while on the unit.  Outcome: Progressing   Goal Outcome Evaluation:                        "

## 2023-05-19 NOTE — PROGRESS NOTES
"[facility] PSYCHIATRY  PROGRESS NOTE     SUBJECTIVE     Prior to interviewing the patient, I met with nursing and reviewed patient's clinical condition. We discussed clinical care both before and after the interview. I have reviewed the patient's clinical course by review of records including previous notes, labs, and vital signs.     Per nursing, the patient had the following behavioral events over the last 24-hours: The patient has been weaning out appropriately. Psychosis lessening. Receiving multiple prn agents.     On psychiatric interview, the patient was found in her room. She notes that she is doing alright today. She notes that she is not concerned about the chip implanted in her head. She notes that her auditory hallucinations are less. She notes that the prn Haldol has been helping. The patient would like to schedule it twice a day with her consenting after discussing B/R/SE, including EPSE, namely tardive dyskinesia and akathisia. The patient denies any muscle rigidity. She denies any tremor. She notes that her akathisia is well controlled. She denies any problems with her increase in propranolol.       MEDICATIONS   Scheduled Meds:    atomoxetine  40 mg Oral Daily     benztropine  1 mg Oral TID     haloperidol  5 mg Oral At Bedtime     [START ON 6/2/2023] haloperidol decanoate  200 mg Intramuscular Q28 Days     propranolol  20 mg Oral BID     PRN Meds:.acetaminophen, alum & mag hydroxide-simethicone, sore throat, haloperidol **AND** LORazepam **AND** diphenhydrAMINE, haloperidol lactate **AND** LORazepam **AND** diphenhydrAMINE, hydrOXYzine, melatonin, nicotine, OLANZapine **OR** OLANZapine, polyethylene glycol     ALLERGIES   Allergies   Allergen Reactions     Seasonal Allergies         MENTAL STATUS EXAM   Vitals: /55 (BP Location: Left arm)   Pulse 77   Temp 97.6  F (36.4  C) (Tympanic)   Resp 16   Ht 1.676 m (5' 6\")   SpO2 95%   BMI 37.12 kg/m      Appearance: Alert, oriented, " "dressed in hospital scrubs  Attitude: Cooperative   Eye Contact: Good  Mood: \"Fine\"  Affect: Full range   Speech: Normal rate and rhythm   Psychomotor Behavior: No TD or rigidity. Less akathisia   Thought Process: Linear, some tangentiality   Associations: No loose associations   Thought Content: Denies SI, plan, or SIB. AH. Delusional thought regarding chip implants not present today; ideas of reference, somatic passivity improved   Insight: Limited  Judgment: Fair  Oriented to: Person, place, and time  Attention Span and Concentration: Intact  Recent and Remote Memory: Intact  Language: English with appropriate syntax and vocabulary  Fund of Knowledge: Average   Muscle Strength and Tone: Grossly normal  Gait and Station: Grossly normal       LABS   No results found for this or any previous visit (from the past 24 hour(s)).      CARMELLA Dunaway is a 22 year old female with a past psychiatric history notable for schizoaffective disorder versus substance induced psychosis, polysubstance dependence, ADHD. Multiple prior inpatient psychiatric hospitalizations. Prior SA. Multiple prior CD treatments.  Recently hospitalized here at [facility] and dismissed on 5/10/23.  Within 24 hours, was back in an emergency department and then dismissed and then re-presented again to an emergency department grossly psychotic and disorganized.  Was supposed to be attending CD treatment at [facility]. Patient was subsequently transferred and accepted for inpatient psychiatric hospitalization at [facility] for further safety and further stabilization.    Today: The patient is improving patient is less psychotic and disorganized than admission. Recommended Clozapine with her preferring to increase her Haldol with this being done. Will need to convert her AMBRIZ. Monitoring for akathisia.        DIAGNOSES     #schizoaffective " disorder, bipolar type, multiple episodes, in acute episode  #ADHD  #Opioid Use Disorder  #Stimulant Use Disorder       PLAN     Location: Unit 5  Legal Status: Orders Placed This Encounter      Voluntary    Safety Assessment:    Behavioral Orders   Procedures     Code 1 - Restrict to Unit     Routine Programming     As clinically indicated     Status 15     Every 15 minutes.      PTA medications held:   -none     PTA medications continued/changed:   -haldol decanoate 200 mg AMBRIZ every 28 days, next injection on 6/2   -haldol 5 mg PRN switched to Haldol/Benadryl/Ativan prn   -cogentin 0.5 mg TID -> increased to 1.0 mg TID to address akathisia      New medications initiated:   -haldol 5 mg at bedtime as of 5/18 -> 5 mg BID as of 5/19  -propranolol 10 mg TID to address akathisia -> 20 mg BID on 5/18     Today's Changes:    - Increase Haldol to 5 mg BID     Future considerations: Increase AMBRIZ to 300 mg every 28 days and stop oral Haldol if changes with Haldol are sufficient     Programming: Patient will be treated in a therapeutic milieu with appropriate individual and group therapies. Education will be provided on diagnoses, medications, and treatments.     Medical diagnoses:  Per medicine    Consult: None  Tests: None    Anticipated LOS: > 7 days   Disposition: CD treatment versus IRTS        ATTESTATION      Dr. Meng Gauthier  Psychiatrist    Video Visit: Patient has given verbal consent for video visit?: Yes  Type of Service: video visit for mental health treatment  Reason for Video Visit: COVID[place]  Originating Site (patient location): [facility]  Distant Site (provider location): Remote Location  Mode of Communication: Video Conference via [employer]  Time of Service: Date: 05/19/2023 Start: 1000 end: 1015

## 2023-05-19 NOTE — PROGRESS NOTES
NIDIA HIGGINBOTHAM CLOEY S.  Patient requested visit with  for purpose of prayer for deliverance. Patient asked to break bonds with old relationships. Ej wants to go to [facility] in [place].  We closed our time in prayer.

## 2023-05-19 NOTE — PLAN OF CARE
Problem: Adult Behavioral Health Plan of Care  Goal: Patient-Specific Goal (Individualization)  Description: Pt will be compliant with treatment team recommendations during hospitalization.   Pt will report adequate amounts of sleep on NOC shift (5-8 hours.)  Pt will participate in greater than 50% of daily groups.    Outcome: Progressing  A&O, pain 3/10 described as HA-tylenol rec'd.   Start of shift pt requests Zyprexa 10 mg stating the voices are loud. Pt observed walking with hands over ears. Affect flat , tense gives short answers to questions.   States her voices are telling her she has a chip in her head. Pt then states I'm a child of God so I know it's not true. Pt is marching in place, shifting from side to side while conversing. Pt verbalizes she is unable to be still and cannot stop moving. Prn Atarax 25 mg rec'd.   Pt sleeps approximately two hours and wakes for snack. Pt states she will take her bedtime medications with exception of Haldol-- states she already took too much Haldol today.   Requests Zyprexa 10 mg and sound machine at bedtime due to voices and thoughts.   Pt frequently seeks medications and staff.   Face to face end of shift report communicated to oncoming shift.     Rola Johnston RN  5/19/2023  9:34 PM             Problem: Thought Process Alteration  Goal: Optimal Thought Clarity  Description: Patient will have clear linear conversations while on the unit.  Outcome: Progressing   Goal Outcome Evaluation:         Face to face end of shift report communicated to oncoming shift.     Rola Johnston RN  5/19/2023  5:30 PM

## 2023-05-20 PROCEDURE — 124N000001 HC R&B MH

## 2023-05-20 PROCEDURE — 99232 SBSQ HOSP IP/OBS MODERATE 35: CPT | Mod: GT | Performed by: PSYCHIATRY & NEUROLOGY

## 2023-05-20 PROCEDURE — 250N000013 HC RX MED GY IP 250 OP 250 PS 637: Performed by: NURSE PRACTITIONER

## 2023-05-20 PROCEDURE — 250N000013 HC RX MED GY IP 250 OP 250 PS 637: Performed by: PSYCHIATRY & NEUROLOGY

## 2023-05-20 PROCEDURE — 250N000013 HC RX MED GY IP 250 OP 250 PS 637: Performed by: STUDENT IN AN ORGANIZED HEALTH CARE EDUCATION/TRAINING PROGRAM

## 2023-05-20 RX ADMIN — BENZTROPINE MESYLATE 1 MG: 1 TABLET ORAL at 13:17

## 2023-05-20 RX ADMIN — NICOTINE POLACRILEX 4 MG: 4 GUM, CHEWING ORAL at 17:47

## 2023-05-20 RX ADMIN — NICOTINE POLACRILEX 4 MG: 4 GUM, CHEWING ORAL at 08:28

## 2023-05-20 RX ADMIN — HYDROXYZINE HYDROCHLORIDE 25 MG: 25 TABLET, FILM COATED ORAL at 16:22

## 2023-05-20 RX ADMIN — HALOPERIDOL 5 MG: 5 TABLET ORAL at 08:35

## 2023-05-20 RX ADMIN — PROPRANOLOL HYDROCHLORIDE 20 MG: 20 TABLET ORAL at 20:02

## 2023-05-20 RX ADMIN — BENZTROPINE MESYLATE 1 MG: 1 TABLET ORAL at 20:02

## 2023-05-20 RX ADMIN — BENZTROPINE MESYLATE 1 MG: 1 TABLET ORAL at 08:35

## 2023-05-20 RX ADMIN — HALOPERIDOL 5 MG: 5 TABLET ORAL at 20:02

## 2023-05-20 RX ADMIN — NICOTINE POLACRILEX 4 MG: 4 GUM, CHEWING ORAL at 19:05

## 2023-05-20 RX ADMIN — LORAZEPAM 2 MG: 1 TABLET ORAL at 12:14

## 2023-05-20 RX ADMIN — DIPHENHYDRAMINE HYDROCHLORIDE 50 MG: 50 CAPSULE ORAL at 12:14

## 2023-05-20 RX ADMIN — MELATONIN 3 MG: at 20:02

## 2023-05-20 RX ADMIN — BENZOCAINE 6 MG-MENTHOL 10 MG LOZENGES 1 LOZENGE: at 09:04

## 2023-05-20 RX ADMIN — ATOMOXETINE 40 MG: 40 CAPSULE ORAL at 08:35

## 2023-05-20 RX ADMIN — ACETAMINOPHEN 325MG 650 MG: 325 TABLET ORAL at 18:48

## 2023-05-20 RX ADMIN — BENZOCAINE 6 MG-MENTHOL 10 MG LOZENGES 1 LOZENGE: at 15:40

## 2023-05-20 RX ADMIN — PROPRANOLOL HYDROCHLORIDE 20 MG: 20 TABLET ORAL at 08:35

## 2023-05-20 RX ADMIN — NICOTINE POLACRILEX 4 MG: 4 GUM, CHEWING ORAL at 15:56

## 2023-05-20 RX ADMIN — BENZOCAINE 6 MG-MENTHOL 10 MG LOZENGES 1 LOZENGE: at 22:30

## 2023-05-20 RX ADMIN — HALOPERIDOL 5 MG: 5 TABLET ORAL at 12:14

## 2023-05-20 ASSESSMENT — ACTIVITIES OF DAILY LIVING (ADL)
ADLS_ACUITY_SCORE: 28
DRESS: SCRUBS (BEHAVIORAL HEALTH);INDEPENDENT
ADLS_ACUITY_SCORE: 28
LAUNDRY: UNABLE TO COMPLETE
ADLS_ACUITY_SCORE: 28
HYGIENE/GROOMING: INDEPENDENT
HYGIENE/GROOMING: INDEPENDENT
ADLS_ACUITY_SCORE: 28
ORAL_HYGIENE: INDEPENDENT

## 2023-05-20 NOTE — PLAN OF CARE
"  Problem: Adult Behavioral Health Plan of Care  Goal: Patient-Specific Goal (Individualization)  Description: Pt will be compliant with treatment team recommendations during hospitalization.   Pt will report adequate amounts of sleep on NOC shift (5-8 hours.)  Pt will participate in greater than 50% of daily groups.    /20/2023 1446 by Olimpia Carey RN      Patient is Alert, able to make needs known. VSS, afebrile. Assessment and vitals as charted. Denies pain. Presents with flat, blunt affect. Patient has been in room much of shift but frequents the nurses station door with multiple request. She endorses anxiety and depression. Does endorse auditory hallucinations. Appears responding to internal stimulus. Did receive PRN Zyprexa initially for voices and than refused it stating, \"I need haldol.\" PRN lorazepam 2 mg, PRN haldol 5 mg, PRN diphenhydramine 50 mg PO for agitation, hallucinations. Continues to state she has a chip in her head. Religiously preoccupied. Ask writer what her religous preference is and cuts off writer and states, \"nevermind. I think I know.\" and walks away. Ate 100% of breakfast and 100% of lunch. Steady gait. No falls.         Face to face report given with opportunity to observe patient.    Report given to PM, RN.     Olimpia Carey RN   5/20/2023  3:13 PM      "

## 2023-05-20 NOTE — PLAN OF CARE
Problem: Adult Behavioral Health Plan of Care  Goal: Patient-Specific Goal (Individualization)  Description: Pt will be compliant with treatment team recommendations during hospitalization.   Pt will report adequate amounts of sleep on NOC shift (5-8 hours.)  Pt will participate in greater than 50% of daily groups.    5/19- wean as appropriate        Outcome: Progressing  Note:     0030 Patient in bed with eyes closed and regular resps.     0600 Patient continues in bed with eyes closed and regular resps for a total of 7 hours of sleep this shift.    Face to face end of shift report communicated to 7-3 shift RN.     Sheila Broderick, RN  5/20/2023  6:18 AM           Problem: Thought Process Alteration  Goal: Optimal Thought Clarity  Description: Patient will have clear linear conversations while on the unit.  Outcome: Progressing   Goal Outcome Evaluation:

## 2023-05-20 NOTE — PLAN OF CARE
Problem: Adult Behavioral Health Plan of Care  Goal: Patient-Specific Goal (Individualization)  Description: Pt will be compliant with treatment team recommendations during hospitalization.   Pt will report adequate amounts of sleep on NOC shift (5-8 hours.)  Pt will participate in greater than 50% of daily groups.    5/19- wean as appropriate        5/20/2023 0108 by Sheila Broderick, RN  Outcome: Progressing  Note:     0030 Patient in bed with eyes closed and regular resps.    Problem: Thought Process Alteration  Goal: Optimal Thought Clarity  Description: Patient will have clear linear conversations while on the unit.  5/20/2023 0108 by Sheila Broderick, RN  Outcome: Progressing      Goal Outcome Evaluation:

## 2023-05-20 NOTE — PROGRESS NOTES
"[facility] PSYCHIATRY  PROGRESS NOTE     SUBJECTIVE     Prior to interviewing the patient, I met with nursing and reviewed patient's clinical condition. We discussed clinical care both before and after the interview. I have reviewed the patient's clinical course by review of records including previous notes, labs, and vital signs.     Per nursing, the patient had the following behavioral events over the last 24-hours: weaned out of MHICU.    On psychiatric interview, the patient was found in the milieu. She states she is doing \"Alright\". She states that she is not having any side effects from her medications. She notes that she slept well last night. She states she had worsening voices last night and then suggests she does not want to take the haloperidol at night. When asked why, she states \"I am not sure\".  Discussed we will continue BID dosing for the interim.  Denies SI.        MEDICATIONS   Scheduled Meds:    atomoxetine  40 mg Oral Daily     benztropine  1 mg Oral TID     haloperidol  5 mg Oral BID     [START ON 6/2/2023] haloperidol decanoate  200 mg Intramuscular Q28 Days     propranolol  20 mg Oral BID     PRN Meds:.acetaminophen, alum & mag hydroxide-simethicone, sore throat, haloperidol **AND** LORazepam **AND** diphenhydrAMINE, haloperidol lactate **AND** LORazepam **AND** diphenhydrAMINE, hydrOXYzine, melatonin, nicotine, OLANZapine **OR** OLANZapine, polyethylene glycol     ALLERGIES   Allergies   Allergen Reactions     Seasonal Allergies         MENTAL STATUS EXAM   Vitals: /71   Pulse 95   Temp 97.6  F (36.4  C) (Tympanic)   Resp 18   Ht 1.676 m (5' 6\")   Wt 109.7 kg (241 lb 12.8 oz)   SpO2 95%   BMI 39.03 kg/m      Appearance: Alert, oriented, dressed in hospital scrubs  Attitude: Cooperative   Eye Contact: Good  Mood: \"okay\"  Affect: Full range   Speech: Normal rate and rhythm   Psychomotor Behavior: No TD or rigidity. Less akathisia   Thought Process: Linear, some tangentiality "   Associations: No loose associations   Thought Content: Denies SI, plan, or SIB. AH. Delusional thought regarding chip implants not present today; ideas of reference, somatic passivity improved   Insight: Limited  Judgment: Fair  Oriented to: Person, place, and time  Attention Span and Concentration: Intact  Recent and Remote Memory: Intact  Language: English with appropriate syntax and vocabulary  Fund of Knowledge: Average   Muscle Strength and Tone: Grossly normal  Gait and Station: Grossly normal       LABS   No results found for this or any previous visit (from the past 24 hour(s)).      CARMELLA Dunaway is a 22 year old female with a past psychiatric history notable for schizoaffective disorder versus substance induced psychosis, polysubstance dependence, ADHD. Multiple prior inpatient psychiatric hospitalizations. Prior SA. Multiple prior CD treatments.  Recently hospitalized here at [facility] and dismissed on 5/10/23.  Within 24 hours, was back in an emergency department and then dismissed and then re-presented again to an emergency department grossly psychotic and disorganized.  Was supposed to be attending CD treatment at [facility]. Patient was subsequently transferred and accepted for inpatient psychiatric hospitalization at [facility] for further safety and further stabilization.    Today: continues to report auditory hallucinations, this AM she states they are tolerable. Will continue with increased dose of haldol BID.         DIAGNOSES     #schizoaffective disorder, bipolar type, multiple episodes, in acute episode  #ADHD  #Opioid Use Disorder  #Stimulant Use Disorder       PLAN     Location: Unit 5  Legal Status: Orders Placed This Encounter      Voluntary    Safety Assessment:    Behavioral Orders   Procedures     Code 1 - Restrict to Unit     Routine Programming     As clinically indicated      Status 15     Every 15 minutes.      PTA medications held:   -none     PTA medications continued/changed:   -haldol decanoate 200 mg AMBRIZ every 28 days, next injection on 6/2   -haldol 5 mg PRN switched to Haldol/Benadryl/Ativan prn   -cogentin 0.5 mg TID -> increased to 1.0 mg TID to address akathisia      New medications initiated:   -haldol 5 mg at bedtime as of 5/18 -> 5 mg BID as of 5/19  -propranolol 10 mg TID to address akathisia -> 20 mg BID on 5/18     Today's Changes:    -maintain Haldol to 5 mg BID     Future considerations: Increase AMBRIZ to 300 mg every 28 days and stop oral Haldol if changes with Haldol are sufficient     Programming: Patient will be treated in a therapeutic milieu with appropriate individual and group therapies. Education will be provided on diagnoses, medications, and treatments.     Medical diagnoses:  Per medicine    Consult: None  Tests: None    Anticipated LOS: > 7 days   Disposition: CD treatment versus IRTS        ATTESTATION      Sam Coombs MD  [facility]   Psychiatry    Video Visit: Patient has given verbal consent for video visit?: Yes  Type of Service: video visit for mental health treatment  Reason for Video Visit: COVID-[place]  Originating Site (patient location): [facility]  Distant Site (provider location): Remote Location  Mode of Communication: Video Conference via [employer]  Time of Service: Date: 05/20/2023 , Start: 08:00 end; 08:30

## 2023-05-21 PROCEDURE — 250N000013 HC RX MED GY IP 250 OP 250 PS 637: Performed by: STUDENT IN AN ORGANIZED HEALTH CARE EDUCATION/TRAINING PROGRAM

## 2023-05-21 PROCEDURE — 124N000001 HC R&B MH

## 2023-05-21 PROCEDURE — 250N000013 HC RX MED GY IP 250 OP 250 PS 637: Performed by: PSYCHIATRY & NEUROLOGY

## 2023-05-21 PROCEDURE — 250N000013 HC RX MED GY IP 250 OP 250 PS 637: Performed by: NURSE PRACTITIONER

## 2023-05-21 PROCEDURE — 99233 SBSQ HOSP IP/OBS HIGH 50: CPT | Mod: GT | Performed by: PSYCHIATRY & NEUROLOGY

## 2023-05-21 RX ORDER — HALOPERIDOL 5 MG/1
5 TABLET ORAL 3 TIMES DAILY
Status: DISCONTINUED | OUTPATIENT
Start: 2023-05-21 | End: 2023-06-02

## 2023-05-21 RX ADMIN — HALOPERIDOL 5 MG: 5 TABLET ORAL at 14:25

## 2023-05-21 RX ADMIN — ATOMOXETINE 40 MG: 40 CAPSULE ORAL at 08:13

## 2023-05-21 RX ADMIN — BENZTROPINE MESYLATE 1 MG: 1 TABLET ORAL at 20:07

## 2023-05-21 RX ADMIN — NICOTINE POLACRILEX 4 MG: 4 GUM, CHEWING ORAL at 09:05

## 2023-05-21 RX ADMIN — HYDROXYZINE HYDROCHLORIDE 25 MG: 25 TABLET, FILM COATED ORAL at 10:39

## 2023-05-21 RX ADMIN — PROPRANOLOL HYDROCHLORIDE 20 MG: 20 TABLET ORAL at 08:13

## 2023-05-21 RX ADMIN — NICOTINE POLACRILEX 4 MG: 4 GUM, CHEWING ORAL at 10:05

## 2023-05-21 RX ADMIN — NICOTINE POLACRILEX 4 MG: 4 GUM, CHEWING ORAL at 11:43

## 2023-05-21 RX ADMIN — NICOTINE POLACRILEX 4 MG: 4 GUM, CHEWING ORAL at 17:32

## 2023-05-21 RX ADMIN — ACETAMINOPHEN 325MG 650 MG: 325 TABLET ORAL at 09:58

## 2023-05-21 RX ADMIN — BENZTROPINE MESYLATE 1 MG: 1 TABLET ORAL at 14:25

## 2023-05-21 RX ADMIN — NICOTINE POLACRILEX 4 MG: 4 GUM, CHEWING ORAL at 19:15

## 2023-05-21 RX ADMIN — NICOTINE POLACRILEX 4 MG: 4 GUM, CHEWING ORAL at 15:43

## 2023-05-21 RX ADMIN — NICOTINE POLACRILEX 4 MG: 4 GUM, CHEWING ORAL at 14:27

## 2023-05-21 RX ADMIN — HALOPERIDOL 5 MG: 5 TABLET ORAL at 08:13

## 2023-05-21 RX ADMIN — LORAZEPAM 2 MG: 1 TABLET ORAL at 17:32

## 2023-05-21 RX ADMIN — ACETAMINOPHEN 325MG 650 MG: 325 TABLET ORAL at 19:25

## 2023-05-21 RX ADMIN — HALOPERIDOL 5 MG: 5 TABLET ORAL at 17:32

## 2023-05-21 RX ADMIN — PROPRANOLOL HYDROCHLORIDE 20 MG: 20 TABLET ORAL at 20:07

## 2023-05-21 RX ADMIN — OLANZAPINE 10 MG: 10 TABLET, FILM COATED ORAL at 20:36

## 2023-05-21 RX ADMIN — OLANZAPINE 10 MG: 10 TABLET, FILM COATED ORAL at 11:43

## 2023-05-21 RX ADMIN — MELATONIN 3 MG: at 20:07

## 2023-05-21 RX ADMIN — BENZTROPINE MESYLATE 1 MG: 1 TABLET ORAL at 08:13

## 2023-05-21 RX ADMIN — DIPHENHYDRAMINE HYDROCHLORIDE 50 MG: 50 CAPSULE ORAL at 17:32

## 2023-05-21 RX ADMIN — BENZOCAINE 6 MG-MENTHOL 10 MG LOZENGES 1 LOZENGE: at 04:05

## 2023-05-21 ASSESSMENT — ACTIVITIES OF DAILY LIVING (ADL)
ORAL_HYGIENE: INDEPENDENT
ADLS_ACUITY_SCORE: 28
DRESS: SCRUBS (BEHAVIORAL HEALTH);INDEPENDENT
ADLS_ACUITY_SCORE: 28
LAUNDRY: UNABLE TO COMPLETE
ADLS_ACUITY_SCORE: 28
HYGIENE/GROOMING: INDEPENDENT
ADLS_ACUITY_SCORE: 28
LAUNDRY: UNABLE TO COMPLETE
ADLS_ACUITY_SCORE: 28
ORAL_HYGIENE: INDEPENDENT
ADLS_ACUITY_SCORE: 28
ADLS_ACUITY_SCORE: 28
HYGIENE/GROOMING: INDEPENDENT
ADLS_ACUITY_SCORE: 28
ADLS_ACUITY_SCORE: 28
DRESS: SCRUBS (BEHAVIORAL HEALTH)
ADLS_ACUITY_SCORE: 28

## 2023-05-21 NOTE — PLAN OF CARE
Face to face shift report received from nurse. Rounding completed, pt observed.    Problem: Adult Behavioral Health Plan of Care  Goal: Patient-Specific Goal (Individualization)  Description: Pt will be compliant with treatment team recommendations during hospitalization.   Pt will report adequate amounts of sleep on NOC shift (5-8 hours.)  Pt will participate in greater than 50% of daily groups.    Outcome: Progressing   Pt has been in bed with eyes closed and regular respirations observed all night. Will continue to monitor. Patient slept 6.5 hours. No issues noted throughout the night. Patient did get up once and request a cough lozenge, gave lozenge at 0405.     Face to face report will be communicated to oncoming RN.    Earl Patel RN  5/21/2023

## 2023-05-21 NOTE — PLAN OF CARE
"  Problem: Adult Behavioral Health Plan of Care  Goal: Patient-Specific Goal (Individualization)  Description: Pt will be compliant with treatment team recommendations during hospitalization.   Pt will report adequate amounts of sleep on NOC shift (5-8 hours.)  Pt will participate in greater than 50% of daily groups.    Outcome: Progressing    Pt appeared to be sleeping at the start of the shift. Pt came to the nursing station as soon as she woke up and asked for her meds. Pt given morning meds, denied pain, anxiety, depression, SI, HI and hallucinations. Pt physically restless, stepping in place while talking to nursing.     Pt complained of a headache and requested tylenol 650mg at 0958. Pt came to staff asking for something for allergies. Pt then went to group. Pt came out of group at 1035 crying. When asked what was wrong, pt stated \"some feelings just came out in group, but I don't want a pity party\" pt given hydroxyzine 25mg at 1039 for allergies.        Problem: Thought Process Alteration  Goal: Optimal Thought Clarity  Description: Patient will have clear linear conversations while on the unit.  Outcome: Progressing   Goal Outcome Evaluation:                        "

## 2023-05-21 NOTE — PLAN OF CARE
Problem: Adult Behavioral Health Plan of Care  Goal: Patient-Specific Goal (Individualization)  Description: Pt will be compliant with treatment team recommendations during hospitalization.   Pt will report adequate amounts of sleep on NOC shift (5-8 hours.)  Pt will participate in greater than 50% of daily groups.        Outcome: Progressing     Problem: Thought Process Alteration  Goal: Optimal Thought Clarity  Description: Patient will have clear linear conversations while on the unit.  Outcome: Progressing     Pt denies SI/HI, AH/VH and depression. Pt is medication compliant and VSS. Pt ate 100% of dinner. Pt has a flat affect. Pt is calm and alert. Pt is using appropriate behavior with staff and peers.  Pt frequently asks for medications. Pt frequently has requests for staff.     1622 PRN atarax 25mg for 4/10 anxiety.     1848 PRN tylenol 650mg for 4/10 head pressure.     2002 PRN melatonin 3mg for sleep.     Face to face report will be communicated to oncoming RN.    Kylah Saucedo RN  5/20/2023

## 2023-05-21 NOTE — PROGRESS NOTES
"[facility] PSYCHIATRY  PROGRESS NOTE     SUBJECTIVE     Prior to interviewing the patient, I met with nursing and reviewed patient's clinical condition. We discussed clinical care both before and after the interview. I have reviewed the patient's clinical course by review of records including previous notes, labs, and vital signs.     Per nursing, the patient had the following behavioral events over the last 24-hours: none. Continues to ask people about Mu-ism.     On psychiatric interview, the patient was found in the milieu. In contrast to yesterday stating that she did not want to take both doses of haldol, today she states it is helping her voices and she would like to take the haldol 5 mg three times a day.  is requesting her haldol dec dose be increased as a result of this. She states that the haldol keeps the voices away. She is able to talk today that when the voices are bad they tell her she is bad and that she is a sinner and she is the devil which causes her to feel awkward around other people. She states her goal for today SI  to try and not isolate and be around people and see how the increased dose of haldol is working. She denies SI. No physical pain today.        MEDICATIONS   Scheduled Meds:    atomoxetine  40 mg Oral Daily     benztropine  1 mg Oral TID     haloperidol  5 mg Oral TID     [START ON 6/2/2023] haloperidol decanoate  200 mg Intramuscular Q28 Days     propranolol  20 mg Oral BID     PRN Meds:.acetaminophen, alum & mag hydroxide-simethicone, sore throat, haloperidol **AND** LORazepam **AND** diphenhydrAMINE, haloperidol lactate **AND** LORazepam **AND** diphenhydrAMINE, hydrOXYzine, melatonin, nicotine, OLANZapine **OR** OLANZapine, polyethylene glycol     ALLERGIES   Allergies   Allergen Reactions     Seasonal Allergies         MENTAL STATUS EXAM   Vitals: /90   Pulse 93   Temp 97.6  F (36.4  C) (Tympanic)   Resp 18   Ht 1.676 m (5' 6\")   Wt 109.7 kg (241 lb 12.8 oz)  " " SpO2 99%   BMI 39.03 kg/m      Appearance: Alert, oriented, dressed in hospital scrubs  Attitude: Cooperative   Eye Contact: Good  Mood: \"better\"  Affect:euthymic   Speech: Normal rate and rhythm   Psychomotor Behavior: No TD or rigidity. Less akathisia   Thought Process: Linear, some tangentiality   Associations: No loose associations   Thought Content: Denies SI, plan, or SIB. AH. Delusional thought regarding chip implants not present today; ideas of reference, somatic passivity improved   Insight: Limited  Judgment: Fair  Oriented to: Person, place, and time  Attention Span and Concentration: Intact  Recent and Remote Memory: Intact  Language: English with appropriate syntax and vocabulary  Fund of Knowledge: Average   Muscle Strength and Tone: Grossly normal  Gait and Station: Grossly normal       LABS   No results found for this or any previous visit (from the past 24 hour(s)).      CARMELLA Dunaway is a 22 year old female with a past psychiatric history notable for schizoaffective disorder versus substance induced psychosis, polysubstance dependence, ADHD. Multiple prior inpatient psychiatric hospitalizations. Prior SA. Multiple prior CD treatments.  Recently hospitalized here at [facility] and dismissed on 5/10/23.  Within 24 hours, was back in an emergency department and then dismissed and then re-presented again to an emergency department grossly psychotic and disorganized.  Was supposed to be attending CD treatment at [facility]. Patient was subsequently transferred and accepted for inpatient psychiatric hospitalization at [facility] for further safety and further stabilization.    Today: increase oral haldol to 5 mg TID with same dosing schedule as cogentin. Will need to increase haldol dec on 6/2/23 when it is due next       DIAGNOSES     #schizoaffective disorder, bipolar type, multiple " episodes, in acute episode  #ADHD  #Opioid Use Disorder  #Stimulant Use Disorder       PLAN     Location: Unit 5  Legal Status: Orders Placed This Encounter      Voluntary    Safety Assessment:    Behavioral Orders   Procedures     Code 1 - Restrict to Unit     Routine Programming     As clinically indicated     Status 15     Every 15 minutes.      PTA medications held:   -none     PTA medications continued/changed:   -haldol decanoate 200 mg AMBRIZ every 28 days, next injection on 6/2   -haldol 5 mg PRN switched to Haldol/Benadryl/Ativan prn   -cogentin 0.5 mg TID -> increased to 1.0 mg TID to address akathisia      New medications initiated:   -haldol 5 mg at bedtime as of 5/18 -> 5 mg BID as of 5/19  -propranolol 10 mg TID to address akathisia -> 20 mg BID on 5/18     Today's Changes:  -increase haldol to 5 mg TID    Future considerations: Increase AMBRIZ to 300 mg every 28 days and stop oral Haldol if changes with Haldol are sufficient     Programming: Patient will be treated in a therapeutic milieu with appropriate individual and group therapies. Education will be provided on diagnoses, medications, and treatments.     Medical diagnoses:  Per medicine    Consult: None  Tests: None    Anticipated LOS: > 7 days   Disposition: CD treatment versus IRTS        ATTESTATION      Sam Coombs MD  [facility]   Psychiatry    Video Visit: Patient has given verbal consent for video visit?: Yes  Type of Service: video visit for mental health treatment  Reason for Video Visit: COVID-19 [place]  Originating Site (patient location): [facility]  Distant Site (provider location): Remote Location  Mode of Communication: Video Conference via [employer]  Time of Service: Date: 05/21/2023 , Start: 08:00 end; 08:30

## 2023-05-22 PROCEDURE — 124N000001 HC R&B MH

## 2023-05-22 PROCEDURE — 250N000013 HC RX MED GY IP 250 OP 250 PS 637: Performed by: STUDENT IN AN ORGANIZED HEALTH CARE EDUCATION/TRAINING PROGRAM

## 2023-05-22 PROCEDURE — 99232 SBSQ HOSP IP/OBS MODERATE 35: CPT | Mod: GT | Performed by: PSYCHIATRY & NEUROLOGY

## 2023-05-22 PROCEDURE — 250N000013 HC RX MED GY IP 250 OP 250 PS 637: Performed by: NURSE PRACTITIONER

## 2023-05-22 PROCEDURE — 250N000013 HC RX MED GY IP 250 OP 250 PS 637: Performed by: PSYCHIATRY & NEUROLOGY

## 2023-05-22 RX ADMIN — PROPRANOLOL HYDROCHLORIDE 20 MG: 20 TABLET ORAL at 08:29

## 2023-05-22 RX ADMIN — NICOTINE POLACRILEX 4 MG: 4 GUM, CHEWING ORAL at 16:35

## 2023-05-22 RX ADMIN — ALUMINUM HYDROXIDE, MAGNESIUM HYDROXIDE, AND DIMETHICONE 30 ML: 200; 20; 200 SUSPENSION ORAL at 20:03

## 2023-05-22 RX ADMIN — NICOTINE POLACRILEX 4 MG: 4 GUM, CHEWING ORAL at 08:31

## 2023-05-22 RX ADMIN — BENZTROPINE MESYLATE 1 MG: 1 TABLET ORAL at 13:48

## 2023-05-22 RX ADMIN — HYDROXYZINE HYDROCHLORIDE 25 MG: 25 TABLET, FILM COATED ORAL at 18:26

## 2023-05-22 RX ADMIN — BENZTROPINE MESYLATE 1 MG: 1 TABLET ORAL at 08:29

## 2023-05-22 RX ADMIN — NICOTINE POLACRILEX 4 MG: 4 GUM, CHEWING ORAL at 18:00

## 2023-05-22 RX ADMIN — BENZOCAINE 6 MG-MENTHOL 10 MG LOZENGES 1 LOZENGE: at 23:41

## 2023-05-22 RX ADMIN — NICOTINE POLACRILEX 4 MG: 4 GUM, CHEWING ORAL at 13:50

## 2023-05-22 RX ADMIN — BENZTROPINE MESYLATE 1 MG: 1 TABLET ORAL at 20:04

## 2023-05-22 RX ADMIN — HALOPERIDOL 5 MG: 5 TABLET ORAL at 20:03

## 2023-05-22 RX ADMIN — ATOMOXETINE 40 MG: 40 CAPSULE ORAL at 08:29

## 2023-05-22 RX ADMIN — PROPRANOLOL HYDROCHLORIDE 20 MG: 20 TABLET ORAL at 20:03

## 2023-05-22 RX ADMIN — MELATONIN 3 MG: at 20:03

## 2023-05-22 RX ADMIN — HALOPERIDOL 5 MG: 5 TABLET ORAL at 13:49

## 2023-05-22 RX ADMIN — HALOPERIDOL 5 MG: 5 TABLET ORAL at 08:29

## 2023-05-22 RX ADMIN — NICOTINE POLACRILEX 4 MG: 4 GUM, CHEWING ORAL at 09:47

## 2023-05-22 ASSESSMENT — ACTIVITIES OF DAILY LIVING (ADL)
ORAL_HYGIENE: INDEPENDENT
ADLS_ACUITY_SCORE: 28
DRESS: SCRUBS (BEHAVIORAL HEALTH);INDEPENDENT
ORAL_HYGIENE: INDEPENDENT
ADLS_ACUITY_SCORE: 28
HYGIENE/GROOMING: INDEPENDENT
ADLS_ACUITY_SCORE: 28
ADLS_ACUITY_SCORE: 28
LAUNDRY: UNABLE TO COMPLETE
ADLS_ACUITY_SCORE: 28
LAUNDRY: UNABLE TO COMPLETE
HYGIENE/GROOMING: INDEPENDENT
ADLS_ACUITY_SCORE: 28
ADLS_ACUITY_SCORE: 28
DRESS: SCRUBS (BEHAVIORAL HEALTH)

## 2023-05-22 NOTE — PLAN OF CARE
Problem: Adult Behavioral Health Plan of Care  Goal: Patient-Specific Goal (Individualization)  Description: Pt will be compliant with treatment team recommendations during hospitalization.   Pt will report adequate amounts of sleep on NOC shift (5-8 hours.)  Pt will participate in greater than 50% of daily groups.        Outcome: Progressing     Problem: Thought Process Alteration  Goal: Optimal Thought Clarity  Description: Patient will have clear linear conversations while on the unit.  Outcome: Progressing     Pt denies SI/HI, AH/VH and depression. Pt is medication compliant and VSS. Pt ate 75% of dinner. Pt has a flat affect. Pt is alert. Pt is using appropriate behavior with staff and peers.  Pt frequently asks for medications. Pt frequently has requests for staff. Pt declined Hx haldol.    1732 PRN haldol 5mg, ativan 2mg, and benadryl 50mg for anxiety and agitation. 1925 PRN tylenol 650mg for 7/10 head pain. 2007 PRN melatonin 3mg for sleep. 2036 PRN Zyprexa 10mg for AH.         Face to face report will be communicated to oncoming RN.    Kylah Saucedo RN  5/21/2023

## 2023-05-22 NOTE — PLAN OF CARE
Problem: Adult Behavioral Health Plan of Care  Goal: Patient-Specific Goal (Individualization)  Description: Pt will be compliant with treatment team recommendations during hospitalization.   Pt will report adequate amounts of sleep on NOC shift (5-8 hours.)  Pt will participate in greater than 50% of daily groups.        Outcome: Progressing   Goal Outcome Evaluation:               Face to face shift report received from RN. Rounding completed, pt observed. Client rested in room for 7 hours with eyes closed and respirations noted.Face to face report will be communicated to oncoming RN.    Neal Dejesus RN  5/22/2023  6:19 AM

## 2023-05-22 NOTE — PROGRESS NOTES
Pt was denied from [facility] in [place]. They state she will need to complete an IRTS program before they would look at taking her.    Spoke with pt regarding this information. She states she would like to look at treatment down in [place]. Pt is open to an IRTS but states she does not want to be in the hospital for a month while she waits. Encouraged pt that this writer can send referrals to both CD and IRTS, so that when she is completing treatment the IRTS waiting list will go down. Pt is agreeable to this.    Faxed referral to [facility].     Spoke with pt's  Veronica and gave update on pt. She scheduled a meeting for Friday @ 1 PM via zoom for a meeting with pt.

## 2023-05-22 NOTE — PROGRESS NOTES
"[facility] PSYCHIATRY  PROGRESS NOTE     SUBJECTIVE     Prior to interviewing the patient, I met with nursing and reviewed patient's clinical condition. We discussed clinical care both before and after the interview. I have reviewed the patient's clinical course by review of records including previous notes, labs, and vital signs.     Per nursing, the patient had the following behavioral events over the last 24-hours: none. Periodically marches in place.     On psychiatric interview, the patient was found in her room. She states \"the haldol is helping with the negative voices\". She then states she still has positive voices that are telling her to \"go to groups\".  She states that she is interested in getting reconnected with CD treatment again and suggests she would like to go to [facility] in [place], [place]. She is aware that a referral process needs to unfold for this.  She is having cravings for cigarettes.  She does continue to talk about Muslim and today state s\"my bradley is going to keep me safe and God will open that door for me\".  She denies SI. She denies any physical pain.          MEDICATIONS   Scheduled Meds:    atomoxetine  40 mg Oral Daily     benztropine  1 mg Oral TID     haloperidol  5 mg Oral TID     [START ON 6/2/2023] haloperidol decanoate  200 mg Intramuscular Q28 Days     propranolol  20 mg Oral BID     PRN Meds:.acetaminophen, alum & mag hydroxide-simethicone, sore throat, haloperidol **AND** LORazepam **AND** diphenhydrAMINE, haloperidol lactate **AND** LORazepam **AND** diphenhydrAMINE, hydrOXYzine, melatonin, nicotine, OLANZapine **OR** OLANZapine, polyethylene glycol     ALLERGIES   Allergies   Allergen Reactions     Seasonal Allergies         MENTAL STATUS EXAM   Vitals: /77   Pulse 96   Temp 97.6  F (36.4  C) (Tympanic)   Resp 18   Ht 1.676 m (5' 6\")   Wt 109.7 kg (241 lb 12.8 oz)   SpO2 98%   BMI 39.03 kg/m      Appearance: Alert, oriented, dressed in " "hospital scrubs  Attitude: Cooperative   Eye Contact: Good  Mood: \"okay\"  Affect:euthymic   Speech: Normal rate and rhythm   Psychomotor Behavior: No TD or rigidity. Less akathisia   Thought Process: Linear, some tangentiality   Associations: No loose associations   Thought Content: Denies SI, plan, or SIB. AH improving. Delusional thought regarding chip implants not present today; ideas of reference, somatic passivity improved   Insight: Limited  Judgment: Fair  Oriented to: Person, place, and time  Attention Span and Concentration: Intact  Recent and Remote Memory: Intact  Language: English with appropriate syntax and vocabulary  Fund of Knowledge: Average   Muscle Strength and Tone: Grossly normal  Gait and Station: Grossly normal       LABS   No results found for this or any previous visit (from the past 24 hour(s)).      IMPRESSION     Ej Dunaway is a 22 year old female with a past psychiatric history notable for schizoaffective disorder versus substance induced psychosis, polysubstance dependence, ADHD. Multiple prior inpatient psychiatric hospitalizations. Prior SA. Multiple prior CD treatments.  Recently hospitalized here at [facility] and dismissed on 5/10/23.  Within 24 hours, was back in an emergency department and then dismissed and then re-presented again to an emergency department grossly psychotic and disorganized.  Was supposed to be attending CD treatment at [facility]. Patient was subsequently transferred and accepted for inpatient psychiatric hospitalization at [facility]  for further safety and further stabilization.    Today: continue haldol at 5 mg TID, expressing interest to get reconnected with CD treatment.        DIAGNOSES     #schizoaffective disorder, bipolar type, multiple episodes, in acute episode  #ADHD  #Opioid Use Disorder  #Stimulant Use Disorder       PLAN     Location: Unit 5  Legal " Status: Orders Placed This Encounter      Voluntary    Safety Assessment:    Behavioral Orders   Procedures     Code 1 - Restrict to Unit     Routine Programming     As clinically indicated     Status 15     Every 15 minutes.      PTA medications held:   -none     PTA medications continued/changed:   -haldol decanoate 200 mg AMBRIZ every 28 days, next injection on 6/2   -haldol 5 mg PRN switched to Haldol/Benadryl/Ativan prn   -cogentin 0.5 mg TID -> increased to 1.0 mg TID to address akathisia      New medications initiated:   -haldol 5 mg at bedtime as of 5/18 -> 5 mg BID as of 5/19  -propranolol 10 mg TID to address akathisia -> 20 mg BID on 5/18     Today's Changes:  -increase haldol to 5 mg TID    Future considerations: Increase AMBRIZ to 300 mg every 28 days and stop oral Haldol if changes with Haldol are sufficient     Programming: Patient will be treated in a therapeutic milieu with appropriate individual and group therapies. Education will be provided on diagnoses, medications, and treatments.     Medical diagnoses:  Per medicine    Consult: None  Tests: None    Anticipated LOS: > 7 days   Disposition: CD treatment versus IRTS        ATTESTATION      Sam Coombs MD  [facility]   Psychiatry    Video Visit: Patient has given verbal consent for video visit?: Yes  Type of Service: video visit for mental health treatment  Reason for Video Visit: COVID-19 [place]  Originating Site (patient location): [facility]  Distant Site (provider location): Remote Location  Mode of Communication: Video Conference via [employer]  Time of Service: Date: 05/22/2023 , Start: 09:00 end: 09:30

## 2023-05-22 NOTE — PLAN OF CARE
"  Problem: Adult Behavioral Health Plan of Care  Goal: Patient-Specific Goal (Individualization)  Description: Pt will be compliant with treatment team recommendations during hospitalization.   Pt will report adequate amounts of sleep on NOC shift (5-8 hours.)  Pt will participate in greater than 50% of daily groups.    Patient spends time on the open unit, restless and pacing the unit and marching in place, and using the telephone. Affect is full range, mood is calm. She denies SI, HI, anxiety, depression, hallucinations, and pain. States she is good. She asks \"do you write updates on all of us every day? Like our behaviors? Ole I want good reports for [facility]\". States she is considering going to an IRTS first, she will discuss this with her  tomorrow. Has been appropriate with staff and peers.   1800-patient states \"I think I want to discharge tomorrow, I can go back to my apartment and put myself in treatment\". She has requests for Nicotine gum.   1826-requested and accepted Atarax 25 mg for anxiety related to her discharge plans.   2003-requested and accepted Maalox for flatulence, and Melatonin 3 mg for sleep.   Patient moved from private room to room with a room mate.  Face to face end of shift report communicated to oncoming RN.     Aicha Duarte RN  5/22/2023  11:51 PM          Outcome: Progressing     Problem: Thought Process Alteration  Goal: Optimal Thought Clarity  Description: Patient will have clear linear conversations while on the unit.  Outcome: Progressing   Goal Outcome Evaluation:    Plan of Care Reviewed With: patient                   "

## 2023-05-22 NOTE — PLAN OF CARE
"  Problem: Adult Behavioral Health Plan of Care  Goal: Patient-Specific Goal (Individualization)  Description: Pt will be compliant with treatment team recommendations during hospitalization.   Pt will report adequate amounts of sleep on NOC shift (5-8 hours.)  Pt will participate in greater than 50% of daily groups  Outcome: Progressing     Pt in bed at the start of the shift. Pt up for meals and ate meals in the lounge. Pt has been in groups, socialized with staff and peers. Pt polite and cooperative. Pt did not request medications for \"voices\" this shift. Pt did ask for many snacks this shift. Pt does report to having cravings for a cigerette. Pt denies pain, anxiety, depression, SI, HI.           Problem: Thought Process Alteration  Goal: Optimal Thought Clarity  Description: Patient will have clear linear conversations while on the unit.  Outcome: Progressing   Goal Outcome Evaluation:     Plan of Care Reviewed With: patient       Face to face end of shift report communicated to evening shift RN.     Danica Higuera RN  5/22/2023  7:58 AM                "

## 2023-05-23 PROCEDURE — 124N000001 HC R&B MH

## 2023-05-23 PROCEDURE — 99232 SBSQ HOSP IP/OBS MODERATE 35: CPT | Mod: GT | Performed by: PSYCHIATRY & NEUROLOGY

## 2023-05-23 PROCEDURE — 250N000013 HC RX MED GY IP 250 OP 250 PS 637: Performed by: STUDENT IN AN ORGANIZED HEALTH CARE EDUCATION/TRAINING PROGRAM

## 2023-05-23 PROCEDURE — 250N000013 HC RX MED GY IP 250 OP 250 PS 637: Performed by: PSYCHIATRY & NEUROLOGY

## 2023-05-23 PROCEDURE — 250N000013 HC RX MED GY IP 250 OP 250 PS 637: Performed by: NURSE PRACTITIONER

## 2023-05-23 RX ADMIN — HALOPERIDOL 5 MG: 5 TABLET ORAL at 08:30

## 2023-05-23 RX ADMIN — LORAZEPAM 2 MG: 1 TABLET ORAL at 20:57

## 2023-05-23 RX ADMIN — DIPHENHYDRAMINE HYDROCHLORIDE 50 MG: 50 CAPSULE ORAL at 20:57

## 2023-05-23 RX ADMIN — NICOTINE POLACRILEX 4 MG: 4 GUM, CHEWING ORAL at 08:31

## 2023-05-23 RX ADMIN — MELATONIN 3 MG: at 20:28

## 2023-05-23 RX ADMIN — NICOTINE POLACRILEX 4 MG: 4 GUM, CHEWING ORAL at 20:06

## 2023-05-23 RX ADMIN — BENZOCAINE 6 MG-MENTHOL 10 MG LOZENGES 1 LOZENGE: at 03:42

## 2023-05-23 RX ADMIN — HALOPERIDOL 5 MG: 5 TABLET ORAL at 20:02

## 2023-05-23 RX ADMIN — HYDROXYZINE HYDROCHLORIDE 25 MG: 25 TABLET, FILM COATED ORAL at 15:59

## 2023-05-23 RX ADMIN — OLANZAPINE 10 MG: 10 TABLET, FILM COATED ORAL at 05:01

## 2023-05-23 RX ADMIN — NICOTINE POLACRILEX 4 MG: 4 GUM, CHEWING ORAL at 07:21

## 2023-05-23 RX ADMIN — NICOTINE POLACRILEX 4 MG: 4 GUM, CHEWING ORAL at 10:30

## 2023-05-23 RX ADMIN — ATOMOXETINE 40 MG: 40 CAPSULE ORAL at 08:30

## 2023-05-23 RX ADMIN — BENZTROPINE MESYLATE 1 MG: 1 TABLET ORAL at 08:30

## 2023-05-23 RX ADMIN — HALOPERIDOL 5 MG: 5 TABLET ORAL at 14:04

## 2023-05-23 RX ADMIN — BENZTROPINE MESYLATE 1 MG: 1 TABLET ORAL at 20:02

## 2023-05-23 RX ADMIN — HALOPERIDOL 5 MG: 5 TABLET ORAL at 20:57

## 2023-05-23 RX ADMIN — NICOTINE POLACRILEX 4 MG: 4 GUM, CHEWING ORAL at 14:29

## 2023-05-23 RX ADMIN — NICOTINE POLACRILEX 4 MG: 4 GUM, CHEWING ORAL at 18:14

## 2023-05-23 RX ADMIN — ACETAMINOPHEN 325MG 650 MG: 325 TABLET ORAL at 15:47

## 2023-05-23 RX ADMIN — HYDROXYZINE HYDROCHLORIDE 25 MG: 25 TABLET, FILM COATED ORAL at 04:30

## 2023-05-23 RX ADMIN — PROPRANOLOL HYDROCHLORIDE 20 MG: 20 TABLET ORAL at 08:30

## 2023-05-23 RX ADMIN — OLANZAPINE 10 MG: 10 TABLET, FILM COATED ORAL at 18:36

## 2023-05-23 RX ADMIN — PROPRANOLOL HYDROCHLORIDE 20 MG: 20 TABLET ORAL at 20:02

## 2023-05-23 RX ADMIN — BENZTROPINE MESYLATE 1 MG: 1 TABLET ORAL at 14:04

## 2023-05-23 ASSESSMENT — ACTIVITIES OF DAILY LIVING (ADL)
ADLS_ACUITY_SCORE: 28
HYGIENE/GROOMING: INDEPENDENT
ORAL_HYGIENE: INDEPENDENT
LAUNDRY: UNABLE TO COMPLETE
ADLS_ACUITY_SCORE: 28
ADLS_ACUITY_SCORE: 28
ORAL_HYGIENE: INDEPENDENT
ADLS_ACUITY_SCORE: 28
DRESS: SCRUBS (BEHAVIORAL HEALTH)
LAUNDRY: UNABLE TO COMPLETE
ADLS_ACUITY_SCORE: 28
HYGIENE/GROOMING: INDEPENDENT
ADLS_ACUITY_SCORE: 28
DRESS: SCRUBS (BEHAVIORAL HEALTH);INDEPENDENT
ADLS_ACUITY_SCORE: 28

## 2023-05-23 NOTE — PROGRESS NOTES
Lisseth states they would like to see a couple more days of stabilization before they would consider admitting pt.     [employer] is looking to book an interview. No date yet.

## 2023-05-23 NOTE — PROGRESS NOTES
"[facility] PSYCHIATRY  PROGRESS NOTE     SUBJECTIVE     Prior to interviewing the patient, I met with nursing and reviewed patient's clinical condition. We discussed clinical care both before and after the interview. I have reviewed the patient's clinical course by review of records including previous notes, labs, and vital signs.     Per nursing, the patient had the following behavioral events over the last 24-hours: none.     On psychiatric interview, the patient was found in her room. She is getting antsy about going to treatment. She was dissapointed that she would not be able to go to treatment in [place], [place] but is now open to attending other treatment programs. She states she wishes the process was faster and that she could go to a sober home after treatment.  She notes her medications are \"now where they need to be\". She pondered a 12 hour intent to leave today but rescinded. Denies SI. Is attending groups. Cooperative and engaging with peers.          MEDICATIONS   Scheduled Meds:    atomoxetine  40 mg Oral Daily     benztropine  1 mg Oral TID     haloperidol  5 mg Oral TID     [START ON 6/2/2023] haloperidol decanoate  200 mg Intramuscular Q28 Days     propranolol  20 mg Oral BID     PRN Meds:.acetaminophen, alum & mag hydroxide-simethicone, sore throat, haloperidol **AND** LORazepam **AND** diphenhydrAMINE, haloperidol lactate **AND** LORazepam **AND** diphenhydrAMINE, hydrOXYzine, melatonin, nicotine, OLANZapine **OR** OLANZapine, polyethylene glycol     ALLERGIES   Allergies   Allergen Reactions     Seasonal Allergies         MENTAL STATUS EXAM   Vitals: /85   Pulse 100   Temp 97.6  F (36.4  C) (Tympanic)   Resp 18   Ht 1.676 m (5' 6\")   Wt 109.7 kg (241 lb 12.8 oz)   SpO2 98%   BMI 39.03 kg/m      Appearance: Alert, oriented, dressed in hospital scrubs  Attitude: Cooperative   Eye Contact: Good  Mood: \"alright\"  Affect:euthymic   Speech: Normal rate and rhythm   Psychomotor Behavior: " No TD or rigidity. Less akathisia   Thought Process: Linear, some tangentiality   Associations: No loose associations   Thought Content: Denies SI, plan, or SIB. AH improving. Delusional thought regarding chip implants not present today; ideas of reference, somatic passivity improved   Insight: Limited  Judgment: Fair  Oriented to: Person, place, and time  Attention Span and Concentration: Intact  Recent and Remote Memory: Intact  Language: English with appropriate syntax and vocabulary  Fund of Knowledge: Average   Muscle Strength and Tone: Grossly normal  Gait and Station: Grossly normal       LABS   No results found for this or any previous visit (from the past 24 hour(s)).      IMPRESSION     Ej Dunaway is a 22 year old female with a past psychiatric history notable for schizoaffective disorder versus substance induced psychosis, polysubstance dependence, ADHD. Multiple prior inpatient psychiatric hospitalizations. Prior SA. Multiple prior CD treatments.  Recently hospitalized here at [facility] and dismissed on 5/10/23.  Within 24 hours, was back in an emergency department and then dismissed and then re-presented again to an emergency department grossly psychotic and disorganized.  Was supposed to be attending CD treatment at [facility]. Patient was subsequently transferred and accepted for inpatient psychiatric hospitalization at [facility] for further safety and further stabilization.    Today: continue haldol at 5 mg TID, expressing interest to get reconnected with CD treatment. Will send increased referrals. More appropriate with staff and peers.        DIAGNOSES     #schizoaffective disorder, bipolar type, multiple episodes, in acute episode  #ADHD  #Opioid Use Disorder  #Stimulant Use Disorder       PLAN     Location: Unit 5  Legal Status: Orders Placed This Encounter      Voluntary    Safety Assessment:     Behavioral Orders   Procedures     Code 1 - Restrict to Unit     Routine Programming     As clinically indicated     Status 15     Every 15 minutes.      PTA medications held:   -none     PTA medications continued/changed:   -haldol decanoate 200 mg AMBRIZ every 28 days, next injection on 6/2   -haldol 5 mg PRN switched to Haldol/Benadryl/Ativan prn   -cogentin 0.5 mg TID -> increased to 1.0 mg TID to address akathisia      New medications initiated:   -haldol 5 mg at bedtime as of 5/18 -> 5 mg BID as of 5/19  -propranolol 10 mg TID to address akathisia -> 20 mg BID on 5/18     Today's Changes:  continue haldol to 5 mg TID    Future considerations: Increase AMBRIZ to 300 mg every 28 days and stop oral Haldol if changes with Haldol are sufficient     Programming: Patient will be treated in a therapeutic milieu with appropriate individual and group therapies. Education will be provided on diagnoses, medications, and treatments.     Medical diagnoses:  Per medicine    Consult: None  Tests: None    Anticipated LOS: > 7 days   Disposition: CD treatment versus IRTS        ATTESTATION      Sam Coombs MD  [facility]   Psychiatry    Video Visit: Patient has given verbal consent for video visit?: Yes  Type of Service: video visit for mental health treatment  Reason for Video Visit: COVID-19 [place]  Originating Site (patient location): [facility]  Distant Site (provider location): Remote Location  Mode of Communication: Video Conference via [employer]  Time of Service: Date: 05/23/2023 , Start: 09:00 end: 09:30

## 2023-05-23 NOTE — PLAN OF CARE
"  Problem: Adult Behavioral Health Plan of Care  Goal: Patient-Specific Goal (Individualization)  Description: Pt will be compliant with treatment team recommendations during hospitalization.   Pt will report adequate amounts of sleep on NOC shift (5-8 hours.)  Pt will participate in greater than 50% of daily groups.    Patient is restless, she paces with headphones on or marches in place when standing still to talk to staff. Affect is full range, mood is anxious. She is Baptist, talking about her connections to God and how she bases her decisions on if God would agree with them. She admits to headache pain, she received Tylenol 650 mg at 1547. She also admits to anxiety, she requested Atarax 25 mg at 1559 and Zyprexa 10 mg at 1836. She asked for a piece of Nicotine gum, but struggled with the decision to chew it, she chewed it for a few minutes then gave it back to staff. States \"I know I don't have a chip in my head, but I need to get the wiring in my head to believe that\". She is hoping to be able to discharge soon. Has been appropriate with staff and peers.     Patient states she is willing to try another medication for \"the voices, but I'm afraid to lose the good voice, but maybe my bradley is strong enough, maybe I'll try the Clozapine\". States she will discuss meds with her provider tomorrow.   2028-requested and accepted Melatonin 3 mg for sleep.   2057-requested and accepted Ativan 2 mg, Benadryl 50 mg, and Haldol 5 mg for intrusive thoughts of \"being the devil's child and having the sign of the beast on me\". States she is struggling to fall asleep because of the thoughts, was encouraged to use her coping skills, such as listening to music or writing the negative thoughts in a journal, patient in agreement.   Face to face end of shift report communicated to oncoming RN.     Aicha Duarte RN  5/23/2023  10:38 PM    Outcome: Progressing     Problem: Thought Process Alteration  Goal: Optimal Thought " Clarity  Description: Patient will have clear linear conversations while on the unit.  Outcome: Progressing   Goal Outcome Evaluation:    Plan of Care Reviewed With: patient

## 2023-05-23 NOTE — PLAN OF CARE
"  Problem: Adult Behavioral Health Plan of Care  Goal: Patient-Specific Goal (Individualization)  Description: Pt will be compliant with treatment team recommendations during hospitalization.   Pt will report adequate amounts of sleep on NOC shift (5-8 hours.)  Pt will participate in greater than 50% of daily groups.    0030-patient in bed with eyes closed and regular respirations noted.  0340-patient awake in the lounge for a snack and a Chloraseptic lozenge, then went back to bed.  0420-requested and accepted Atarax 25 mg for anxiety. She went back to bed.   0501-requested and accepted Zyprexa 10 mg, states \"the voices are getting loud, I just have to have bradley in God\".  0600-patient slept about 4 hours last night.  Face to face end of shift report communicated to oncoming RN.     Aicha Duarte RN  5/23/2023  6:06 AM          Outcome: Progressing   Goal Outcome Evaluation:    Plan of Care Reviewed With: patient                   "

## 2023-05-23 NOTE — PLAN OF CARE
"  Problem: Adult Behavioral Health Plan of Care  Goal: Patient-Specific Goal (Individualization)  Description: Pt will be compliant with treatment team recommendations during hospitalization.   Pt will report adequate amounts of sleep on NOC shift (5-8 hours.)  Pt will participate in greater than 50% of daily groups.    2345-patient in lounge for a snack and Chloraseptic lozenge. States she is having difficulty falling asleep, states \"I think it's because I have a room mate\". She ate and went back to her room.     Outcome: Progressing   Goal Outcome Evaluation:    Plan of Care Reviewed With: patient                   "

## 2023-05-23 NOTE — PLAN OF CARE
Problem: Adult Behavioral Health Plan of Care  Goal: Patient-Specific Goal (Individualization)  Description: Pt will be compliant with treatment team recommendations during hospitalization.   Pt will report adequate amounts of sleep on NOC shift (5-8 hours.)  Pt will participate in greater than 50% of daily groups.    Outcome: Progressing     Pt up in lounge at the start of the shift. Pt asked to sign a 12 hour intent to leave this morning. Pt talked to provider after signing the 12 hour intent, pt did not want to be put on a 72 hour hold and asked to rescind the request. Pt signed the paperwork to rescind request. Pt states she feels trapped here and wants to go home. Pt is craving a cigerette and doesn't think not being able to go outside is doing her any good.       Pt asked nursing for a B52 before lunch, pt denied anxiety or voices but stated she is just having a hard time being here. Encouraged pt to go to groups and use coping skills, pt stated that she would try and agreed that she would be fine without a prn. Offered hydroxyzine but pt refused and stated she didn't need it.           Problem: Thought Process Alteration  Goal: Optimal Thought Clarity  Description: Patient will have clear linear conversations while on the unit.  Outcome: Progressing   Goal Outcome Evaluation:     Plan of Care Reviewed With: patient            Face to face end of shift report communicated to evening shift RN.     Danica Higuera, RN  5/23/2023  7:56 AM

## 2023-05-24 PROCEDURE — 99232 SBSQ HOSP IP/OBS MODERATE 35: CPT | Mod: GT | Performed by: PSYCHIATRY & NEUROLOGY

## 2023-05-24 PROCEDURE — 250N000013 HC RX MED GY IP 250 OP 250 PS 637: Performed by: PSYCHIATRY & NEUROLOGY

## 2023-05-24 PROCEDURE — 250N000013 HC RX MED GY IP 250 OP 250 PS 637: Performed by: STUDENT IN AN ORGANIZED HEALTH CARE EDUCATION/TRAINING PROGRAM

## 2023-05-24 PROCEDURE — 124N000001 HC R&B MH

## 2023-05-24 RX ADMIN — HALOPERIDOL 5 MG: 5 TABLET ORAL at 20:08

## 2023-05-24 RX ADMIN — LORAZEPAM 2 MG: 1 TABLET ORAL at 19:00

## 2023-05-24 RX ADMIN — PROPRANOLOL HYDROCHLORIDE 20 MG: 20 TABLET ORAL at 20:08

## 2023-05-24 RX ADMIN — DIPHENHYDRAMINE HYDROCHLORIDE 50 MG: 50 CAPSULE ORAL at 18:59

## 2023-05-24 RX ADMIN — ATOMOXETINE 40 MG: 40 CAPSULE ORAL at 08:28

## 2023-05-24 RX ADMIN — BENZTROPINE MESYLATE 1 MG: 1 TABLET ORAL at 14:23

## 2023-05-24 RX ADMIN — NICOTINE POLACRILEX 4 MG: 4 GUM, CHEWING ORAL at 16:08

## 2023-05-24 RX ADMIN — HALOPERIDOL 5 MG: 5 TABLET ORAL at 18:59

## 2023-05-24 RX ADMIN — PROPRANOLOL HYDROCHLORIDE 20 MG: 20 TABLET ORAL at 08:28

## 2023-05-24 RX ADMIN — HALOPERIDOL 5 MG: 5 TABLET ORAL at 08:29

## 2023-05-24 RX ADMIN — HALOPERIDOL 5 MG: 5 TABLET ORAL at 14:23

## 2023-05-24 RX ADMIN — NICOTINE POLACRILEX 4 MG: 4 GUM, CHEWING ORAL at 19:36

## 2023-05-24 RX ADMIN — NICOTINE POLACRILEX 4 MG: 4 GUM, CHEWING ORAL at 09:25

## 2023-05-24 RX ADMIN — OLANZAPINE 10 MG: 10 TABLET, FILM COATED ORAL at 16:14

## 2023-05-24 RX ADMIN — BENZTROPINE MESYLATE 1 MG: 1 TABLET ORAL at 20:08

## 2023-05-24 RX ADMIN — MELATONIN 3 MG: at 20:09

## 2023-05-24 RX ADMIN — BENZTROPINE MESYLATE 1 MG: 1 TABLET ORAL at 08:28

## 2023-05-24 RX ADMIN — OLANZAPINE 10 MG: 10 TABLET, FILM COATED ORAL at 09:24

## 2023-05-24 ASSESSMENT — ACTIVITIES OF DAILY LIVING (ADL)
ORAL_HYGIENE: INDEPENDENT
ORAL_HYGIENE: INDEPENDENT
ADLS_ACUITY_SCORE: 28
LAUNDRY: UNABLE TO COMPLETE
ADLS_ACUITY_SCORE: 28
DRESS: SCRUBS (BEHAVIORAL HEALTH)
DRESS: SCRUBS (BEHAVIORAL HEALTH);INDEPENDENT
ADLS_ACUITY_SCORE: 28
HYGIENE/GROOMING: INDEPENDENT
ADLS_ACUITY_SCORE: 28
HYGIENE/GROOMING: INDEPENDENT

## 2023-05-24 NOTE — PLAN OF CARE
Problem: Adult Behavioral Health Plan of Care  Goal: Patient-Specific Goal (Individualization)  Description: Pt will be compliant with treatment team recommendations during hospitalization.   Pt will report adequate amounts of sleep on NOC shift (5-8 hours.)  Pt will participate in greater than 50% of daily groups.        Note: Report received from PATI Holcomb.  Rounding complete.  Pt observed sleeping with regular and unlabored respirations.      Pt has been in bed with eyes closed and regular respirations.  15 minute and PRN checks all night.  No complaints offered.  Will continue to monitor.     Face to face end of shift communicated to oncoming PATI.     Ana NARANJO  May 24, 2023  5:40 AM     Goal Outcome Evaluation:

## 2023-05-24 NOTE — PROGRESS NOTES
Pt had their phone interview with [employer] this morning. She states it went well and requests to continue to peruse IRTS instead of CD treatment.

## 2023-05-24 NOTE — PLAN OF CARE
"  Problem: Adult Behavioral Health Plan of Care  Goal: Patient-Specific Goal (Individualization)  Description: Pt will be compliant with treatment team recommendations during hospitalization.   Pt will report adequate amounts of sleep on NOC shift (5-8 hours.)  Pt will participate in greater than 50% of daily groups.    Outcome: Progressing     Pt in bed at the start of shift. Pt up for breakfast in the lounge. Pt made several comments to peers asking if they believe in god and if she is marked by the devil. Pt asked for zyprexa and as nursing was pulling it up in the computer, pt changed her mind and stated \"nevermind, I don't need it\". Pt appeared as if she were struggling. Pt asked nursing to walk with her. Pt talked about her negative thoughts about other people not believing in jeremias being evil. Discussed that the thoughts are caused by her mental illness and taking her medications can be helpful. Pt agreed to take the zyprexa 10mg at 0924.           Problem: Thought Process Alteration  Goal: Optimal Thought Clarity  Description: Patient will have clear linear conversations while on the unit.  Outcome: Progressing   Goal Outcome Evaluation:    Plan of Care Reviewed With: patient        Face to face end of shift report communicated to evening shift RN.     Danica Higuera RN  5/24/2023  7:35 AM                "

## 2023-05-24 NOTE — PLAN OF CARE
"  Problem: Adult Behavioral Health Plan of Care  Goal: Patient-Specific Goal (Individualization)  Description: Pt will be compliant with treatment team recommendations during hospitalization.   Pt will report adequate amounts of sleep on NOC shift (5-8 hours.)  Pt will participate in greater than 50% of daily groups.        Outcome: Progressing     Problem: Thought Process Alteration  Goal: Optimal Thought Clarity  Description: Patient will have clear linear conversations while on the unit.  Outcome: Progressing   Goal Outcome Evaluation:     Plan of Care Reviewed With: patient         Pt. Has been up and about on unit, slept 7 hours last night, taking prescribed medications, compliant with treatment team recommendations, is able to make needs be known, is religiously preoccupied, making frequent Baptist statements, spending time in dayroom or in own room, will continue to monitor progress.  1614-  Pt. Requested/received zyprexa 10 mg po for \"negative thoughts\".  1900-  Pt. Requested/received haldol 5 mg po. Ativan 2 mg po and benadryl 50 mg po for \"increasing negative thoughts\".  2109-  Pt. Requested/received melatonin 3 mg po for sleep.    Face to face end of shift report will be communicated to oncoming night shift RN.     Yandy Brown RN  5/24/2023  6:50 PM               "

## 2023-05-24 NOTE — PROGRESS NOTES
".z  [facility] PSYCHIATRY  PROGRESS NOTE     SUBJECTIVE     Prior to interviewing the patient, I met with nursing and reviewed patient's clinical condition. We discussed clinical care both before and after the interview. I have reviewed the patient's clinical course by review of records including previous notes, labs, and vital signs.     Per nursing, the patient had the following behavioral events over the last 24-hours: none.     On psychiatric interview, the patient was found in her room. She staes she has an interview this morning at an IRTS which she is looking forward to. Sympathized that the process to find a safe disposition at times is long.  She denies SI. She states she feels her haloperidol is working well. Denies any side effects.          MEDICATIONS   Scheduled Meds:    atomoxetine  40 mg Oral Daily     benztropine  1 mg Oral TID     haloperidol  5 mg Oral TID     [START ON 6/2/2023] haloperidol decanoate  200 mg Intramuscular Q28 Days     propranolol  20 mg Oral BID     PRN Meds:.acetaminophen, alum & mag hydroxide-simethicone, sore throat, haloperidol **AND** LORazepam **AND** diphenhydrAMINE, haloperidol lactate **AND** LORazepam **AND** diphenhydrAMINE, hydrOXYzine, melatonin, nicotine, OLANZapine **OR** OLANZapine, polyethylene glycol     ALLERGIES   Allergies   Allergen Reactions     Seasonal Allergies         MENTAL STATUS EXAM   Vitals: /76   Pulse 91   Temp 97.6  F (36.4  C) (Tympanic)   Resp 20   Ht 1.676 m (5' 6\")   Wt 109.7 kg (241 lb 12.8 oz)   SpO2 97%   BMI 39.03 kg/m      Appearance: Alert, oriented, dressed in hospital scrubs  Attitude: Cooperative   Eye Contact: Good  Mood: \"good\"  Affect:euthymic   Speech: Normal rate and rhythm   Psychomotor Behavior: No TD or rigidity. Less akathisia   Thought Process: Linear, some tangentiality   Associations: No loose associations   Thought Content: Denies SI, plan, or SIB. AH improving. Delusional thought regarding chip " implants not present today; ideas of reference, somatic passivity improved   Insight: Limited  Judgment: Fair  Oriented to: Person, place, and time  Attention Span and Concentration: Intact  Recent and Remote Memory: Intact  Language: English with appropriate syntax and vocabulary  Fund of Knowledge: Average   Muscle Strength and Tone: Grossly normal  Gait and Station: Grossly normal       LABS   No results found for this or any previous visit (from the past 24 hour(s)).      CARMELLA Dunaway is a 22 year old female with a past psychiatric history notable for schizoaffective disorder versus substance induced psychosis, polysubstance dependence, ADHD. Multiple prior inpatient psychiatric hospitalizations. Prior SA. Multiple prior CD treatments.  Recently hospitalized here at [facility] and dismissed on 5/10/23.  Within 24 hours, was back in an emergency department and then dismissed and then re-presented again to an emergency department grossly psychotic and disorganized.  Was supposed to be attending CD treatment at [facility]. Patient was subsequently transferred and accepted for inpatient psychiatric hospitalization at [facility] for further safety and further stabilization.    Today: continue haldol at 5 mg TID. Agreeable to IRTS referral. Interview today.        DIAGNOSES     #schizoaffective disorder, bipolar type, multiple episodes, in acute episode  #ADHD  #Opioid Use Disorder  #Stimulant Use Disorder       PLAN     Location: Unit 5  Legal Status: Orders Placed This Encounter      Voluntary    Safety Assessment:    Behavioral Orders   Procedures     Code 1 - Restrict to Unit     Routine Programming     As clinically indicated     Status 15     Every 15 minutes.      PTA medications held:   -none     PTA medications continued/changed:   -haldol decanoate 200 mg AMBRIZ every 28 days, next injection on 6/2    -haldol 5 mg PRN switched to Haldol/Benadryl/Ativan prn   -cogentin 0.5 mg TID -> increased to 1.0 mg TID to address akathisia      New medications initiated:   -haldol 5 mg at bedtime as of 5/18 -> 5 mg BID as of 5/19  -propranolol 10 mg TID to address akathisia -> 20 mg BID on 5/18     Today's Changes:  continue haldol to 5 mg TID    Future considerations: Increase AMBRIZ to 300 mg every 28 days and stop oral Haldol if changes with Haldol are sufficient     Programming: Patient will be treated in a therapeutic milieu with appropriate individual and group therapies. Education will be provided on diagnoses, medications, and treatments.     Medical diagnoses:  Per medicine    Consult: None  Tests: None    Anticipated LOS: > 7 days   Disposition: CD treatment versus IRTS        ATTESTATION      Sam Coombs MD  [facility]   Psychiatry    Video Visit: Patient has given verbal consent for video visit?: Yes  Type of Service: video visit for mental health treatment  Reason for Video Visit: COVID-[place]  Originating Site (patient location): [facility]  Distant Site (provider location): Remote Location  Mode of Communication: Video Conference via Cit[employer]  Time of Service: Date: 05/24/2023 , Start: 10:00 end: 10:30

## 2023-05-25 PROCEDURE — 250N000013 HC RX MED GY IP 250 OP 250 PS 637: Performed by: STUDENT IN AN ORGANIZED HEALTH CARE EDUCATION/TRAINING PROGRAM

## 2023-05-25 PROCEDURE — 250N000013 HC RX MED GY IP 250 OP 250 PS 637: Performed by: PSYCHIATRY & NEUROLOGY

## 2023-05-25 PROCEDURE — 124N000001 HC R&B MH

## 2023-05-25 PROCEDURE — 99232 SBSQ HOSP IP/OBS MODERATE 35: CPT | Mod: GT | Performed by: PSYCHIATRY & NEUROLOGY

## 2023-05-25 RX ADMIN — HALOPERIDOL 5 MG: 5 TABLET ORAL at 08:35

## 2023-05-25 RX ADMIN — BENZTROPINE MESYLATE 1 MG: 1 TABLET ORAL at 20:17

## 2023-05-25 RX ADMIN — OLANZAPINE 10 MG: 10 TABLET, FILM COATED ORAL at 10:20

## 2023-05-25 RX ADMIN — PROPRANOLOL HYDROCHLORIDE 20 MG: 20 TABLET ORAL at 20:17

## 2023-05-25 RX ADMIN — OLANZAPINE 10 MG: 10 TABLET, FILM COATED ORAL at 21:12

## 2023-05-25 RX ADMIN — BENZTROPINE MESYLATE 1 MG: 1 TABLET ORAL at 13:32

## 2023-05-25 RX ADMIN — HALOPERIDOL 5 MG: 5 TABLET ORAL at 13:32

## 2023-05-25 RX ADMIN — ATOMOXETINE 40 MG: 40 CAPSULE ORAL at 08:34

## 2023-05-25 RX ADMIN — NICOTINE POLACRILEX 4 MG: 4 GUM, CHEWING ORAL at 10:12

## 2023-05-25 RX ADMIN — MELATONIN 3 MG: at 20:17

## 2023-05-25 RX ADMIN — BENZTROPINE MESYLATE 1 MG: 1 TABLET ORAL at 08:34

## 2023-05-25 RX ADMIN — PROPRANOLOL HYDROCHLORIDE 20 MG: 20 TABLET ORAL at 08:35

## 2023-05-25 RX ADMIN — HALOPERIDOL 5 MG: 5 TABLET ORAL at 20:17

## 2023-05-25 ASSESSMENT — ACTIVITIES OF DAILY LIVING (ADL)
ADLS_ACUITY_SCORE: 28
ADLS_ACUITY_SCORE: 28
LAUNDRY: UNABLE TO COMPLETE
HYGIENE/GROOMING: INDEPENDENT
ADLS_ACUITY_SCORE: 28
DRESS: INDEPENDENT;SCRUBS (BEHAVIORAL HEALTH)
ADLS_ACUITY_SCORE: 28
ADLS_ACUITY_SCORE: 28
HYGIENE/GROOMING: INDEPENDENT
ORAL_HYGIENE: INDEPENDENT
ADLS_ACUITY_SCORE: 28
ORAL_HYGIENE: INDEPENDENT
ADLS_ACUITY_SCORE: 28
DRESS: SCRUBS (BEHAVIORAL HEALTH);INDEPENDENT

## 2023-05-25 NOTE — PLAN OF CARE
"  Problem: Adult Behavioral Health Plan of Care  Goal: Patient-Specific Goal (Individualization)  Description: Pt will be compliant with treatment team recommendations during hospitalization.   Pt will report adequate amounts of sleep on NOC shift (5-8 hours.)  Pt will participate in greater than 50% of daily groups    Outcome: Progressing     Pt in bed at the start of the shift. Pt woke up for breakfast in lounge. Pt appears anxious this shift, up at nurses station multiple times asking for snacks, meds, various items. Pt wanted pudding after breakfast then said \"no, I'm not hungry, I just ate breakfast\". Pt states she is just bored. Pt up to ask for tylenol, when asked about her pain, pt stated \"I don't need it \" and walked away. Pt asked for a yoga mat so she could do sit ups in her room.    Pt asking  questions about discharge several times. Pt impatient about discharge and is focused.     Pt asked for zyprexa stating she has anxiety. Pt given zyprexa 10mg a 1020.     Pt ate lunch in the lounge, socialized with peers. Pt napped in the afternoon.           Problem: Thought Process Alteration  Goal: Optimal Thought Clarity  Description: Patient will have clear linear conversations while on the unit.  Outcome: Progressing   Goal Outcome Evaluation:    Plan of Care Reviewed With: patient          Face to face end of shift report communicated to evening shift RN.     Danica Higurea RN  5/25/2023  8:05 AM              "

## 2023-05-25 NOTE — PROGRESS NOTES
".z  [facility] PSYCHIATRY  PROGRESS NOTE     SUBJECTIVE     Prior to interviewing the patient, I met with nursing and reviewed patient's clinical condition. We discussed clinical care both before and after the interview. I have reviewed the patient's clinical course by review of records including previous notes, labs, and vital signs.     Per nursing, the patient had the following behavioral events over the last 24-hours: requested PRN medications.     On psychiatric interview, the patient was found in the milieu. She states her IRTS interview yesterday went \"good\". She is hopeful she will get a bed soon. She is not able to describe why she requested PRN medications last night. She feels her meds are \"good, no changes\".  She is aware that haldol dec is due on 6/2/23 and that it will be an increased dose. She reports no difficulties with peers.          MEDICATIONS   Scheduled Meds:    atomoxetine  40 mg Oral Daily     benztropine  1 mg Oral TID     haloperidol  5 mg Oral TID     [START ON 6/2/2023] haloperidol decanoate  200 mg Intramuscular Q28 Days     propranolol  20 mg Oral BID     PRN Meds:.acetaminophen, alum & mag hydroxide-simethicone, sore throat, haloperidol **AND** LORazepam **AND** diphenhydrAMINE, haloperidol lactate **AND** LORazepam **AND** diphenhydrAMINE, hydrOXYzine, melatonin, nicotine, OLANZapine **OR** OLANZapine, polyethylene glycol     ALLERGIES   Allergies   Allergen Reactions     Seasonal Allergies         MENTAL STATUS EXAM   Vitals: /50   Pulse 79   Temp 98.6  F (37  C) (Tympanic)   Resp 14   Ht 1.676 m (5' 6\")   Wt 109.7 kg (241 lb 12.8 oz)   SpO2 95%   BMI 39.03 kg/m      Appearance: Alert, oriented, dressed in hospital scrubs  Attitude: Cooperative   Eye Contact: Good  Mood: \"alright\"  Affect:euthymic   Speech: Normal rate and rhythm   Psychomotor Behavior: No TD or rigidity. Less akathisia   Thought Process: Linear, some tangentiality   Associations: No loose " associations   Thought Content: Denies SI, plan, or SIB. AH improving. Delusional thought regarding chip implants not present today; ideas of reference, somatic passivity improved   Insight: Limited  Judgment: Fair  Oriented to: Person, place, and time  Attention Span and Concentration: Intact  Recent and Remote Memory: Intact  Language: English with appropriate syntax and vocabulary  Fund of Knowledge: Average   Muscle Strength and Tone: Grossly normal  Gait and Station: Grossly normal       LABS   No results found for this or any previous visit (from the past 24 hour(s)).      CARMELLA Dunaway is a 22 year old female with a past psychiatric history notable for schizoaffective disorder versus substance induced psychosis, polysubstance dependence, ADHD. Multiple prior inpatient psychiatric hospitalizations. Prior SA. Multiple prior CD treatments.  Recently hospitalized here at [facility] and dismissed on 5/10/23.  Within 24 hours, was back in an emergency department and then dismissed and then re-presented again to an emergency department grossly psychotic and disorganized.  Was supposed to be attending CD treatment at [facility]. Patient was subsequently transferred and accepted for inpatient psychiatric hospitalization at [facility] for further safety and further stabilization.    Today: reports her IRTS interview went well yesterday. We await for a safe dispo       DIAGNOSES     #schizoaffective disorder, bipolar type, multiple episodes, in acute episode  #ADHD  #Opioid Use Disorder  #Stimulant Use Disorder       PLAN     Location: Unit 5  Legal Status: Orders Placed This Encounter      Voluntary    Safety Assessment:    Behavioral Orders   Procedures     Code 1 - Restrict to Unit     Routine Programming     As clinically indicated     Status 15     Every 15 minutes.      PTA medications held:    -none     PTA medications continued/changed:   -haldol decanoate 200 mg AMBRIZ every 28 days, next injection on 6/2   -haldol 5 mg PRN switched to Haldol/Benadryl/Ativan prn   -cogentin 0.5 mg TID -> increased to 1.0 mg TID to address akathisia      New medications initiated:   -haldol 5 mg at bedtime as of 5/18 -> 5 mg BID as of 5/19  -propranolol 10 mg TID to address akathisia -> 20 mg BID on 5/18     Today's Changes:  continue haldol to 5 mg TID    Future considerations: Increase AMBRIZ to 300 mg every 28 days and stop oral Haldol if changes with Haldol are sufficient     Programming: Patient will be treated in a therapeutic milieu with appropriate individual and group therapies. Education will be provided on diagnoses, medications, and treatments.     Medical diagnoses:  Per medicine    Consult: None  Tests: None    Anticipated LOS: > 7 days   Disposition: CD treatment versus IRTS        ATTESTATION      Sam Coombs MD  [facility]   Psychiatry    Video Visit: Patient has given verbal consent for video visit?: Yes  Type of Service: video visit for mental health treatment  Reason for Video Visit: COVID-19 [place]  Originating Site (patient location): [facility]  Distant Site (provider location): Remote Location  Mode of Communication: Video Conference via [employer]  Time of Service: Date: 05/25/2023 , Start: 10:00 end: 10:30

## 2023-05-25 NOTE — PLAN OF CARE
Problem: Adult Behavioral Health Plan of Care  Goal: Patient-Specific Goal (Individualization)  Description: Pt will be compliant with treatment team recommendations during hospitalization.   Pt will report adequate amounts of sleep on NOC shift (5-8 hours.)  Pt will participate in greater than 50% of daily groups.        Outcome: Progressing     Patient slept through the night with regular respirations and position changes noted.  Face to face end of shift report communicated to day shift RN.     Cadence Padilla RN  5/25/2023  6:27 AM

## 2023-05-25 NOTE — PROGRESS NOTES
Spoke with [facility] and they state the interview went well and the pt is on 3 different wait lists. No anticipated date.     When speaking with pt today she states she would like to continue with the wait list for IRTS instead of CD treatment.

## 2023-05-26 PROCEDURE — 99232 SBSQ HOSP IP/OBS MODERATE 35: CPT | Mod: GT | Performed by: PSYCHIATRY & NEUROLOGY

## 2023-05-26 PROCEDURE — 250N000013 HC RX MED GY IP 250 OP 250 PS 637: Performed by: STUDENT IN AN ORGANIZED HEALTH CARE EDUCATION/TRAINING PROGRAM

## 2023-05-26 PROCEDURE — 250N000013 HC RX MED GY IP 250 OP 250 PS 637: Performed by: PSYCHIATRY & NEUROLOGY

## 2023-05-26 PROCEDURE — 124N000001 HC R&B MH

## 2023-05-26 RX ADMIN — NICOTINE POLACRILEX 4 MG: 4 GUM, CHEWING ORAL at 15:51

## 2023-05-26 RX ADMIN — BENZTROPINE MESYLATE 1 MG: 1 TABLET ORAL at 08:15

## 2023-05-26 RX ADMIN — PROPRANOLOL HYDROCHLORIDE 20 MG: 20 TABLET ORAL at 20:03

## 2023-05-26 RX ADMIN — HYDROXYZINE HYDROCHLORIDE 25 MG: 25 TABLET, FILM COATED ORAL at 21:18

## 2023-05-26 RX ADMIN — PROPRANOLOL HYDROCHLORIDE 20 MG: 20 TABLET ORAL at 08:14

## 2023-05-26 RX ADMIN — BENZTROPINE MESYLATE 1 MG: 1 TABLET ORAL at 13:11

## 2023-05-26 RX ADMIN — NICOTINE POLACRILEX 4 MG: 4 GUM, CHEWING ORAL at 13:54

## 2023-05-26 RX ADMIN — HALOPERIDOL 5 MG: 5 TABLET ORAL at 20:03

## 2023-05-26 RX ADMIN — MELATONIN 3 MG: at 20:03

## 2023-05-26 RX ADMIN — HALOPERIDOL 5 MG: 5 TABLET ORAL at 13:11

## 2023-05-26 RX ADMIN — NICOTINE POLACRILEX 4 MG: 4 GUM, CHEWING ORAL at 17:30

## 2023-05-26 RX ADMIN — HALOPERIDOL 5 MG: 5 TABLET ORAL at 08:14

## 2023-05-26 RX ADMIN — ALUMINUM HYDROXIDE, MAGNESIUM HYDROXIDE, AND DIMETHICONE 30 ML: 200; 20; 200 SUSPENSION ORAL at 17:30

## 2023-05-26 RX ADMIN — NICOTINE POLACRILEX 4 MG: 4 GUM, CHEWING ORAL at 09:44

## 2023-05-26 RX ADMIN — NICOTINE POLACRILEX 4 MG: 4 GUM, CHEWING ORAL at 10:44

## 2023-05-26 RX ADMIN — ATOMOXETINE 40 MG: 40 CAPSULE ORAL at 08:15

## 2023-05-26 RX ADMIN — BENZTROPINE MESYLATE 1 MG: 1 TABLET ORAL at 20:03

## 2023-05-26 RX ADMIN — OLANZAPINE 10 MG: 10 TABLET, FILM COATED ORAL at 21:37

## 2023-05-26 RX ADMIN — NICOTINE POLACRILEX 4 MG: 4 GUM, CHEWING ORAL at 19:27

## 2023-05-26 RX ADMIN — NICOTINE POLACRILEX 4 MG: 4 GUM, CHEWING ORAL at 12:21

## 2023-05-26 ASSESSMENT — ACTIVITIES OF DAILY LIVING (ADL)
ADLS_ACUITY_SCORE: 28
ADLS_ACUITY_SCORE: 28
ORAL_HYGIENE: INDEPENDENT
ADLS_ACUITY_SCORE: 28
DRESS: SCRUBS (BEHAVIORAL HEALTH)
ADLS_ACUITY_SCORE: 28
ADLS_ACUITY_SCORE: 28
LAUNDRY: UNABLE TO COMPLETE
HYGIENE/GROOMING: INDEPENDENT

## 2023-05-26 NOTE — PROGRESS NOTES
Pt met with their case manger this afternoon. Veronica states she would like the pt to focus on CD treatment instead of IRTS. Explained to Veronica that referrals had been sent and denied due to pt needing more mental health stabilization. She is requesting Co-occurring placement. Reminded Veronica that the pt is voluntary and had requested IRTS and awaiting placement soon by one of them and to start over would have the pt here for another month, so we would be sticking with IRTS.

## 2023-05-26 NOTE — PLAN OF CARE
Problem: Adult Behavioral Health Plan of Care  Goal: Patient-Specific Goal (Individualization)  Description: Pt will be compliant with treatment team recommendations during hospitalization.   Pt will report adequate amounts of sleep on NOC shift (5-8 hours.)  Pt will participate in greater than 50% of daily groups.        Outcome: Progressing     Patient slept through the night.  In bed all shift with regular respirations and position changes noted.  Face to face end of shift report communicated to day shift RN.     Cadence Padilla RN  5/26/2023  6:10 AM

## 2023-05-26 NOTE — PLAN OF CARE
"Problem: Adult Behavioral Health Plan of Care  Goal: Patient-Specific Goal (Individualization)  Description: Pt will be compliant with treatment team recommendations during hospitalization.   Pt will report adequate amounts of sleep on NOC shift (5-8 hours.)  Pt will participate in greater than 50% of daily groups.  Outcome: Progressing  Note: On two different ocassions tonight, pt came up to the nurses station and stated \"can I have Zyprexa?\". Seconds later, pt stated \"nevermind, I don't need it.\" Pt informed writer that she wants to use her \"coping skills\" instead. When pt was asked what her coping skills were, she stated \"praying. I know God has me.\" Pt admitted to , \"negative voices.\" Pt did not want to share what the voices were saying with writer. Pt ate 75% of supper. She was compliant with her scheduled medications.     2017 - Pt requested and received PRN Melatonin for sleep.    2112 - Pt requested and received 10 mg of PRN Zyprexa for psychosis.     Face to face end of shift report communicated to oncoming RN.      Problem: Thought Process Alteration  Goal: Optimal Thought Clarity  Description: Patient will have clear linear conversations while on the unit.  Outcome: Progressing  Note: Pt was able to maintain a reality based conversation; however, she continues to have Mormonism preoccupation and AH.      Goal Outcome Evaluation:    Plan of Care Reviewed With: patient                   "

## 2023-05-26 NOTE — PLAN OF CARE
"  Problem: Adult Behavioral Health Plan of Care  Goal: Patient-Specific Goal (Individualization)  Description: Pt will be compliant with treatment team recommendations during hospitalization.   Pt will report adequate amounts of sleep on NOC shift (5-8 hours.)  Pt will participate in greater than 50% of daily groups.    Patient comes to nurse's station several times with requests, asking to speak to writer. States she is struggling, later states she is better, that \"Hood helps\". She likes to pace the unit. States \"I don't think I need the Clozapine now, I just need the katelin and mercy of God\". She showered.   Patient requesting to \"get rid of double entrees\".    Writer continued care to patient to next shift.     Patient is able to make her needs known. She social[facility] with her [facility] in the [facility]. She comes to the nurse's station to state \"I really want to go, when can I get out of here? I hope it's soon.  said the referrals would go out on Tuesday. I've been doing really good, and I want the notes to show that, I hope a good report is sent to them\".  1730-requested and accepted Maalox for indigestion.   2117-requested and accepted Atarax 25 mg for anxiety related to sleep.  2137-requested and accepted Zyprexa 10 mg for \"voices, they seem to get worse when I'm trying to sleep\".  Face to face end of shift report communicated to oncoming RN.     Aicha Duarte RN  5/26/2023  9:40 PM    Outcome: Progressing     Problem: Thought Process Alteration  Goal: Optimal Thought Clarity  Description: Patient will have clear linear conversations while on the unit.  Outcome: Progressing   Goal Outcome Evaluation:    Plan of Care Reviewed With: patient                   "

## 2023-05-26 NOTE — PLAN OF CARE
Problem: Adult Behavioral Health Plan of Care  Goal: Patient-Specific Goal (Individualization)  Description: Pt will be compliant with treatment team recommendations during hospitalization.   Pt will report adequate amounts of sleep on NOC shift (5-8 hours.)  Pt will participate in greater than 50% of daily groups.    Patient is pleasant and cooperative. Affect is flat, mood is calm. Speech is rambling. She admits to anxiety, states it is manageable, but that she wants to speak to her provider regarding starting Clozapine. States she sees demons in people's faces and hears negative voices in her head. She asks if she will lose the katelin of God by using Nicotine gum. She spends time on the open unit, attending groups and socializing with her peers.   Outcome: Progressing     Problem: Thought Process Alteration  Goal: Optimal Thought Clarity  Description: Patient will have clear linear conversations while on the unit.  Outcome: Progressing   Goal Outcome Evaluation:    Plan of Care Reviewed With: patient

## 2023-05-27 PROCEDURE — 250N000013 HC RX MED GY IP 250 OP 250 PS 637: Performed by: STUDENT IN AN ORGANIZED HEALTH CARE EDUCATION/TRAINING PROGRAM

## 2023-05-27 PROCEDURE — 124N000001 HC R&B MH

## 2023-05-27 PROCEDURE — 99232 SBSQ HOSP IP/OBS MODERATE 35: CPT | Mod: GT | Performed by: PSYCHIATRY & NEUROLOGY

## 2023-05-27 PROCEDURE — 250N000013 HC RX MED GY IP 250 OP 250 PS 637: Performed by: PSYCHIATRY & NEUROLOGY

## 2023-05-27 RX ADMIN — BENZTROPINE MESYLATE 1 MG: 1 TABLET ORAL at 20:04

## 2023-05-27 RX ADMIN — NICOTINE POLACRILEX 4 MG: 4 GUM, CHEWING ORAL at 15:32

## 2023-05-27 RX ADMIN — PROPRANOLOL HYDROCHLORIDE 20 MG: 20 TABLET ORAL at 08:30

## 2023-05-27 RX ADMIN — HALOPERIDOL 5 MG: 5 TABLET ORAL at 08:30

## 2023-05-27 RX ADMIN — NICOTINE POLACRILEX 4 MG: 4 GUM, CHEWING ORAL at 13:00

## 2023-05-27 RX ADMIN — MELATONIN 3 MG: at 20:04

## 2023-05-27 RX ADMIN — BENZTROPINE MESYLATE 1 MG: 1 TABLET ORAL at 14:28

## 2023-05-27 RX ADMIN — POLYETHYLENE GLYCOL 3350 17 G: 17 POWDER, FOR SOLUTION ORAL at 17:18

## 2023-05-27 RX ADMIN — BENZTROPINE MESYLATE 1 MG: 1 TABLET ORAL at 08:31

## 2023-05-27 RX ADMIN — NICOTINE POLACRILEX 4 MG: 4 GUM, CHEWING ORAL at 17:18

## 2023-05-27 RX ADMIN — ATOMOXETINE 40 MG: 40 CAPSULE ORAL at 08:30

## 2023-05-27 RX ADMIN — HALOPERIDOL 5 MG: 5 TABLET ORAL at 20:04

## 2023-05-27 RX ADMIN — NICOTINE POLACRILEX 4 MG: 4 GUM, CHEWING ORAL at 07:30

## 2023-05-27 RX ADMIN — PROPRANOLOL HYDROCHLORIDE 20 MG: 20 TABLET ORAL at 20:04

## 2023-05-27 RX ADMIN — NICOTINE POLACRILEX 4 MG: 4 GUM, CHEWING ORAL at 08:31

## 2023-05-27 RX ADMIN — HYDROXYZINE HYDROCHLORIDE 25 MG: 25 TABLET, FILM COATED ORAL at 20:04

## 2023-05-27 RX ADMIN — HYDROXYZINE HYDROCHLORIDE 25 MG: 25 TABLET, FILM COATED ORAL at 09:20

## 2023-05-27 RX ADMIN — ALUMINUM HYDROXIDE, MAGNESIUM HYDROXIDE, AND DIMETHICONE 30 ML: 200; 20; 200 SUSPENSION ORAL at 17:17

## 2023-05-27 RX ADMIN — HALOPERIDOL 5 MG: 5 TABLET ORAL at 14:28

## 2023-05-27 ASSESSMENT — ACTIVITIES OF DAILY LIVING (ADL)
ADLS_ACUITY_SCORE: 28
ADLS_ACUITY_SCORE: 28
HYGIENE/GROOMING: INDEPENDENT
ADLS_ACUITY_SCORE: 28
ADLS_ACUITY_SCORE: 28
DRESS: SCRUBS (BEHAVIORAL HEALTH)
ORAL_HYGIENE: INDEPENDENT
ADLS_ACUITY_SCORE: 28
ADLS_ACUITY_SCORE: 28
HYGIENE/GROOMING: INDEPENDENT
ADLS_ACUITY_SCORE: 28

## 2023-05-27 NOTE — PROGRESS NOTES
".z  [facility] PSYCHIATRY  PROGRESS NOTE     SUBJECTIVE     Prior to interviewing the patient, I met with nursing and reviewed patient's clinical condition. We discussed clinical care both before and after the interview. I have reviewed the patient's clinical course by review of records including previous notes, labs, and vital signs.     Per nursing, the patient had the following behavioral events over the last 24-hours: requested PRN medications.     On psychiatric interview, the patient was found in the milieu. She wonders today whether or not she should be on clozapine. We discussed radical acceptance today with her lingering psychotic symptoms. She long meet with  today. Denies SI.        MEDICATIONS   Scheduled Meds:    atomoxetine  40 mg Oral Daily     benztropine  1 mg Oral TID     haloperidol  5 mg Oral TID     [START ON 6/2/2023] haloperidol decanoate  200 mg Intramuscular Q28 Days     propranolol  20 mg Oral BID     PRN Meds:.acetaminophen, alum & mag hydroxide-simethicone, sore throat, haloperidol **AND** LORazepam **AND** diphenhydrAMINE, haloperidol lactate **AND** LORazepam **AND** diphenhydrAMINE, hydrOXYzine, melatonin, nicotine, OLANZapine **OR** OLANZapine, polyethylene glycol     ALLERGIES   Allergies   Allergen Reactions     Seasonal Allergies         MENTAL STATUS EXAM   Vitals: /64   Pulse 74   Temp 98.6  F (37  C) (Tympanic)   Resp 14   Ht 1.676 m (5' 6\")   Wt 109.7 kg (241 lb 12.8 oz)   SpO2 98%   BMI 39.03 kg/m      Appearance: Alert, oriented, dressed in hospital scrubs  Attitude: Cooperative   Eye Contact: Good  Mood: \"good\"  Affect:euthymic   Speech: Normal rate and rhythm   Psychomotor Behavior: No TD or rigidity. Less akathisia   Thought Process: Linear, some tangentiality   Associations: No loose associations   Thought Content: Denies SI, plan, or SIB. AH improving. Delusional thought regarding chip implants not present today; ideas of reference, somatic " passivity improved   Insight: Limited  Judgment: Fair  Oriented to: Person, place, and time  Attention Span and Concentration: Intact  Recent and Remote Memory: Intact  Language: English with appropriate syntax and vocabulary  Fund of Knowledge: Average   Muscle Strength and Tone: Grossly normal  Gait and Station: Grossly normal       LABS   No results found for this or any previous visit (from the past 24 hour(s)).      CARMELLA Dunaway is a 22 year old female with a past psychiatric history notable for schizoaffective disorder versus substance induced psychosis, polysubstance dependence, ADHD. Multiple prior inpatient psychiatric hospitalizations. Prior SA. Multiple prior CD treatments.  Recently hospitalized here at [facility] and dismissed on 5/10/23.  Within 24 hours, was back in an emergency department and then dismissed and then re-presented again to an emergency department grossly psychotic and disorganized.  Was supposed to be attending CD treatment at [facility]. Patient was subsequently transferred and accepted for inpatient psychiatric hospitalization at [facility] for further safety and further stabilization.    Today: IRTS referrals appropriate. We await a safe disposition. Requested to change to clozapine. Will hold off on doing this given the stability we have noticed with haloperidol. She willingly agrees to take haldol dec, next shot due 6/2/23       DIAGNOSES     #schizoaffective disorder, bipolar type, multiple episodes, in acute episode  #ADHD  #Opioid Use Disorder  #Stimulant Use Disorder       PLAN     Location: Unit 5  Legal Status: Orders Placed This Encounter      Voluntary    Safety Assessment:    Behavioral Orders   Procedures     Code 1 - Restrict to Unit     Routine Programming     As clinically indicated     Status 15     Every 15 minutes.      PTA medications held:    -none     PTA medications continued/changed:   -haldol decanoate 200 mg AMBRIZ every 28 days, next injection on 6/2   -haldol 5 mg PRN switched to Haldol/Benadryl/Ativan prn   -cogentin 0.5 mg TID -> increased to 1.0 mg TID to address akathisia      New medications initiated:   -haldol 5 mg at bedtime as of 5/18 -> 5 mg BID as of 5/19  -propranolol 10 mg TID to address akathisia -> 20 mg BID on 5/18     Today's Changes:  continue haldol to 5 mg TID    Future considerations: Increase AMBRIZ to 300 mg every 28 days and stop oral Haldol if changes with Haldol are sufficient     Programming: Patient will be treated in a therapeutic milieu with appropriate individual and group therapies. Education will be provided on diagnoses, medications, and treatments.     Medical diagnoses:  Per medicine    Consult: None  Tests: None    Anticipated LOS: > 7 days   Disposition: CD treatment versus IRTS        ATTESTATION      Sam Coombs MD  [facility]   Psychiatry    Video Visit: Patient has given verbal consent for video visit?: Yes  Type of Service: video visit for mental health treatment  Reason for Video Visit: COVID-19 [place]  Originating Site (patient location): [facility]  Distant Site (provider location): Remote Location  Mode of Communication: Video Conference via [employer]  Time of Service: Date: 05/26/2023 , Start: 10:00 end: 10:30

## 2023-05-27 NOTE — PLAN OF CARE
"  Problem: Adult Behavioral Health Plan of Care  Goal: Patient-Specific Goal (Individualization)  Description: Pt will be compliant with treatment team recommendations during hospitalization.   Pt will report adequate amounts of sleep on NOC shift (5-8 hours.)  Pt will participate in greater than 50% of daily groups.      Outcome: Progressing    A&O, VSS, denies pain.  Akathisia noted: pacing in halls, marching in place while engaging in conversation or talking on telephone--unable to be still.   Endorses boredom-frequently seeking medications interactions with staff. Redirection with minimal results. Pt encouraged to work on puzzles, read and attend groups.   Remains religiously preoccupied/focused. Asks this writer if God will be mad if she has nicotine gum. States \"I'm human, so I'm flawed and will sin because only Hood is perfect right?\"  Pt states she is ready to discharge. This writer challenges her by asking why she has asked for prn medication twice after just receiving morning medications (rec'd prn hydroxyzine and is requesting prn B-52 within same hour of receiving scheduled morning med's). Pt states \"I know I'm bored\"--requests pudding and crackers after working on puzzle for two minutes.    Denies : SI, SIB, or HI.    Care of pt continues to evening shift:    Pt repeatedly asks if she will have \"good notes\" in her chart. Pt states she has taken prn medications to help her with boredom or used instead of \"coping skills\". Asks this writer if that will make it harder to get accepted into a facility. Explain to pt that prn medications are there to help if she needs the prn after trying coping skills or talking with staff. Knowing when you need prn and requesting it are good and will not be \"held against\" her. Pt remains religiously preoccupied.  Focused on discharging-wants to discharge to apartment.   Affect flat and depressed this evening. Pt noted walking in halls putting hands up to cover ears. When " asked if anything wrong pt pauses and states no.   Cooperative with staff. Interactions with peers appropriate.     Problem: Thought Process Alteration  Goal: Optimal Thought Clarity  Description: Patient will have clear linear conversations while on the unit.  Outcome: Progressing   Goal Outcome Evaluation:    Plan of Care Reviewed With: patient          Face to face end of shift report communicated to oncoming shift.     Rola Johnston RN  5/27/2023  10:54 AM

## 2023-05-27 NOTE — PROGRESS NOTES
".z  [facility] PSYCHIATRY  PROGRESS NOTE     SUBJECTIVE     Prior to interviewing the patient, I met with nursing and reviewed patient's clinical condition. We discussed clinical care both before and after the interview. I have reviewed the patient's clinical course by review of records including previous notes, labs, and vital signs.     Per nursing, the patient had the following behavioral events over the last 24-hours:none.    On psychiatric interview, she states \"I am having a really great day\". She denies any side effects from her medications. Is aware referrals are out. Encouraged to go to groups. Does not report any distress from [facility] today.        MEDICATIONS   Scheduled Meds:    atomoxetine  40 mg Oral Daily     benztropine  1 mg Oral TID     haloperidol  5 mg Oral TID     [START ON 6/2/2023] haloperidol decanoate  200 mg Intramuscular Q28 Days     propranolol  20 mg Oral BID     PRN Meds:.acetaminophen, alum & mag hydroxide-simethicone, sore throat, haloperidol **AND** LORazepam **AND** diphenhydrAMINE, haloperidol lactate **AND** LORazepam **AND** diphenhydrAMINE, hydrOXYzine, melatonin, nicotine, OLANZapine **OR** OLANZapine, polyethylene glycol     ALLERGIES   Allergies   Allergen Reactions     Seasonal Allergies         MENTAL STATUS EXAM   Vitals: /99   Pulse 108   Temp 99.1  F (37.3  C) (Tympanic)   Resp 18   Ht 1.676 m (5' 6\")   Wt 109.7 kg (241 lb 12.8 oz)   SpO2 97%   BMI 39.03 kg/m      Appearance: Alert, oriented, dressed in hospital scrubs  Attitude: Cooperative   Eye Contact: Good  Mood: \"great\"  Affect:euthymic   Speech: Normal rate and rhythm   Psychomotor Behavior: No TD or rigidity. Less akathisia   Thought Process: Linear, some tangentiality   Associations: No loose associations   Thought Content: Denies SI, plan, or SIB. AH improving. Delusional thought regarding chip implants not present today; ideas of reference, somatic passivity improved   Insight: Limited  Judgment: " Fair  Oriented to: Person, place, and time  Attention Span and Concentration: Intact  Recent and Remote Memory: Intact  Language: English with appropriate syntax and vocabulary  Fund of Knowledge: Average   Muscle Strength and Tone: Grossly normal  Gait and Station: Grossly normal       LABS   No results found for this or any previous visit (from the past 24 hour(s)).      CARMELLA Dunaway is a 22 year old female with a past psychiatric history notable for schizoaffective disorder versus substance induced psychosis, polysubstance dependence, ADHD. Multiple prior inpatient psychiatric hospitalizations. Prior SA. Multiple prior CD treatments.  Recently hospitalized here at [facility] and dismissed on 5/10/23.  Within 24 hours, was back in an emergency department and then dismissed and then re-presented again to an emergency department grossly psychotic and disorganized.  Was supposed to be attending CD treatment at [facility]. Patient was subsequently transferred and accepted for inpatient psychiatric hospitalization at [facility] for further safety and further stabilization.    Today: no changes to treatment plan. Continue to seek placement.        DIAGNOSES     #schizoaffective disorder, bipolar type, multiple episodes, in acute episode  #ADHD  #Opioid Use Disorder  #Stimulant Use Disorder       PLAN     Location: Unit 5  Legal Status: Orders Placed This Encounter      Voluntary    Safety Assessment:    Behavioral Orders   Procedures     Code 1 - Restrict to Unit     Routine Programming     As clinically indicated     Status 15     Every 15 minutes.      PTA medications held:   -none     PTA medications continued/changed:   -haldol decanoate 200 mg AMBRIZ every 28 days, next injection on 6/2   -haldol 5 mg PRN switched to Haldol/Benadryl/Ativan prn   -cogentin 0.5 mg TID -> increased to 1.0 mg TID to address  akathisia      New medications initiated:   -haldol 5 mg at bedtime as of 5/18 -> 5 mg BID as of 5/19  -propranolol 10 mg TID to address akathisia -> 20 mg BID on 5/18     Today's Changes:  continue haldol to 5 mg TID    Future considerations: Increase AMBRIZ to 300 mg every 28 days and stop oral Haldol if changes with Haldol are sufficient     Programming: Patient will be treated in a therapeutic milieu with appropriate individual and group therapies. Education will be provided on diagnoses, medications, and treatments.     Medical diagnoses:  Per medicine    Consult: None  Tests: None    Anticipated LOS: > 7 days   Disposition: CD treatment versus IRTS        ATTESTATION      Sam Coombs MD  [facility]   Psychiatry    Video Visit: Patient has given verbal consent for video visit?: Yes  Type of Service: video visit for mental health treatment  Reason for Video Visit: COVID-[place]  Originating Site (patient location): [facility]  Distant Site (provider location): Remote Location  Mode of Communication: Video Conference via [employer]  Time of Service: Date: 05/27/2023 , Start: 10:00 end: 10:30

## 2023-05-27 NOTE — PLAN OF CARE
Face to face end of shift report received from Aicha DIAZ RN.  Pt observed laying in bed and appears to be sleeping. .     Problem: Adult Behavioral Health Plan of Care  Goal: Patient-Specific Goal (Individualization)  Description: Pt will be compliant with treatment team recommendations during hospitalization.   Pt will report adequate amounts of sleep on NOC shift (5-8 hours.)  Pt will participate in greater than 50% of daily groups.      Outcome: Progressing     Problem: Thought Process Alteration  Goal: Optimal Thought Clarity  Description: Patient will have clear linear conversations while on the unit.  Outcome: Progressing   Goal Outcome Evaluation:         Pt appeared to sleep 7 hours through the night with a normal breathing pattern and noted position changes.   Pt did not c/o or display self harm/ suicidal thoughts/bechaviors. 15 min checks completed. Pt refused AM vital signs.  Pt makes needs known.     Face to face end of shift report communicated to oncoming RN.     Matilda Patel RN  5/27/2023

## 2023-05-28 PROCEDURE — 250N000013 HC RX MED GY IP 250 OP 250 PS 637: Performed by: PSYCHIATRY & NEUROLOGY

## 2023-05-28 PROCEDURE — 250N000013 HC RX MED GY IP 250 OP 250 PS 637: Performed by: STUDENT IN AN ORGANIZED HEALTH CARE EDUCATION/TRAINING PROGRAM

## 2023-05-28 PROCEDURE — 99232 SBSQ HOSP IP/OBS MODERATE 35: CPT | Mod: GT | Performed by: PSYCHIATRY & NEUROLOGY

## 2023-05-28 PROCEDURE — 124N000001 HC R&B MH

## 2023-05-28 RX ORDER — CHLORAL HYDRATE 500 MG
1 CAPSULE ORAL 2 TIMES DAILY
Status: DISCONTINUED | OUTPATIENT
Start: 2023-05-28 | End: 2023-06-06 | Stop reason: HOSPADM

## 2023-05-28 RX ADMIN — HALOPERIDOL 5 MG: 5 TABLET ORAL at 20:05

## 2023-05-28 RX ADMIN — NICOTINE POLACRILEX 4 MG: 4 GUM, CHEWING ORAL at 09:47

## 2023-05-28 RX ADMIN — BENZTROPINE MESYLATE 1 MG: 1 TABLET ORAL at 13:45

## 2023-05-28 RX ADMIN — PROPRANOLOL HYDROCHLORIDE 20 MG: 20 TABLET ORAL at 08:17

## 2023-05-28 RX ADMIN — HYDROXYZINE HYDROCHLORIDE 25 MG: 25 TABLET, FILM COATED ORAL at 21:46

## 2023-05-28 RX ADMIN — NICOTINE POLACRILEX 4 MG: 4 GUM, CHEWING ORAL at 08:18

## 2023-05-28 RX ADMIN — MELATONIN 3 MG: at 20:05

## 2023-05-28 RX ADMIN — HALOPERIDOL 5 MG: 5 TABLET ORAL at 13:45

## 2023-05-28 RX ADMIN — ACETAMINOPHEN 325MG 650 MG: 325 TABLET ORAL at 15:53

## 2023-05-28 RX ADMIN — OLANZAPINE 10 MG: 10 TABLET, FILM COATED ORAL at 22:13

## 2023-05-28 RX ADMIN — HYDROXYZINE HYDROCHLORIDE 25 MG: 25 TABLET, FILM COATED ORAL at 15:52

## 2023-05-28 RX ADMIN — NICOTINE POLACRILEX 4 MG: 4 GUM, CHEWING ORAL at 12:33

## 2023-05-28 RX ADMIN — BENZTROPINE MESYLATE 1 MG: 1 TABLET ORAL at 08:17

## 2023-05-28 RX ADMIN — HALOPERIDOL 5 MG: 5 TABLET ORAL at 08:17

## 2023-05-28 RX ADMIN — BENZTROPINE MESYLATE 1 MG: 1 TABLET ORAL at 20:04

## 2023-05-28 RX ADMIN — NICOTINE POLACRILEX 4 MG: 4 GUM, CHEWING ORAL at 19:32

## 2023-05-28 RX ADMIN — OMEGA-3 FATTY ACIDS CAP 1000 MG 1 G: 1000 CAP at 20:05

## 2023-05-28 RX ADMIN — ATOMOXETINE 40 MG: 40 CAPSULE ORAL at 08:17

## 2023-05-28 RX ADMIN — PROPRANOLOL HYDROCHLORIDE 20 MG: 20 TABLET ORAL at 20:05

## 2023-05-28 ASSESSMENT — ACTIVITIES OF DAILY LIVING (ADL)
ADLS_ACUITY_SCORE: 28
ADLS_ACUITY_SCORE: 28
ORAL_HYGIENE: INDEPENDENT
ADLS_ACUITY_SCORE: 28
DRESS: SCRUBS (BEHAVIORAL HEALTH)
ADLS_ACUITY_SCORE: 28
HYGIENE/GROOMING: INDEPENDENT

## 2023-05-28 NOTE — PLAN OF CARE
Face to face end of shift report received from Rola REYNOLDS RN.  Pt observed in bed and appears to be sleeping.     Problem: Adult Behavioral Health Plan of Care  Goal: Patient-Specific Goal (Individualization)  Description: Pt will be compliant with treatment team recommendations during hospitalization.   Pt will report adequate amounts of sleep on NOC shift (5-8 hours.)  Pt will participate in greater than 50% of daily groups.        Outcome: Progressing     Problem: Thought Process Alteration  Goal: Optimal Thought Clarity  Description: Patient will have clear linear conversations while on the unit.  Outcome: Progressing   Goal Outcome Evaluation:         Pt appeared to sleep 7 hours through the night with a normal breathing pattern and noted position changes.   Pt did not c/o or display self harm/ suicidal thoughts/bechaviors. 15 min checks completed. Pt refused AM vital signs.  Pt makes needs known.      Face to face end of shift report communicated to oncoming RN.      Matilda Patel RN  5/28/2023

## 2023-05-28 NOTE — PLAN OF CARE
"    Problem: Adult Behavioral Health Plan of Care  Goal: Patient-Specific Goal (Individualization)  Description: Pt will be compliant with treatment team recommendations during hospitalization.   Pt will report adequate amounts of sleep on NOC shift (5-8 hours.)  Pt will participate in greater than 50% of daily groups.    Outcome: Progressing    A&O, denies pain.  Full range affect with clear conversation.   Pt asks this writer if I hear voices or if \"sounds talk to me\".  Pt knocks on wall--like that, did that say anything to you? Pt believes sounds talk and send messages. pt asks if people can read minds and put thoughts into each others minds.   Remains focused on Mandaeism-asks this writer if Hood wants a wife.   Denies SI, SIB, or HI.  Spends time on unit walking in halls and talking with peers. Interactions appropriate.   Denies need for prn-discuss with pt that asking for medication if needed will not cause her to be in hospital longer as she indicated yesterday. Pt asks if doing drugs has caused her brain to be damaged and if it can heal.   Lets needs be known.        Problem: Thought Process Alteration  Goal: Optimal Thought Clarity  Description: Patient will have clear linear conversations while on the unit.  Outcome: Progressing   Goal Outcome Evaluation:    Plan of Care Reviewed With: patient        Face to face end of shift report communicated to oncoming shift.     Rola Johnston RN  5/28/2023  2:08 PM                 "

## 2023-05-29 PROCEDURE — 250N000013 HC RX MED GY IP 250 OP 250 PS 637: Performed by: STUDENT IN AN ORGANIZED HEALTH CARE EDUCATION/TRAINING PROGRAM

## 2023-05-29 PROCEDURE — 124N000001 HC R&B MH

## 2023-05-29 PROCEDURE — 250N000013 HC RX MED GY IP 250 OP 250 PS 637: Performed by: PSYCHIATRY & NEUROLOGY

## 2023-05-29 PROCEDURE — 99232 SBSQ HOSP IP/OBS MODERATE 35: CPT | Mod: GT | Performed by: PSYCHIATRY & NEUROLOGY

## 2023-05-29 RX ADMIN — BENZTROPINE MESYLATE 1 MG: 1 TABLET ORAL at 14:10

## 2023-05-29 RX ADMIN — HALOPERIDOL 5 MG: 5 TABLET ORAL at 14:09

## 2023-05-29 RX ADMIN — OMEGA-3 FATTY ACIDS CAP 1000 MG 1 G: 1000 CAP at 20:20

## 2023-05-29 RX ADMIN — PROPRANOLOL HYDROCHLORIDE 20 MG: 20 TABLET ORAL at 08:08

## 2023-05-29 RX ADMIN — BENZTROPINE MESYLATE 1 MG: 1 TABLET ORAL at 08:08

## 2023-05-29 RX ADMIN — HALOPERIDOL 5 MG: 5 TABLET ORAL at 08:10

## 2023-05-29 RX ADMIN — LORAZEPAM 2 MG: 1 TABLET ORAL at 08:08

## 2023-05-29 RX ADMIN — ATOMOXETINE 40 MG: 40 CAPSULE ORAL at 08:19

## 2023-05-29 RX ADMIN — PROPRANOLOL HYDROCHLORIDE 20 MG: 20 TABLET ORAL at 20:20

## 2023-05-29 RX ADMIN — NICOTINE POLACRILEX 4 MG: 4 GUM, CHEWING ORAL at 17:28

## 2023-05-29 RX ADMIN — NICOTINE POLACRILEX 4 MG: 4 GUM, CHEWING ORAL at 18:52

## 2023-05-29 RX ADMIN — OMEGA-3 FATTY ACIDS CAP 1000 MG 1 G: 1000 CAP at 08:08

## 2023-05-29 RX ADMIN — NICOTINE POLACRILEX 4 MG: 4 GUM, CHEWING ORAL at 08:11

## 2023-05-29 RX ADMIN — MELATONIN 3 MG: at 20:20

## 2023-05-29 RX ADMIN — NICOTINE POLACRILEX 4 MG: 4 GUM, CHEWING ORAL at 09:43

## 2023-05-29 RX ADMIN — HALOPERIDOL 5 MG: 5 TABLET ORAL at 20:20

## 2023-05-29 RX ADMIN — BENZTROPINE MESYLATE 1 MG: 1 TABLET ORAL at 20:20

## 2023-05-29 RX ADMIN — HALOPERIDOL 5 MG: 5 TABLET ORAL at 08:08

## 2023-05-29 ASSESSMENT — ACTIVITIES OF DAILY LIVING (ADL)
ADLS_ACUITY_SCORE: 28
ADLS_ACUITY_SCORE: 28
HYGIENE/GROOMING: INDEPENDENT
ADLS_ACUITY_SCORE: 28
ORAL_HYGIENE: INDEPENDENT
DRESS: SCRUBS (BEHAVIORAL HEALTH);INDEPENDENT
ADLS_ACUITY_SCORE: 28
LAUNDRY: UNABLE TO COMPLETE
ADLS_ACUITY_SCORE: 28
HYGIENE/GROOMING: INDEPENDENT
ADLS_ACUITY_SCORE: 28

## 2023-05-29 NOTE — PROGRESS NOTES
".z  [facility] PSYCHIATRY  PROGRESS NOTE     SUBJECTIVE     Prior to interviewing the patient, I met with nursing and reviewed patient's clinical condition. We discussed clinical care both before and after the interview. I have reviewed the patient's clinical course by review of records including previous notes, labs, and vital signs.     Per nursing, the patient had the following behavioral events over the last 24-hours:none.    On psychiatric interview, she is met in the milieu. She states \"I am okay\".  Today we discussed ways to distract herself form her voices.  She denies SI. Denies any issues.  No physical pain. Awaiting to hear whether or not she has been placed.      MEDICATIONS   Scheduled Meds:    atomoxetine  40 mg Oral Daily     benztropine  1 mg Oral TID     fish oil-omega-3 fatty acids  1 g Oral BID     haloperidol  5 mg Oral TID     [START ON 6/2/2023] haloperidol decanoate  200 mg Intramuscular Q28 Days     propranolol  20 mg Oral BID     PRN Meds:.acetaminophen, alum & mag hydroxide-simethicone, sore throat, haloperidol **AND** LORazepam **AND** diphenhydrAMINE, haloperidol lactate **AND** LORazepam **AND** diphenhydrAMINE, hydrOXYzine, melatonin, nicotine, OLANZapine **OR** OLANZapine, polyethylene glycol     ALLERGIES   Allergies   Allergen Reactions     Seasonal Allergies         MENTAL STATUS EXAM   Vitals: /84   Pulse 91   Temp 97.5  F (36.4  C) (Tympanic)   Resp 14   Ht 1.676 m (5' 6\")   Wt 110.3 kg (243 lb 1.6 oz)   SpO2 97%   BMI 39.24 kg/m      Appearance: Alert, oriented, dressed in hospital scrubs  Attitude: Cooperative   Eye Contact: Good  Mood: \"okay\"  Affect:euthymic   Speech: Normal rate and rhythm   Psychomotor Behavior: No TD or rigidity. Less akathisia   Thought Process: Linear, some tangentiality   Associations: No loose associations   Thought Content: Denies SI, plan, or SIB. AH improving. Delusional thought regarding chip implants not present today; ideas of " reference, somatic passivity improved   Insight: Limited  Judgment: Fair  Oriented to: Person, place, and time  Attention Span and Concentration: Intact  Recent and Remote Memory: Intact  Language: English with appropriate syntax and vocabulary  Fund of Knowledge: Average   Muscle Strength and Tone: Grossly normal  Gait and Station: Grossly normal       LABS   No results found for this or any previous visit (from the past 24 hour(s)).      CARMELLA Dunaway is a 22 year old female with a past psychiatric history notable for schizoaffective disorder versus substance induced psychosis, polysubstance dependence, ADHD. Multiple prior inpatient psychiatric hospitalizations. Prior SA. Multiple prior CD treatments.  Recently hospitalized here at [facility] and dismissed on 5/10/23.  Within 24 hours, was back in an emergency department and then dismissed and then re-presented again to an emergency department grossly psychotic and disorganized.  Was supposed to be attending CD treatment at [facility]. Patient was subsequently transferred and accepted for inpatient psychiatric hospitalization at [facility] for further safety and further stabilization.    Today: no alterations to treatment plan.       DIAGNOSES     #schizoaffective disorder, bipolar type, multiple episodes, in acute episode  #ADHD  #Opioid Use Disorder  #Stimulant Use Disorder       PLAN     Location: Unit 5  Legal Status: Orders Placed This Encounter      Voluntary    Safety Assessment:    Behavioral Orders   Procedures     Code 1 - Restrict to Unit     Routine Programming     As clinically indicated     Status 15     Every 15 minutes.      PTA medications held:   -none     PTA medications continued/changed:   -haldol decanoate 200 mg AMBRIZ every 28 days, next injection on 6/2   -haldol 5 mg PRN switched to Haldol/Benadryl/Ativan prn   -cogentin 0.5 mg  TID -> increased to 1.0 mg TID to address akathisia      New medications initiated:   -haldol 5 mg at bedtime as of 5/18 -> 5 mg BID as of 5/19  -propranolol 10 mg TID to address akathisia -> 20 mg BID on 5/18     Today's Changes:  continue haldol to 5 mg TID    Future considerations: Increase AMBRIZ to 300 mg every 28 days and stop oral Haldol if changes with Haldol are sufficient     Programming: Patient will be treated in a therapeutic milieu with appropriate individual and group therapies. Education will be provided on diagnoses, medications, and treatments.     Medical diagnoses:  Per medicine    Consult: None  Tests: None    Anticipated LOS: > 7 days   Disposition: CD treatment versus IRTS        ATTESTATION      Sam Coombs MD  [facility]   Psychiatry    Video Visit: Patient has given verbal consent for video visit?: Yes  Type of Service: video visit for mental health treatment  Reason for Video Visit: COVID-19 [place]  Originating Site (patient location): [facility]  Distant Site (provider location): Remote Location  Mode of Communication: Video Conference via [employer]  Time of Service: Date: 05/29/2023 , Start: 08:00 end: 08:30

## 2023-05-29 NOTE — PROGRESS NOTES
".z  [facility] PSYCHIATRY  PROGRESS NOTE     SUBJECTIVE     Prior to interviewing the patient, I met with nursing and reviewed patient's clinical condition. We discussed clinical care both before and after the interview. I have reviewed the patient's clinical course by review of records including previous notes, labs, and vital signs.     Per nursing, the patient had the following behavioral events over the last 24-hours:none.    On psychiatric interview, she states \"I am not sure if I should go to an IRTS or treatment\".  Discussed that referrals are out based on her preference sand we will wait to see. She was praised for appropriate behavior on unit, attempting to limit her requests and taking her medications as prescribed. She talks today about missing her friends and wanting to get to \"real life\".  She denies SI. She states the voices are \"low\"      MEDICATIONS   Scheduled Meds:    atomoxetine  40 mg Oral Daily     benztropine  1 mg Oral TID     fish oil-omega-3 fatty acids  1 g Oral BID     haloperidol  5 mg Oral TID     [START ON 6/2/2023] haloperidol decanoate  200 mg Intramuscular Q28 Days     propranolol  20 mg Oral BID     PRN Meds:.acetaminophen, alum & mag hydroxide-simethicone, sore throat, haloperidol **AND** LORazepam **AND** diphenhydrAMINE, haloperidol lactate **AND** LORazepam **AND** diphenhydrAMINE, hydrOXYzine, melatonin, nicotine, OLANZapine **OR** OLANZapine, polyethylene glycol     ALLERGIES   Allergies   Allergen Reactions     Seasonal Allergies         MENTAL STATUS EXAM   Vitals: /84   Pulse 91   Temp 97.5  F (36.4  C) (Tympanic)   Resp 14   Ht 1.676 m (5' 6\")   Wt 110.3 kg (243 lb 1.6 oz)   SpO2 97%   BMI 39.24 kg/m      Appearance: Alert, oriented, dressed in hospital scrubs  Attitude: Cooperative   Eye Contact: Good  Mood: \"alright\"  Affect:euthymic   Speech: Normal rate and rhythm   Psychomotor Behavior: No TD or rigidity. Less akathisia   Thought Process: Linear, some " tangentiality   Associations: No loose associations   Thought Content: Denies SI, plan, or SIB. AH improving. Delusional thought regarding chip implants not present today; ideas of reference, somatic passivity improved   Insight: Limited  Judgment: Fair  Oriented to: Person, place, and time  Attention Span and Concentration: Intact  Recent and Remote Memory: Intact  Language: English with appropriate syntax and vocabulary  Fund of Knowledge: Average   Muscle Strength and Tone: Grossly normal  Gait and Station: Grossly normal       LABS   No results found for this or any previous visit (from the past 24 hour(s)).      CARMELLA Dunaway is a 22 year old female with a past psychiatric history notable for schizoaffective disorder versus substance induced psychosis, polysubstance dependence, ADHD. Multiple prior inpatient psychiatric hospitalizations. Prior SA. Multiple prior CD treatments.  Recently hospitalized here at [facility] and dismissed on 5/10/23.  Within 24 hours, was back in an emergency department and then dismissed and then re-presented again to an emergency department grossly psychotic and disorganized.  Was supposed to be attending CD treatment at [facility]. Patient was subsequently transferred and accepted for inpatient psychiatric hospitalization at [facility] for further safety and further stabilization.    Today: requesting to start fish oil. Continue to seek appropriate placement.        DIAGNOSES     #schizoaffective disorder, bipolar type, multiple episodes, in acute episode  #ADHD  #Opioid Use Disorder  #Stimulant Use Disorder       PLAN     Location: Unit 5  Legal Status: Orders Placed This Encounter      Voluntary    Safety Assessment:    Behavioral Orders   Procedures     Code 1 - Restrict to Unit     Routine Programming     As clinically indicated     Status 15     Every 15 minutes.       PTA medications held:   -none     PTA medications continued/changed:   -haldol decanoate 200 mg AMBRIZ every 28 days, next injection on 6/2   -haldol 5 mg PRN switched to Haldol/Benadryl/Ativan prn   -cogentin 0.5 mg TID -> increased to 1.0 mg TID to address akathisia      New medications initiated:   -haldol 5 mg at bedtime as of 5/18 -> 5 mg BID as of 5/19  -propranolol 10 mg TID to address akathisia -> 20 mg BID on 5/18     Today's Changes:  continue haldol to 5 mg TID    Future considerations: Increase AMBRIZ to 300 mg every 28 days and stop oral Haldol if changes with Haldol are sufficient     Programming: Patient will be treated in a therapeutic milieu with appropriate individual and group therapies. Education will be provided on diagnoses, medications, and treatments.     Medical diagnoses:  Per medicine    Consult: None  Tests: None    Anticipated LOS: > 7 days   Disposition: CD treatment versus IRTS        ATTESTATION      Sam Coombs MD  [facility]   Psychiatry    Video Visit: Patient has given verbal consent for video visit?: Yes  Type of Service: video visit for mental health treatment  Reason for Video Visit: COVID-19 [place]  Originating Site (patient location): [facility]  Distant Site (provider location): Remote Location  Mode of Communication: Video Conference via [employer]  Time of Service: Date: 05/28/2023 , Start: 10:00 end: 10:30

## 2023-05-29 NOTE — PLAN OF CARE
"PT reports she is bored and ready to leave. She requests to have writer walk with her. She continues to speak about [employer] and intrusive thought she gets about the devil trying to take her over. She does state \"I know I am not Hood wife now though\". She reports she does not like the Halidol and that it makes her mind feel slow. We discuss how that could be healthy for her since her mind races with intrusive thoughts.   She did not attend group although was encouraged several times and she comes to the window seeking staff interaction.     She took a PRN atarax and tylenol at 1552.  1800 She requested a B52 for a \"little anxiety\" we dicussed that that is not an appropriate PRN for a little anxiety and went over coping skills. She reported it helped to \"Tenriism with headphones on\"   She took melatonin with her HS medications.     She requested another dose of atarax at 2146 reporting intrusive thoughts of her mind being taken over by the devil if she goes to sleep.   She received Zyprexa at 2213 for continued AH. She came back at 2245 reporting she still could not sleep. PRN medication declined and PT encouraged to journal, or read and practice positive self talk and coping skills.     Face to face end of shift report communicated to oncoming RN    Melisa Isabel RN  5/28/2023  10:58 PM                       Problem: Adult Behavioral Health Plan of Care  Goal: Patient-Specific Goal (Individualization)  Description: Pt will be compliant with treatment team recommendations during hospitalization.   Pt will report adequate amounts of sleep on NOC shift (5-8 hours.)  Pt will participate in greater than 50% of daily groups.        Outcome: Progressing     Problem: Thought Process Alteration  Goal: Optimal Thought Clarity  Description: Patient will have clear linear conversations while on the unit.  Outcome: Progressing   Goal Outcome Evaluation:                        "

## 2023-05-29 NOTE — PLAN OF CARE
"  Problem: Adult Behavioral Health Plan of Care  Goal: Patient-Specific Goal (Individualization)  Description: Pt will be compliant with treatment team recommendations during hospitalization.   Pt will report adequate amounts of sleep on NOC shift (5-8 hours.)  Pt will participate in greater than 50% of daily groups.      Outcome: Progressing    Pt states she woke frequently during the night with \"bad images\".  States \"the voices are really bad this morning I need something to help me. Pt looks afraid and panicky, marching in place, eyes tearful. Prn medication rec'd for auditory hallucinations and high anxiety.  Walking with pt , pt asks this writer if she is a demon.  Pt up for lunch, verbalizes the medications have helped her thoughts.   Remains withdrawn and isolative today.   Denies SI, SIB, or HI.     Problem: Thought Process Alteration  Goal: Optimal Thought Clarity  Description: Patient will have clear linear conversations while on the unit.  Outcome: Progressing   Goal Outcome Evaluation:    Plan of Care Reviewed With: patient      Face to face end of shift report communicated to oncoming shift.     Rola Johnston RN  5/29/2023  1:02 PM                   "

## 2023-05-29 NOTE — PLAN OF CARE
Face to face end of shift report received from Melisa MURILLO RN.  Pt appears to be sleeping in room.     Problem: Adult Behavioral Health Plan of Care  Goal: Patient-Specific Goal (Individualization)  Description: Pt will be compliant with treatment team recommendations during hospitalization.   Pt will report adequate amounts of sleep on NOC shift (5-8 hours.)  Pt will participate in greater than 50% of daily groups.        Outcome: Progressing     Problem: Thought Process Alteration  Goal: Optimal Thought Clarity  Description: Patient will have clear linear conversations while on the unit.  Outcome: Progressing   Goal Outcome Evaluation:         Pt appeared to sleep 7 hours through the night with a normal breathing pattern and noted position changes.   Pt did not c/o or display self harm/ suicidal thoughts/bechaviors. 15 min checks completed.  Pt makes needs known.      Face to face end of shift report communicated to oncoming RN.      Matilda Patel RN  5/29/2023

## 2023-05-30 PROCEDURE — 99232 SBSQ HOSP IP/OBS MODERATE 35: CPT | Mod: GT | Performed by: PSYCHIATRY & NEUROLOGY

## 2023-05-30 PROCEDURE — 250N000013 HC RX MED GY IP 250 OP 250 PS 637: Performed by: PSYCHIATRY & NEUROLOGY

## 2023-05-30 PROCEDURE — 250N000013 HC RX MED GY IP 250 OP 250 PS 637: Performed by: STUDENT IN AN ORGANIZED HEALTH CARE EDUCATION/TRAINING PROGRAM

## 2023-05-30 PROCEDURE — 124N000001 HC R&B MH

## 2023-05-30 RX ORDER — HALOPERIDOL DECANOATE 100 MG/ML
300 INJECTION INTRAMUSCULAR
Status: DISCONTINUED | OUTPATIENT
Start: 2023-06-02 | End: 2023-06-06 | Stop reason: HOSPADM

## 2023-05-30 RX ADMIN — NICOTINE POLACRILEX 4 MG: 4 GUM, CHEWING ORAL at 14:02

## 2023-05-30 RX ADMIN — PROPRANOLOL HYDROCHLORIDE 20 MG: 20 TABLET ORAL at 20:18

## 2023-05-30 RX ADMIN — NICOTINE POLACRILEX 4 MG: 4 GUM, CHEWING ORAL at 19:03

## 2023-05-30 RX ADMIN — PROPRANOLOL HYDROCHLORIDE 20 MG: 20 TABLET ORAL at 09:01

## 2023-05-30 RX ADMIN — NICOTINE POLACRILEX 4 MG: 4 GUM, CHEWING ORAL at 06:52

## 2023-05-30 RX ADMIN — NICOTINE POLACRILEX 4 MG: 4 GUM, CHEWING ORAL at 11:44

## 2023-05-30 RX ADMIN — ATOMOXETINE 40 MG: 40 CAPSULE ORAL at 08:28

## 2023-05-30 RX ADMIN — OMEGA-3 FATTY ACIDS CAP 1000 MG 1 G: 1000 CAP at 20:18

## 2023-05-30 RX ADMIN — OLANZAPINE 10 MG: 10 TABLET, FILM COATED ORAL at 17:43

## 2023-05-30 RX ADMIN — HALOPERIDOL 5 MG: 5 TABLET ORAL at 14:39

## 2023-05-30 RX ADMIN — NICOTINE POLACRILEX 4 MG: 4 GUM, CHEWING ORAL at 17:30

## 2023-05-30 RX ADMIN — HALOPERIDOL 5 MG: 5 TABLET ORAL at 08:28

## 2023-05-30 RX ADMIN — OMEGA-3 FATTY ACIDS CAP 1000 MG 1 G: 1000 CAP at 08:28

## 2023-05-30 RX ADMIN — BENZTROPINE MESYLATE 1 MG: 1 TABLET ORAL at 14:02

## 2023-05-30 RX ADMIN — HALOPERIDOL 5 MG: 5 TABLET ORAL at 14:02

## 2023-05-30 RX ADMIN — NICOTINE POLACRILEX 4 MG: 4 GUM, CHEWING ORAL at 08:29

## 2023-05-30 RX ADMIN — NICOTINE POLACRILEX 4 MG: 4 GUM, CHEWING ORAL at 20:31

## 2023-05-30 RX ADMIN — BENZTROPINE MESYLATE 1 MG: 1 TABLET ORAL at 20:18

## 2023-05-30 RX ADMIN — HALOPERIDOL 5 MG: 5 TABLET ORAL at 20:18

## 2023-05-30 RX ADMIN — BENZTROPINE MESYLATE 1 MG: 1 TABLET ORAL at 08:31

## 2023-05-30 ASSESSMENT — ACTIVITIES OF DAILY LIVING (ADL)
ADLS_ACUITY_SCORE: 28
HYGIENE/GROOMING: INDEPENDENT
ADLS_ACUITY_SCORE: 28

## 2023-05-30 NOTE — PLAN OF CARE
Pt had minimal attention seeking this shift. Reports she is having a much better afternoon than this morning. She does not ask to talk about Hinduism or jeremias with me this shift.   She only had PRN melatonin with her HS medications.   She briefly attended group.   She denies SI/HI  She denies pain.     Face to face end of shift report communicated to oncoming RN     Melisa Isabel RN  5/29/2023  11:47 PM                   Problem: Adult Behavioral Health Plan of Care  Goal: Plan of Care Review  Outcome: Progressing  Flowsheets (Taken 5/29/2023 1900)  Patient Agreement with Plan of Care: agrees     Problem: Adult Behavioral Health Plan of Care  Goal: Patient-Specific Goal (Individualization)  Description: Pt will be compliant with treatment team recommendations during hospitalization.   Pt will report adequate amounts of sleep on NOC shift (5-8 hours.)  Pt will participate in greater than 50% of daily groups.        Outcome: Progressing     Problem: Thought Process Alteration  Goal: Optimal Thought Clarity  Description: Patient will have clear linear conversations while on the unit.  Outcome: Progressing   Goal Outcome Evaluation:    Plan of Care Reviewed With: patient

## 2023-05-30 NOTE — PROGRESS NOTES
".z  [facility] PSYCHIATRY  PROGRESS NOTE     SUBJECTIVE     Prior to interviewing the patient, I met with nursing and reviewed patient's clinical condition. We discussed clinical care both before and after the interview. I have reviewed the patient's clinical course by review of records including previous notes, labs, and vital signs.     Per nursing, the patient had the following behavioral events over the last 24-hours:none.    On psychiatric interview, she is met in the milieu. She states \"I am good\". She reports several days of stability. She states that her voices and beliefs related to Judaism never completely go away but she is learning how to manage these thoughts. She is hopeful she will hear something about her placement this week.  Denies SI. Denies any physical pain.      MEDICATIONS   Scheduled Meds:    atomoxetine  40 mg Oral Daily     benztropine  1 mg Oral TID     fish oil-omega-3 fatty acids  1 g Oral BID     haloperidol  5 mg Oral TID     [START ON 6/2/2023] haloperidol decanoate  200 mg Intramuscular Q28 Days     propranolol  20 mg Oral BID     PRN Meds:.acetaminophen, alum & mag hydroxide-simethicone, sore throat, haloperidol **AND** LORazepam **AND** diphenhydrAMINE, haloperidol lactate **AND** LORazepam **AND** diphenhydrAMINE, hydrOXYzine, melatonin, nicotine, OLANZapine **OR** OLANZapine, polyethylene glycol     ALLERGIES   Allergies   Allergen Reactions     Seasonal Allergies         MENTAL STATUS EXAM   Vitals: BP (!) 118/47 (Patient Position: Supine)   Pulse 80   Temp 98.7  F (37.1  C) (Tympanic)   Resp 12   Ht 1.676 m (5' 6\")   Wt 110.3 kg (243 lb 1.6 oz)   SpO2 97%   BMI 39.24 kg/m      Appearance: Alert, oriented, dressed in hospital scrubs  Attitude: Cooperative   Eye Contact: Good  Mood: \"okay\"  Affect:euthymic   Speech: Normal rate and rhythm   Psychomotor Behavior: No TD or rigidity. Less akathisia   Thought Process: Linear, some tangentiality   Associations: No loose " associations   Thought Content: Denies SI, plan, or SIB. AH improving. Delusional thought regarding chip implants not present today; ideas of reference, somatic passivity improved   Insight: Limited  Judgment: Fair  Oriented to: Person, place, and time  Attention Span and Concentration: Intact  Recent and Remote Memory: Intact  Language: English with appropriate syntax and vocabulary  Fund of Knowledge: Average   Muscle Strength and Tone: Grossly normal  Gait and Station: Grossly normal       LABS   No results found for this or any previous visit (from the past 24 hour(s)).      CARMELLA Dunaway is a 22 year old female with a past psychiatric history notable for schizoaffective disorder versus substance induced psychosis, polysubstance dependence, ADHD. Multiple prior inpatient psychiatric hospitalizations. Prior SA. Multiple prior CD treatments.  Recently hospitalized here at [facility] and dismissed on 5/10/23.  Within 24 hours, was back in an emergency department and then dismissed and then re-presented again to an emergency department grossly psychotic and disorganized.  Was supposed to be attending CD treatment at [facility]. Patient was subsequently transferred and accepted for inpatient psychiatric hospitalization at [facility] for further safety and further stabilization.    Today: no alterations to treatment plan. We are awaiting a safe disposition. She is attending groups and appropriately interacting with her peers without difficulty.        DIAGNOSES     #schizoaffective disorder, bipolar type, multiple episodes, in acute episode  #ADHD  #Opioid Use Disorder  #Stimulant Use Disorder       PLAN     Location: Unit 5  Legal Status: Orders Placed This Encounter      Voluntary    Safety Assessment:    Behavioral Orders   Procedures     Code 1 - Restrict to Unit     Routine Programming     As clinically  indicated     Status 15     Every 15 minutes.      PTA medications held:   -none     PTA medications continued/changed:   -haldol decanoate 200 mg AMBRIZ every 28 days, next injection on 6/2   -haldol 5 mg PRN switched to Haldol/Benadryl/Ativan prn   -cogentin 0.5 mg TID -> increased to 1.0 mg TID to address akathisia      New medications initiated:   -haldol 5 mg at bedtime as of 5/18 -> 5 mg BID as of 5/19  -propranolol 10 mg TID to address akathisia -> 20 mg BID on 5/18     Today's Changes:  continue haldol to 5 mg TID  -due for haldol dec on 6/2    Future considerations: Increase AMBRIZ to 300 mg every 28 days and stop oral Haldol if changes with Haldol are sufficient     Programming: Patient will be treated in a therapeutic milieu with appropriate individual and group therapies. Education will be provided on diagnoses, medications, and treatments.     Medical diagnoses:  Per medicine    Consult: None  Tests: None    Anticipated LOS: > 7 days   Disposition: CD treatment versus IRTS        ATTESTATION      Sam Coombs MD  [facility]   Psychiatry    Video Visit: Patient has given verbal consent for video visit?: Yes  Type of Service: video visit for mental health treatment  Reason for Video Visit: COVID-[place]  Originating Site (patient location): [facility]  Distant Site (provider location): Remote Location  Mode of Communication: Video Conference via [employer]  Time of Service: Date: 05/30/2023 , Start: 08:00 end: 08:30

## 2023-05-30 NOTE — PLAN OF CARE
"  Problem: Adult Behavioral Health Plan of Care  Goal: Patient-Specific Goal (Individualization)  Description: Pt will be compliant with treatment team recommendations during hospitalization.   Pt will report adequate amounts of sleep on NOC shift (5-8 hours.)  Pt will participate in greater than 50% of daily groups    Outcome: Progressing     Affect improved this AM. Pt states she slept well and is feeling better this morning. \"I'm feeling the positivity, thank the Lord!\"   Attends groups today and states she is setting goals.   Pt tells this writer she is considering fasting for three days for religous purposes. Pt later tells this writer she is struggling with voices and asks this writer if she is a demon. After ling discussion pt asks to take prn medication  Along with her scheduled medication. Pt visualized placing holding hands over ears in attempt to block voices. Haldol 5 mg received prn.   denies SI, SIB, and HI.     Problem: Thought Process Alteration  Goal: Optimal Thought Clarity  Description: Patient will have clear linear conversations while on the unit.  Outcome: Progressing   Goal Outcome Evaluation:    Plan of Care Reviewed With: patient         Face to face end of shift report communicated to oncoming shift.     Rola Johnston RN  5/30/2023  2:20 PM                   "

## 2023-05-30 NOTE — PLAN OF CARE
Face to face shift report received from nurse. Rounding completed, pt observed.    Problem: Adult Behavioral Health Plan of Care  Goal: Patient-Specific Goal (Individualization)  Description: Pt will be compliant with treatment team recommendations during hospitalization.   Pt will report adequate amounts of sleep on NOC shift (5-8 hours.)  Pt will participate in greater than 50% of daily groups.  Outcome: Progressing   Pt has been in bed with eyes closed and regular respirations observed all night. Will continue to monitor. No issues noted, patient slept 7 hours throughout the shift.    Face to face report will be communicated to oncoming RN.    Earl Patel RN  5/30/2023

## 2023-05-31 PROCEDURE — 250N000013 HC RX MED GY IP 250 OP 250 PS 637: Performed by: STUDENT IN AN ORGANIZED HEALTH CARE EDUCATION/TRAINING PROGRAM

## 2023-05-31 PROCEDURE — 124N000001 HC R&B MH

## 2023-05-31 PROCEDURE — 250N000013 HC RX MED GY IP 250 OP 250 PS 637: Performed by: PSYCHIATRY & NEUROLOGY

## 2023-05-31 RX ADMIN — BENZTROPINE MESYLATE 1 MG: 1 TABLET ORAL at 21:20

## 2023-05-31 RX ADMIN — OMEGA-3 FATTY ACIDS CAP 1000 MG 1 G: 1000 CAP at 08:17

## 2023-05-31 RX ADMIN — OLANZAPINE 10 MG: 10 TABLET, FILM COATED ORAL at 18:23

## 2023-05-31 RX ADMIN — HALOPERIDOL 5 MG: 5 TABLET ORAL at 13:14

## 2023-05-31 RX ADMIN — HYDROXYZINE HYDROCHLORIDE 25 MG: 25 TABLET, FILM COATED ORAL at 08:17

## 2023-05-31 RX ADMIN — OMEGA-3 FATTY ACIDS CAP 1000 MG 1 G: 1000 CAP at 21:20

## 2023-05-31 RX ADMIN — PROPRANOLOL HYDROCHLORIDE 20 MG: 20 TABLET ORAL at 21:20

## 2023-05-31 RX ADMIN — HALOPERIDOL 5 MG: 5 TABLET ORAL at 21:20

## 2023-05-31 RX ADMIN — ACETAMINOPHEN 325MG 650 MG: 325 TABLET ORAL at 16:33

## 2023-05-31 RX ADMIN — ATOMOXETINE 40 MG: 40 CAPSULE ORAL at 08:16

## 2023-05-31 RX ADMIN — MELATONIN 3 MG: at 23:10

## 2023-05-31 RX ADMIN — BENZTROPINE MESYLATE 1 MG: 1 TABLET ORAL at 08:17

## 2023-05-31 RX ADMIN — BENZTROPINE MESYLATE 1 MG: 1 TABLET ORAL at 13:14

## 2023-05-31 RX ADMIN — HALOPERIDOL 5 MG: 5 TABLET ORAL at 08:17

## 2023-05-31 RX ADMIN — OLANZAPINE 10 MG: 10 TABLET, FILM COATED ORAL at 05:37

## 2023-05-31 RX ADMIN — PROPRANOLOL HYDROCHLORIDE 20 MG: 20 TABLET ORAL at 08:17

## 2023-05-31 RX ADMIN — ACETAMINOPHEN 325MG 650 MG: 325 TABLET ORAL at 06:19

## 2023-05-31 RX ADMIN — HYDROXYZINE HYDROCHLORIDE 25 MG: 25 TABLET, FILM COATED ORAL at 13:15

## 2023-05-31 ASSESSMENT — ACTIVITIES OF DAILY LIVING (ADL)
ADLS_ACUITY_SCORE: 28
HYGIENE/GROOMING: INDEPENDENT
ADLS_ACUITY_SCORE: 28
ORAL_HYGIENE: INDEPENDENT
ADLS_ACUITY_SCORE: 28
HYGIENE/GROOMING: SHOWER
HYGIENE/GROOMING: SHOWER
LAUNDRY: UNABLE TO COMPLETE
ADLS_ACUITY_SCORE: 28
ADLS_ACUITY_SCORE: 28
DRESS: SCRUBS (BEHAVIORAL HEALTH)
ADLS_ACUITY_SCORE: 28
DRESS: SCRUBS (BEHAVIORAL HEALTH)

## 2023-05-31 NOTE — PLAN OF CARE
Face to face shift report received from nurse. Rounding completed, pt observed.    Problem: Adult Behavioral Health Plan of Care  Goal: Patient-Specific Goal (Individualization)  Description: Pt will be compliant with treatment team recommendations during hospitalization.   Pt will report adequate amounts of sleep on NOC shift (5-8 hours.)  Pt will participate in greater than 50% of daily groups.    Outcome: Progressing   Pt has been in bed with eyes closed and regular respirations observed all night. Will continue to monitor. Patient slept 6.5 hours throughout the shift. Patient requesting zyprexa due to voices being really bad this morning, gave zyprexa 10mg at 0537. Patient up at the window requesting tylenol for a headache, gave tylenol 650mg at 0619.     Face to face report will be communicated to oncoming RN.    Earl Patel RN  5/31/2023

## 2023-05-31 NOTE — PLAN OF CARE
Pt alert, orientated and observed up in hallways walking at beginning of the shift.   Pt very indecisive this shift, as numerous times up to nurses' station requesting to discharge. Once this writer printed 12 hour intent for pt, pt declines and states will stay, and speak to NP in morning.   Pt denies pain this shift.   Pt denies SI and/or HI. Pt reports high anxiety and requests PRN Zyprexa.   Pt states PRN Zyprexa not helpful, and asks for bedtime haldol at 20:00.     22:00 Pt observed in bed at this time, appears to be sleeping comfortably.       No thoughts of SI and/or HI this shift.   Pt free from self injury/self harm this shift.     Face to face report given to oncoming RN.

## 2023-05-31 NOTE — PLAN OF CARE
"  Problem: Adult Behavioral Health Plan of Care  Goal: Patient-Specific Goal (Individualization)  Description: Pt will be compliant with treatment team recommendations during hospitalization.   Pt will report adequate amounts of sleep on NOC shift (5-8 hours.)  Pt will participate in greater than 50% of daily groups.      Outcome: Progressing    Sitting at table with peer this morning-affect is bright and happy. Cheerfully greets staff as they enter unit.   Endorses high anxiety-Hydroxyzine 25 mg rec'd with AM medications.   Continues to be religiously preoccupied.   Pt appears preoccupied and responding to internal stimuli this afternoon. Long delay before responses breaks eye contact and staring off to side before answering. Pt asks this writer \"would someone lie and say they are Mormon if they aren't?\"   Pt withdrawn and not interacting with peers this afternoon. States she is \"fine'.   Denies SI, SIB, or HI.       Problem: Thought Process Alteration  Goal: Optimal Thought Clarity  Description: Patient will have clear linear conversations while on the unit.  Outcome: Progressing   Goal Outcome Evaluation:    Plan of Care Reviewed With: patient          Face to face end of shift report communicated to oncoming shift.     Rola Johnston RN  5/31/2023  1:20 PM               "

## 2023-06-01 PROCEDURE — 250N000013 HC RX MED GY IP 250 OP 250 PS 637: Performed by: PSYCHIATRY & NEUROLOGY

## 2023-06-01 PROCEDURE — 124N000001 HC R&B MH

## 2023-06-01 PROCEDURE — 250N000013 HC RX MED GY IP 250 OP 250 PS 637: Performed by: STUDENT IN AN ORGANIZED HEALTH CARE EDUCATION/TRAINING PROGRAM

## 2023-06-01 PROCEDURE — 99233 SBSQ HOSP IP/OBS HIGH 50: CPT | Mod: GT | Performed by: STUDENT IN AN ORGANIZED HEALTH CARE EDUCATION/TRAINING PROGRAM

## 2023-06-01 RX ADMIN — PROPRANOLOL HYDROCHLORIDE 20 MG: 20 TABLET ORAL at 09:15

## 2023-06-01 RX ADMIN — OLANZAPINE 10 MG: 10 TABLET, FILM COATED ORAL at 17:39

## 2023-06-01 RX ADMIN — NICOTINE POLACRILEX 4 MG: 4 GUM, CHEWING ORAL at 10:01

## 2023-06-01 RX ADMIN — BENZTROPINE MESYLATE 1 MG: 1 TABLET ORAL at 09:15

## 2023-06-01 RX ADMIN — BENZTROPINE MESYLATE 1 MG: 1 TABLET ORAL at 20:14

## 2023-06-01 RX ADMIN — PROPRANOLOL HYDROCHLORIDE 20 MG: 20 TABLET ORAL at 20:14

## 2023-06-01 RX ADMIN — HALOPERIDOL 5 MG: 5 TABLET ORAL at 09:15

## 2023-06-01 RX ADMIN — HALOPERIDOL 5 MG: 5 TABLET ORAL at 13:33

## 2023-06-01 RX ADMIN — ATOMOXETINE 40 MG: 40 CAPSULE ORAL at 09:15

## 2023-06-01 RX ADMIN — OMEGA-3 FATTY ACIDS CAP 1000 MG 1 G: 1000 CAP at 09:16

## 2023-06-01 RX ADMIN — MELATONIN 3 MG: at 20:15

## 2023-06-01 RX ADMIN — BENZTROPINE MESYLATE 1 MG: 1 TABLET ORAL at 13:33

## 2023-06-01 RX ADMIN — OMEGA-3 FATTY ACIDS CAP 1000 MG 1 G: 1000 CAP at 20:14

## 2023-06-01 RX ADMIN — HALOPERIDOL 5 MG: 5 TABLET ORAL at 09:39

## 2023-06-01 RX ADMIN — HALOPERIDOL 5 MG: 5 TABLET ORAL at 20:15

## 2023-06-01 RX ADMIN — HYDROXYZINE HYDROCHLORIDE 25 MG: 25 TABLET, FILM COATED ORAL at 10:24

## 2023-06-01 RX ADMIN — NICOTINE POLACRILEX 4 MG: 4 GUM, CHEWING ORAL at 19:19

## 2023-06-01 RX ADMIN — NICOTINE POLACRILEX 4 MG: 4 GUM, CHEWING ORAL at 17:28

## 2023-06-01 ASSESSMENT — ACTIVITIES OF DAILY LIVING (ADL)
ADLS_ACUITY_SCORE: 28
ORAL_HYGIENE: INDEPENDENT
ADLS_ACUITY_SCORE: 28
HYGIENE/GROOMING: SHOWER
ADLS_ACUITY_SCORE: 28
DRESS: SCRUBS (BEHAVIORAL HEALTH)

## 2023-06-01 NOTE — PLAN OF CARE
Problem: Adult Behavioral Health Plan of Care  Goal: Patient-Specific Goal (Individualization)  Description: Pt will be compliant with treatment team recommendations during hospitalization.   Pt will report adequate amounts of sleep on NOC shift (5-8 hours.)  Pt will participate in greater than 50% of daily groups.        Outcome: Progressing     Problem: Thought Process Alteration  Goal: Optimal Thought Clarity  Description: Patient will have clear linear conversations while on the unit.  Outcome: Progressing     Pt denies SI/HI, AH/VH. Pt rates pain 6/10 left side cramps. PRN tylenol 650mg at 1633. Pt endorses some anxiety and depression. Pt is medication compliant and VSS. Pt ate 100% of dinner. Pt has a flat affect. Pt is restless and alert. Pt is using appropriate behavior with staff and peers. Pt walking halls. Pt out in lounge for periods for time. 1823 PRN Zyprexa 10mg given for anxiety/ agitation.     Face to face report will be communicated to oncyogesh RN.    Kylha Saucedo RN  5/31/2023

## 2023-06-01 NOTE — PROGRESS NOTES
".z  [facility] PSYCHIATRY  PROGRESS NOTE     SUBJECTIVE     Prior to interviewing the patient, I met with nursing and reviewed patient's clinical condition. We discussed clinical care both before and after the interview. I have reviewed the patient's clinical course by review of records including previous notes, labs, and vital signs.     Per nursing, the patient had the following behavioral events over the last 24-hours: Taking medications. Continues to be irritable.    On psychiatric interview, she is met in the milieu. She notes that she is growing tired of being in the hospital.    The patient notes that she continues to have some auditory hallucinations. She notes that she has some restlessness but it is better than before. The patient notes that she is willing to take Haldol dec tomorrow. She consents after discussing B/R/SE, including increased risk of EPSE and NMS.    The patient denies any physical concerns. She denies any safety concerns.       MEDICATIONS   Scheduled Meds:    atomoxetine  40 mg Oral Daily     benztropine  1 mg Oral TID     fish oil-omega-3 fatty acids  1 g Oral BID     haloperidol  5 mg Oral TID     [START ON 6/2/2023] haloperidol decanoate  300 mg Intramuscular Q28 Days     propranolol  20 mg Oral BID     PRN Meds:.acetaminophen, alum & mag hydroxide-simethicone, sore throat, haloperidol **AND** LORazepam **AND** diphenhydrAMINE, haloperidol lactate **AND** LORazepam **AND** diphenhydrAMINE, hydrOXYzine, melatonin, nicotine, OLANZapine **OR** OLANZapine, polyethylene glycol     ALLERGIES   Allergies   Allergen Reactions     Seasonal Allergies         MENTAL STATUS EXAM   Vitals: /79   Pulse 82   Temp 98.2  F (36.8  C) (Tympanic)   Resp 18   Ht 1.676 m (5' 6\")   Wt 110.3 kg (243 lb 1.6 oz)   SpO2 98%   BMI 39.24 kg/m      Appearance: Alert, oriented, dressed in hospital scrubs  Attitude: Cooperative   Eye Contact: Good  Mood: \"Fine\"  Affect:euthymic   Speech: Normal rate " and rhythm   Psychomotor Behavior: No TD or rigidity. Less akathisia   Thought Process: Linear, some tangentiality   Associations: No loose associations   Thought Content: Denies SI, plan, or SIB. AH improving. Delusional thought regarding chip implants not present today; ideas of reference, somatic passivity improved   Insight: Limited  Judgment: Fair  Oriented to: Person, place, and time  Attention Span and Concentration: Intact  Recent and Remote Memory: Intact  Language: English with appropriate syntax and vocabulary  Fund of Knowledge: Average   Muscle Strength and Tone: Grossly normal  Gait and Station: Grossly normal       LABS   No results found for this or any previous visit (from the past 24 hour(s)).      CARMELLA Dunaway is a 22 year old female with a past psychiatric history notable for schizoaffective disorder versus substance induced psychosis, polysubstance dependence, ADHD. Multiple prior inpatient psychiatric hospitalizations. Prior SA. Multiple prior CD treatments.  Recently hospitalized here at [facility] and dismissed on 5/10/23.  Within 24 hours, was back in an emergency department and then dismissed and then re-presented again to an emergency department grossly psychotic and disorganized.  Was supposed to be attending CD treatment at [facility]. Patient was subsequently transferred and accepted for inpatient psychiatric hospitalization at [facility] for further safety and further stabilization.    Today: AMBRIZ injection tomorrow. Will monitor for side effects and likely go off of oral Haldol. Awaiting placement.       DIAGNOSES     #schizoaffective disorder, bipolar type, multiple episodes, in acute episode  #ADHD  #Opioid Use Disorder  #Stimulant Use Disorder       PLAN     Location: Unit 5  Legal Status: Orders Placed This Encounter      Voluntary    Safety Assessment:    Behavioral  Orders   Procedures     Code 1 - Restrict to Unit     Routine Programming     As clinically indicated     Status 15     Every 15 minutes.      PTA medications held:   -none     PTA medications continued/changed:   -haldol decanoate 200 mg AMBRIZ every 28 days, next injection on 6/2   -haldol 5 mg PRN switched to Haldol/Benadryl/Ativan prn   -cogentin 0.5 mg TID -> increased to 1.0 mg TID to address akathisia      New medications initiated:   -haldol 5 mg TID  -propranolol 10 mg TID to address akathisia -> 20 mg BID on 5/18     Today's Changes:    - Haldol Dec 300 mg tomorrow    Programming: Patient will be treated in a therapeutic milieu with appropriate individual and group therapies. Education will be provided on diagnoses, medications, and treatments.     Medical diagnoses:  Per medicine    Consult: None  Tests: None    Anticipated LOS: > 7 days   Disposition: CD treatment versus IRTS        ATTESTATION      Meng Gauthier DO, MA  Psychiatrist     Video Visit: Patient has given verbal consent for video visit?: Yes  Type of Service: video visit for mental health treatment  Reason for Video Visit: COVID-19 [place]  Originating Site (patient location): [facility]  Distant Site (provider location): Remote Location  Mode of Communication: Video Conference via [employer]  Time of Service: Date: 06/01/2023 , Start: 100 end: 130

## 2023-06-01 NOTE — PLAN OF CARE
Face to face report received from Kylah KRUEGER. Pt. Observed.     Problem: Adult Behavioral Health Plan of Care  Goal: Patient-Specific Goal (Individualization)  Description: Pt will be compliant with treatment team recommendations during hospitalization.   Pt will report adequate amounts of sleep on NOC shift (5-8 hours.)  Pt will participate in greater than 50% of daily groups.      Outcome: Progressing   Pt has been in bed with eyes closed and regular respirations x 7 hours this noc shift. 15 minute and PRN checks all night. No complaints offered. Will continue to monitor.   Pt. Refusing VS this a.m.   Face to face end of shift report communicated to oncoming RN.     Lizy Tang RN  6/1/2023

## 2023-06-01 NOTE — PLAN OF CARE
Problem: Adult Behavioral Health Plan of Care  Goal: Patient-Specific Goal (Individualization)  Description: Pt will be compliant with treatment team recommendations during hospitalization.   Pt will report adequate amounts of sleep on NOC shift (5-8 hours.)  Pt will participate in greater than 50% of daily groups.    Outcome: Progressing    Alert, VSS, denies pain.  Remains religiously preoccupied-asks this writer if she is a demon or if she has the sign of the beast. Pt reassured she does not. Pacing on unit-pt stating that being in the hospital is making her worse.   Denies SI, SIB, or HI. Appears responding to internal stimuli frequently. Pt aware she will receive Haldol Dec 300 mg inj tomorrow.   Lets needs be known.      Problem: Thought Process Alteration  Goal: Optimal Thought Clarity  Description: Patient will have clear linear conversations while on the unit.  Outcome: Progressing   Goal Outcome Evaluation:    Plan of Care Reviewed With: patient       Face to face end of shift report communicated to oncoming shift     Rola Johnston RN  6/1/2023  2:13 PM

## 2023-06-02 PROCEDURE — 250N000013 HC RX MED GY IP 250 OP 250 PS 637: Performed by: PSYCHIATRY & NEUROLOGY

## 2023-06-02 PROCEDURE — 250N000011 HC RX IP 250 OP 636: Performed by: PSYCHIATRY & NEUROLOGY

## 2023-06-02 PROCEDURE — 250N000013 HC RX MED GY IP 250 OP 250 PS 637: Performed by: STUDENT IN AN ORGANIZED HEALTH CARE EDUCATION/TRAINING PROGRAM

## 2023-06-02 PROCEDURE — 124N000001 HC R&B MH

## 2023-06-02 PROCEDURE — 99232 SBSQ HOSP IP/OBS MODERATE 35: CPT | Mod: GT | Performed by: STUDENT IN AN ORGANIZED HEALTH CARE EDUCATION/TRAINING PROGRAM

## 2023-06-02 RX ORDER — PROPRANOLOL HCL 60 MG
60 CAPSULE, EXTENDED RELEASE 24HR ORAL DAILY
Status: DISCONTINUED | OUTPATIENT
Start: 2023-06-03 | End: 2023-06-06 | Stop reason: HOSPADM

## 2023-06-02 RX ORDER — PROPRANOLOL HYDROCHLORIDE 20 MG/1
20 TABLET ORAL 2 TIMES DAILY
Status: COMPLETED | OUTPATIENT
Start: 2023-06-02 | End: 2023-06-02

## 2023-06-02 RX ORDER — HALOPERIDOL 5 MG/1
5 TABLET ORAL 2 TIMES DAILY
Status: DISCONTINUED | OUTPATIENT
Start: 2023-06-02 | End: 2023-06-04

## 2023-06-02 RX ADMIN — HALOPERIDOL DECANOATE 300 MG: 100 INJECTION INTRAMUSCULAR at 09:42

## 2023-06-02 RX ADMIN — MELATONIN 3 MG: at 20:06

## 2023-06-02 RX ADMIN — NICOTINE POLACRILEX 4 MG: 4 GUM, CHEWING ORAL at 10:07

## 2023-06-02 RX ADMIN — PROPRANOLOL HYDROCHLORIDE 20 MG: 20 TABLET ORAL at 20:06

## 2023-06-02 RX ADMIN — OLANZAPINE 10 MG: 10 TABLET, FILM COATED ORAL at 16:51

## 2023-06-02 RX ADMIN — BENZTROPINE MESYLATE 1 MG: 1 TABLET ORAL at 08:31

## 2023-06-02 RX ADMIN — HALOPERIDOL 5 MG: 5 TABLET ORAL at 20:11

## 2023-06-02 RX ADMIN — NICOTINE POLACRILEX 4 MG: 4 GUM, CHEWING ORAL at 14:15

## 2023-06-02 RX ADMIN — OMEGA-3 FATTY ACIDS CAP 1000 MG 1 G: 1000 CAP at 20:06

## 2023-06-02 RX ADMIN — NICOTINE POLACRILEX 4 MG: 4 GUM, CHEWING ORAL at 17:28

## 2023-06-02 RX ADMIN — OMEGA-3 FATTY ACIDS CAP 1000 MG 1 G: 1000 CAP at 08:31

## 2023-06-02 RX ADMIN — NICOTINE POLACRILEX 4 MG: 4 GUM, CHEWING ORAL at 11:31

## 2023-06-02 RX ADMIN — BENZTROPINE MESYLATE 1 MG: 1 TABLET ORAL at 20:06

## 2023-06-02 RX ADMIN — PROPRANOLOL HYDROCHLORIDE 20 MG: 20 TABLET ORAL at 08:31

## 2023-06-02 RX ADMIN — NICOTINE POLACRILEX 4 MG: 4 GUM, CHEWING ORAL at 08:31

## 2023-06-02 RX ADMIN — NICOTINE POLACRILEX 4 MG: 4 GUM, CHEWING ORAL at 12:36

## 2023-06-02 RX ADMIN — BENZTROPINE MESYLATE 1 MG: 1 TABLET ORAL at 14:13

## 2023-06-02 RX ADMIN — HALOPERIDOL 5 MG: 5 TABLET ORAL at 08:31

## 2023-06-02 RX ADMIN — ALUMINUM HYDROXIDE, MAGNESIUM HYDROXIDE, AND DIMETHICONE 30 ML: 200; 20; 200 SUSPENSION ORAL at 12:35

## 2023-06-02 RX ADMIN — ATOMOXETINE 40 MG: 40 CAPSULE ORAL at 08:31

## 2023-06-02 ASSESSMENT — ACTIVITIES OF DAILY LIVING (ADL)
ADLS_ACUITY_SCORE: 28
LAUNDRY: UNABLE TO COMPLETE
ADLS_ACUITY_SCORE: 28
ADLS_ACUITY_SCORE: 28
ORAL_HYGIENE: INDEPENDENT
HYGIENE/GROOMING: SHOWER;INDEPENDENT
DRESS: SCRUBS (BEHAVIORAL HEALTH);INDEPENDENT
HYGIENE/GROOMING: INDEPENDENT
ADLS_ACUITY_SCORE: 28
ADLS_ACUITY_SCORE: 28
DRESS: SCRUBS (BEHAVIORAL HEALTH)
ADLS_ACUITY_SCORE: 28
ADLS_ACUITY_SCORE: 28

## 2023-06-02 NOTE — PLAN OF CARE
"  Problem: Adult Behavioral Health Plan of Care  Goal: Patient-Specific Goal (Individualization)  Description: Pt will be compliant with treatment team recommendations during hospitalization.   Pt will report adequate amounts of sleep on NOC shift (5-8 hours.)  Pt will participate in greater than 50% of daily groups.    Outcome: Progressing    A&O, VSS, denies pain.  Continues being preoccupied religiously. Comes to nurses station tells this writer \"I'm not in a psychosis anymore! Last night after I spoke to my nurse I realized I'm not in a psychosis. I hardly have any voices since then\"   Mood is improved from previous day. Continues to appear to be responding to interna stimuli. Continues with pacing, rocking, shifting from foot to foot all day---   Pt able to sit still during hour long visit with mother. Pt states visit went well.   IM Haldol Dec 300 mg to R-hip. Tolerates well.   Attends and participates in groups.   Denies all criteria including: SI, SIB, HI        Problem: Thought Process Alteration  Goal: Optimal Thought Clarity  Description: Patient will have clear linear conversations while on the unit.  Outcome: Progressing   Goal Outcome Evaluation:    Plan of Care Reviewed With: patient        Face to face end of shift report communicated to oncoming shift.     Rola Johnston RN  6/2/2023  2:59 PM                 "

## 2023-06-02 NOTE — PLAN OF CARE
"  1739 PT received Zyprexa for intrusive thoughts.   She was able to attend groups later. She had me walk and talk with her several times. She reports she is starting to question a lot of what she thinks and is realizing more how delusional/crazy she sounds sometimes. She says \"For example, my mind is trying to tell me that sounds are communicating with me, like when the door slams its trying to talk to me, and now when I hear myself telling you I hear how crazy that sounds and how that can't be true.\"   She reports her boyfriend might give her another chance as long as she continues to focus on getting better.   She comes to the window and says \"It feels so good, I feel like right now, I am out of psychosis, I hope it stays this way.\"   She is still religiously preoccupied but this shift sounds more realistic with her Lutheran beliefs vs thinking she is jeremias wife when she first came here.     Face to face end of shift report communicated to oncoming RN    Melisa Isabel RN  6/1/2023  11:27 PM         Problem: Adult Behavioral Health Plan of Care  Goal: Patient-Specific Goal (Individualization)  Description: Pt will be compliant with treatment team recommendations during hospitalization.   Pt will report adequate amounts of sleep on NOC shift (5-8 hours.)  Pt will participate in greater than 50% of daily groups.        Outcome: Progressing     Problem: Thought Process Alteration  Goal: Optimal Thought Clarity  Description: Patient will have clear linear conversations while on the unit.  Outcome: Progressing                  "

## 2023-06-02 NOTE — PROGRESS NOTES
Initial Treatment Plan      Client Strengths:  caring, committed to sobriety, creative, educated, empathetic, goal-focused, good listener, has a previous history of therapy, insightful, intelligent, motivated, open to learning, open to suggestions / feedback, support of family, friends and providers, supportive, wants to learn, willing to ask questions and willing to relate to others    Areas of Vulnerability:  Manic symptoms   Poor impulse control   Psychotic symptoms/behavior   Anxiety  Depressive symptoms   Trauma/Abuse/Neglect    Long-Term Goals:  Knowledge about illness and management of symptoms   Maintenance of personal safety   Maintenance of sobriety   Effective management of impulsivity     Abuse Prevention Plan:  Safe, therapeutic environment   Safety coping plan as needed   Education regarding illness and skill development   Coordination with care providers   Impluse control education and intervention   Medication adjustment/management (MI/CD)   Monitor for use of substances    Discharge Criteria:  Satisfactory progress toward treatment goals   Improvement re: identified problems and symptoms   Ability to continue recovery at next level of service   Has a discharge plan in place      Specific Barriers to Discharge:  Pt unable to discharge to a treatment center due to continued mental health symptoms.     Need for Continued Inpatient Treatment:  Spoke with pt this morning, she continues to be high risk due to their current lack of insight into their mental illness and overall vulnerability. Per treatment team, it is agreed that pt remains appropriate for inpatient hospitalization due to continued Syn[employer] preoccupation and visual hallucinations. This increases vulnerability outside of the unit, with no community supports to follow them and intervene. This will result in further decompensation and safety concerns. It is recommended the pt remain inpatient until appropriate placement is achieved.        Contact with :  She would like referrals out to [facility] treatment centers.

## 2023-06-02 NOTE — PLAN OF CARE
Face to face report received from Melisa KRUEGER. Pt. Observed.     Problem: Adult Behavioral Health Plan of Care  Goal: Patient-Specific Goal (Individualization)  Description: Pt will be compliant with treatment team recommendations during hospitalization.   Pt will report adequate amounts of sleep on NOC shift (5-8 hours.)  Pt will participate in greater than 50% of daily groups.        Outcome: Progressing   Pt has been in bed with eyes closed and regular respirations x 7 hours this noc shift. 15 minute and PRN checks all night. No complaints offered. Will continue to monitor.     Face to face end of shift report communicated to oncoming RN.     Lizy Tang RN  6/2/2023

## 2023-06-03 PROCEDURE — 124N000001 HC R&B MH

## 2023-06-03 PROCEDURE — 250N000013 HC RX MED GY IP 250 OP 250 PS 637: Performed by: PSYCHIATRY & NEUROLOGY

## 2023-06-03 PROCEDURE — 250N000013 HC RX MED GY IP 250 OP 250 PS 637: Performed by: STUDENT IN AN ORGANIZED HEALTH CARE EDUCATION/TRAINING PROGRAM

## 2023-06-03 PROCEDURE — 99232 SBSQ HOSP IP/OBS MODERATE 35: CPT | Mod: GT | Performed by: STUDENT IN AN ORGANIZED HEALTH CARE EDUCATION/TRAINING PROGRAM

## 2023-06-03 RX ADMIN — OMEGA-3 FATTY ACIDS CAP 1000 MG 1 G: 1000 CAP at 20:00

## 2023-06-03 RX ADMIN — NICOTINE POLACRILEX 4 MG: 4 GUM, CHEWING ORAL at 19:02

## 2023-06-03 RX ADMIN — HALOPERIDOL 5 MG: 5 TABLET ORAL at 20:00

## 2023-06-03 RX ADMIN — NICOTINE POLACRILEX 4 MG: 4 GUM, CHEWING ORAL at 11:20

## 2023-06-03 RX ADMIN — NICOTINE POLACRILEX 4 MG: 4 GUM, CHEWING ORAL at 09:26

## 2023-06-03 RX ADMIN — BENZTROPINE MESYLATE 1 MG: 1 TABLET ORAL at 13:17

## 2023-06-03 RX ADMIN — NICOTINE POLACRILEX 4 MG: 4 GUM, CHEWING ORAL at 13:18

## 2023-06-03 RX ADMIN — OMEGA-3 FATTY ACIDS CAP 1000 MG 1 G: 1000 CAP at 08:56

## 2023-06-03 RX ADMIN — NICOTINE POLACRILEX 4 MG: 4 GUM, CHEWING ORAL at 12:19

## 2023-06-03 RX ADMIN — ATOMOXETINE 40 MG: 40 CAPSULE ORAL at 08:55

## 2023-06-03 RX ADMIN — BENZTROPINE MESYLATE 1 MG: 1 TABLET ORAL at 20:00

## 2023-06-03 RX ADMIN — HALOPERIDOL 5 MG: 5 TABLET ORAL at 08:56

## 2023-06-03 RX ADMIN — NICOTINE POLACRILEX 4 MG: 4 GUM, CHEWING ORAL at 15:21

## 2023-06-03 RX ADMIN — BENZTROPINE MESYLATE 1 MG: 1 TABLET ORAL at 08:55

## 2023-06-03 RX ADMIN — MELATONIN 3 MG: at 20:00

## 2023-06-03 RX ADMIN — NICOTINE POLACRILEX 4 MG: 4 GUM, CHEWING ORAL at 17:40

## 2023-06-03 RX ADMIN — PROPRANOLOL HYDROCHLORIDE 60 MG: 60 CAPSULE, EXTENDED RELEASE ORAL at 08:55

## 2023-06-03 ASSESSMENT — ACTIVITIES OF DAILY LIVING (ADL)
ADLS_ACUITY_SCORE: 28
ADLS_ACUITY_SCORE: 28
ORAL_HYGIENE: INDEPENDENT
LAUNDRY: UNABLE TO COMPLETE
ORAL_HYGIENE: INDEPENDENT
ADLS_ACUITY_SCORE: 28
HYGIENE/GROOMING: INDEPENDENT
HYGIENE/GROOMING: INDEPENDENT
ADLS_ACUITY_SCORE: 28
DRESS: SCRUBS (BEHAVIORAL HEALTH);INDEPENDENT
ADLS_ACUITY_SCORE: 28
ADLS_ACUITY_SCORE: 28
DRESS: SCRUBS (BEHAVIORAL HEALTH);INDEPENDENT
ADLS_ACUITY_SCORE: 28
ADLS_ACUITY_SCORE: 28

## 2023-06-03 NOTE — PROGRESS NOTES
"  [facility] PSYCHIATRY  PROGRESS NOTE     SUBJECTIVE     Prior to interviewing the patient, I met with nursing and reviewed patient's clinical condition. We discussed clinical care both before and after the interview. I have reviewed the patient's clinical course by review of records including previous notes, labs, and vital signs.     Per nursing, the patient had the following behavioral events over the last 24-hours: Taking medications. Calmer.Took AMBRIZ yesterday.    On psychiatric interview, she is met in her bed. She notes that she slept well last night. She notes that she feels alright today. She keeps reminding herself that the voices are not real. The patient denies any concerns after the AMBRIZ yesterday. She denies any worsening of akathisia or any EPSE like tremor.       MEDICATIONS   Scheduled Meds:    atomoxetine  40 mg Oral Daily     benztropine  1 mg Oral TID     fish oil-omega-3 fatty acids  1 g Oral BID     haloperidol  5 mg Oral BID     haloperidol decanoate  300 mg Intramuscular Q28 Days     propranolol ER  60 mg Oral Daily     PRN Meds:.acetaminophen, alum & mag hydroxide-simethicone, sore throat, haloperidol **AND** LORazepam **AND** diphenhydrAMINE, haloperidol lactate **AND** LORazepam **AND** diphenhydrAMINE, hydrOXYzine, melatonin, nicotine, OLANZapine **OR** OLANZapine, polyethylene glycol     ALLERGIES   Allergies   Allergen Reactions     Seasonal Allergies         MENTAL STATUS EXAM   Vitals: /81   Pulse 97   Temp 98.2  F (36.8  C) (Tympanic)   Resp 16   Ht 1.676 m (5' 6\")   Wt 110.3 kg (243 lb 1.6 oz)   SpO2 97%   BMI 39.24 kg/m      Appearance: Alert, oriented, dressed in hospital scrubs  Attitude: Cooperative   Eye Contact: Good  Mood: \"Better\"  Affect:euthymic   Speech: Normal rate and rhythm   Psychomotor Behavior: No TD or rigidity. No akathisia.  Thought Process: Linear, some tangentiality   Associations: No loose associations   Thought Content: Denies SI, plan, or " SIB. AH lessened.  Insight: Fair  Judgment: Fair  Oriented to: Person, place, and time  Attention Span and Concentration: Intact  Recent and Remote Memory: Intact  Language: English with appropriate syntax and vocabulary  Fund of Knowledge: Average   Muscle Strength and Tone: Grossly normal  Gait and Station: Grossly normal       LABS   No results found for this or any previous visit (from the past 24 hour(s)).      CARMELLA Dunaway is a 22 year old female with a past psychiatric history notable for schizoaffective disorder versus substance induced psychosis, polysubstance dependence, ADHD. Multiple prior inpatient psychiatric hospitalizations. Prior SA. Multiple prior CD treatments.  Recently hospitalized here at [facility] and dismissed on 5/10/23.  Within 24 hours, was back in an emergency department and then dismissed and then re-presented again to an emergency department grossly psychotic and disorganized.  Was supposed to be attending CD treatment at [facility]. Patient was subsequently transferred and accepted for inpatient psychiatric hospitalization at [facility] for further safety and further stabilization.    Today: AMBRIZ injection yesterday. Will monitor for side effects and taper off of oral Haldol. Doing well so far. Awaiting placement.       DIAGNOSES     #Schizoaffective disorder, bipolar type, multiple episodes, in acute episode  #ADHD  #Opioid Use Disorder  #Stimulant Use Disorder       PLAN     Location: Unit 5  Legal Status: Orders Placed This Encounter      Voluntary    Safety Assessment:    Behavioral Orders   Procedures     Code 1 - Restrict to Unit     Routine Programming     As clinically indicated     Status 15     Every 15 minutes.      PTA medications held:   -none     PTA medications continued/changed:   -haldol decanoate 200 mg AMBRIZ every 28 days increased to 300 mg with last  injection 6/2  -haldol 5 mg PRN switched to Haldol/Benadryl/Ativan prn   -cogentin 0.5 mg TID -> increased to 1.0 mg TID to address akathisia      New medications initiated:   -haldol 5 mg TID > 5 mg BID as of 6/2  -propranolol 10 mg TID to address akathisia -> 20 mg BID on 5/18 -> LA 60 mg daily on 6/2     Today's Changes:    - Reduce Haldol to 5 mg daily tomorrow    Programming: Patient will be treated in a therapeutic milieu with appropriate individual and group therapies. Education will be provided on diagnoses, medications, and treatments.     Medical diagnoses:  Per medicine    Consult: None  Tests: None    Anticipated LOS: > 7 days   Disposition: CD treatment versus IRTS        ATTESTATION      Meng Gauthier DO, MA  Psychiatrist     Video Visit: Patient has given verbal consent for video visit?: Yes  Type of Service: video visit for mental health treatment  Reason for Video Visit: COVID-19 [place]  Originating Site (patient location): [facility]  Distant Site (provider location): Remote Location  Mode of Communication: Video Conference via [employer]  Time of Service: Date: 06/03/2023 , Start: 745 end: 184

## 2023-06-03 NOTE — PLAN OF CARE
Face to face shift report received from Lauren BALTAZAR RN. Rounding completed, pt observed.    Problem: Adult Behavioral Health Plan of Care  Goal: Patient-Specific Goal (Individualization)  Description: Pt will be compliant with treatment team recommendations during hospitalization.   Pt will report adequate amounts of sleep on NOC shift (5-8 hours.)  Pt will participate in greater than 50% of daily groups.        Outcome: Progressing  Note: Shift Summary:  Patient in bed with eyes closed at the start of this shift.  Respirations are audible and regular.  Position changes independently throughout the night.     Problem: Thought Process Alteration  Goal: Optimal Thought Clarity  Description: Patient will have clear linear conversations while on the unit.  Outcome: Progressing  Face to face report will be communicated to oncoming RN.    Gladys Moser RN  6/3/2023

## 2023-06-03 NOTE — PLAN OF CARE
"  Problem: Adult Behavioral Health Plan of Care  Goal: Patient-Specific Goal (Individualization)  Description: Pt will be compliant with treatment team recommendations during hospitalization.   Pt will report adequate amounts of sleep on NOC shift (5-8 hours.)  Pt will participate in greater than 50% of daily groups.    Outcome: Progressing   Goal Outcome Evaluation:    Plan of Care Reviewed With: patient      Report received. Patient observed. Patient is Alert, able to make needs known. VSS, afebrile. Assessment and vitals as charted. Denies pain. Affect is somewhat bright at times, otherwise flat. She endorses anxiety today. Ask staff for PRNs as needed. Has received non thus far this shift. She does offer some insight. Does endorse auditory hallucinations. Ask staff, \"they are not real right.\" Request and receives reassurance from staff. She has been attending groups. Social on the unit. Looking forward to being able to leave at some point. Steady gait, no falls. Appetite good. Takes scheduled medications.     Face to face report given with opportunity to observe patient.    Report given to PM, RN.     Olimpia Carey RN   6/3/2023  3:00 PM      "

## 2023-06-03 NOTE — PLAN OF CARE
"  Problem: Adult Behavioral Health Plan of Care  Goal: Patient-Specific Goal (Individualization)  Description: Pt will be compliant with treatment team recommendations during hospitalization.   Pt will report adequate amounts of sleep on NOC shift (5-8 hours.)  Pt will participate in greater than 50% of daily groups.        Outcome: Progressing   Goal Outcome Evaluation:    Plan of Care Reviewed With: patient      Shift report received. Rounding complete and patient observed in hallway at beginning of shift.   Patient is Alert, able to make needs known. VSS, afebrile. Assessment and vitals as charted. Denies pain.     Patient reports feeling \"better\" and In a good mood. Smiling at times. Otherwise flat, affect. She is religiously preoccupied. Appears to be responding to internal stimulus as well as admits to auditory hallucinations. She states the \"voices' have improved but still do say \"negative things.\" Does appear to be restless at times. Rocking back and fourth, paces. Did take PRN Zyprexa 10 mg PO x1 for anxiety, hallucinations, racing thoughts.  Upon reassessment, patient verbalizes improvement in symptoms. Denies SI, SIB, and depression. She took a shower this PM Did attend groups. Appetite good. Ate  50% of dinner. Steady gait. No falls.     Patient took scheduled NOC medications. Requested and Received PRN melatonin for sleep with  meds.     Face to face report will be given with opportunity to observe patient.    Report given to PATI CAMILO.     Olimpia Carey RN   6/2/2023  10:53 PM          Problem: Thought Process Alteration  Goal: Optimal Thought Clarity  Description: Patient will have clear linear conversations while on the unit.  Outcome: Progressing       "

## 2023-06-04 PROCEDURE — 250N000013 HC RX MED GY IP 250 OP 250 PS 637: Performed by: PSYCHIATRY & NEUROLOGY

## 2023-06-04 PROCEDURE — 250N000013 HC RX MED GY IP 250 OP 250 PS 637: Performed by: STUDENT IN AN ORGANIZED HEALTH CARE EDUCATION/TRAINING PROGRAM

## 2023-06-04 PROCEDURE — 124N000001 HC R&B MH

## 2023-06-04 PROCEDURE — 99232 SBSQ HOSP IP/OBS MODERATE 35: CPT | Mod: GT | Performed by: STUDENT IN AN ORGANIZED HEALTH CARE EDUCATION/TRAINING PROGRAM

## 2023-06-04 RX ADMIN — PROPRANOLOL HYDROCHLORIDE 60 MG: 60 CAPSULE, EXTENDED RELEASE ORAL at 08:33

## 2023-06-04 RX ADMIN — ATOMOXETINE 40 MG: 40 CAPSULE ORAL at 08:33

## 2023-06-04 RX ADMIN — NICOTINE POLACRILEX 4 MG: 4 GUM, CHEWING ORAL at 09:57

## 2023-06-04 RX ADMIN — BENZTROPINE MESYLATE 1 MG: 1 TABLET ORAL at 13:59

## 2023-06-04 RX ADMIN — HALOPERIDOL 5 MG: 5 TABLET ORAL at 08:33

## 2023-06-04 RX ADMIN — NICOTINE POLACRILEX 4 MG: 4 GUM, CHEWING ORAL at 14:10

## 2023-06-04 RX ADMIN — MELATONIN 3 MG: at 20:05

## 2023-06-04 RX ADMIN — NICOTINE POLACRILEX 4 MG: 4 GUM, CHEWING ORAL at 12:11

## 2023-06-04 RX ADMIN — NICOTINE POLACRILEX 4 MG: 4 GUM, CHEWING ORAL at 17:41

## 2023-06-04 RX ADMIN — NICOTINE POLACRILEX 4 MG: 4 GUM, CHEWING ORAL at 16:20

## 2023-06-04 RX ADMIN — NICOTINE POLACRILEX 4 MG: 4 GUM, CHEWING ORAL at 08:34

## 2023-06-04 RX ADMIN — OMEGA-3 FATTY ACIDS CAP 1000 MG 1 G: 1000 CAP at 08:33

## 2023-06-04 RX ADMIN — BENZTROPINE MESYLATE 1 MG: 1 TABLET ORAL at 20:05

## 2023-06-04 RX ADMIN — BENZTROPINE MESYLATE 1 MG: 1 TABLET ORAL at 08:33

## 2023-06-04 RX ADMIN — OMEGA-3 FATTY ACIDS CAP 1000 MG 1 G: 1000 CAP at 20:05

## 2023-06-04 ASSESSMENT — ACTIVITIES OF DAILY LIVING (ADL)
ADLS_ACUITY_SCORE: 28
ORAL_HYGIENE: INDEPENDENT
ADLS_ACUITY_SCORE: 28
ORAL_HYGIENE: INDEPENDENT
ADLS_ACUITY_SCORE: 28
HYGIENE/GROOMING: INDEPENDENT
ADLS_ACUITY_SCORE: 28
DRESS: INDEPENDENT;SCRUBS (BEHAVIORAL HEALTH)
LAUNDRY: UNABLE TO COMPLETE
ADLS_ACUITY_SCORE: 28
ADLS_ACUITY_SCORE: 28
HYGIENE/GROOMING: INDEPENDENT
ADLS_ACUITY_SCORE: 28
DRESS: SCRUBS (BEHAVIORAL HEALTH)
ADLS_ACUITY_SCORE: 28

## 2023-06-04 NOTE — PLAN OF CARE
Problem: Adult Behavioral Health Plan of Care  Goal: Patient-Specific Goal (Individualization)  Description: Pt will be compliant with treatment team recommendations during hospitalization.   Pt will report adequate amounts of sleep on NOC shift (5-8 hours.)  Pt will participate in greater than 50% of daily groups.        Outcome: Progressing  Note:     1800 Patient up on the unit and  has been attending groups. Patient states she wants to know how many days she needs to be free of psychosis to be admitted to treatment. Patient steady on her feet. Ate well for dinner meal. Patient steady on her feet. No physical complaints.    2200 Patient in bed with eyes closed and reg resps.     Face to face end of shift report communicated to 11-7 shift RN.     Sheila Broderick RN  6/3/2023  10:27 PM          Problem: Thought Process Alteration  Goal: Optimal Thought Clarity  Description: Patient will have clear linear conversations while on the unit.  Outcome: Progressing     Problem: Suicidal Behavior  Goal: Suicidal Behavior is Absent or Managed  Outcome: Progressing      Goal Outcome Evaluation:    Plan of Care Reviewed With: patient

## 2023-06-04 NOTE — PLAN OF CARE
"  Problem: Adult Behavioral Health Plan of Care  Goal: Patient-Specific Goal (Individualization)  Description: Pt will be compliant with treatment team recommendations during hospitalization.   Pt will report adequate amounts of sleep on NOC shift (5-8 hours.)  Pt will participate in greater than 50% of daily groups.    Outcome: Progressing   Goal Outcome Evaluation:    Plan of Care Reviewed With: patient      Patient is alert, able to make needs known. VSS, afebrile. Assessment and vitals as charted. Denies pain. Patient full range affect, bright and smiling. States she feels \"great.\" She states, \"im not in psychosis anymore.\" Denies hallucinations. Hoping to discharge this coming week. States she wants to stay sober. Endorses some anxiety today but states it is manageable.   Appetite good. Ate 25% of breakfast and 100% of lunch. Steady gait. No falls.       "

## 2023-06-04 NOTE — PLAN OF CARE
Problem: Adult Behavioral Health Plan of Care  Goal: Patient-Specific Goal (Individualization)  Description: Pt will be compliant with treatment team recommendations during hospitalization.   Pt will report adequate amounts of sleep on NOC shift (5-8 hours.)  Pt will participate in greater than 50% of daily groups.        Outcome: Progressing     Problem: Suicidal Behavior  Goal: Suicidal Behavior is Absent or Managed  Outcome: Progressing     Face to face shift report received from Sheila KRUEGER. Rounding completed, pt observed.     Pt appeared to sleep most of this shift. Pt did not have any noted episodes of self harm this shift.    Face to face report will be communicated to oncoming RN.    Magaly Maloney RN  6/4/2023  5:50 AM

## 2023-06-04 NOTE — PROGRESS NOTES
[facility] PSYCHIATRY  PROGRESS NOTE     SUBJECTIVE     Prior to interviewing the patient, I met with nursing and reviewed patient's clinical condition. We discussed clinical care both before and after the interview. I have reviewed the patient's clinical course by review of records including previous notes, labs, and vital signs.     Per nursing, the patient had the following behavioral events over the last 24-hours: Taking medications. Calmer. Doing reality testing.     On psychiatric interview, she is met in her bed. She notes that she feels like the voices have improved. She notes that she is doing well with the AMBRIZ. She notes that it is helping considerably. She notes that her AH is manageable. She denies any dystonia.    The patient notes her appetite has reduced some. She notes that she feels like she is able to eat fine and also has self control, which she appreciates.     The patient notes that she feels less restless today. She notes that she feels like the Propranolol helps. She notes that her focus has improved.     The patient denies any headache, confusion, change in vision, chest pain, SOB, abdominal pain, diarrhea, or constipation. No medical concerns today.       MEDICATIONS   Scheduled Meds:    atomoxetine  40 mg Oral Daily     benztropine  1 mg Oral TID     fish oil-omega-3 fatty acids  1 g Oral BID     haloperidol  5 mg Oral BID     haloperidol decanoate  300 mg Intramuscular Q28 Days     propranolol ER  60 mg Oral Daily     PRN Meds:.acetaminophen, alum & mag hydroxide-simethicone, sore throat, haloperidol **AND** LORazepam **AND** diphenhydrAMINE, haloperidol lactate **AND** LORazepam **AND** diphenhydrAMINE, hydrOXYzine, melatonin, nicotine, OLANZapine **OR** OLANZapine, polyethylene glycol     ALLERGIES   Allergies   Allergen Reactions     Seasonal Allergies         MENTAL STATUS EXAM   Vitals: BP (!) 157/101   Pulse 74   Temp 97.1  F (36.2  C) (Tympanic)   Resp 16   Ht 1.676 m (5'  "6\")   Wt 110.3 kg (243 lb 1.6 oz)   SpO2 98%   BMI 39.24 kg/m      Appearance: Alert, oriented, dressed in hospital scrubs  Attitude: Cooperative   Eye Contact: Good  Mood: \"Better\"  Affect: euthymic   Speech: Normal rate and rhythm   Psychomotor Behavior: No TD or rigidity. No akathisia.  Thought Process: Linear, some tangentiality   Associations: No loose associations   Thought Content: Denies SI, plan, or SIB. AH lessened.  Insight: Fair  Judgment: Fair  Oriented to: Person, place, and time  Attention Span and Concentration: Intact  Recent and Remote Memory: Intact  Language: English with appropriate syntax and vocabulary  Fund of Knowledge: Average   Muscle Strength and Tone: Grossly normal  Gait and Station: Grossly normal       LABS   No results found for this or any previous visit (from the past 24 hour(s)).      IMPRESSION     Ej Dunaway is a 22 year old female with a past psychiatric history notable for schizoaffective disorder versus substance induced psychosis, polysubstance dependence, ADHD. Multiple prior inpatient psychiatric hospitalizations. Prior SA. Multiple prior CD treatments.  Recently hospitalized here at [facility] and dismissed on 5/10/23.  Within 24 hours, was back in an emergency department and then dismissed and then re-presented again to an emergency department grossly psychotic and disorganized.  Was supposed to be attending CD treatment at [facility]. Patient was subsequently transferred and accepted for inpatient psychiatric hospitalization at [facility] for further safety and further stabilization.    Today: AMBRIZ injection yesterday. Will monitor for side effects and taper off of oral Haldol. Doing well so far. Awaiting placement.       DIAGNOSES     #Schizoaffective disorder, bipolar type, multiple episodes, in acute episode  #ADHD  #Opioid Use Disorder  #Stimulant Use Disorder   "     PLAN     Location: Unit 5  Legal Status: Orders Placed This Encounter      Voluntary    Safety Assessment:    Behavioral Orders   Procedures     Code 1 - Restrict to Unit     Routine Programming     As clinically indicated     Status 15     Every 15 minutes.      PTA medications held:   -none     PTA medications continued/changed:   -haldol decanoate 200 mg AMBRIZ every 28 days increased to 300 mg with last injection 6/2  -haldol 5 mg PRN switched to Haldol/Benadryl/Ativan prn   -cogentin 0.5 mg TID -> increased to 1.0 mg TID to address akathisia      New medications initiated:   -haldol 5 mg TID > 5 mg BID as of 6/2 -> 5 mg daily 6/4 -> stop 6/5  -propranolol 10 mg TID to address akathisia -> 20 mg BID on 5/18 -> LA 60 mg daily on 6/2     Today's Changes:    - Stop Haldol tomorrow    Programming: Patient will be treated in a therapeutic milieu with appropriate individual and group therapies. Education will be provided on diagnoses, medications, and treatments.     Medical diagnoses:  Per medicine    Consult: None  Tests: None    Anticipated LOS: > 7 days   Disposition: CD treatment versus IRTS versus home       ATTESTATION      Meng Gauthier DO, MA  Psychiatrist     Video Visit: Patient has given verbal consent for video visit?: Yes  Type of Service: video visit for mental health treatment  Reason for Video Visit: COVID-19 [place]  Originating Site (patient location): [facility]  Distant Site (provider location): Remote Location  Mode of Communication: Video Conference via [employer]  Time of Service: Date: 06/04/2023 , Start: 730 end: 696

## 2023-06-05 VITALS
DIASTOLIC BLOOD PRESSURE: 89 MMHG | RESPIRATION RATE: 18 BRPM | SYSTOLIC BLOOD PRESSURE: 152 MMHG | HEART RATE: 77 BPM | TEMPERATURE: 97.8 F | HEIGHT: 66 IN | WEIGHT: 244.3 LBS | BODY MASS INDEX: 39.26 KG/M2 | OXYGEN SATURATION: 98 %

## 2023-06-05 PROCEDURE — 250N000013 HC RX MED GY IP 250 OP 250 PS 637: Performed by: PSYCHIATRY & NEUROLOGY

## 2023-06-05 PROCEDURE — 99239 HOSP IP/OBS DSCHRG MGMT >30: CPT | Mod: GT | Performed by: STUDENT IN AN ORGANIZED HEALTH CARE EDUCATION/TRAINING PROGRAM

## 2023-06-05 PROCEDURE — 124N000001 HC R&B MH

## 2023-06-05 PROCEDURE — 250N000013 HC RX MED GY IP 250 OP 250 PS 637: Performed by: STUDENT IN AN ORGANIZED HEALTH CARE EDUCATION/TRAINING PROGRAM

## 2023-06-05 RX ORDER — HALOPERIDOL 5 MG/1
5 TABLET ORAL DAILY PRN
Qty: 60 TABLET | Refills: 0 | Status: SHIPPED | OUTPATIENT
Start: 2023-06-05 | End: 2023-08-22

## 2023-06-05 RX ORDER — ATOMOXETINE 40 MG/1
40 CAPSULE ORAL DAILY
Qty: 60 CAPSULE | Refills: 0 | Status: SHIPPED | OUTPATIENT
Start: 2023-06-05 | End: 2023-08-22

## 2023-06-05 RX ORDER — BENZTROPINE MESYLATE 1 MG/1
1 TABLET ORAL 3 TIMES DAILY
Qty: 180 TABLET | Refills: 0 | Status: SHIPPED | OUTPATIENT
Start: 2023-06-05 | End: 2023-08-22

## 2023-06-05 RX ORDER — CHLORAL HYDRATE 500 MG
1 CAPSULE ORAL 2 TIMES DAILY
Qty: 120 CAPSULE | Refills: 0 | Status: SHIPPED | OUTPATIENT
Start: 2023-06-05 | End: 2023-08-22

## 2023-06-05 RX ORDER — HALOPERIDOL DECANOATE 100 MG/ML
300 INJECTION INTRAMUSCULAR
Qty: 6 ML | Refills: 0 | Status: SHIPPED | OUTPATIENT
Start: 2023-06-30 | End: 2023-08-22

## 2023-06-05 RX ORDER — ATOMOXETINE 40 MG/1
40 CAPSULE ORAL DAILY
Qty: 60 CAPSULE | Refills: 0 | Status: SHIPPED | OUTPATIENT
Start: 2023-06-05 | End: 2023-06-05

## 2023-06-05 RX ORDER — PROPRANOLOL HCL 60 MG
60 CAPSULE, EXTENDED RELEASE 24HR ORAL DAILY
Qty: 60 CAPSULE | Refills: 0 | Status: SHIPPED | OUTPATIENT
Start: 2023-06-06 | End: 2023-08-22

## 2023-06-05 RX ADMIN — HYDROXYZINE HYDROCHLORIDE 25 MG: 25 TABLET, FILM COATED ORAL at 20:52

## 2023-06-05 RX ADMIN — NICOTINE POLACRILEX 4 MG: 4 GUM, CHEWING ORAL at 10:11

## 2023-06-05 RX ADMIN — PROPRANOLOL HYDROCHLORIDE 60 MG: 60 CAPSULE, EXTENDED RELEASE ORAL at 08:22

## 2023-06-05 RX ADMIN — OMEGA-3 FATTY ACIDS CAP 1000 MG 1 G: 1000 CAP at 20:04

## 2023-06-05 RX ADMIN — NICOTINE POLACRILEX 4 MG: 4 GUM, CHEWING ORAL at 08:51

## 2023-06-05 RX ADMIN — ATOMOXETINE 40 MG: 40 CAPSULE ORAL at 08:22

## 2023-06-05 RX ADMIN — BENZTROPINE MESYLATE 1 MG: 1 TABLET ORAL at 20:04

## 2023-06-05 RX ADMIN — NICOTINE POLACRILEX 4 MG: 4 GUM, CHEWING ORAL at 12:19

## 2023-06-05 RX ADMIN — MELATONIN 3 MG: at 20:04

## 2023-06-05 RX ADMIN — BENZTROPINE MESYLATE 1 MG: 1 TABLET ORAL at 13:47

## 2023-06-05 RX ADMIN — BENZTROPINE MESYLATE 1 MG: 1 TABLET ORAL at 08:22

## 2023-06-05 RX ADMIN — NICOTINE POLACRILEX 4 MG: 4 GUM, CHEWING ORAL at 15:05

## 2023-06-05 RX ADMIN — NICOTINE POLACRILEX 4 MG: 4 GUM, CHEWING ORAL at 17:42

## 2023-06-05 RX ADMIN — OMEGA-3 FATTY ACIDS CAP 1000 MG 1 G: 1000 CAP at 08:22

## 2023-06-05 ASSESSMENT — ACTIVITIES OF DAILY LIVING (ADL)
ADLS_ACUITY_SCORE: 28
ADLS_ACUITY_SCORE: 28
DRESS: INDEPENDENT;SCRUBS (BEHAVIORAL HEALTH)
ADLS_ACUITY_SCORE: 28
ADLS_ACUITY_SCORE: 28
ORAL_HYGIENE: INDEPENDENT
HYGIENE/GROOMING: INDEPENDENT
HYGIENE/GROOMING: INDEPENDENT
ADLS_ACUITY_SCORE: 28
ADLS_ACUITY_SCORE: 28
ORAL_HYGIENE: INDEPENDENT
ADLS_ACUITY_SCORE: 28
DRESS: INDEPENDENT
ADLS_ACUITY_SCORE: 28
ADLS_ACUITY_SCORE: 28

## 2023-06-05 NOTE — PLAN OF CARE
"Face to face shift report received from Magaly ALBARRAN RN. Rounding completed, pt observed.    Problem: Adult Behavioral Health Plan of Care  Goal: Patient-Specific Goal (Individualization)  Description: Pt will be compliant with treatment team recommendations during hospitalization.   Pt will report adequate amounts of sleep on NOC shift (5-8 hours.)  Pt will participate in greater than 50% of daily groups.    Outcome: Progressing  Note: Shift Summary:  Patient in bed at the start of this shift.  Patient up in lounge for breakfast and has remained up.  Mood is pleasant.  States sheisi \"ready to go home\".  States she feels safe to discharge.  She has not made many Cheondoism statements today.  Compliant with medications.  Gait is balanced and steady.  She can easily make her needs known to staff.     Problem: Thought Process Alteration  Goal: Optimal Thought Clarity  Description: Patient will have clear linear conversations while on the unit.  Outcome: Progressing  Face to face report will be communicated to oncoming RN.    Gladys Moser RN  6/5/2023                            "

## 2023-06-05 NOTE — DISCHARGE SUMMARY
[facility] PSYCHIATRY  DISCHARGE SUMMARY     DISCHARGE DATA     Ej Dunaway MRN# [id number]   Age: 22 year old YOB: 2000     Date of Admission: 5/13/2023  Date of Discharge: 6/6/2023  Discharge Provider: Meng Gauthier DO       REASON FOR ADMISSION     Ej Dunaway is a 22 year old female with a past psychiatric history notable for schizoaffective disorder versus substance induced psychosis, polysubstance dependence, ADHD. Multiple prior inpatient psychiatric hospitalizations. Prior SA. Multiple prior CD treatments.  Recently hospitalized here at [facility] and dismissed on 5/10/23.  Within 24 hours, was back in an emergency department and then dismissed and then re-presented again to an emergency department grossly psychotic and disorganized.  Was supposed to be attending CD treatment at [facility]. Patient was subsequently transferred and accepted for inpatient psychiatric hospitalization at [facility] for further safety and further stabilization.        DISCHARGE DIAGNOSES     #Schizoaffective disorder, bipolar type, multiple episodes, in acute episode  #ADHD  #Opioid Use Disorder  #Stimulant Use Disorder       CONSULTS     1. Spiritual care       HOSPITAL COURSE   Psychiatric Course:    Legal status: Orders Placed This Encounter      Voluntary    Patient was admitted to unit 5 due to the aforementioned presentation. The patient was placed under 15 minute checks to ensure patient safety. The patient participated in unit programming and groups as able.    Ms. Irvin Dunaway did not require seclusion/restraint during hospitalization.     We reviewed with Ms. Irvin Dunaway current and past medication trials including duration, dose, response and side effects. During this hospitalization, the following changes to the patient's psychotropic medications were made:    PTA  medications held:   -none     PTA medications continued/changed:   -haldol decanoate 200 mg AMBRIZ every 28 days increased to 300 mg with last injection 6/2 and next injection 6/30  -cogentin 0.5 mg TID -> increased to 1.0 mg TID to address akathisia   -haldol 5 mg PRN     New medications initiated:   -fish oil 1 g BID for possible neuroprotective effect    -haldol 5 mg TID scheduled until higher dose of AMBRIZ initiated. Stopped prior to discharge.  -propranolol LA 60 mg daily to address akathisia    The patient's delusions and AH centered around Mu-ism themes with demons attacking her. Explored multiple possible medication options with the patient preferring to stay on Haldol. Recommended Clozapine but the patient was not interested. As a result, increased her Haldol using oral supplementation. Provided her with AMBRIZ at a higher dose of 300 mg monthly. The patient did experience akathisa with Cogentin being increased and propranolol being started to address. The patient did not have any signs of rigidity or parkinsonism. She was educated again on the risks of TD with the patient consenting to use of Haldol. The patient had a longer hospitalization given a longer period of time being needed to improve her psychosis.      With these changes and supports the patient noticed improvement in their symptoms and felt sufficiently ready for discharge. The patient's psychosis reduced with her AH not being as present through the day and her insight improving with her doing reality testing. Recommended the patient go to CD treatment or IRTS facility with this being attempted. The patient preferred to discharge before placement could be found being aware of the risks.    As a result, Ej Dunaway was discharged. At the time of discharge, Ej Dunaway was determined to not be a danger to self or others. At the current time of discharge, the patient does not meet criteria for involuntary hospitalization.  On the day of discharge, the patient reports that they do not have suicidal or homicidal ideation. Steps taken to minimize risk include: assessing patient s behavior and thought process daily during hospital stay, discharging patient with adequate plan for follow up for mental and physical health and discussing safety plan of returning to the hospital should the patient ever have thoughts of harming themselves or others. Therefore, based on all available evidence including the factors cited above, the patient does not appear to be at imminent risk for self-harm, and is appropriate for outpatient level of care. However, if patient uses substances or is medication non-adherent, their risk of decompensation, psychosis, and SI will be elevated. This was discussed with the patient.    Medical Course:    The patient was medically cleared for admission to inpatient psychiatry. No medical issues arose. The patient was medically stable at the time of discharge.        DISCHARGE MEDICATIONS     Current Discharge Medication List      START taking these medications    Details   fish oil-omega-3 fatty acids 1000 MG capsule Take 1 capsule (1 g) by mouth 2 times daily  Qty: 120 capsule, Refills: 0    Associated Diagnoses: Schizoaffective disorder, bipolar type (H)      nicotine polacrilex (NICORETTE) 4 MG gum Place 1 each (4 mg) inside cheek every hour as needed for smoking cessation  Qty: 240 each, Refills: 0    Associated Diagnoses: Tobacco use disorder      propranolol ER (INDERAL LA) 60 MG 24 hr capsule Take 1 capsule (60 mg) by mouth daily  Qty: 60 capsule, Refills: 0    Associated Diagnoses: Schizoaffective disorder, bipolar type (H)         CONTINUE these medications which have CHANGED    Details   atomoxetine (STRATTERA) 40 MG capsule Take 1 capsule (40 mg) by mouth daily  Qty: 60 capsule, Refills: 0    Associated Diagnoses: ADHD (attention deficit hyperactivity disorder), combined type      benztropine (COGENTIN) 1 MG  "tablet Take 1 tablet (1 mg) by mouth 3 times daily  Qty: 180 tablet, Refills: 0    Associated Diagnoses: Schizoaffective disorder, bipolar type (H)      haloperidol (HALDOL) 5 MG tablet Take 1 tablet (5 mg) by mouth daily as needed for agitation (anxiety / agitation/ break through psychosis)  Qty: 60 tablet, Refills: 0    Associated Diagnoses: Psychosis, unspecified psychosis type (H)      haloperidol decanoate (HALDOL DECANOATE) 100 MG/ML injection Inject 300 mg into the muscle every 28 days  Qty: 6 mL, Refills: 0    Associated Diagnoses: Schizoaffective disorder, bipolar type (H)                MENTAL STATUS EXAM   Vitals: /77   Pulse 79   Temp 98.2  F (36.8  C) (Tympanic)   Resp 16   Ht 1.676 m (5' 6\")   Wt 110.8 kg (244 lb 4.8 oz)   SpO2 98%   BMI 39.43 kg/m      Appearance: Alert, oriented, dressed in hospital scrubs  Attitude: Cooperative   Eye Contact: Good  Mood: \"Better\"  Affect: euthymic   Speech: Normal rate and rhythm   Psychomotor Behavior: No TD or rigidity. No akathisia.  Thought Process: Linear, logical   Associations: No loose associations   Thought Content: Denies SI, plan, or SIB. AH lessened.  Insight: Fair  Judgment: Fair  Oriented to: Person, place, and time  Attention Span and Concentration: Intact  Recent and Remote Memory: Intact  Language: English with appropriate syntax and vocabulary  Fund of Knowledge: Average   Muscle Strength and Tone: Grossly normal  Gait and Station: Grossly normal        DISCHARGE PLAN     1.  Education given regarding diagnostic and treatment options with risks, benefits and alternatives with adequate verbalization of understanding.  2.  Discharge to home with father picking up. Upon detailed review of risk factors, patient amenable for release.   3.  Continue aforementioned medications and associated medication changes with follow-up by outpatient provider.  4.  Crisis management planning in place.    5.  Nursing and  to review further " discharge recommendations.   6.  Patient is being discharged with the following appointments:    Health Care Follow-up:      [facility]   Case Manger: Veronica Nunn @ [phone]  [address] [place] [id number]  [phone]   Fax     [facility]  [facility] [address]  [place] [address]  Phone: [phone]    See AVS for further details.    7. General discharge instructions:       Reason for your hospital stay    Psychosis.     Follow-up and recommended labs and tests    See  Recommendations for outpatient follow-up appointments. Annual AP labs.     Activity    Your activity upon discharge: activity as tolerated.     Discharge Instructions    Please continue to take your medications as prescribed. Please also practice healthy lifestyle choices, including exercise, healthy diet, stress management, adequate sleep, and cultivating supportive relationships. Please also avoid use of any substances as best as able. Please return to the ED if your symptoms worsen or you do not feel safe.     Diet    Follow this diet upon discharge: Orders Placed This Encounter      Regular Diet Adult           DISCHARGE SERVICES PROVIDED     42 minutes spent on discharge services, including:  Final examination of patient.  Review and discussion of hospital stay.  Instructions for continued outpatient care/goals.  Preparation of discharge records.  Preparation of medications refills and new prescriptions.  Preparation of applicable referral forms.        ATTESTATION     Dr. Meng Gauthier  Psychiatrist     VIDEO VISIT    Patient has given verbal consent for video visit?: Yes     Video- Visit Details  Type of service:  video visit for mental health treatment.  Time of service:  Date:  06/05/2023  Video Start Time: 1100 AM  Video End Time: 1130 AM    Reason for video visit: COVID-19 [place]  Originating  Site (patient location):  [facility] Site (provider location):  Remote location  Mode of Communication:  Video Conference via Smile THIS ADMISSION     No results found for any visits on 05/13/23.

## 2023-06-05 NOTE — PLAN OF CARE
Problem: Adult Behavioral Health Plan of Care  Goal: Patient-Specific Goal (Individualization)  Description: Pt will be compliant with treatment team recommendations during hospitalization.   Pt will report adequate amounts of sleep on NOC shift (5-8 hours.)  Pt will participate in greater than 50% of daily groups.        Outcome: Progressing  Note:     1900 Patient has been up on the unit and attending groups. Approached writer a few times this shift indicating that she really wants to be discharged tomorrow. States that she is no longer interested in going to treatment and has a plan to go to her sister's house for a night and then back to her apartment in the [facility]. States she feels stressed about needing to get a job quickly enough to pay her rent. Denies physical problems and pain. Ate well for meal and is steady on her feet when ambulating.    2145 Patient in bed at this time, eyes closed and resps. Regular.    Face to face end of shift report communicated to 11-7 shift RN.     Sheila Broderick RN  6/4/2023  9:54 PM          Problem: Thought Process Alteration  Goal: Optimal Thought Clarity  Description: Patient will have clear linear conversations while on the unit.  Outcome: Progressing     Problem: Suicidal Behavior  Goal: Suicidal Behavior is Absent or Managed  Outcome: Progressing      Goal Outcome Evaluation:    Plan of Care Reviewed With: patient

## 2023-06-05 NOTE — PROGRESS NOTES
Pt is discharging tomorrow 6/6 between 10-11 AM with her father.     Scheduled follow up with [facility]   Psychiatry- Jessica Ramirezyomi- Thursday June 15th @ 12:00 PM

## 2023-06-05 NOTE — PLAN OF CARE
Problem: Adult Behavioral Health Plan of Care  Goal: Patient-Specific Goal (Individualization)  Description: Pt will be compliant with treatment team recommendations during hospitalization.   Pt will report adequate amounts of sleep on NOC shift (5-8 hours.)  Pt will participate in greater than 50% of daily groups.        Outcome: Progressing     Problem: Suicidal Behavior  Goal: Suicidal Behavior is Absent or Managed  Outcome: Progressing     Face to face shift report received from Sheila KRUEGER. Rounding completed, pt observed.     Pt appeared to sleep most of this shift. Pt did not have any noted episodes of self harm this shift.    Face to face report will be communicated to oncoming RN.    Magaly Maloney RN  6/5/2023  6:01 AM

## 2023-06-06 PROCEDURE — 250N000013 HC RX MED GY IP 250 OP 250 PS 637: Performed by: STUDENT IN AN ORGANIZED HEALTH CARE EDUCATION/TRAINING PROGRAM

## 2023-06-06 PROCEDURE — 250N000013 HC RX MED GY IP 250 OP 250 PS 637: Performed by: PSYCHIATRY & NEUROLOGY

## 2023-06-06 RX ADMIN — PROPRANOLOL HYDROCHLORIDE 60 MG: 60 CAPSULE, EXTENDED RELEASE ORAL at 08:39

## 2023-06-06 RX ADMIN — OMEGA-3 FATTY ACIDS CAP 1000 MG 1 G: 1000 CAP at 08:39

## 2023-06-06 RX ADMIN — BENZTROPINE MESYLATE 1 MG: 1 TABLET ORAL at 08:39

## 2023-06-06 RX ADMIN — ATOMOXETINE 40 MG: 40 CAPSULE ORAL at 08:39

## 2023-06-06 RX ADMIN — NICOTINE POLACRILEX 4 MG: 4 GUM, CHEWING ORAL at 08:39

## 2023-06-06 RX ADMIN — POLYETHYLENE GLYCOL 3350 17 G: 17 POWDER, FOR SOLUTION ORAL at 08:39

## 2023-06-06 ASSESSMENT — ACTIVITIES OF DAILY LIVING (ADL)
ADLS_ACUITY_SCORE: 28

## 2023-06-06 NOTE — PLAN OF CARE
Face to face report received from Sheila KRUEGER. Pt. Observed.     Problem: Adult Behavioral Health Plan of Care  Goal: Patient-Specific Goal (Individualization)  Description: Pt will be compliant with treatment team recommendations during hospitalization.   Pt will report adequate amounts of sleep on NOC shift (5-8 hours.)  Pt will participate in greater than 50% of daily groups.    Outcome: Progressing   Pt has been in bed with eyes closed and regular respirations x 7 hours this noc shift. 15 minute and PRN checks all night. No complaints offered. Will continue to monitor.    Face to face end of shift report communicated to oncoming RN.     Lizy Tang RN  6/6/2023

## 2023-06-06 NOTE — PROGRESS NOTES
Pt is discharging at the recommendation of the treatment team. Pt is discharging to home transported by dad . Pt denies having any thoughts of hurting themself or anyone else. Pt denies anxiety or depression. Pt has follow up with case management and psychiatry. Discharge instructions, including; demographic sheet, psychiatric evaluation, discharge summary, and AVS were faxed to these next level of care providers.

## 2023-06-06 NOTE — PLAN OF CARE
Face to face shift report received from Lizy AGUILAR RN. Rounding completed, pt observed.    Problem: Adult Behavioral Health Plan of Care  Goal: Plan of Care Review  Outcome: Adequate for Care Transition  Note: Shift Summary:  Discharge Note    Patient Discharged to home on 6/6/2023 10:37 AM via Private Car accompanied by father.     Patient informed of discharge instructions in AVS. patient verbalizes understanding and denies having any questions pertaining to AVS. Patient stable at time of discharge. Patient denies SI, HI, and thoughts of self harm at time of discharge. All personal belongings returned to patient. Discharge prescriptions sent to [facility] via electronic communication. Psych evaluation, history and physical, AVS, and discharge summary faxed to next level of care- .     Gladys Moser RN  6/6/2023  10:37 AM      Goal: Patient-Specific Goal (Individualization)  Description: Pt will be compliant with treatment team recommendations during hospitalization.   Pt will report adequate amounts of sleep on NOC shift (5-8 hours.)  Pt will participate in greater than 50% of daily groups.        Outcome: Adequate for Care Transition  Goal: Individualized Daily Interaction Plan (IDIP)  Outcome: Adequate for Care Transition  Goal: Adheres to Safety Considerations for Self and Others  Outcome: Adequate for Care Transition  Goal: Absence of New-Onset Illness or Injury  Outcome: Adequate for Care Transition  Goal: Optimized Coping Skills in Response to Life Stressors  Outcome: Adequate for Care Transition  Goal: Develops/Participates in Therapeutic [place] to Support Successful Transition  Outcome: Adequate for Care Transition     Problem: Thought Process Alteration  Goal: Optimal Thought Clarity  Description: Patient will have clear linear conversations while on the unit.  Outcome: Adequate for Care Transition     Problem: Suicidal Behavior  Goal: Suicidal Behavior is Absent or Managed  Outcome: Adequate for Care  Transition   Goal Outcome Evaluation:    Plan of Care Reviewed With: patient

## 2023-06-06 NOTE — PLAN OF CARE
Problem: Adult Behavioral Health Plan of Care  Goal: Patient-Specific Goal (Individualization)  Description: Pt will be compliant with treatment team recommendations during hospitalization.   Pt will report adequate amounts of sleep on NOC shift (5-8 hours.)  Pt will participate in greater than 50% of daily groups.        Outcome: Progressing  Note:     1700 Patient up on the unit and attending groups. Patient excitedly tells writer that she is leaving tomorrow probably around 1100 am. Patient medications had been sent to [facility] and have been picked up and placed in secured medication room for discharge. Patient states that she spoke with her Dad and he will try to be here by 1000. Patient ate well for supper meal. Denies physical problems including pain today.    2004 Patient requested and given Melatonin 3 mg po for sleep.    2052 Patient requested and given Hydroxyzine 25 mg po for anxiety.    2200 Patient in bed with eyes closed and regular resps.    Face to face end of shift report communicated to 11-7 shift RN.     Sheila Broderick RN  6/5/2023  10:22 PM          Problem: Thought Process Alteration  Goal: Optimal Thought Clarity  Description: Patient will have clear linear conversations while on the unit.  Outcome: Progressing     Problem: Suicidal Behavior  Goal: Suicidal Behavior is Absent or Managed  Outcome: Progressing      Goal Outcome Evaluation:    Plan of Care Reviewed With: patient

## 2023-06-07 ENCOUNTER — TELEPHONE (OUTPATIENT)
Dept: BEHAVIORAL HEALTH | Facility: HOSPITAL | Age: 23
End: 2023-06-07

## 2023-06-07 NOTE — TELEPHONE ENCOUNTER
She was discharged to home on  6/6/23 from [facility]. The number listed in her chart is not in service. Unable to contact for follow up discharge call.

## 2023-08-22 ENCOUNTER — OFFICE VISIT (OUTPATIENT)
Dept: FAMILY MEDICINE | Facility: CLINIC | Age: 23
End: 2023-08-22
Payer: MEDICAID

## 2023-08-22 VITALS
HEART RATE: 83 BPM | RESPIRATION RATE: 22 BRPM | OXYGEN SATURATION: 99 % | BODY MASS INDEX: 36.59 KG/M2 | WEIGHT: 227.7 LBS | HEIGHT: 66 IN | SYSTOLIC BLOOD PRESSURE: 127 MMHG | DIASTOLIC BLOOD PRESSURE: 80 MMHG | TEMPERATURE: 98.5 F

## 2023-08-22 DIAGNOSIS — N30.01 ACUTE CYSTITIS WITH HEMATURIA: Primary | ICD-10-CM

## 2023-08-22 DIAGNOSIS — K59.00 CONSTIPATION, UNSPECIFIED CONSTIPATION TYPE: ICD-10-CM

## 2023-08-22 DIAGNOSIS — R20.0 BILATERAL LEG NUMBNESS: ICD-10-CM

## 2023-08-22 LAB
ALBUMIN UR-MCNC: NEGATIVE MG/DL
APPEARANCE UR: ABNORMAL
BACTERIA #/AREA URNS HPF: ABNORMAL /HPF
BILIRUB UR QL STRIP: NEGATIVE
COLOR UR AUTO: YELLOW
GLUCOSE UR STRIP-MCNC: NEGATIVE MG/DL
HGB UR QL STRIP: NEGATIVE
HYALINE CASTS #/AREA URNS LPF: ABNORMAL /LPF
KETONES UR STRIP-MCNC: NEGATIVE MG/DL
LEUKOCYTE ESTERASE UR QL STRIP: ABNORMAL
MUCOUS THREADS #/AREA URNS LPF: PRESENT /LPF
NITRATE UR QL: POSITIVE
PH UR STRIP: 6.5 [PH] (ref 5–7)
RBC #/AREA URNS AUTO: ABNORMAL /HPF
SP GR UR STRIP: 1.02 (ref 1–1.03)
SQUAMOUS #/AREA URNS AUTO: ABNORMAL /LPF
TSH SERPL DL<=0.005 MIU/L-ACNC: 2.49 UIU/ML (ref 0.3–4.2)
UROBILINOGEN UR STRIP-ACNC: 1 E.U./DL
WBC #/AREA URNS AUTO: ABNORMAL /HPF

## 2023-08-22 PROCEDURE — 87186 SC STD MICRODIL/AGAR DIL: CPT | Performed by: FAMILY MEDICINE

## 2023-08-22 PROCEDURE — 84443 ASSAY THYROID STIM HORMONE: CPT | Performed by: FAMILY MEDICINE

## 2023-08-22 PROCEDURE — 81001 URINALYSIS AUTO W/SCOPE: CPT | Performed by: FAMILY MEDICINE

## 2023-08-22 PROCEDURE — 99214 OFFICE O/P EST MOD 30 MIN: CPT | Performed by: FAMILY MEDICINE

## 2023-08-22 PROCEDURE — 87086 URINE CULTURE/COLONY COUNT: CPT | Performed by: FAMILY MEDICINE

## 2023-08-22 PROCEDURE — 36415 COLL VENOUS BLD VENIPUNCTURE: CPT | Performed by: FAMILY MEDICINE

## 2023-08-22 RX ORDER — AMOXICILLIN 250 MG
1 CAPSULE ORAL 2 TIMES DAILY PRN
Qty: 30 TABLET | Refills: 1 | Status: SHIPPED | OUTPATIENT
Start: 2023-08-22 | End: 2023-10-20

## 2023-08-22 RX ORDER — CEPHALEXIN 500 MG/1
500 CAPSULE ORAL 3 TIMES DAILY
Qty: 21 CAPSULE | Refills: 0 | Status: SHIPPED | OUTPATIENT
Start: 2023-08-22 | End: 2023-08-29

## 2023-08-22 ASSESSMENT — PATIENT HEALTH QUESTIONNAIRE - PHQ9
10. IF YOU CHECKED OFF ANY PROBLEMS, HOW DIFFICULT HAVE THESE PROBLEMS MADE IT FOR YOU TO DO YOUR WORK, TAKE CARE OF THINGS AT HOME, OR GET ALONG WITH OTHER PEOPLE: SOMEWHAT DIFFICULT
SUM OF ALL RESPONSES TO PHQ QUESTIONS 1-9: 7
SUM OF ALL RESPONSES TO PHQ QUESTIONS 1-9: 7

## 2023-08-22 ASSESSMENT — PAIN SCALES - GENERAL: PAINLEVEL: NO PAIN (0)

## 2023-08-22 NOTE — PROGRESS NOTES
"  Assessment & Plan     Acute cystitis with hematuria  My first visit with patient and previous history reviewed with her and in her chart.  Says she has had urinary symptoms for quite some time.  Does not feel sick but urine is very foul-smelling.  Denies any significant vaginal discharge.  Urinalysis is definitely positive so we will get her started on antibiotics for this.  - UA Macroscopic with reflex to Microscopic and Culture - Lab Collect; Future  - UA Macroscopic with reflex to Microscopic and Culture - Lab Collect  - Urine Microscopic Exam  - Urine Culture  - cephALEXin (KEFLEX) 500 MG capsule; Take 1 capsule (500 mg) by mouth 3 times daily for 7 days    Constipation, unspecified constipation type  She also notes that she has had constipation for quite some time.  Was on quite a bit of psychoactive medication that could contribute but no longer taking these.  Has been using MiraLAX regularly but she says she is not cleaning out.  She has small hard stools and there is mucus around that.  So encouraged her to continue the MiraLAX but add Emrcedez-Colace twice daily until things improve.  - TSH with free T4 reflex; Future  - TSH with free T4 reflex  - senna-docusate (SENOKOT-S/PERICOLACE) 8.6-50 MG tablet; Take 1 tablet by mouth 2 times daily as needed for constipation    Bilateral leg numbness  Complains of intermittent bilateral numbness of her legs, primarily centered around the knees.  But it really does not have any particular neurologic distribution.  Exam unremarkable.  So I really do not know what to make of this and I suggested that it might be a surface nerve impingement so she could try massage, etc.  We will see how her thyroid turns out.       BMI:   Estimated body mass index is 36.75 kg/m  as calculated from the following:    Height as of this encounter: 1.676 m (5' 6\").    Weight as of this encounter: 103.3 kg (227 lb 11.2 oz).   Weight management plan: Discussed healthy diet and exercise " "guidelines    See Patient Instructions    Kalyani Velasquez MD  [facility] RYAN Pagan is a 22 year old, presenting for the following health issues:  Urinary Pain (Burning when peeing with right back pain ), Knee Problem (Numbness and tingling in the right knee for 5 days ), and Constipation (For 3-4 months )        8/22/2023     8:04 AM   Additional Questions   Roomed by Jade FERRERA   Accompanied by Friend - Sushant KERNS       Here today for a few issues as above.      Review of Systems   Constitutional, HEENT, cardiovascular, pulmonary, gi and gu systems are negative, except as otherwise noted.      Objective    /80 (BP Location: Right arm, Patient Position: Sitting, Cuff Size: Adult Large)   Pulse 83   Temp 98.5  F (36.9  C) (Oral)   Resp 22   Ht 1.676 m (5' 6\")   Wt 103.3 kg (227 lb 11.2 oz)   LMP 07/21/2023 (Exact Date)   SpO2 99%   BMI 36.75 kg/m    Body mass index is 36.75 kg/m .  Physical Exam   Alert, pleasant, upbeat, and in no apparent discomfort.  S1 and S2 normal, no murmurs, clicks, gallops or rubs. Regular rate and rhythm. Chest is clear; no wheezes or rales. No edema or JVD.  Lower extremities -normal exam and range of motion  Past labs reviewed with the patient.     UA RESULTS:  Recent Labs   Lab Test 08/22/23  0842   COLOR Yellow   APPEARANCE Slightly Cloudy*   URINEGLC Negative   URINEBILI Negative   URINEKETONE Negative   SG 1.020   UBLD Negative   URINEPH 6.5   PROTEIN Negative   UROBILINOGEN 1.0   NITRITE Positive*   LEUKEST Small*   RBCU None Seen   WBCU 10-25*                    Answers submitted by the patient for this visit:  Patient Health Questionnaire (Submitted on 8/22/2023)  If you checked off any problems, how difficult have these problems made it for you to do your work, take care of things at home, or get along with other people?: Somewhat difficult  PHQ9 TOTAL SCORE: 7  General Questionnaire (Submitted on 8/22/2023)  Chief " Complaint: Chronic problems general questions HPI Form  How many servings of fruits and vegetables do you eat daily?: 0-1  On average, how many sweetened beverages do you drink each day (Examples: soda, juice, sweet tea, etc.  Do NOT count diet or artificially sweetened beverages)?: 4  How many minutes a day do you exercise enough to make your heart beat faster?: 9 or less  How many days a week do you exercise enough to make your heart beat faster?: 3 or less  How many days per week do you miss taking your medication?: 0  General Concern (Submitted on 8/22/2023)  Chief Complaint: Chronic problems general questions HPI Form  What is the reason for your visit today?: legs numb bladder infection  When did your symptoms begin?: 1-2 weeks ago

## 2023-08-22 NOTE — RESULT ENCOUNTER NOTE
Hi, Ej,  Your thyroid level actually looks normal.  Perfectly fine.  1 less thing to deal with.  So lets try that new medication to see if it gets your system moving.  And I might stick with the MiraLAX as well at least at first until everything starts clearing out.  JEFF Velasquez M.D.

## 2023-08-23 LAB
BACTERIA UR CULT: ABNORMAL
BACTERIA UR CULT: ABNORMAL

## 2023-08-29 ENCOUNTER — VIRTUAL VISIT (OUTPATIENT)
Dept: INTERNAL MEDICINE | Facility: CLINIC | Age: 23
End: 2023-08-29
Payer: MEDICAID

## 2023-08-29 ENCOUNTER — NURSE TRIAGE (OUTPATIENT)
Dept: NURSING | Facility: CLINIC | Age: 23
End: 2023-08-29

## 2023-08-29 ENCOUNTER — E-VISIT (OUTPATIENT)
Dept: PEDIATRICS | Facility: CLINIC | Age: 23
End: 2023-08-29
Payer: MEDICAID

## 2023-08-29 DIAGNOSIS — Z09 ENCOUNTER FOR EXAMINATION FOLLOWING TREATMENT AT HOSPITAL: Primary | ICD-10-CM

## 2023-08-29 DIAGNOSIS — H93.13 RINGING IN EARS, BILATERAL: ICD-10-CM

## 2023-08-29 DIAGNOSIS — N12 PYELONEPHRITIS: ICD-10-CM

## 2023-08-29 DIAGNOSIS — F15.20 METHAMPHETAMINE DEPENDENCE (H): Primary | ICD-10-CM

## 2023-08-29 PROCEDURE — 99207 PR NON-BILLABLE SERV PER CHARTING: CPT | Performed by: PEDIATRICS

## 2023-08-29 PROCEDURE — 99213 OFFICE O/P EST LOW 20 MIN: CPT | Mod: VID | Performed by: INTERNAL MEDICINE

## 2023-08-29 NOTE — LETTER
August 29, 2023      Ej Dunaway  [address]  [place] [address]        To Whom It May Concern:    Ej Dunaway was seen by our clinic. She may return to work without restrictions.      Sincerely,        Navneet Sullivan MD, MPH  [facility]  Internal Medicine

## 2023-08-29 NOTE — TELEPHONE ENCOUNTER
"Cloey calling with episode of loud 'static\" in both ears gone now in left ear and mild in right ear. Also hearing loss in her right ear since she \"quit Fentanyl\" several month ago. Denies pain. Care advice is to go to office now. Agreed to urgent care if no appt.  Rola Stewart RN on 8/29/2023 at 12:56 PM      Reason for Disposition   Tinnitus (e.g., ringing, hissing, beating) is main symptom   Hearing loss in one or both ears and sudden onset and present now    Additional Information   Negative: Patient sounds very sick or weak to the triager   Negative: Taking aspirin and dosage sounds high (i.e., > 1500 mg/day)    Protocols used: Hearing Loss or Change-A-OH, Tinnitus-A-OH    "

## 2023-08-29 NOTE — PROGRESS NOTES
Ej is a 22 year old who is being evaluated via a billable video visit.      How would you like to obtain your AVS? [employer]  If the video visit is dropped, the invitation should be resent by: Text to cell phone: [phone]  Will anyone else be joining your video visit? No    Assessment & Plan   Encounter for examination following treatment at hospital  Pyelonephritis  Reports she is improved. She is taking cephalexin as prescribed. She'd like to return to work and I see no medical contraindication to her working in the [employer]. Note sent via [employer]. Encouraged to complete full antibiotic course.    F/u with regular PCP at regular interval or sooner HARRY Sullivan MD  [facility]    Subjective   Ej is a 22 year old who presents for a same day acute care virtual video visit with chief concern of: ER f/u. This is the first time I have met Ej, who typically gets care at clinics closer to her residence. Seen in ER 8/23 and diagnosed with pyelonephritis. She was given 1 dose ceftriaxone and told to start cephalexin that had been prescribed. She has been taking that and feels much better. Denies flank pain or fever/chills. She'd like to return to work but requires a doctor's note.    Review of Systems   Constitutional, gu systems are negative, except as otherwise noted.      Objective       Vitals: No vitals were obtained today due to virtual visit.    Physical Exam   GENERAL: Healthy, alert and no distress  EYES: Eyes grossly normal to inspection.  No discharge or erythema, or obvious scleral/conjunctival abnormalities.  RESP: No audible wheeze, cough, or visible cyanosis.  No visible retractions or increased work of breathing.    SKIN: Visible skin clear. No significant rash, abnormal pigmentation or lesions.  NEURO: Cranial nerves grossly intact.  Mentation and speech appropriate for age.  PSYCH: Mentation appears normal, affect normal/bright, judgement and  insight intact, normal speech and appearance well-groomed.    Video-Visit Details  Type of service: Video Visit   Video Start Time: 11:30 AM  Video End Time: 11:32 AM  Originating Location (pt. Location): Home  Distant Location (provider location):  On-site  Platform used for Video Visit: Dante

## 2023-08-31 ENCOUNTER — MYC MEDICAL ADVICE (OUTPATIENT)
Dept: PEDIATRICS | Facility: CLINIC | Age: 23
End: 2023-08-31

## 2023-08-31 ENCOUNTER — NURSE TRIAGE (OUTPATIENT)
Dept: FAMILY MEDICINE | Facility: CLINIC | Age: 23
End: 2023-08-31

## 2023-08-31 ENCOUNTER — E-VISIT (OUTPATIENT)
Dept: PEDIATRICS | Facility: CLINIC | Age: 23
End: 2023-08-31
Payer: MEDICAID

## 2023-08-31 DIAGNOSIS — R20.8 DYSESTHESIA: Primary | ICD-10-CM

## 2023-08-31 PROCEDURE — 99207 PR NON-BILLABLE SERV PER CHARTING: CPT | Performed by: PEDIATRICS

## 2023-08-31 NOTE — PATIENT INSTRUCTIONS
Thank you for choosing us for your care. I think an in-clinic visit would be best next steps based on your symptoms. Please schedule a clinic appointment; or video visit  you won t be charged for this eVisit.    Recommend ER if your symptoms are appearing to be getting worse  You can schedule an appointment right here in [facility], or call [phone]

## 2023-08-31 NOTE — TELEPHONE ENCOUNTER
I reviewed ucx results of Ecoli UTI , a very common UTI source which appeared to be well covered by most antibiotics.  Recommend to set up VV to discuss further ,  Recommend ER if symptoms recur

## 2023-08-31 NOTE — TELEPHONE ENCOUNTER
Nurse Triage SBAR    Is this a 2nd Level Triage? NO    Situation: Patient calling with some neurological changes    Background: Recently diagnosed with kidney infection. States that she has been forgetting to take her medications. History of chemical dependency. Stated that she relapsed on methamphetamines. History of mental health issues.    Assessment:   -States that it feels like brain is outside of her head.   -Static in her ears  -Having tremors in legs. Left knee has been numb for weeks. Feet are cramped. Toes are curled in.  -Left side felt heavy.   -Feels like she can look around and not see everything in her vision. She would have to focus in order to see something  -Breathing feels slow like she has to focus on it  -heart rate feels slow    Protocol Recommended Disposition:   Call  Now    Recommendation: Instructed patient to call 911.       Does the patient meet one of the following criteria for ADS visit consideration? 16+ years old, no PCP (internal or external) but seen at [facility]     Belinda Lr RN  [facility]     TIP  Providers, please consider if this condition is appropriate for management at one of our Acute and Diagnostic Services sites.     If patient is a good candidate, please use dotphrase <dot>triageresponse and select Refer to ADS to document.   Reason for Disposition   New neurologic deficit that is present now: * Weakness of the face, arm, or leg on one side of the body * Numbness of the face, arm, or leg on one side of the body * Loss of speech or garbled speech    Additional Information   Negative: SEVERE difficulty breathing (e.g., struggling for each breath, speaks in single words)   Negative: Shock suspected (e.g., cold/pale/clammy skin, too weak to stand, low BP, rapid pulse)   Negative: Difficult to awaken or acting confused (e.g., disoriented, slurred speech)   Negative: Fainted, and still feels dizzy afterwards   Negative: Overdose  "(accidental or intentional) of medications    Answer Assessment - Initial Assessment Questions  1. DESCRIPTION: \"Describe your dizziness.\"      Can look around and not see everything in her vision. She needs to focus in order to see.  2. LIGHTHEADED: \"Do you feel lightheaded?\" (e.g., somewhat faint, woozy, weak upon standing)      No  3. VERTIGO: \"Do you feel like either you or the room is spinning or tilting?\" (i.e. vertigo)      No  4. SEVERITY: \"How bad is it?\"  \"Do you feel like you are going to faint?\" \"Can you stand and walk?\"    - MILD: Feels slightly dizzy, but walking normally.    - MODERATE: Feels unsteady when walking, but not falling; interferes with normal activities (e.g., school, work).    - SEVERE: Unable to walk without falling, or requires assistance to walk without falling; feels like passing out now.       She feels like she wants to, but feels like she can't. Like it would take effort.  5. ONSET:  \"When did the dizziness begin?\"      Today  6. AGGRAVATING FACTORS: \"Does anything make it worse?\" (e.g., standing, change in head position)      Moving head  7. HEART RATE: \"Can you tell me your heart rate?\" \"How many beats in 15 seconds?\"  (Note: not all patients can do this)        Heart feels like it is slower than normal  8. CAUSE: \"What do you think is causing the dizziness?\"      Maybe antibiotic  9. RECURRENT SYMPTOM: \"Have you had dizziness before?\" If Yes, ask: \"When was the last time?\" \"What happened that time?\"      No  10. OTHER SYMPTOMS: \"Do you have any other symptoms?\" (e.g., fever, chest pain, vomiting, diarrhea, bleeding)        Having some numbness in her left knee. Having tremors and cramping  11. PREGNANCY: \"Is there any chance you are pregnant?\" \"When was your last menstrual period?\"        No, but has unprotected sex    Protocols used: Dizziness-A-OH    "

## 2023-08-31 NOTE — TELEPHONE ENCOUNTER
Orthopedic Surgery Pre-Op Plan: Lisa Ray  pre-op review. This is NOT an H&P   Surgeon: Dr. Bryson   [facility]: [facility]  Name of Surgery: Left Direct Anterior Total Hip Arthroplasty  Date of Surgery: 2/4/22  H&P: Completed 1/31/22 by Dr. Amber Alberto at [facility].  History of ASA, NSAIDS, vitamin and/or herbal supplements within 10 days: Yes-  mg daily-instructed to hold this 7 days before surgery. Turmeric- instructed to hold this 7 days before surgery.   History of blood thinners: No    Plan:   1) Discharge Plan: Home POD 1 with assist of Spouse. Please see Discharge Planning section near bottom of this note for further details.     2) Coronary Artery Disease: S/P CABG x3 3/2019: Follows with Dr. Foley at [facility]. Reviewed last visit note with Dr. Foley from 8/9/21: patient doing very well s/p CABG on ASA, statin and metoprolol. Plan for follow up in 1 year. EKG 1/31/22 at pre-op exam showed: sinus bradycardia. Possible left atrial enlargement. Septal infarct, age undetermined. Echocardiogram ordered by Dr. Foley and completed on 2/3/22 (results below). No alarming findings on Echo so will proceed with planned hip surgery.     Complete Echo Adult.  ______________________________________________________________________________  Interpretation Summary     Left ventricular size, wall motion and function are normal. The ejection  fraction is 55-60%.  There is mild concentric left ventricular hypertrophy.  Normal right ventricle size and systolic function.  No hemodynamically significant valvular abnormalities on 2D or color flow  imaging.    3) Hypertension: well-controlled on lisinopril and metoprolol.  Instructed patient to hold lisinopril on the morning of surgery but should continue taking metoprolol.    4) Hyperlipidemia: On rosuvastatin.    5) Urinary Tract Infection (UTI): UA on 1/31/22 at pre-op exam: showed likely UTI. PCP started  Provider E-Visit time total (minutes): 5   patient on 5 day course of Bactrim, however urine culture and sensitivity now came back as > 100,000 CFU/mL E. Coli that is resistant to Bactrim. Patient's PCP, Dr. Alberto, has prescribed a new antibiotic, nitrofurantoin (Macrobid) and wants patient to start on this right away. Discussed resistant UTI with Dr. Bryson. Ok to proceed as long as patient starts taking the new antibiotic today. I also checked and patient's UTI is also sensitive to cefazolin. Plan is to give IV cefazolin pre-op tomorrow per normal protocol for total joint arthroplasty patients. Both patient's PCP and I left messages reminding her to please make sure to  the new antibiotic today and start taking it.    6) Pulmonary Emphysema: Stable per notes from PCP.  Does not require oxygen at baseline.  On Incruse Ellipta inhaler.  Pulmonary emphysema, recovered COVID-19, history of breast cancer-s/p bilateral mastectomy in 2017, GERD.    7) Recovered Covid-19:  Received copy of positive Covid 19 test from [facility] dated on 1/13/22. Patient states she recovered without any complications at home. Is fully vaccinated. Not required to have repeat Covid-19 test before surgery due to lab confirmed positive test in past 90 days, however Covid-19 test was performed on 1/31/22 and was negative.     8) History of Breast Cancer- S/P Bilateral Mastectomy in 2017:     9) GERD: on calcium carbonate (Tums).     Patient appears medically optimized for upcoming surgery. I would recommend Hospitalist Consult to assist with medical management. Please call me below with any questions on this patient.       Review of Systems Notable for: CAD-s/p CABG x3 in 2019, hypertension, hyperlipidemia, urinary tract infection, pulmonary emphysema, recovered COVID-19, history of breast cancer-s/p bilateral mastectomy in 2017, GERD.    Past Medical History:   Past Medical History:   Diagnosis Date     Anemia 2012    microcytic from Celebrex. GI w/u neg     Antiplatelet or  antithrombotic long-term use      Back pain      Benign neoplasm of colon     Created by Conversion      Bronchiectasis (H)      CAD (coronary artery disease) 2008    RCA- stent     Cancer (H)     breast cancer     DJD (degenerative joint disease)      Dysthymia      Fatigue     recurrent/variable - no serious pathology     Fibromyalgia      GERD (gastroesophageal reflux disease)      History of gastric ulcer      Hx of CABG      Hypertension      Iritis     past Hx.-left eye     ARSEN (obstructive sleep apnea) 6/5/2019     Raynaud's disease      Rosacea      Shortness of breath     with exertion     Shoulder tendonitis     right     Stented coronary artery      Ulcer of intestine     small bowel- 10 years ago     UTI (urinary tract infection)     Jan. 2019     Past Surgical History:   Procedure Laterality Date     BELPHAROPTOSIS REPAIR Bilateral 2013    Dr. Eder Bingham- 2013     BREAST SURGERY Bilateral 11/2017    bilateral mastectomy 2017- 11/2017     BREAST SURGERY  2018    implants and revsion 2     CORONARY STENT PLACEMENT Right 2008    Dr. Schwab     CV CORONARY ANGIOGRAM N/A 3/26/2019    Procedure: Coronary Angiogram;  Surgeon: Ba Foley MD;  Location: [facility];  Service: Cardiology     HYSTERECTOMY       OOPHORECTOMY       TOTAL HIP ARTHROPLASTY Right 12/7/2015    Procedure: RIGHT TOTAL HIP ARTHROPLASTY;  Surgeon: Tera Yeung MD;  Location: [facility];  Service:      [facility] APPENDECTOMY      Description: Appendectomy;  Recorded: 01/13/2009;       Current Medications:  Patient's Medications   New Prescriptions    No medications on file   Previous Medications    ACETAMINOPHEN (TYLENOL) 500 MG TABLET    [ACETAMINOPHEN (TYLENOL) 500 MG TABLET] Take 500 mg by mouth every 6 (six) hours as needed for pain (arthritis).    ACETAMINOPHEN-CODEINE (TYLENOL W/CODEINE #3) 300-30 MG PER TABLET    Take 1-2 tablets by mouth every 4 hours as needed    ALENDRONATE (FOSAMAX) 70 MG TABLET     Take 1 tablet (70 mg) by mouth every 7 days    ASPIRIN 81 MG CHEWABLE TABLET    [ASPIRIN 81 MG CHEWABLE TABLET] Chew 2 tablets (162 mg total) daily.    B COMPLEX VITAMINS TABLET    [B COMPLEX VITAMINS TABLET] Take 1 tablet by mouth daily.    CALCIUM, AS CARBONATE, (TUMS) 200 MG CALCIUM (500 MG) CHEWABLE TABLET    [CALCIUM, AS CARBONATE, (TUMS) 200 MG CALCIUM (500 MG) CHEWABLE TABLET] Chew 2 tablets daily.    CHOLECALCIFEROL, VITAMIN D3, 1,000 UNIT TABLET    [CHOLECALCIFEROL, VITAMIN D3, 1,000 UNIT TABLET] Take 1,000 Units by mouth daily.    DICLOFENAC SODIUM (VOLTAREN) 1 % GEL    [DICLOFENAC SODIUM (VOLTAREN) 1 % GEL] 4 g four times a day    DOCUSATE SODIUM (COLACE) 100 MG CAPSULE    [DOCUSATE SODIUM (COLACE) 100 MG CAPSULE] Take 1 capsule (100 mg total) by mouth 2 (two) times a day as needed for constipation.    DOXYLAMINE (UNISOM) 25 MG TABLET    [DOXYLAMINE (UNISOM) 25 MG TABLET] Take 25 mg by mouth at bedtime as needed for sleep.    FERROUS SULFATE (IRON) 28 MG TABS    Take by mouth daily    LISINOPRIL (ZESTRIL) 10 MG TABLET    TAKE 1 TABLET BY MOUTH DAILY.    MAGNESIUM OXIDE 500 MG TAB    [MAGNESIUM OXIDE 500 MG TAB] Take 500 mg by mouth at bedtime.           MELATONIN 1 MG TAB TABLET    [MELATONIN 1 MG TAB TABLET] Take 1 mg by mouth at bedtime.           METOPROLOL SUCCINATE ER (TOPROL-XL) 100 MG 24 HR TABLET    TAKE 1 TABLET BY MOUTH AT BEDTIME    NITROFURANTOIN MACROCRYSTAL-MONOHYDRATE (MACROBID) 100 MG CAPSULE    Take 1 capsule (100 mg) by mouth 2 times daily    OMEPRAZOLE (PRILOSEC) 20 MG DR CAPSULE    Take 1 capsule (20 mg) by mouth daily    -PROPYLENE GLYCOL PF (SYSTANE) 0.4-0.3 % DPET    [-PROPYLENE GLYCOL PF (SYSTANE) 0.4-0.3 % DPET] Administer 1 drop to both eyes 2 (two) times a day as needed.    ROSUVASTATIN (CRESTOR) 20 MG TABLET    Take 1 tablet (20 mg) by mouth At Bedtime    SERTRALINE (ZOLOFT) 50 MG TABLET    [SERTRALINE (ZOLOFT) 50 MG TABLET] TAKE 1 TABLET BY MOUTH ONCE DAILY     SULFAMETHOXAZOLE-TRIMETHOPRIM (BACTRIM DS) 800-160 MG TABLET    Take 1 tablet by mouth 2 times daily    TIZANIDINE (ZANAFLEX) 2 MG TABLET    [TIZANIDINE (ZANAFLEX) 2 MG TABLET] TAKE 1 TABLET BY MOUTH EVERY 6 HOURS IF NEEDED FOR MUSCLE SPASM.    TRIAMCINOLONE (NASACORT) 55 MCG NASAL INHALER    [TRIAMCINOLONE (NASACORT) 55 MCG NASAL INHALER] Apply 2 sprays into each nostril daily as needed.    TURMERIC PO        UMECLIDINIUM (INCRUSE ELLIPTA) 62.5 MCG/INH INHALER    Inhale 1 puff into the lungs daily    ZOLPIDEM (AMBIEN) 10 MG TABLET    [ZOLPIDEM (AMBIEN) 10 MG TABLET] Take 1 tablet (10 mg total) by mouth at bedtime as needed for sleep.   Modified Medications    No medications on file   Discontinued Medications    No medications on file       ALLERGIES:  Allergies   Allergen Reactions     Latex Unknown     Added based on information entered during log entry, please review and add reactions, type, and severity as needed     Nsaids (Non-Steroidal Anti-Inflammatory Drug) [Nsaids] Unknown     Other reaction(s): GI Bleeding, GI Upset, Sensitivity.  Ulceration w/ Celebrex       Social History  Social History     Tobacco Use     Smoking status: Former Smoker     Packs/day: 1.00     Years: 20.00     Pack years: 20.00     Quit date: 3/27/1990     Years since quittin.8     Smokeless tobacco: Never Used   Substance Use Topics     Alcohol use: Yes     Alcohol/week: 3.3 standard drinks     Comment: Alcoholic Drinks/day: 1-2 glasses of wine/week      Drug use: No       Any Abnormal Recent Diagnostics? Yes  Urine culture on 2022 showed E coli that was resistant to Bactrim.  Patient started on Macrobid. Discussed with Dr. Carter to proceed as long as patient starts on the new antibiotic.  Messages left for patient reminding to pickup new antibiotic and start taking it right away.     Discharge Planning:   Patient expects to be discharged to: Home with assist of Family (Spouse)   Barriers to discharge: None stated    Assistance Needed?: TBD   Discharge arrangements made: Yes  Discharge Plan: Home POD 1 with assist of Spouse   Living Arrangements: with Spouse   Type of residence: Single Family Home   Do you have stairs?  Yes- 10 stairs with railings   Once in the house are you able to live on one level? Not answered   What does your bathroom have? Tub/shower unit   Home Care Services? No  How many times per week do you use community support at home for daily activities/needs? 0- Independent with ADL's at baseline  Support Systems: Spouse and Family    Pt/Rep Engagement Interventions: Discussed discharge plans  Caregiver at Home: Yes- Spouse   Patient confirmed they have help/assistance in place at home upon discharge?   Yes    ALLYSON Cornejo, CNP   Advanced Practice Nurse Navigator- Orthopedics  [facility]   Phone: [phone]

## 2023-09-01 ENCOUNTER — VIRTUAL VISIT (OUTPATIENT)
Dept: FAMILY MEDICINE | Facility: CLINIC | Age: 23
End: 2023-09-01
Payer: MEDICAID

## 2023-09-01 DIAGNOSIS — H93.13 TINNITUS OF BOTH EARS: ICD-10-CM

## 2023-09-01 DIAGNOSIS — M79.662 PAIN OF LEFT LOWER LEG: Primary | ICD-10-CM

## 2023-09-01 PROCEDURE — 99213 OFFICE O/P EST LOW 20 MIN: CPT | Mod: VID | Performed by: FAMILY MEDICINE

## 2023-09-01 ASSESSMENT — ENCOUNTER SYMPTOMS
DIFFICULTY URINATING: 0
ABDOMINAL DISTENTION: 0
MYALGIAS: 1
ARTHRALGIAS: 1
BACK PAIN: 1
AGITATION: 0
NUMBNESS: 1
PARESTHESIAS: 1

## 2023-09-01 NOTE — PROGRESS NOTES
Ej is a 22 year old who is being evaluated via a billable video visit.      How would you like to obtain your AVS?   If the video visit is dropped, the invitation should be resent by:   Will anyone else be joining your video vis          Assessment & Plan     Pain of left lower leg      Tinnitus of both ears  Reviewed ER blood work in range .     Recommend pcp evaluation so physical exam can be done to determine the cause of health problem .         Karla Herrera MD  [facility]    Gerardo Pagan is a 22 year old, presenting for the following health issues:  No chief complaint on file.        8/22/2023     8:04 AM   Additional Questions   Roomed by Jade FERRERA   Accompanied by Friend - Sushant       LUIS F     Complaining of tinitus in ear .  Complaining of pain left leg .      Review of Systems   HENT:  Negative for congestion.    Gastrointestinal:  Negative for abdominal distention.   Genitourinary:  Negative for difficulty urinating.   Musculoskeletal:  Positive for arthralgias, back pain, gait problem and myalgias.   Neurological:  Positive for numbness and paresthesias.   Psychiatric/Behavioral:  Negative for agitation.             Objective           Vitals:  No vitals were obtained today due to virtual visit.    Physical Exam   GENERAL: Healthy, alert and no distress  EYES: Eyes grossly normal to inspection.  No discharge or erythema, or obvious scleral/conjunctival abnormalities.  RESP: No audible wheeze, cough, or visible cyanosis.  No visible retractions or increased work of breathing.    SKIN: Visible skin clear. No significant rash, abnormal pigmentation or lesions.  NEURO: Cranial nerves grossly intact.  Mentation and speech appropriate for age.  PSYCH: Mentation appears normal, affect normal/bright, judgement and insight intact, normal speech and appearance well-groomed.            Video-Visit Details    Type of service:  Video Visit   Video Start Time: 4:00 PM  Video End Time:4:10  PM    Originating Location (pt. Location): Home    Distant Location (provider location):  On-site  Platform used for Video Visit: Dante

## 2023-09-03 ENCOUNTER — MYC MEDICAL ADVICE (OUTPATIENT)
Dept: PEDIATRICS | Facility: CLINIC | Age: 23
End: 2023-09-03
Payer: MEDICAID

## 2023-09-05 ENCOUNTER — NURSE TRIAGE (OUTPATIENT)
Dept: PEDIATRICS | Facility: CLINIC | Age: 23
End: 2023-09-05
Payer: MEDICAID

## 2023-09-05 ENCOUNTER — NURSE TRIAGE (OUTPATIENT)
Dept: NURSING | Facility: CLINIC | Age: 23
End: 2023-09-05
Payer: MEDICAID

## 2023-09-05 NOTE — TELEPHONE ENCOUNTER
This is probably better addressed in clinic with an adult provider (FP or IM).    Please schedule patient.  Face to face visit recommended.  Electronically signed by:  Cara Matute MD  Pediatrics  [facility]

## 2023-09-05 NOTE — TELEPHONE ENCOUNTER
Patient Contact    Attempt # 1    Was call answered?  No.  Unable to leave message. VM not set up.

## 2023-09-05 NOTE — TELEPHONE ENCOUNTER
"TO PCP    S-(situation): Ears bilaterally pain/earache, swelling behind ear, left sided numbness knee, right toe is curling, muscle spasms/stiffness throughout body, feels like she is forgetting peoples names more (forgot her bosses name today), boyfriend yells something from another room and she cannot hear him when she used to be able to.     B-(background): Ears bilaterally pain/earache, swelling behind ear, left sided numbness knee, right toe is curling, muscle spasms/stiffness throughout body, feels like she is forgetting peoples names more (forgot her bosses name today), boyfriend yells something from another room and she cannot hear him when she used to be able to.       A-(assessment): Afebrile, Patient could not give any more specifics than above.     R-(recommendations): Dispositions states to be seen today. Writer advised no appointment so to be seen in UC/ED. Patient wants provider response.     Marcelle Chilel RN on 9/5/2023 at 4:36 PM        Reason for Disposition   Ear is painful    Additional Information   Negative: Followed an ear injury   Negative: Hearing loss is main symptom   Negative: Patient sounds very sick or weak to the triager   Negative: Taking aspirin and dosage sounds high (i.e., > 1500 mg/day)   Negative: Hearing loss in one or both ears and sudden onset and present now    Answer Assessment - Initial Assessment Questions  1. DESCRIPTION: \"Describe the sound you are hearing.\" (e.g., buzzing, hissing, humming, ringing)      Buzzing sound, ringing  2. LOCATION: \"Is the sound in one or both ears?\" If one, ask: \"Which ear?\"      I hear it in both ears  3. SEVERITY: \"How bad is it?\"     - MILD: Doesn't interfere with normal activities, only can hear in a quiet room.     - MODERATE-SEVERE: interferes with work, school, sleep, or other activities.       Moderate on some days  4. ONSET: \"When did this begin?\" \"Did it start suddenly or come on gradually?\"      gradually  5. PATTERN: \"Does this " "come and go, or has it been constant since it started?\"      constant  6. HEARING LOSS: \"Is your hearing decreased?\" (e.g., normal, decreased)        I feel like people have to repeat things   7. OTHER SYMPTOMS: \"Do you have any other symptoms?\" (e.g., dizziness, earache)      earache  8. PREGNANCY: \"Is there any chance you are pregnant?\" \"When was your last menstrual period?\"      No    Protocols used: Tinnitus-A-OH    "

## 2023-09-05 NOTE — TELEPHONE ENCOUNTER
Patient returning call from clinic, message relayed to patient to schedule appointment at clinic.     Transferred patient to .       Reason for Disposition   Health information question, no triage required and triager able to answer question    Protocols used: Information Only Call - No Triage-A-OH

## 2023-09-05 NOTE — TELEPHONE ENCOUNTER
Please see mychart message.     Attempted to call pt and triage red flag symptom: numbess. Dialed number listed in chart and went directly to bust signal.

## 2023-09-06 NOTE — TELEPHONE ENCOUNTER
Per 9/5/2023 Telephone encounter, patient returned call and was scheduled per provider recommendations.     Closing encounter.

## 2023-09-06 NOTE — TELEPHONE ENCOUNTER
Per chart review, pt was successfully contacted on 9/5/23 and has made an appt to be seen for these concerns.     Closing encounter.     Thank you,  Jami Tijerina RN

## 2023-09-27 ENCOUNTER — TELEPHONE (OUTPATIENT)
Dept: FAMILY MEDICINE | Facility: CLINIC | Age: 23
End: 2023-09-27
Payer: MEDICAID

## 2023-09-27 ENCOUNTER — TELEPHONE (OUTPATIENT)
Dept: BEHAVIORAL HEALTH | Facility: CLINIC | Age: 23
End: 2023-09-27
Payer: MEDICAID

## 2023-09-27 NOTE — TELEPHONE ENCOUNTER
Pt is a(n) adult (18+ out of HS) Seeking as eval for Adult AJ Assessment for evaluation and recommendations..  Appointment scheduled by:  Patient.  (self-pay - complete Cost Estimate)       needed?  NO    Contact information verified/updated: Yes    Lisa Mcgregor

## 2023-09-27 NOTE — TELEPHONE ENCOUNTER
Patient Quality Outreach    Patient is due for the following:   Cervical Cancer Screening - PAP Needed  Physical Preventive Adult Physical  Chlamydia Screening      Topic Date Due    COVID-19 Vaccine (1) Never done    Flu Vaccine (1) 09/01/2023       Next Steps:   Schedule a Adult Preventative    Type of outreach:    Sent [place] SPARK message.      Questions for provider review:    None           Sia Calderon MA

## 2023-10-03 ENCOUNTER — TELEPHONE (OUTPATIENT)
Dept: BEHAVIORAL HEALTH | Facility: CLINIC | Age: 23
End: 2023-10-03

## 2023-10-03 ENCOUNTER — HOSPITAL ENCOUNTER (EMERGENCY)
Facility: CLINIC | Age: 23
Discharge: HOME | End: 2023-10-03
Attending: EMERGENCY MEDICINE | Admitting: EMERGENCY MEDICINE
Payer: MEDICAID

## 2023-10-03 VITALS
RESPIRATION RATE: 16 BRPM | SYSTOLIC BLOOD PRESSURE: 149 MMHG | WEIGHT: 215 LBS | HEART RATE: 102 BPM | TEMPERATURE: 98.9 F | OXYGEN SATURATION: 98 % | HEIGHT: 66 IN | DIASTOLIC BLOOD PRESSURE: 81 MMHG | BODY MASS INDEX: 34.55 KG/M2

## 2023-10-03 DIAGNOSIS — F15.10 METHAMPHETAMINE ABUSE (H): ICD-10-CM

## 2023-10-03 PROBLEM — F43.10 POSTTRAUMATIC STRESS DISORDER: Status: ACTIVE | Noted: 2023-10-03

## 2023-10-03 PROBLEM — F33.9 RECURRENT MAJOR DEPRESSION (H): Status: ACTIVE | Noted: 2019-12-12

## 2023-10-03 PROBLEM — F15.20 AMPHETAMINE DEPENDENCE (H): Status: ACTIVE | Noted: 2023-10-03

## 2023-10-03 LAB
AMPHETAMINES UR QL SCN: ABNORMAL
BARBITURATES UR QL SCN: ABNORMAL
BENZODIAZ UR QL SCN: ABNORMAL
BZE UR QL SCN: ABNORMAL
CANNABINOIDS UR QL SCN: ABNORMAL
FENTANYL UR QL: ABNORMAL
HCG UR QL: NEGATIVE
OPIATES UR QL SCN: ABNORMAL
PCP QUAL URINE (ROCHE): ABNORMAL

## 2023-10-03 PROCEDURE — 99284 EMERGENCY DEPT VISIT MOD MDM: CPT | Mod: GC | Performed by: EMERGENCY MEDICINE

## 2023-10-03 PROCEDURE — 99285 EMERGENCY DEPT VISIT HI MDM: CPT | Performed by: EMERGENCY MEDICINE

## 2023-10-03 PROCEDURE — 80307 DRUG TEST PRSMV CHEM ANLYZR: CPT | Performed by: EMERGENCY MEDICINE

## 2023-10-03 PROCEDURE — 81025 URINE PREGNANCY TEST: CPT | Performed by: EMERGENCY MEDICINE

## 2023-10-03 RX ORDER — BENZTROPINE MESYLATE 0.5 MG/1
0.5 TABLET ORAL 2 TIMES DAILY
COMMUNITY
Start: 2023-09-25 | End: 2023-10-20

## 2023-10-03 RX ORDER — HALOPERIDOL 5 MG/1
5 TABLET ORAL 2 TIMES DAILY
COMMUNITY
Start: 2023-09-25 | End: 2023-10-20

## 2023-10-03 ASSESSMENT — ACTIVITIES OF DAILY LIVING (ADL): ADLS_ACUITY_SCORE: 35

## 2023-10-03 NOTE — DISCHARGE INSTRUCTIONS
Aftercare Plan    Follow up with established providers and supports as scheduled. Continue taking medications as prescribed. Abstain from drugs and alcohol. Utilize your [facility] mental health crisis team as needed. They are available 24/7. Contact information is listed below. You signed a release of information for [facility] today. We will fax your records from this visit over to them.     If I am feeling unsafe or I am in a crisis, I will:   Contact my established care providers   Call the [place] Suicide Prevention Lifeline: 988  Call [facility] at [phone]  Go to the nearest emergency room   Call 911     Warning signs that I or other people might notice when a crisis is developing for me: changes to sleep, appetite or mood, increased anger, agitation or irritability, feeling depressed or hopeless, spending more time alone or talking less, increased crying, decreased productivity, seeing or hearing things that aren't there, thoughts of not wanting to live anymore or of actually killing myself, thoughts of hurting others    Things I am able to do on my own to cope or help me feel better: watching a favorite tv show or movie, listening to music I enjoy, going outside and breathing fresh air, going for a walk or exercising, taking a shower or bath, a cold or hot beverage, a healthy snack, drawing/coloring/painting, journaling, singing or dancing, deep breathing     I can try practicing square breathing when I begin to feel anxious - inhale through the nose for the count of 4 and the first line on the square. Exhale through the mouth for the count of 4 for the second line of the square. Repeat to complete the square. Repeat the square as many times as needed.    I can also use my five senses to practice mindfulness and grounding. What are five things I can see, four things I can hear, three things I can feel, two things I can smell, and one thing I can taste.     Things that I am able  to do with others to cope or help me feel better: sometimes just talking or spending time with someone else, sharing a meal or having coffee, watching a movie or playing a game, going for a walk or exercising    I can also use community resources including mental health hotlines, [facility] crisis teams, or apps.     Things I can use or do for distraction: movies/tv, music, reading, games, drawing/coloring/painting or other art, essential oils, exercise, cleaning/organizing, puzzles, crossword puzzles, word search, Sudoku       I can also download a meditation or relaxation beena, like Calm, Headspace, or Insight Timer (all three offer a free version)    Changes I can make to support my mental health and wellness: Attend scheduled mental health therapy and psychiatric appointments. Take my medications as prescribed. Maintain a daily schedule/routine. Abstain from all mood altering substances, including drugs, alcohol, or medications not currently prescribed to me. Implement a self-care routine.      People in my life that I can ask for help: friends or family, trusted teachers/staff/colleagues, trusted members of my community or place of [employer]ational, mental health crisis lines, or 911    Your [facility] has a mental health crisis team you can call 24/7: [facility], [phone]    Other things that are important when I m in crisis: to remember that the feelings I am having right now are temporary, and it won't feel like this forever, and that it is okay and important to ask for help    Substance Use Disorder Direct Access Resources    It is recommended that you abstain from all mood altering chemicals. Please contact the sober support hotline (022-454-9117) as needed; phones are answered 24 hours a day, 7 days a week.    To access substance use treatment you must have a comprehensive assessment completed to begin any treatment program.     If uninsured, please contact your county of residence for eligibility screen to  substance use disorder evaluation and treatment:    [place] - [phone]   [place] - [phone]   [place] - [phone]   [place] - [phone]   [place] - [phone]   [place] - [phone]   [place] - [phone]   [place] - [phone]     If you have private insurance, call the customer service number on the back of your insurance card to find an in-network substance abuse use disorder assessment. The ideal provider will be a treatment facility, licensed in the [facility].     Community AJ Evaluations: Clients may call their county for a full list of providers - Availability and services listed belo are subject to change, please call the provider to confirm    [facility]  [phone]  [address] [place] [address], [address]  *Please call the above number to schedule a comprehensive assessment for determination of level of care needs. In person and virtual appointments available Mon-Fri.    [facility], [address] [place] [address], First Floor, [address], [place] [address] (next to the outpatient lab)    Phone: [phone]   Provides bridging services to people with Opiate Use Disorders (OUD) seeking care. This is a front door to Medication Assisted Treatments (MAT), ages 16+  Walk In hours: Monday-Friday 9:00am-3:00pm    [facility]  [phone]  Walk in Assessments: Mon-Friday 7a-1:45p  [address], [place], [address]    [facility]  Central Access [phone]  [address] [place] [address], [address]  *by appointment only    [facility]  [phone] (phone consultation available )  Locations in: [place], [place], [facility], and [place], [place]  British virtual IOP [phone] or visit [email or web address]   Also offers LGBTQ programming     [facility]  [phone]  [address]  [place], [place], [address]  *Currently only offered via telehealth - call to  set up an appointment    [facility]  [address] [place], [place], [address]  [facility]  For more information to to make a referral call:  [phone]  Walk-in on Fridays  9-11 a.m.    [facility]  [phone]  [address] [place], [place], [address]  *available by appointments only    [facility] specific  [phone]  [address] [place], [place], [address]  *available by appointment only    [facility]  [phone]  [address] [place], [place], [address]  *walk in assessments available M-F starting at 7 am.    [facility]  [phone]  Locations: [facility], [facility], and [place]  *Walk in assessments availble M-F starting at 8 am -virtual only    [facility]  [phone]  [address] [place] [address]    [facility]  Virtual + Locations: [place], [place], [place], [place], [facility], [facility], [place], [place]   [phone]  *available by appointment only    [facility]  [phone]  [address]  [place], [address]    [facility]  [phone]  [address], [place], [address]  *available by appointment    [facility]  [phone]  [address] [place], [place], [address]  *walk ins available M-TH from 9-3    [facility]  MAT program: [phone]  [address] [place], [place], [address]    [place]  [phone]  Same day substance use disorder assessments are available Monday - Friday, via walk-in or by appointment at the [place] location.  [address], [address], [place] [address]     [facility] - adolescent and adult SUDs services  [phone]  Offer services Monday through Friday, as well as evening hours Monday through Thursday. Normally, a first appointment will be scheduled within one  week  [email or web address]  Locations all over [place]    If you are intoxicated, you may be required to detox at a detox facility before starting treatment. The following are detox facilities that you can self present to. All detox facilities are able to help you complete an assessment prior to discharge if you choose:    [facility]: Arrive at a [facility] for immediate medical evaluation    [facility]: [address] [place], [place], [address].         [phone]    [facility]: [address] [place], [place], [address]  [phone]     [facility] ([facility]): [address] [place], [place], [address] [phone]     [facility]: [address] [place], [place], [address], [phone]         Ways to help cope with sobriety:    -- Take prescribed medicines as scheduled  -- Keep follow-up appointments  -- Talk to others about your concerns  -- Get regular exercise  -- Practice deep breathing skills  -- Eat a healthy diet  -- Use community resources, including hotline numbers, [facility] crisis and support meetings  -- Stay sober and avoid places/people/things associated with substance use  --Maintain a daily schedule/routine  --Get at least 7-8 hours of sleep per night  --Create a list 10--20 healthy activities that you can do that are enjoyable and do not involve substance use  --Create daily goals (approx. 1-4 goals) per day and work to achieve them throughout the day.       Free Resources:    [place] Recovery Connection ([facility])  [facility] connects people seeking recovery to resources that help foster and sustain long-term recovery. Whether you are seeking resources for treatment, transportation, housing, job training, education, health care or other pathways to recovery, [facility] is a great place to start.  Phone: [phone]. www.minnesotaGroupize.com.Groupe-Allomedia (Great listing of all types of recovery and  "non-recovery related resources)    Alcoholics Anonymous  Phone: 1-749-ALCOHOL  Website: HTTP://WWW.AA.ORG/  AA [place] (960-426-4570 or http://a[place].org)  [facility] [place] (959-630-2574 or www.aastpaul.org)     Narcotics Anonymous  Phone: 479.599.4884  Website: www.[employer]..    [facility] [address], [place], [place],  Phone: [phone]  Drop-in Hours: Monday-Friday 9-11:30 am. By appointment at other times.  Provides: [facility] is a drop-in center on the [place] of [place] that provides a safe space for individuals who are homeless and have a history of chemical use. Sobriety is not a requirement but drugs and alcohol are not allowed on the property.  Services: Non-clients can access drop-in services such as Recovery and Harm Reduction Groups, referrals to case management, community activities, shower facilities, and a pool table. Individuals who are homeless and have chemical health needs may be eligible for enrollment into [facility]'s case management program. Clients and  work together to access benefits, treatment, health care, shelter, and external housing resources.    Crisis Lines  Crisis Text Line  Text 740684  You will be connected with a trained live crisis counselor to provide support.    Por espanol, texto  REGLA a 249956 o texto a 442-AYUDAME en WhatsApp    National Hope Line  1.800.SUICIDE [4925949]      Community Resources  Fast Tracker  Linking people to mental health and substance use disorder resources  fasttrackermn.org     [place] Mental Health Warm Line  Peer to peer support  Monday thru Saturday, 12 pm to 10 pm  208.146.9933 or 4.041.679.7295  Text \"Support\" to 99669    National Ecru on Mental Illness (YESSENIA)  930.801.5979 or 1.888.YESSENIA.HELPS      Mental Health Apps  My3  https://my3app.org/    VirtualHopeBox  https://Connectbeam.org/apps/virtual-hope-box/      Additional Information  Today you were " seen by a licensed mental health professional through Triage and Transition services, Behavioral Healthcare Providers ([facility])  for a crisis assessment in the Emergency Department at [facility].  It is recommended that you follow up with your established providers (psychiatrist, mental health therapist, and/or primary care doctor - as relevant) as soon as possible. Coordinators from [facility] will be calling you in the next 24-48 hours to ensure that you have the resources you need.  You can also contact [facility] coordinators directly at [phone]. You may have been scheduled for or offered an appointment with a mental health provider. [facility] maintains an extensive network of licensed behavioral health providers to connect patients with the services they need.  We do not charge providers a fee to participate in our referral network.  We match patients with providers based on a patient's specific needs, insurance coverage, and location.  Our first effort will be to refer you to a provider within your care system, and will utilize providers outside your care system as needed.

## 2023-10-03 NOTE — CONSULTS
"Diagnostic Evaluation Consultation  Crisis Assessment    Patient Name: Ej Dunaway  Age:  23 year old  Legal Sex: female  Gender Identity: female  Race: White  Ethnicity: Not  or   Language: English      Patient was assessed: In person      Patient location: [facility] EMERGENCY DEPARTMENT                                 Referral Data and Chief Complaint  Ej Dunaway presents to the ED by  self. Patient is presenting to the ED for the following concerns: Other (see comment) (substance-induced psychosis).   Factors that make the mental health crisis life threatening or complex are:  Pt presents to the ED at the recommendation of [facility] prior to admission for AJ treatment. Pt tells this writer that she has been using methamphetamines with last use 4 days ago. Per pt, [facility] wanted pt assessed for psychosis in the ED before starting AJ treatment. She tells this writer that she often experiences symptoms of psychosis, including hallucinations, when using methamphetamines. Earlier this week, pt reports that she was hearing the \"devil\" telling her to go to hell and seeing \"hell fire\". Pt denies these hallucinations at present, noting that she \"believes in the power of God to take them away\". Although pt is religiously occupied during the assessment with this writer, she does not exhibit any symptoms of psychosis at present, including paranoia or delusions. She denies any non-suicidal self-injury, suicidal ideation, or homicidal ideation, including thoughts of wanting to fall asleep and not wake-up. She reports suppressed appetite in the context of her substance use and some difficulities sleeping through the night..      Informed Consent and Assessment Methods  Explained the crisis assessment process, including applicable information disclosures and limits to confidentiality, assessed understanding of the " process, and obtained consent to proceed with the assessment.  Assessment methods included conducting a formal interview with patient, review of medical records, collaboration with medical staff, and obtaining relevant collateral information from family and community providers when available.  : done     Patient response to interventions: acceptance expressed  Coping skills were attempted to reduce the crisis:  Pt has plans to start AJ treatment at [facility].     History of the Crisis   Pt tells this writer that she has been using methamphetamines intermittently since age 17. Pt was most recently seen in the ED at [facility] on 09/25/23 for hallucinations. Her last inpatient psychiatric hospitalization was at [facility] from 05/13-06/06/23. She denies having any established mental healthcare providers at present.    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.)  Source of Income:  salary/wages  Financial Environmental Concerns:  No concerns identified  Current Hobbies:  music  Barriers in Personal Life:  emotional concerns, other (see comments) (substance use concerns)    Significant Clinical History  Current Anxiety Symptoms:     Current Depression/Trauma:     Current Somatic Symptoms:     Current Psychosis/Thought Disturbance:  auditory hallucinations, visual hallucinations  Current Eating Symptoms:     Chemical Use History:  Alcohol: None  Benzodiazepines: None  Opiates: None  Cocaine: None  Marijuana: None  Other Use: Methamphetamines (Pt reports last use 4 days ago.)   Past diagnosis:  ADHD, Anxiety Disorder, Depression, PTSD, Substance Use Disorder (unspecified schizophrenia spectrum disorder)  Family history:  No known history of mental health or chemical health concerns  Past  treatment:  Case management, Psychiatric Medication Management, Inpatient Hospitalization  Details of most recent treatment:  Pt was hospitalized for psychosis at [facility] from 05/13-06/06/23.  Other relevant history:  Pt reports previously participating in AJ treatment at [facility] this year.       Collateral Information  Is there collateral information: No    Additional collateral information:  This writer attepted to call [facility] but was unable to reach anyone familiar with pt's care.     Risk Assessment    [place] Suicide Severity Rating Scale Recent: 10/3/23  Suicidal Ideation (Recent)  Q1 Wished to be Dead (Past Month): no  Q2 Suicidal Thoughts (Past Month): no  Q3 Suicidal Thought Method: no  Q4 Suicidal Intent without Specific Plan: no  Q5 Suicide Intent with Specific Plan: no  Level of Risk per Screen: low risk  Intensity of Ideation (Recent)  Most Severe Ideation Rating (Past 1 Month):  (0)  Frequency (Past 1 Month):  (0)  Duration (Past 1 Month):  (0)  Controllability (Past 1 Month):  (0)  Deterrents (Past 1 Month): Does not apply  Reasons for Ideation (Past 1 Month): Does not apply  Suicidal Behavior (Recent)  Actual Attempt (Past 3 Months): No  Total Number of Actual Attempts (Past 3 Months): 0  Has subject engaged in non-suicidal self-injurious behavior? (Past 3 Months): No  Interrupted Attempts (Past 3 Months): No  Total Number of Interrupted Attempts (Past 3 Months): 0  Aborted or Self-Interrupted Attempt (Past 3 Months): No  Total Number of Aborted or Self-Interrupted Attempts (Past 3 Months): 0  Preparatory Acts or Behavior (Past 3 Months): No  Total Number of Preparatory Acts (Past 3 Months): 0    Environmental or Psychosocial Events: challenging interpersonal relationships, ongoing abuse of substances (Pt reports that she kicked her boyfriend out of her apartment last night (10/2/23) but declines to provide further details to this writer.)  Protective  Factors: Protective Factors: help seeking, cultural, spiritual , or Scientology beliefs associated with meaning and value in life, optimistic outlook - identification of future goals (Pt identifies her Muslim bradley as a source of strength.)    Does the patient have thoughts of harming others? Feels Like Hurting Others: no  Previous Attempt to Hurt Others: no  Is the patient engaging in sexually inappropriate behavior?: no    Is the patient engaging in sexually inappropriate behavior?  no        Mental Status Exam   Affect: Other (Pt appears fatigued.)  Appearance: Disheveled  Attention Span/Concentration: Inattentive  Eye Contact: Variable    Fund of Knowledge: Appropriate   Language /Speech Content: Fluent  Language /Speech Volume: Soft  Language /Speech Rate/Productions: Normal  Recent Memory: Intact  Remote Memory: Intact  Mood: Apathetic  Orientation to Person: Yes   Orientation to Place: Yes  Orientation to Time of Day: Yes  Orientation to Date: Yes     Situation (Do they understand why they are here?): Yes  Psychomotor Behavior: Normal  Thought Content: Clear  Thought Form: Intact     Medication  Psychotropic medications:     No current facility-administered medications for this encounter.     Current Outpatient Medications   Medication    benztropine (COGENTIN) 0.5 MG tablet    haloperidol (HALDOL) 5 MG tablet    senna-docusate (SENOKOT-S/PERICOLACE) 8.6-50 MG tablet       Current Care Team  Patient Care Team:  Clinic - [facility] as PCP - General  Altagracia Tavarez APRN CNP as Nurse Practitioner (Nurse Practitioner)  Sheri Rutherford APRN CNP as Nurse Practitioner (Nurse Practitioner - Pediatrics)  Onofre Castillo APRN CNP as Nurse Practitioner (Nurse Practitioner)  Karla Herrera MD as Assigned PCP    Diagnosis  Patient Active Problem List   Diagnosis Code    Urinary incontinence, unspecified type R32    Frequent UTI N39.0    ADHD (attention deficit hyperactivity disorder), combined type  F90.2    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    Obesity E66.9    Foster care child Z62.21    Chemical dependency (H) F19.20    Depo-Provera contraceptive status Z30.42    Generalized anxiety disorder F41.1    Recurrent major depression (H24) F33.9    BV (bacterial vaginosis) N76.0, B96.89    Mixed stress and urge urinary incontinence N39.46    Psychosis (H) F29    Posttraumatic stress disorder F43.10    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 Pt declined therapy and psychiatry resources from this writer but plans to go to [facility] for substance use. She denies any non-suicidal self-injury, homicidal ideation, or suicidal ideation, including thoughts of wanting to fall asleep and not wake-up.    Patient coping skills attempted to reduce the crisis:  Pt has plans to start AJ treatment at [facility].    Disposition  Recommended disposition: Individual Therapy, Medication Management, Rule 25/AJ Assessment, Substance Abuse Disorder Treatment        Reviewed case and recommendations with attending provider. Attending Name: Dr. Cortez       Attending concurs with disposition: yes       Patient and/or validated legal guardian concurs with disposition:   no (Pt declined resources for therapy & psychiatry.)       Final disposition:  discharge    Legal status on admission: Voluntary/Patient has signed consent for treatment    Assessment Details   Total duration spent with the patient: 15 min     CPT code(s) utilized:  Non-Billable    CHRIS Rouse, Psychotherapist  [facility] - Triage & Transition Services  Callback: [phone]

## 2023-10-03 NOTE — ED TRIAGE NOTES
[facility]. Patient was sent to emergency room for psych evaluation prior to admission to ensure patient was not in psychosis. Patient was hallucinating a couple days ago due to using meth. Patient now reports Hood closing that door due to her asking him too. Patient denies hallucinations at this time.      Triage Assessment       Row Name 10/03/23 1211       Triage Assessment (Adult)    Airway WDL WDL       Respiratory WDL    Respiratory WDL WDL       Skin Circulation/Temperature WDL    Skin Circulation/Temperature WDL WDL       Cardiac WDL    Cardiac WDL WDL       Peripheral/Neurovascular WDL    Peripheral Neurovascular WDL WDL       Cognitive/Neuro/Behavioral WDL    Cognitive/Neuro/Behavioral WDL X    Level of Consciousness lethargic    Arousal Level opens eyes spontaneously    Orientation oriented x 4    Speech word-finding difficulty;clear    Mood/Behavior calm;cooperative       [place] Coma Scale    Best Eye Response 4-->(E4) spontaneous    Best Motor Response 6-->(M6) obeys commands    Best Verbal Response 5-->(V5) oriented    [place] Coma Scale Score 15

## 2023-10-03 NOTE — TELEPHONE ENCOUNTER
10/3/2023    Spoke with pt - lots of noise and other voices in the background. Pt said she wanted to cancel the appt. When asked for the reason, pt said she is going to [facility]. I could barely hear anything else, told pt I would cancel the appt. Pt ended call.    Tamiko Guzman [facility] - Patient Navigator   [email or web address]  Direct phone: [phone]

## 2023-10-03 NOTE — ED PROVIDER NOTES
ED Provider Note  [facility]      History     Chief Complaint   Patient presents with    Psychiatric Evaluation     Patient presented to [facility] today for meth use. Patient was sent to emergency room for psych evaluation prior to admission to ensure patient was not in psychosis. Patient was hallucinating a couple days ago due to using meth. Patient now reports Hood closing that door due to her asking him too. Patient denies hallucinations at this time.      LUIS F Dunaway is a 23 year old female with a past medical history of schizoaffective disorder, bipolar type, major depressive disorder, PTSD, ADHD, anxiety, psychosis and substance use who presents for psychiatric evaluation prior [facility]. Last meth use was four days ago, and [facility] staff subsequently observed the patient over the past few days to have auditory hallucinations, last as of this morning acting abnormally. The patient reports feeling better being sober for 4 days and not experiencing any hallucinations, denying auditory, visual, somatic and olfactory sensorium disturbances. Unclear daily meth use up to months at a time for binging. 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 Uses Haldol 5 mg as PRN when she has symptoms. Habit of using nicotine vape. Denies cocaine, opioids, alcohol, marijuana, IVDU or other substances. No other complaints.     Denies meth withdrawal, fevers, chills, headache, lightheadedness, dizziness, chest pain, palpitations, shortness of breath, cough, abdominal pain, nausea, vomiting, rash, dysuria, vaginal discharge, arthralgias, myalgias, heat/cold intolerance.     Past Medical History  Past Medical History:   Diagnosis Date    Abnormal lead level in blood     10/15/2002    ADHD (attention deficit hyperactivity  disorder), combined type     Adjustment disorder with mixed disturbance of emotions and conduct     Suicidal ideation 6/22/12, placed with new foster parents    Anxiety     Chlamydial infection     8/2015    Conduct disorder     5/2007    Constipation     Depressive disorder     Foster child     Adopted 2013    Personal history of other medical treatment (CODE)     No Comments Provided    Recurrent UTI     Toxic effect of lead and its compounds, accidental (unintentional), initial encounter     2004    Urinary leakage     Urinary tract infection     No Comments Provided     Past Surgical History:   Procedure Laterality Date    NO HISTORY OF SURGERY       benztropine (COGENTIN) 0.5 MG tablet  haloperidol (HALDOL) 5 MG tablet  senna-docusate (SENOKOT-S/PERICOLACE) 8.6-50 MG tablet      Allergies   Allergen Reactions    Seasonal Allergies      Family History  Family History   Problem Relation Age of Onset    Family History Negative Father         Good Health    Substance Abuse Father     Mental Illness Father     Substance Abuse Sister     Substance Abuse Brother     Other - See Comments Mother         Psychiatric illness,schizophrenia    Cervical Cancer Mother     Cancer Mother     Substance Abuse Mother     Mental Illness Mother     Cancer Maternal Grandmother         Cancer,brain    Breast Cancer Maternal Grandmother     Asthma No family hx of     Coronary Artery Disease No family hx of     Mental Illness No family hx of      Social History   Social History     Tobacco Use    Smoking status: Every Day     Packs/day: 0.50     Types: Cigarettes, Vaping Device    Smokeless tobacco: Never   Vaping Use    Vaping Use: Every day    Substances: Nicotine    Devices: Disposable, Pre-filled or refillable cartridge, Pre-filled pod   Substance Use Topics    Alcohol use: Not Currently     Alcohol/week: 0.0 standard drinks of alcohol    Drug use: Not Currently     Types: Marijuana, Methamphetamines, Other, Opiates     Comment:  "Fentanyl         A medically appropriate review of systems was performed with pertinent positives and negatives noted in the HPI, and all other systems negative.    Physical Exam   BP: 121/75  Pulse: 91  Temp: 98.1  F (36.7  C)  Resp: 14  Height: 167.6 cm (5' 6\")  Weight: 97.5 kg (215 lb)  SpO2: 96 %  Physical Exam  General: Resting head on chair, tired appearing, NAD  Head:  Scalp is atraumatic.   Eyes:  The pupils are equal, round, and reactive to light. EOMI. Normal conjunctiva.   ENT:                                      Nose:  The external nose is normal.  Throat:  The oropharynx is normal. Mucus membranes are moist.                 Neck:  Supple neck, no thyroid enlargement or tenderness  CV:  Normal rate, regular rhythm. No murmur. 2+ radial pulses, no peripheral edema  Resp:  Breath sounds are clear bilaterally. Non-labored, no retractions or accessory muscle use.  GI:  Abdomen is soft, no distension, no tenderness.   MS:  Normal range of motion. No acute deformities.   Skin:  Warm and dry. Well healed dorsal and ventral forearm cut marks. No acute rash or lesions.   Neuro:   Alert and oriented. Strength and sensation grossly intact. Biceps DTRs +1. No tongue fasciculations.   Psych: Awake, Pleasant and Appropriate interactions. Not responding to internal stimuli. Concise and logical thought pattern. Mood congruent to speech.         ED Course, Procedures, & Data     ED Course as of 10/03/23 1550   Tue Oct 03, 2023   1540 Had meal. Resting comfortably. DEC  seeing patient now.     1541 Negative Pregnancy Test.        Procedures         Mental Health Risk Assessment        PSS-3      Date and Time Over the past 2 weeks have you felt down, depressed, or hopeless? Over the past 2 weeks have you had thoughts of killing yourself? Have you ever attempted to kill yourself? When did this last happen? User   10/03/23 1212 no no yes more than 6 months ago TMW                Suicide assessment completed by " mental health (D.E.C., LCSW, etc.)       Results for orders placed or performed during the hospital encounter of 10/03/23   Urine Drugs of Abuse Screen     Status: None ()    Narrative    The following orders were created for panel order Urine Drugs of Abuse Screen.  Procedure                               Abnormality         Status                     ---------                               -----------         ------                     Drug Abuse Screen Qual U...[[id number]]                                                   Please view results for these tests on the individual orders.     Medications - No data to display  Labs Ordered and Resulted from Time of ED Arrival to Time of ED Departure - No data to display  No orders to display          Critical care was not performed.     Medical Decision Making  The patient's presentation was of moderate complexity (a chronic illness mild to moderate exacerbation, progression, or side effect of treatment).    The patient's evaluation involved:  discussion of management or test interpretation with another health professional (DEC )    The patient's management necessitated only low risk treatment.    Assessment & Plan    23 year old female with a complicated psychiatric history, non-adherent to Haldol daily dosing coming in for psychiatric evaluation after apparent substance induced psychosis. Concerned about how variable her presentation is given reported abnormal behavior just earlier today and recent meth use 4 days ago. Here, she is vitally stable, afebrile and appears at baseline from a psychiatric standpoint. Appears to have substance induced psychosis regularly with meth use. Will rule out other substances with UDS as well as pregnancy. Low concerns of CV complications at this time with unremarkable physical exam. DEC evaluated patient, and they agree the patient is not psychotic and is appropriate to [facility]. The current plan is for patient  to return home with family and return to [facility] by tomorrow morning. Given the poor adherence to Haldol, I feel the patient would be better served on a once a month injectable like Risperdal but she declines. No concern for self or others to initiate Quijano. The patient is discharged in stable and mentally sound condition with after visit summary recommendations given by DEC.     I have reviewed the nursing notes. I have reviewed the findings, diagnosis, plan and need for follow up with the patient.    New Prescriptions    No medications on file       Final diagnoses:   None     Donald Bernstein MD  [facility], PGY-1      --    ED Attending Physician Attestation    I Leann Cortez MD, cared for this patient with the Resident. I have performed a history and physical examination of the patient and discussed management with the resident. I reviewed the resident's documentation above and agree with the documented findings and plan of care.    Summary of HPI, PE, ED Course   Patient is a 23 year old female evaluated in the emergency department for methamphetamine abuse. Had hallucinations in the setting of recent use but currently not intoxicated and without psychotic symptoms. Exam unremarkable. DEC assessment done, please see their note for additional details. Pt is interested in continuing care through [facility] and will go there tomorrow.  After the completion of care in the emergency department, the patient was discharged.    Critical Care & Procedures  Not applicable.        Leann Cortez MD  Emergency Medicine   [facility] EMERGENCY DEPARTMENT  10/3/2023     Leann Cortez MD  10/04/23 8760

## 2023-10-20 ENCOUNTER — HOSPITAL ENCOUNTER (OUTPATIENT)
Facility: CLINIC | Age: 23
Setting detail: OBSERVATION
Discharge: HOME | End: 2023-10-21
Attending: EMERGENCY MEDICINE | Admitting: EMERGENCY MEDICINE
Payer: MEDICAID

## 2023-10-20 DIAGNOSIS — F15.10 METHAMPHETAMINE ABUSE (H): ICD-10-CM

## 2023-10-20 DIAGNOSIS — R44.0 AUDITORY HALLUCINATIONS: ICD-10-CM

## 2023-10-20 DIAGNOSIS — F20.2 CATATONIC SCHIZOPHRENIA (H): Primary | ICD-10-CM

## 2023-10-20 DIAGNOSIS — F25.0 SCHIZOAFFECTIVE DISORDER, BIPOLAR TYPE (H): ICD-10-CM

## 2023-10-20 PROBLEM — F20.9 SCHIZOPHRENIA (H): Status: ACTIVE | Noted: 2023-04-29

## 2023-10-20 PROCEDURE — 250N000013 HC RX MED GY IP 250 OP 250 PS 637: Performed by: PSYCHIATRY & NEUROLOGY

## 2023-10-20 PROCEDURE — 250N000013 HC RX MED GY IP 250 OP 250 PS 637: Performed by: EMERGENCY MEDICINE

## 2023-10-20 PROCEDURE — 81025 URINE PREGNANCY TEST: CPT | Performed by: EMERGENCY MEDICINE

## 2023-10-20 PROCEDURE — 99285 EMERGENCY DEPT VISIT HI MDM: CPT | Mod: 25

## 2023-10-20 PROCEDURE — 99285 EMERGENCY DEPT VISIT HI MDM: CPT | Performed by: EMERGENCY MEDICINE

## 2023-10-20 PROCEDURE — 80307 DRUG TEST PRSMV CHEM ANLYZR: CPT | Performed by: EMERGENCY MEDICINE

## 2023-10-20 PROCEDURE — G0378 HOSPITAL OBSERVATION PER HR: HCPCS

## 2023-10-20 RX ORDER — CHLORAL HYDRATE 500 MG
1 CAPSULE ORAL 2 TIMES DAILY
COMMUNITY
End: 2023-10-20

## 2023-10-20 RX ORDER — ACETAMINOPHEN 325 MG/1
650 TABLET ORAL EVERY 4 HOURS PRN
Status: DISCONTINUED | OUTPATIENT
Start: 2023-10-20 | End: 2023-10-21 | Stop reason: HOSPADM

## 2023-10-20 RX ORDER — ATOMOXETINE 40 MG/1
40 CAPSULE ORAL DAILY
COMMUNITY
End: 2023-10-20

## 2023-10-20 RX ORDER — BENZTROPINE MESYLATE 1 MG/1
1 TABLET ORAL 3 TIMES DAILY
COMMUNITY
End: 2023-10-20

## 2023-10-20 RX ORDER — OLANZAPINE 10 MG/1
10 TABLET, ORALLY DISINTEGRATING ORAL ONCE
Status: COMPLETED | OUTPATIENT
Start: 2023-10-20 | End: 2023-10-20

## 2023-10-20 RX ORDER — ACETAMINOPHEN 500 MG
1000 TABLET ORAL ONCE
Status: COMPLETED | OUTPATIENT
Start: 2023-10-20 | End: 2023-10-20

## 2023-10-20 RX ORDER — HALOPERIDOL DECANOATE 100 MG/ML
300 INJECTION INTRAMUSCULAR
COMMUNITY
End: 2023-10-20

## 2023-10-20 RX ORDER — HALOPERIDOL 5 MG/1
5 TABLET ORAL 2 TIMES DAILY
Status: DISCONTINUED | OUTPATIENT
Start: 2023-10-20 | End: 2023-10-21 | Stop reason: HOSPADM

## 2023-10-20 RX ORDER — HALOPERIDOL 5 MG/1
5 TABLET ORAL AT BEDTIME
COMMUNITY
End: 2023-10-21

## 2023-10-20 RX ORDER — BENZTROPINE MESYLATE 0.5 MG/1
0.5 TABLET ORAL 2 TIMES DAILY
Status: DISCONTINUED | OUTPATIENT
Start: 2023-10-20 | End: 2023-10-21 | Stop reason: HOSPADM

## 2023-10-20 RX ORDER — PROPRANOLOL HYDROCHLORIDE 60 MG/1
60 TABLET ORAL DAILY
COMMUNITY
End: 2023-10-20

## 2023-10-20 RX ADMIN — NICOTINE POLACRILEX 2 MG: 2 GUM, CHEWING BUCCAL at 13:35

## 2023-10-20 RX ADMIN — OLANZAPINE 10 MG: 10 TABLET, ORALLY DISINTEGRATING ORAL at 03:37

## 2023-10-20 RX ADMIN — HALOPERIDOL 5 MG: 5 TABLET ORAL at 23:52

## 2023-10-20 RX ADMIN — ACETAMINOPHEN 650 MG: 325 TABLET, FILM COATED ORAL at 14:25

## 2023-10-20 RX ADMIN — BENZTROPINE MESYLATE 0.5 MG: 0.5 TABLET ORAL at 23:52

## 2023-10-20 RX ADMIN — NICOTINE POLACRILEX 2 MG: 2 GUM, CHEWING BUCCAL at 16:05

## 2023-10-20 RX ADMIN — NICOTINE POLACRILEX 2 MG: 2 GUM, CHEWING BUCCAL at 02:57

## 2023-10-20 ASSESSMENT — ACTIVITIES OF DAILY LIVING (ADL)
ADLS_ACUITY_SCORE: 35

## 2023-10-20 NOTE — ED NOTES
Pt stated she wanted this nurse to talk with her.  After speaking for approx. 2 minutes pt stated wanted to be alone with jeremias.  During conversation pt observed to be responding to internal stimuli.

## 2023-10-20 NOTE — PROGRESS NOTES
Triage & Transition Services, Extended Care    Client Name: Ej Dunaway    Date: October 20, 2023  Service Type:  Group Therapy  Session Start Time:  3:00 pm    Session End Time: 3:30 pm  Session Length: 30  Site Location: [facility]  Attendees: Patient and other group members  Facilitator: Alisia Sommer     Topic:   Collaging    Intervention:    Group process: support, challenge, affirm, psycho-education.     Response:  Patient did participate in group. Patient sat at the table with writer and another pt and started to collage. Pt would stand up every few minutes, pace around the milieu while holding her ears, and come back to sit at the table.       Alisia Sommer

## 2023-10-20 NOTE — PROGRESS NOTES
Triage & Transition Services, Extended Care     Ej EMMA Bryan Gume  October 20, 2023    Ej is followed related to observation status. Please see initial DEC Crisis Assessment completed by SHAHNAZ Lino on 10/20/2023 for complete assessment information. Notable concerns include patient responding to internal stimuli, hallucinations and substance use.    There are not significant status changes.     Writer attempted to meet with patient at 9:05am for reassessment, however, patient was sleeping and did not arouse. Writer did not have time to re-approach patient.    Plan:  Observation: Patient continues to be recommended for observation status pending reassessment. Tomorrow (10/21/2023), patient will be reassessed by Extended Care.      Extended Care will follow and meet with patient/family/care team as able or requested.     SHAHNAZ Grady  [facility], Extended Care   [phone]

## 2023-10-20 NOTE — CONSULTS
"Diagnostic Evaluation Consultation  Crisis Assessment    Patient Name: Ej Dunaway  Age:  23 year old  Legal Sex: female  Gender Identity: female  Pronouns:   Race: White  Ethnicity: Not  or   Language: English      Patient was assessed: In person      Patient location: [facility] EMERGENCY DEPARTMENT                             ED19    Referral Data and Chief Complaint  Ej Dunaway presents to the ED with family/friends (\"A girl from the bar\"). Patient is presenting to the ED for the following concerns:  .   Factors that make the mental health crisis life threatening or complex are:  Ej is experiencing hallucinations and appears to be responding to internal stimuli..      Informed Consent and Assessment Methods  Explained the crisis assessment process, including applicable information disclosures and limits to confidentiality, assessed understanding of the process, and obtained consent to proceed with the assessment.  Assessment methods included conducting a formal interview with patient, review of medical records, collaboration with medical staff, and obtaining relevant collateral information from family and community providers when available.  : done     Patient response to interventions: acceptance expressed  Coping skills were attempted to reduce the crisis:  Unknown     History of the Crisis   When writer met with Ej, she insisted on standing during the assessment while making intense eye contact. She was responding to internal stimuli and her mouth was observed to be moving while whispering to herself. She would often need to be asked the same question several times and would take a while to answer. Writer asked who she was talking to and Ej said \"Hood, he is telling me he is not giving up on me\". Some of the assessment questions Ej did not provide an answer to. She did tell writer that she is hear because she is hearing multiple voices " telling her that they want her dead and that they are going to kill her. Ej denied suicidal ideation at this time. Did share that she has had thoughts in the past but was unable to elaborate. During the assessment she did cover her ears several times. She started tightly hugging her stomach and stated that it was Hood holding her. Ej denied any recent substance use and chart review shows she has a history of hallucinations with methamphetamines use. She was assessed two weeks ago on 10/3/2023 for hallucinations and was discharged with a plan for AJ evaluation, individual therapy and medication management. Ej said she came to the hospital to be close to people and to be with Hood.    Brief Psychosocial History  Family:  Single, Children no  Support System:   (Unable to assess)  Employment Status:   (Unable to assess)  Source of Income:   (Unable to assess)  Financial Environmental Concerns:  none  Current Hobbies:   (Unable to assess)  Barriers in Personal Life:  mental health concerns    Significant Clinical History  Current Anxiety Symptoms:  anxious  Current Depression/Trauma:  difficulty concentrating  Current Somatic Symptoms:   (None)  Current Psychosis/Thought Disturbance:  inattentive, hyperactive, auditory hallucinations  Current Eating Symptoms:   (Unknown)  Chemical Use History:  Alcohol:  (Unable to assess)  Benzodiazepines:  (Unable to assess)  Opiates:  (Unable to assess)  Cocaine:  (Unable to assess)  Marijuana:  (Unable to assess)  Other Use: Methamphetamines  Last Use::  (Unable to assess)   Past diagnosis:  ADHD, Anxiety Disorder, Depression, PTSD, Substance Use Disorder, Schizophrenia  Family history:  Schizophrenia  Past treatment:  Case management, Psychiatric Medication Management, Inpatient Hospitalization  Details of most recent treatment:  Priti was hospitalized for psychosis at [facility] from 05/13-06/06/23. She has recently been working with [facility] through [facility] to  establish psychiatric care.  Other relevant history:  Ej denied having any other outpatient services. Per chart review she previously shared that she participated in AJ treatment at [facility] this year.       Collateral Information  Is there collateral information: No      Risk Assessment  [place] Suicide Severity Rating Scale Full Clinical Version: 10/20/2023  Suicidal Ideation  Q1 Wish to be Dead (Lifetime): Yes  Q2 Non-Specific Active Suicidal Thoughts (Lifetime): Yes  3. Active Suicidal Ideation with any Methods (Not Plan) Without Intent to Act (Lifetime): Yes  Q4 Active Suicidal Ideation with Some Intent to Act, Without Specific Plan (Lifetime): Yes  Q5 Active Suicidal Ideation with Specific Plan and Intent (Lifetime): Yes  Q6 Suicide Behavior (Lifetime): yes     Suicidal Behavior (Lifetime)  Actual Attempt (Lifetime): Yes  Total Number of Actual Attempts (Lifetime):  (Unable to assess)  Has subject engaged in non-suicidal self-injurious behavior? (Lifetime):  (Unable to assess)  Interrupted Attempts (Lifetime): No  Aborted or Self-Interrupted Attempt (Lifetime): No  Preparatory Acts or Behavior (Lifetime): No    [place] Suicide Severity Rating Scale Recent: 10/20/2023  Suicidal Ideation (Recent)  Q1 Wished to be Dead (Past Month): no  Q2 Suicidal Thoughts (Past Month): no     Suicidal Behavior (Recent)  Actual Attempt (Past 3 Months):  (Unable to assess)  Has subject engaged in non-suicidal self-injurious behavior? (Past 3 Months):  (Unable to assess)  Interrupted Attempts (Past 3 Months):  (Unable to assess)  Aborted or Self-Interrupted Attempt (Past 3 Months):  (Unable to assess)  Preparatory Acts or Behavior (Past 3 Months):  (Unable to assess)    Environmental or Psychosocial Events: challenging interpersonal relationships, ongoing abuse of substances  Protective Factors: Protective Factors: help seeking, cultural, spiritual , or Restoration beliefs associated with meaning and value  in life, optimistic outlook - identification of future goals, lives in a responsibly safe and stable environment    Does the patient have thoughts of harming others? Feels Like Hurting Others: no  Previous Attempt to Hurt Others: no  Is the patient engaging in sexually inappropriate behavior?: no    Is the patient engaging in sexually inappropriate behavior?  no        Mental Status Exam   Affect: Constricted  Appearance: Disheveled  Attention Span/Concentration: Inattentive  Eye Contact: Intense    Fund of Knowledge: Delayed   Language /Speech Content: Fluent  Language /Speech Volume: Loud  Language /Speech Rate/Productions: Minimally Responsive  Recent Memory: Variable, Poor  Remote Memory: Variable, Poor  Mood: Normal  Orientation to Person:     Orientation to Place: Yes  Orientation to Time of Day: Yes  Orientation to Date: Yes     Situation (Do they understand why they are here?): Yes  Psychomotor Behavior: Hyperactive  Thought Content: Hallucinations  Thought Form: Intact     Medication  Psychotropic medications:   Medication Orders - Psychiatric (From admission, onward)      Start     Dose/Rate Route Frequency Ordered Stop    10/20/23 0800  haloperidol (HALDOL) tablet 5 mg         5 mg Oral 2 TIMES DAILY 10/20/23 0250               Current Care Team  Patient Care Team:  Clinic - [facility] as PCP - General  Altagracia Tavarez APRN CNP as Nurse Practitioner (Nurse Practitioner)  Sheri Rutherford APRN CNP as Nurse Practitioner (Nurse Practitioner - Pediatrics)  Onofre Castillo APRN CNP as Nurse Practitioner (Nurse Practitioner)  Karla Herrera MD as Assigned PCP    Diagnosis  Patient Active Problem List   Diagnosis Code    Urinary incontinence, unspecified type R32    Frequent UTI N39.0    ADHD (attention deficit hyperactivity disorder), combined type F90.2    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    Obesity E66.9    Foster care child Z62.21    Chemical dependency (H) F19.20    Depo-Provera  contraceptive status Z30.42    Generalized anxiety disorder F41.1    Recurrent major depression (H24) F33.9    BV (bacterial vaginosis) N76.0, B96.89    Mixed stress and urge urinary incontinence N39.46    Schizophrenia (H) F20.9    Posttraumatic stress disorder F43.10    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 She is experiencing auditory hallucinations telling her that she is dying. She was not able to engage fully in the assessment. She denied substance use today however, writer has strong belief that Ej's current presentation could be substance induced. She will be placed into observation status to she is she clears and can better engage in reassessment.    Patient coping skills attempted to reduce the crisis:  Unknown    Disposition  Recommended disposition: Observation        Reviewed case and recommendations with attending provider. Attending Name: Dr. Leal       Attending concurs with disposition: yes       Patient and/or validated legal guardian concurs with disposition:   yes       Final disposition:  observation    Legal status on admission: Voluntary/Patient has signed consent for treatment    Assessment Details   Total duration spent with the patient: 18 min     CPT code(s) utilized: Non-Billable    Stormy Bain Psychotherapist  DEC - Triage & Transition Services  Callback: [phone]

## 2023-10-20 NOTE — PLAN OF CARE
Ej Dunaway  October 20, 2023  Plan of Care Hand-off Note     Patient Care Path: observation    Plan for Care:   Ej was observed responding to internal stimuli throughout the assessment. She is experiencing auditory hallucinations telling her that she is dying. She was not able to engage fully in the assessment. She denied substance use today however, writer has strong belief that Ej's current presentation could be substance induced. She will be placed into observation status to she is she clears and can better engage in reassessment.    She did request to speak with a juli. Writer shared this with MD.    Identified Goals and Safety Issues: Reassess to see if Ej clears.    Overview:               Legal Status: Legal Status at Admission: Voluntary/Patient has signed consent for treatment    Psychiatry Consult: Not requested at this time       Updated MD and RN regarding plan of care.           Stormy Bain, MSW, LGSW

## 2023-10-20 NOTE — ED PROVIDER NOTES
"[facility] ED Mental Health Handoff Note:       Brief HPI:  This is a 23 year old female signed out to me.  See initial ED Provider note for full details of the presentation. Interval history is pertinent for ongoing ED boarding. No acute concerns today.    Home meds reviewed and ordered/administered: Yes    Medically stable for inpatient mental health admission: Yes.    Evaluated by mental health: Yes. The recommendation is for inpatient mental health treatment. Bed search in process    Safety concerns: At the time I received sign out, there were no safety concerns.    Hold Status:  Active Orders   N/A       Exam:   Patient Vitals for the past 24 hrs:   BP Temp Pulse Resp SpO2 Height Weight   10/20/23 0016 131/85 99.1  F (37.3  C) 92 18 99 % 1.676 m (5' 6\") 99.8 kg (220 lb)       ED Course:    Medications   benztropine (COGENTIN) tablet 0.5 mg (0.5 mg Oral Not Given 10/20/23 1126)   haloperidol (HALDOL) tablet 5 mg (5 mg Oral Not Given 10/20/23 1126)   nicotine (NICORETTE) gum 2 mg (2 mg Buccal $Given 10/20/23 1335)   acetaminophen (TYLENOL) tablet 650 mg (650 mg Oral $Given 10/20/23 1425)   nicotine (NICORETTE) gum 2 mg (2 mg Buccal $Given 10/20/23 0257)   OLANZapine zydis (zyPREXA) ODT tab 10 mg (10 mg Oral $Given 10/20/23 0337)   acetaminophen (TYLENOL) tablet 1,000 mg (1,000 mg Oral Not Given 10/20/23 0338)            There were no significant events during my shift.      Impression:    ICD-10-CM    1. Auditory hallucinations  R44.0       2. Schizoaffective disorder, bipolar type (H)  F25.0       3. Methamphetamine abuse (H)  F15.10           Plan:    Awaiting mental health evaluation/recommendations.      RESULTS:   Results for orders placed or performed during the hospital encounter of 10/20/23 (from the past 24 hour(s))   [facility] ([facility]) Assessment Consult Order:     Status: None ()    Collection Time: 10/20/23  1:30 AM    Stormy Syde     10/20/2023  3:48 " "AM  Diagnostic Evaluation Consultation  Crisis Assessment    Patient Name: Ej Dunaway  Age:  23 year old  Legal Sex: female  Gender Identity: female  Pronouns:   Race: White  Ethnicity: Not  or   Language: English      Patient was assessed: In person      Patient location: [facility] EMERGENCY DEPARTMENT                             ED19    Referral Data and Chief Complaint  Ej Dunaway presents to the ED with family/friends   (\"A girl from the bar\"). Patient is presenting to the ED for the   following concerns:  .   Factors that make the mental health   crisis life threatening or complex are:  Ej is experiencing   hallucinations and appears to be responding to internal stimuli..      Informed Consent and Assessment Methods  Explained the crisis assessment process, including applicable   information disclosures and limits to confidentiality, assessed   understanding of the process, and obtained consent to proceed   with the assessment.  Assessment methods included conducting a   formal interview with patient, review of medical records,   collaboration with medical staff, and obtaining relevant   collateral information from family and community providers when   available.  : done     Patient response to interventions: acceptance expressed  Coping skills were attempted to reduce the crisis:  Unknown     History of the Crisis   When writer met with Ej, she insisted on standing during the   assessment while making intense eye contact. She was responding   to internal stimuli and her mouth was observed to be moving while   whispering to herself. She would often need to be asked the same   question several times and would take a while to answer. Writer   asked who she was talking to and Ej said \"Hood, he is telling   me he is not giving up on me\". Some of the assessment questions   Ej did not provide an answer to. She did tell writer that she   is hear " because she is hearing multiple voices telling her that   they want her dead and that they are going to kill her. Ej   denied suicidal ideation at this time. Did share that she has had   thoughts in the past but was unable to elaborate. During the   assessment she did cover her ears several times. She started   tightly hugging her stomach and stated that it was Hood holding   her. Ej denied any recent substance use and chart review shows   she has a history of hallucinations with methamphetamines use.   She was assessed two weeks ago on 10/3/2023 for hallucinations   and was discharged with a plan for AJ evaluation, individual   therapy and medication management. Ej said she came to the   hospital to be close to people and to be with Hood.    Brief Psychosocial History  Family:  Single, Children no  Support System:   (Unable to assess)  Employment Status:   (Unable to assess)  Source of Income:   (Unable to assess)  Financial Environmental Concerns:  none  Current Hobbies:   (Unable to assess)  Barriers in Personal Life:  mental health concerns    Significant Clinical History  Current Anxiety Symptoms:  anxious  Current Depression/Trauma:  difficulty concentrating  Current Somatic Symptoms:   (None)  Current Psychosis/Thought Disturbance:  inattentive, hyperactive,   auditory hallucinations  Current Eating Symptoms:   (Unknown)  Chemical Use History:  Alcohol:  (Unable to assess)  Benzodiazepines:  (Unable to assess)  Opiates:  (Unable to assess)  Cocaine:  (Unable to assess)  Marijuana:  (Unable to assess)  Other Use: Methamphetamines  Last Use::  (Unable to assess)   Past diagnosis:  ADHD, Anxiety Disorder, Depression, PTSD,   Substance Use Disorder, Schizophrenia  Family history:  Schizophrenia  Past treatment:  Case management, Psychiatric Medication   Management, Inpatient Hospitalization  Details of most recent treatment:  Priti was hospitalized for   psychosis at [facility] from  05/13-06/06/23. She has recently   been working with [facility] through [facility] to establish   psychiatric care.  Other relevant history:  Ej denied having any other outpatient   services. Per chart review she previously shared that she   participated in AJ treatment at [facility] this   year.       Collateral Information  Is there collateral information: No      Risk Assessment  [place] Suicide Severity Rating Scale Full Clinical Version:   10/20/2023  Suicidal Ideation  Q1 Wish to be Dead (Lifetime): Yes  Q2 Non-Specific Active Suicidal Thoughts (Lifetime): Yes  3. Active Suicidal Ideation with any Methods (Not Plan) Without   Intent to Act (Lifetime): Yes  Q4 Active Suicidal Ideation with Some Intent to Act, Without   Specific Plan (Lifetime): Yes  Q5 Active Suicidal Ideation with Specific Plan and Intent   (Lifetime): Yes  Q6 Suicide Behavior (Lifetime): yes     Suicidal Behavior (Lifetime)  Actual Attempt (Lifetime): Yes  Total Number of Actual Attempts (Lifetime):  (Unable to assess)  Has subject engaged in non-suicidal self-injurious behavior?   (Lifetime):  (Unable to assess)  Interrupted Attempts (Lifetime): No  Aborted or Self-Interrupted Attempt (Lifetime): No  Preparatory Acts or Behavior (Lifetime): No    [place] Suicide Severity Rating Scale Recent: 10/20/2023  Suicidal Ideation (Recent)  Q1 Wished to be Dead (Past Month): no  Q2 Suicidal Thoughts (Past Month): no     Suicidal Behavior (Recent)  Actual Attempt (Past 3 Months):  (Unable to assess)  Has subject engaged in non-suicidal self-injurious behavior?   (Past 3 Months):  (Unable to assess)  Interrupted Attempts (Past 3 Months):  (Unable to assess)  Aborted or Self-Interrupted Attempt (Past 3 Months):  (Unable to   assess)  Preparatory Acts or Behavior (Past 3 Months):  (Unable to assess)    Environmental or Psychosocial Events: challenging interpersonal   relationships, ongoing abuse of substances  Protective Factors:  Protective Factors: help seeking, cultural,   spiritual , or Adventism beliefs associated with meaning and   value in life, optimistic outlook - identification of future   goals, lives in a responsibly safe and stable environment    Does the patient have thoughts of harming others? Feels Like   Hurting Others: no  Previous Attempt to Hurt Others: no  Is the patient engaging in sexually inappropriate behavior?: no    Is the patient engaging in sexually inappropriate behavior?  no          Mental Status Exam   Affect: Constricted  Appearance: Disheveled  Attention Span/Concentration: Inattentive  Eye Contact: Intense    Fund of Knowledge: Delayed   Language /Speech Content: Fluent  Language /Speech Volume: Loud  Language /Speech Rate/Productions: Minimally Responsive  Recent Memory: Variable, Poor  Remote Memory: Variable, Poor  Mood: Normal  Orientation to Person:     Orientation to Place: Yes  Orientation to Time of Day: Yes  Orientation to Date: Yes     Situation (Do they understand why they are here?): Yes  Psychomotor Behavior: Hyperactive  Thought Content: Hallucinations  Thought Form: Intact     Medication  Psychotropic medications:   Medication Orders - Psychiatric (From admission, onward)      Start     Dose/Rate Route Frequency Ordered Stop    10/20/23 0800  haloperidol (HALDOL) tablet 5 mg         5 mg Oral 2 TIMES DAILY 10/20/23 0250               Current Care Team  Patient Care Team:  Clinic - [facility] as PCP - General  Altagracia Tavarez APRN CNP as Nurse Practitioner (Nurse   Practitioner)  Sheri Rutherford APRN CNP as Nurse Practitioner (Nurse Practitioner   - Pediatrics)  Onofre Castillo APRN CNP as Nurse Practitioner (Nurse   Practitioner)  Karla Herrera MD as Assigned PCP    Diagnosis  Patient Active Problem List   Diagnosis Code    Urinary incontinence, unspecified type R32    Frequent UTI N39.0    ADHD (attention deficit hyperactivity disorder), combined type   F90.2     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    Obesity E66.9    Foster care child Z62.21    Chemical dependency (H) F19.20    Depo-Provera contraceptive status Z30.42    Generalized anxiety disorder F41.1    Recurrent major depression (H24) F33.9    BV (bacterial vaginosis) N76.0, B96.89    Mixed stress and urge urinary incontinence N39.46    Schizophrenia (H) F20.9    Posttraumatic stress disorder F43.10    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    Auditory hallucinations R44.0       Primary Problem This Admission  Active Hospital Problems    Amphetamine dependence (H)      *Schizophrenia (H)        Clinical Summary and Substantiation of Recommendations   Ej was observed responding to internal stimuli throughout the   assessment. She is experiencing auditory hallucinations telling   her that she is dying. She was not able to engage fully in the   assessment. She denied substance use today however, writer has   strong belief that Ej's current presentation could be   substance induced. She will be placed into observation status to   she is she clears and can better engage in reassessment.    Patient coping skills attempted to reduce the crisis:  Unknown    Disposition  Recommended disposition: Observation        Reviewed case and recommendations with attending provider.   Attending Name: Dr. Leal       Attending concurs with disposition: yes       Patient and/or validated legal guardian concurs with disposition:     yes       Final disposition:  observation    Legal status on admission: Voluntary/Patient has signed consent   for treatment    Assessment Details   Total duration spent with the patient: 18 min     CPT code(s) utilized: Non-Billable    Li Liontherapist  DEC - Triage & Transition Services  Callback: [phone]          Urine Drug Screen     Status: Abnormal    Collection Time: 10/20/23  2:46 AM    Narrative    The following orders were created for panel order Urine Drug Screen.  Procedure                                Abnormality         Status                     ---------                               -----------         ------                     Urine Drug Screen Panel[[id number]]      Abnormal            Final result                 Please view results for these tests on the individual orders.   HCG qualitative urine     Status: Normal    Collection Time: 10/20/23  2:46 AM   Result Value Ref Range    hCG Urine Qualitative Negative Negative   Urine Drug Screen Panel     Status: Abnormal    Collection Time: 10/20/23  2:46 AM   Result Value Ref Range    Amphetamines Urine Screen Positive (A) Screen Negative    Barbituates Urine Screen Negative Screen Negative    Benzodiazepine Urine Screen Negative Screen Negative    Cannabinoids Urine Screen Negative Screen Negative    Cocaine Urine Screen Negative Screen Negative    Fentanyl Qual Urine Screen Negative Screen Negative    Opiates Urine Screen Negative Screen Negative    PCP Urine Screen Negative Screen Negative             MD Jose Alberto Romo Dung Hoang, MD  10/20/23 5532

## 2023-10-20 NOTE — PROGRESS NOTES
"Pt came from ED to [facility] accompanied by ED staff. Pt has a minimal speech with staff. Pt was observed holding her head. Pt denied having a headphone but later accepted to have it. The patient is pacing in the milieu with a headphone on. PT appears paranoid and has auditory hallucinations. PT contracted for safety. Denies SI/HI but endorses A/H. Pt said in the voices \"They are not going to knock me down because Hood is stronger\".  Ate snack. Will continue to monitor pt.  "
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
## 2023-10-21 VITALS
WEIGHT: 220 LBS | RESPIRATION RATE: 18 BRPM | HEIGHT: 66 IN | HEART RATE: 98 BPM | BODY MASS INDEX: 35.36 KG/M2 | SYSTOLIC BLOOD PRESSURE: 127 MMHG | OXYGEN SATURATION: 97 % | DIASTOLIC BLOOD PRESSURE: 72 MMHG | TEMPERATURE: 98.4 F

## 2023-10-21 PROCEDURE — 99255 IP/OBS CONSLTJ NEW/EST HI 80: CPT

## 2023-10-21 PROCEDURE — G0378 HOSPITAL OBSERVATION PER HR: HCPCS

## 2023-10-21 PROCEDURE — 250N000013 HC RX MED GY IP 250 OP 250 PS 637: Performed by: EMERGENCY MEDICINE

## 2023-10-21 PROCEDURE — 250N000013 HC RX MED GY IP 250 OP 250 PS 637: Performed by: PSYCHIATRY & NEUROLOGY

## 2023-10-21 RX ORDER — BENZTROPINE MESYLATE 0.5 MG/1
1 TABLET ORAL 2 TIMES DAILY
Qty: 60 TABLET | Refills: 0 | Status: SHIPPED | OUTPATIENT
Start: 2023-10-21 | End: 2023-12-12

## 2023-10-21 RX ORDER — HALOPERIDOL 5 MG/1
5 TABLET ORAL 2 TIMES DAILY
Qty: 60 TABLET | Refills: 0 | Status: SHIPPED | OUTPATIENT
Start: 2023-10-21 | End: 2023-12-12

## 2023-10-21 RX ORDER — BENZTROPINE MESYLATE 0.5 MG/1
1 TABLET ORAL 2 TIMES DAILY
Qty: 60 TABLET | Refills: 0 | Status: SHIPPED | OUTPATIENT
Start: 2023-10-21 | End: 2023-10-21

## 2023-10-21 RX ADMIN — HALOPERIDOL 5 MG: 5 TABLET ORAL at 10:02

## 2023-10-21 RX ADMIN — BENZTROPINE MESYLATE 0.5 MG: 0.5 TABLET ORAL at 10:02

## 2023-10-21 RX ADMIN — NICOTINE POLACRILEX 2 MG: 2 GUM, CHEWING BUCCAL at 11:13

## 2023-10-21 RX ADMIN — ACETAMINOPHEN 650 MG: 325 TABLET, FILM COATED ORAL at 11:13

## 2023-10-21 RX ADMIN — NICOTINE POLACRILEX 2 MG: 2 GUM, CHEWING BUCCAL at 10:06

## 2023-10-21 ASSESSMENT — ACTIVITIES OF DAILY LIVING (ADL)
ADLS_ACUITY_SCORE: 35

## 2023-10-21 ASSESSMENT — COLUMBIA-SUICIDE SEVERITY RATING SCALE - C-SSRS
TOTAL  NUMBER OF INTERRUPTED ATTEMPTS SINCE LAST CONTACT: NO
6. HAVE YOU EVER DONE ANYTHING, STARTED TO DO ANYTHING, OR PREPARED TO DO ANYTHING TO END YOUR LIFE?: NO
2. HAVE YOU ACTUALLY HAD ANY THOUGHTS OF KILLING YOURSELF?: NO
TOTAL  NUMBER OF ABORTED OR SELF INTERRUPTED ATTEMPTS SINCE LAST CONTACT: NO
1. SINCE LAST CONTACT, HAVE YOU WISHED YOU WERE DEAD OR WISHED YOU COULD GO TO SLEEP AND NOT WAKE UP?: NO
SUICIDE, SINCE LAST CONTACT: NO
ATTEMPT SINCE LAST CONTACT: NO

## 2023-10-21 NOTE — ED NOTES
Pt calm, quiet, and resting in milieu. Pt self-isolated. VSS, denied pain. Denies SI/SIB/HI thoughts or ideations. Pt did not endorse Auditory hallucinations but could be see plugging ears with fingers intermittently. She was sleeping most evening and did not awake for HS meds. Pt still asleep; will reproach for HS meds. Pt remains behaviorally controlled.

## 2023-10-21 NOTE — DISCHARGE INSTRUCTIONS
Aftercare Plan  It is recommended that you follow up with your established providers (psychiatrist, mental health therapist, and/or primary care doctor - as relevant) as soon as possible. Coordinators from [facility] will be calling you in the next 24-48 hours to ensure that you have the resources you need. You can also contact [facility] coordinators directly at [phone].       If I am feeling unsafe or I am in a crisis, I will:   Contact my established care providers  [facility] Crisis: [phone]   Call the National Suicide Prevention Lifeline: 988  Go to the nearest emergency room   Call 911     Warning signs that I or other people might notice when a crisis is developing for me:  Changes in sleep  Withdrawing from others  Increased substance use  Increased hallucinations    Things I am able to do on my own to cope or help me feel better:   Go on a walk  Get outside  Watch your favorite movie or tv show  Journal  Read a book  Draw  Art/crafts  Listen to music  Stretch    Things that I am able to do with others to cope or help me better:   Call a friend, family member or other support  Play a game  Grab a cup of coffee or tea  Go for a walk together    Changes I can make to support my mental health and wellness:   Engage with my outpatient providers  Consider engaging with a mental health therapist and psychiatrist  Take my medications as prescribed  Abstain from substances  Engage in 3 self care activities per day    People in my life that I can ask for help:   Friends   Family  Outpatient Providers    Your [facility] has a mental health crisis team you can call 24/7: [facility] Crisis  [phone]     Additional resources and information:     Substance Use Disorder Direct Access Resources  It is recommended that you abstain from all mood altering chemicals. Please contact the sober support hotline ([phone]) as needed; phones are answered 24 hours a day, 7 days a week.    To access substance  use treatment you must have a comprehensive assessment completed to begin any treatment program.     If uninsured, please contact your county of residence for eligibility screen to substance use disorder evaluation and treatment:    [place] - [phone]   [place] - [phone]   [place] - [phone]   [place] - [phone]   [place] - [phone]   [place] - [phone]   [place] - [phone]   [place] - [phone]     If you have private insurance, call the customer service number on the back of your insurance card to find an in-network substance abuse use disorder assessment. The ideal provider will be a treatment facility, licensed in the state of [place].     Community AJ Evaluations: Clients may call their county for a full list of providers - Availability and services listed belo are subject to change, please call the provider to confirm    [facility]  [phone]  [address] [place] [address], [address]  *Please call the above number to schedule a comprehensive assessment for determination of level of care needs. In person and virtual appointments available Mon-Fri.    [facility], [address], First Floor, [address], [place] [address] (next to the outpatient lab)    Phone: [phone]   Provides bridging services to people with Opiate Use Disorders (OUD) seeking care. This is a front door to Medication Assisted Treatments (MAT), ages 16+  Walk In hours: Monday-Friday 9:00am-3:00pm    [facility]  [phone]  Walk in Assessments: Mon-Friday 7a-1:45p  [address], [place], [address]    [facility]  Central Access [phone]  [address] [place] [address], [address]  *by appointment only    [facility]  [phone] (phone consultation available )  Locations in: [place], [place], [place], [place], and [place], [place]  Italian virtual IOP [phone] or visit  [email or web address]   Also offers LGBTQ programming     [facility]  [phone]  [address]  [place], [place], [address]  *Currently only offered via telehealth - call to set up an appointment    [facility]  [address] [place], [place], [address]  [facility]  For more information to to make a referral call:  [phone]  Walk-in on Fridays  9-11 a.m.    [facility]  [phone]  [address] [place] [place], [place], [address]  *available by appointments only    [facility] specific  [phone]  [address] [place], [place], [address]  *available by appointment only    [facility]  [phone]  [address] [place], [place], [address]  *walk in assessments available M-F starting at 7 am.    [facility]  [phone]  Locations: [facility], [facility], and [place]  *Walk in assessments availble M-F starting at 8 am -virtual only    [facility]  [phone]  [address] [place] [address]    [facility]  Virtual + Locations: [place], [place], [place], [place], [facility], [facility], [place], [place]   [phone]  *available by appointment only    [facility]  [phone]  [address]  [place], [place], [address]    [facility]  [phone]  [address], [place], [address]  *available by appointment    [facility]  [phone]  [address] [place], [place], [address]  *walk ins available M-TH from 9-3    [facility]  MAT program: [phone]  [address] [place], [place], [address]    [facility]  [phone]  Same day substance use disorder assessments are available Monday - Friday, via walk-in or by appointment at the [place] location.  [address], [address], [place] [address]     [facility] - adolescent and adult SUDs  services  [phone]  Offer services Monday through Friday, as well as evening hours Monday through Thursday. Normally, a first appointment will be scheduled within one week  [email or web address]  Locations all over [place]    Ways to help cope with sobriety:  -- Take prescribed medicines as scheduled  -- Keep follow-up appointments  -- Talk to others about your concerns  -- Get regular exercise  -- Practice deep breathing skills  -- Eat a healthy diet  -- Use community resources, including hotline numbers, [facility] crisis and support meetings  -- Stay sober and avoid places/people/things associated with substance use  --Maintain a daily schedule/routine  --Get at least 7-8 hours of sleep per night  --Create a list 10--20 healthy activities that you can do that are enjoyable and do not involve substance use  --Create daily goals (approx. 1-4 goals) per day and work to achieve them throughout the day.    Free Resources:  [facility] ([facility])  [facility] connects people seeking recovery to resources that help foster and sustain long-term recovery. Whether you are seeking resources for treatment, transportation, housing, job training, education, health care or other pathways to recovery, [facility] is a great place to start.  Phone: 565.531.6672. www.[place]nesotaSocial Shop.org (Great listing of all types of recovery and non-recovery related resources)    Alcoholics Anonymous  Phone: 5-113-ALCOHOL  Website: HTTP://WWW.AA.ORG/  [facility] [place] (507-599-0311 or http://a[place].org)  [facility] [place] (568-519-6331 or www.aastpaul.org)     Narcotics Anonymous  Phone: 400.998.4855  Website: www.[employer].Mogotest.    [facility]  [address] [place] [address], [address], [place], [place],  Phone: [phone]  Drop-in Hours: Monday-Friday 9-11:30 am. By appointment at other times.  Provides: [facility] is a drop-in center on the [place] side of [place] that provides a safe space  "for individuals who are homeless and have a history of chemical use. Sobriety is not a requirement but drugs and alcohol are not allowed on the property.  Services: Non-clients can access drop-in services such as Recovery and Harm Reduction Groups, referrals to case management, community activities, shower facilities, and a pool table. Individuals who are homeless and have chemical health needs may be eligible for enrollment into [facility]'s case management program. Clients and  work together to access benefits, treatment, health care, shelter, and external housing resources.      Crisis Lines  Crisis Text Line  Text 557705  You will be connected with a trained live crisis counselor to provide support.    Por espanol, texto  REGLA a 056656 o texto a 442-AYUDAME en WhatsApp    [facility] (LGBTQ Youth Crisis Line)  7.344.535.6968  text START to 818-460      Community Resources  Fast Tracker  Linking people to mental health and substance use disorder resources  fastY Combinatorn.org     [place] Mental Health Warm Line  Peer to peer support  Monday thru Saturday, 12 pm to 10 pm  636.797.2105 or 7.335.690.4656  Text \"Support\" to 14270    National [place] on Mental Illness (YESSENIA)  628.142.5948 or 1.888.YESSENIA.HELPS      Mental Health Apps  My3  https://myMobcartpp.org/    VirtualHopeBox  https://MedeAnalytics.org/apps/virtual-hope-box/      Additional Information  Today you were seen by a licensed mental health professional through Triage and Transition services, [facility] (P)  for a crisis assessment in the Emergency Department at [facility]. It is recommended that you follow up with your established providers (psychiatrist, mental health therapist, and/or primary care doctor - as relevant) as soon as possible. Coordinators from [facility] will be calling you in the next 24-48 hours to ensure that you have the resources you need. You can also contact [facility] coordinators " directly at [phone]. You may have been scheduled for or offered an appointment with a mental health provider. [facility] maintains an extensive network of licensed behavioral health providers to connect patients with the services they need. We do not charge providers a fee to participate in our referral network. We match patients with providers based on a patient's specific needs, insurance coverage, and location. Our first effort will be to refer you to a provider within your care system, and will utilize providers outside your care system as needed.

## 2023-10-21 NOTE — PHARMACY-ADMISSION MEDICATION HISTORY
Pharmacist Admission Medication History    Admission medication history is complete. The information provided in this note is only as accurate as the sources available at the time of the update.    Information Source(s): Hospital records and CareEverywhere/SureScripts via  computer    Pertinent Information: Patient told nursing she was not taking any medications, which matches her fill records. Her last hospitalization was at [facility] 05/13/23-6/6/23. Medications at time of discharge were:  Atomoxetine 40 mg daily  Benztropine 1 mg TID  Haloperidol 5 mg daily prn  Haloperidol Decanoate 300 mg q4w  Propranolol 60 mg daily    Patient has not filled any of these medications since discharge. Was seen at [facility] ED on 10/18/23 and given prescription for haloperidol.    Changes made to PTA medication list:  Added: None  Deleted: benztropine, senna-docusate  Changed: haloperidol to most recent prescription from 10/28/23 ED visit    Allergies reviewed with patient and updates made in EHR: no    Medication History Completed By: Arielle Torres RPH 10/20/2023 7:33 PM    PTA Med List   Medication Sig Last Dose    haloperidol (HALDOL) 5 MG tablet Take 5 mg by mouth at bedtime Unknown      Arielle Torres, VicD
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
## 2023-10-21 NOTE — ED NOTES
Pt was noted sleeping and snoring on bean bag in the milieu when writer assumed care. Pt got HS meds late when she briefly woke up.  Pt slept rest of night. 15 checks done as required without any signs of distress. Breathing was regular and unlabored. No behavioral issues noted. Will continue to evaluate.

## 2023-10-21 NOTE — PROGRESS NOTES
"Triage & Transition Services, Extended Care     Therapy Progress Note & Reassessment    Patient: Ej goes by \"Ej,\" uses she/her pronouns  Date of Service: October 21, 2023  Site of Service: [facility]  Patient was seen in-person.     Presenting problem:   Ej is followed related to  observation status . Please see initial DEC/[facility] Crisis Assessment completed by SHAHNAZ Lion on 10/20/23 for complete assessment information. Notable concerns include history of schizophrenia, substance use, hallucinations, and responding to internal stimuli.     Individuals Present: Ej and Oralia Matute and Gabriele Russell    Session start: 11:15am  Session end: 11:31am  Session duration in minutes: 16 minutes  Session number: 1  Anticipated number of sessions or this episode of care: 1  CPT utilized: 91184 - Psychotherapy (with patient) - 30 (16-37*) min    Current Presentation:   This writer met with Ej to discuss her plans for discharge. Ej reports she is no longer experiencing hallucinations and reports no safety concerns with no SI, no HI, no SIB. Ej reports she is motivated for discharge and plans to return to her apartment. During interview Ej exhibited disorganized thinking and response latency;several times questions needed to be repeated. In interview, Ej reported she is agreeable to taking prescribed anti-psychotic medications and acknowledged the meds have helped alleviate her hallucinations. However, Ej reports she doesn't believe in medications but believes in [place]. This writer and Ej discussed how believing in [employer] and medications can help protect her and support her wellness. Ej refused referrals for therapy and psychiatry. Ej reported coping skills of art, music, and drawing. Ej reports she socially isolates at home but said she is interested in going to [employer]; this writer encouraged Ej to consider integration into a [facility] community for continued social connection. Ej " reported no other specific wants/needs nor community resources.     Risk Assessment:  [place] Suicide Severity Rating Scale Since Last Contact: 10/21/23  Suicidal Ideation (Since Last Contact)  1. Wish to be Dead (Since Last Contact): No  2. Non-Specific Active Suicidal Thoughts (Since Last Contact): No  Suicidal Behavior (Since Last Contact)  Actual Attempt (Since Last Contact): No  Has subject engaged in non-suicidal self-injurious behavior? (Since Last Contact): No  Interrupted Attempts (Since Last Contact): No  Aborted or Self-Interrupted Attempt (Since Last Contact): No  Preparatory Acts or Behavior (Since Last Contact): No  Suicide (Since Last Contact): No     C-SSRS Risk (Since Last Contact)  Calculated C-SSRS Risk Score (Since Last Contact): No Risk Indicated        Mental Status Exam:   Appearance: awake, alert and dressed in hospital scrubs  Attitude: cooperative  Eye Contact: good  Mood: good  Affect: appropriate and in normal range and mood congruent  Speech: increased speech latency  Psychomotor Behavior: no evidence of tardive dyskinesia, dystonia, or tics  Thought Process:  disorganized  Associations: no loose associations  Thought Content: no evidence of suicidal ideation or homicidal ideation  Insight: limited  Judgement: limited  Oriented to: time, person, and place  Attention Span and Concentration: limited  Recent and Remote Memory: fair    Diagnosis:   Schizoaffective disorder, bipolar type (H)  Methamphetamine abuse (H)  Catatonic schizophrenia (H)    Therapeutic Intervention(s):   Provided active listening, unconditional positive regard, and validation. Provided positive reinforcement for progress towards goals, gains in knowledge, and application of skills previously taught.     Treatment Objective(s) Addressed:   The focus of this session was on identifying an appropriate aftercare plan, assessing safety, identifying additional supports, and exploring obstacles to safety in the community.      Progress Towards Goals:   Patient reports improving symptoms. Patient is making progress towards treatment goals as evidenced by medication adherence and reported reduction in symptoms.       Plan:   Discharge: pt is no longer endorsing hallucinations and engaged in safety planning. Psychiatry provider Chavez MARTINEZ CNP offered medication consultation and recommended discharge. Pt is being recommended for discharge with recommendation for outpatient services; pt declined outpatient services at this time.    Plan for Care reviewed with Assigned Medical Provider? Yes. Provider, Chavez MARTINEZ CNP , response: In agreement     Gabriele Russell   Licensed Mental Health Professional (LMHP), [facility]  [phone]

## 2023-11-29 ENCOUNTER — HOSPITAL ENCOUNTER (EMERGENCY)
Facility: CLINIC | Age: 23
Discharge: HOME | End: 2023-11-30
Attending: FAMILY MEDICINE | Admitting: FAMILY MEDICINE
Payer: MEDICAID

## 2023-11-29 DIAGNOSIS — F20.9 SCHIZOPHRENIA, UNSPECIFIED TYPE (H): ICD-10-CM

## 2023-11-29 DIAGNOSIS — F41.1 GENERALIZED ANXIETY DISORDER: ICD-10-CM

## 2023-11-29 DIAGNOSIS — F94.1 REACTIVE ATTACHMENT DISORDER: ICD-10-CM

## 2023-11-29 PROCEDURE — 99284 EMERGENCY DEPT VISIT MOD MDM: CPT | Performed by: FAMILY MEDICINE

## 2023-11-29 PROCEDURE — 250N000013 HC RX MED GY IP 250 OP 250 PS 637: Performed by: FAMILY MEDICINE

## 2023-11-29 RX ORDER — OLANZAPINE 10 MG/1
10 TABLET, ORALLY DISINTEGRATING ORAL ONCE
Status: COMPLETED | OUTPATIENT
Start: 2023-11-29 | End: 2023-11-29

## 2023-11-29 RX ADMIN — NICOTINE POLACRILEX 4 MG: 4 GUM, CHEWING BUCCAL at 21:10

## 2023-11-29 RX ADMIN — OLANZAPINE 10 MG: 10 TABLET, ORALLY DISINTEGRATING ORAL at 21:10

## 2023-11-29 ASSESSMENT — ACTIVITIES OF DAILY LIVING (ADL)
ADLS_ACUITY_SCORE: 35
ADLS_ACUITY_SCORE: 35

## 2023-11-30 VITALS
TEMPERATURE: 98.1 F | DIASTOLIC BLOOD PRESSURE: 79 MMHG | RESPIRATION RATE: 16 BRPM | HEART RATE: 67 BPM | OXYGEN SATURATION: 98 % | SYSTOLIC BLOOD PRESSURE: 129 MMHG

## 2023-11-30 PROCEDURE — 80307 DRUG TEST PRSMV CHEM ANLYZR: CPT | Performed by: FAMILY MEDICINE

## 2023-11-30 PROCEDURE — 81025 URINE PREGNANCY TEST: CPT | Performed by: FAMILY MEDICINE

## 2023-11-30 ASSESSMENT — ACTIVITIES OF DAILY LIVING (ADL)
ADLS_ACUITY_SCORE: 35

## 2023-11-30 NOTE — ED NOTES
Patient very dysregulated; hard to assess and properly triage. Denies SI, has old scars on her arms. Reports not feeling emotions.

## 2023-11-30 NOTE — ED NOTES
Pt sleeping and not responding to verbal communication at 9am. Please call BEC when pt is awake for DEC Assessment.

## 2023-11-30 NOTE — ED PROVIDER NOTES
ED Provider Note  [facility]      History     Chief Complaint   Patient presents with    Paranoid     Called 911 stating she was emotionally dysregulated; reports needing to get back on medication. Responding to internal stimuli.      HPI  Landyjosselyn Dunaway is a 23 year old female with PMH of major depression, PTSD, reactive attachment disorder, generalized anxiety, f schizoaffective disorder, methamphetamine abuse who presents to the Emergency Department today due to continued escalation of her emotional dysregulation.  Patient has been noncompliant with medications having increased auditory and visual hallucinations increased agitation.    Per chart review, patient presents to the Emergency Department on 10/20/2023 due to auditory hallucinations. A DEC assessment was completed. The  recommended observation admission.    Patient continues to have escalation of her symptoms and states that she has not been compliant with her medications and does not want to take Haldol.        Past Medical History  Past Medical History:   Diagnosis Date    Abnormal lead level in blood     10/15/2002    ADHD (attention deficit hyperactivity disorder), combined type     Adjustment disorder with mixed disturbance of emotions and conduct     Suicidal ideation 6/22/12, placed with new foster parents    Anxiety     Chlamydial infection     8/2015    Conduct disorder     5/2007    Constipation     Depressive disorder     Foster child     Adopted 2013    Personal history of other medical treatment (CODE)     No Comments Provided    Recurrent UTI     Toxic effect of lead and its compounds, accidental (unintentional), initial encounter     2004    Urinary leakage     Urinary tract infection     No Comments Provided     Past Surgical History:   Procedure Laterality Date    NO HISTORY OF SURGERY       benztropine (COGENTIN) 0.5 MG tablet  haloperidol (HALDOL) 5 MG tablet      Allergies   Allergen  Reactions    Seasonal Allergies      Family History  Family History   Problem Relation Age of Onset    Family History Negative Father         Good Health    Substance Abuse Father     Mental Illness Father     Substance Abuse Sister     Substance Abuse Brother     Other - See Comments Mother         Psychiatric illness,schizophrenia    Cervical Cancer Mother     Cancer Mother     Substance Abuse Mother     Mental Illness Mother     Cancer Maternal Grandmother         Cancer,brain    Breast Cancer Maternal Grandmother     Asthma No family hx of     Coronary Artery Disease No family hx of     Mental Illness No family hx of      Social History   Social History     Tobacco Use    Smoking status: Every Day     Packs/day: .5     Types: Cigarettes, Vaping Device    Smokeless tobacco: Never   Vaping Use    Vaping Use: Every day    Substances: Nicotine    Devices: Disposable, Pre-filled or refillable cartridge, Pre-filled pod   Substance Use Topics    Alcohol use: Not Currently     Alcohol/week: 0.0 standard drinks of alcohol    Drug use: Not Currently     Types: Marijuana, Methamphetamines, Other, Opiates     Comment: Fentanyl         A medically appropriate review of systems was performed with pertinent positives and negatives noted in the HPI, and all other systems negative.    Physical Exam   BP:  (refused)  Pulse: 112  Temp: 98  F (36.7  C)  Resp: 16  SpO2: 98 %  Physical Exam  Constitutional:       General: She is not in acute distress.     Appearance: Normal appearance. She is not diaphoretic.   HENT:      Head: Atraumatic.      Mouth/Throat:      Mouth: Mucous membranes are moist.   Eyes:      General: No scleral icterus.     Conjunctiva/sclera: Conjunctivae normal.   Cardiovascular:      Rate and Rhythm: Normal rate.      Heart sounds: Normal heart sounds.   Pulmonary:      Effort: No respiratory distress.      Breath sounds: Normal breath sounds.   Abdominal:      General: Abdomen is flat.   Musculoskeletal:       Cervical back: Neck supple.   Skin:     General: Skin is warm.      Findings: No rash.   Neurological:      General: No focal deficit present.      Mental Status: She is alert and oriented to person, place, and time.      Sensory: No sensory deficit.      Motor: No weakness.      Coordination: Coordination normal.   Psychiatric:         Mood and Affect: Mood is anxious. Affect is labile.         Speech: Speech is rapid and pressured.         Behavior: Behavior is agitated.         Thought Content: Thought content is paranoid. Thought content includes suicidal ideation.           ED Course, Procedures, & Data      Procedures         Medications   OLANZapine zydis (zyPREXA) ODT tab 10 mg (10 mg Oral $Given 11/29/23 2110)   nicotine polacrilex (NICORETTE) gum 4 mg (4 mg Buccal $Given 11/29/23 2110)     Labs Ordered and Resulted from Time of ED Arrival to Time of ED Departure - No data to display  No orders to display          Critical care was not performed.     Medical Decision Making  The patient's presentation was of moderate complexity (a chronic illness mild to moderate exacerbation, progression, or side effect of treatment).    The patient's evaluation involved:  ordering and/or review of 2 test(s) in this encounter (see separate area of note for details)  discussion of management or test interpretation with another health professional (independent practitioner was able to do full DEC assessment initially patient was sedated from oral Zyprexa and reattempt to do full DEC assessment will occur with subsequent discussion of inpatient versus outpatient management.)    The patient's management necessitated high risk (a decision regarding hospitalization).    Assessment & Plan        I have reviewed the nursing notes. I have reviewed the findings, diagnosis, plan and need for follow up with the patient.    Patient with history of previous diagnosis schizophrenia reactive attachment generalized anxiety at this time  initially agitated auditory and visual hallucinations increased paranoia refusing to take Haldol patient finally agreed to take Zyprexa she was given 10 mg Zyprexa and immediately was more sedated sleeping and uncooperative for initial back evaluation patient will continue to have DEC  attempted to have full evaluation.  Patient will remain in the emergency room until stabilized and evaluated.    Final diagnoses:   Schizophrenia, unspecified type (H)   Reactive attachment disorder   Generalized anxiety disorder         [facility] EMERGENCY DEPARTMENT  11/29/2023     Cruzito Pa MD  11/30/23 0002

## 2023-11-30 NOTE — ED NOTES
Bed: ED16B  Expected date:   Expected time:   Means of arrival:   Comments:  Crisis team bringing in Dysregulated,psychotic behavior, delayed response.

## 2023-11-30 NOTE — ED NOTES
Writer called ED to inquire if pt was awake / alert for assessment since she received 10MG Zyprexa; RN informed writer that pt is unrousable at this time.  Writer requested RN call BEC when pt is awake.    SHAHNAZ Taylor  DEC - Triage & Transition Services  Callback: [phone]

## 2023-11-30 NOTE — ED PROVIDER NOTES
[facility] ED Mental Health Handoff Note:       Brief HPI:  This is a 23 year old female signed out to me by Dr. Gupta.  See initial ED Provider note for full details of the presentation. Interval history is pertinent for patient feeling better.    Home meds reviewed and ordered/administered: Yes    Medically stable for inpatient mental health admission: Yes.    Evaluated by mental health: Yes. The recommendation is for outpatient mental health treatment. Resources and plan given to patient.    Safety concerns: At the time I received sign out, there were no safety concerns.    Hold Status:  Active Orders   N/A            Exam:   Patient Vitals for the past 24 hrs:   BP Temp Temp src Pulse Resp SpO2   11/30/23 1031 129/79 98.1  F (36.7  C) Oral 67 16 98 %   11/29/23 2100 -- 98  F (36.7  C) Oral 112 16 98 %       Patient feeling fine without concerns for SI or any safety concerns. No hallucinations reported.  Patient talked with Aide with DEC and OK home with moving to sober housing today.  Father to come and  patient. No medications requested per patient.      ED Course:    Medications   nicotine polacrilex (NICORETTE) gum 4 mg (has no administration in time range)   OLANZapine zydis (zyPREXA) ODT tab 10 mg (10 mg Oral $Given 11/29/23 2110)   nicotine polacrilex (NICORETTE) gum 4 mg (4 mg Buccal $Given 11/29/23 2110)            There were no significant events during my shift.          Impression:    ICD-10-CM    1. Schizophrenia, unspecified type (H)  F20.9       2. Reactive attachment disorder  F94.1       3. Generalized anxiety disorder  F41.1           Plan:    Discharged.      RESULTS:   Results for orders placed or performed during the hospital encounter of 11/29/23 (from the past 24 hour(s))   HCG qualitative urine (UPT)     Status: Normal    Collection Time: 11/30/23 10:18 AM   Result Value Ref Range    hCG Urine Qualitative Negative Negative   Urine Drug Screen     Status: Abnormal     Collection Time: 11/30/23 10:18 AM    Narrative    The following orders were created for panel order Urine Drug Screen.  Procedure                               Abnormality         Status                     ---------                               -----------         ------                     Urine Drug Screen Panel[[id number]]      Abnormal            Final result                 Please view results for these tests on the individual orders.   Urine Drug Screen Panel     Status: Abnormal    Collection Time: 11/30/23 10:18 AM   Result Value Ref Range    Amphetamines Urine Screen Positive (A) Screen Negative    Barbituates Urine Screen Negative Screen Negative    Benzodiazepine Urine Screen Negative Screen Negative    Cannabinoids Urine Screen Negative Screen Negative    Cocaine Urine Screen Negative Screen Negative    Fentanyl Qual Urine Screen Negative Screen Negative    Opiates Urine Screen Negative Screen Negative    PCP Urine Screen Negative Screen Negative             MD Nuris Johnson, Sher Parsons MD  11/30/23 1860

## 2023-11-30 NOTE — CONSULTS
Diagnostic Evaluation Consultation  Crisis Assessment    Patient Name: Ej Dunaway  Age:  23 year old  Legal Sex: female  Gender Identity: female  Pronouns:   Race: White  Ethnicity: Not  or   Language: English      Patient was assessed: Virtual: iPad Crisis Assessment Start Time: 1137 Crisis Assessment Stop Time: 1107  Patient location: [facility] EMERGENCY DEPARTMENT                             ED16B    Referral Data and Chief Complaint  Ej Dunaway presents to the ED via EMS. Patient is presenting to the ED for the following concerns: Worsening psychosocial stress, Significant behavioral change, Anxiety (psychosis).   Factors that make the mental health crisis life threatening or complex are:  Patient presented to the ED by ambulance  with escalation of her emotional dysregulation, noncompliance with medications and having increased auditory, visual hallucinations, and increased agitation.  Patient was given medication and slept for several hours prior to this assessment.  This morning patient work up reporting that she is feeling much better and would like to discharge.  Patient denies suicidal ideation, SIB, HI, or hallucinations.  Patient does not appear to be responding to internal stimuli during interview  She is calm, cooperative and emotionally regulated.  Patient reports she last used methamphetmine 203 days ago.  Drug screen is positive for amphetamines..      Informed Consent and Assessment Methods  Explained the crisis assessment process, including applicable information disclosures and limits to confidentiality, assessed understanding of the process, and obtained consent to proceed with the assessment.  Assessment methods included conducting a formal interview with patient, review of medical records, collaboration with medical staff, and obtaining relevant collateral information from family and community providers when available.  :  done     Patient response to interventions: acceptance expressed  Coping skills were attempted to reduce the crisis:  Unknown     History of the Crisis   Patient has a hx of schizophrenia vs schizoaffective disorder, MDD, anxiety, RAD, PTSD and methamphetamine abuse.    Brief Psychosocial History  Family:  Single, Children no  Support System:     Employment Status:  unemployed  Source of Income:  unable to assess  Financial Environmental Concerns:  none  Current Hobbies:   (unable to assess)  Barriers in Personal Life:  mental health concerns    Significant Clinical History  Current Anxiety Symptoms:  anxious  Current Depression/Trauma:  difficulty concentrating  Current Somatic Symptoms:  anxious  Current Psychosis/Thought Disturbance:  inattentive, hyperactive, auditory hallucinations (report of responding to internal stimuli on initial presentation.  Patient denies them currently.)  Current Eating Symptoms:   (n/a)  Chemical Use History:  Alcohol: None  Benzodiazepines: None  Opiates: None  Cocaine: None  Marijuana: None  Other Use: Methamphetamines  Last Use::  (unknown, pt reported last use was 2-3 days ago, UDS is positive.)  Withdrawal Symptoms:  (n/a)  Addictions:  (n/a)   Past diagnosis:  ADHD, Anxiety Disorder, Depression, PTSD, Substance Use Disorder, Schizophrenia, Other (Reactive Attachment Disorder)  Family history:  Schizophrenia  Past treatment:  Case management, Psychiatric Medication Management, Inpatient Hospitalization, [facility] ([facility])  Details of most recent treatment:  Priti was hospitalized for psychosis at [facility] from 05/13-06/06/23. She has recently been working with [facility] to establish psychiatric care.  She had recent ED visits in October here.  Other relevant history:  Ej denied having any other outpatient services. Per chart review she previously shared that she participated in AJ treatment at [facility] this year.        Collateral Information  Is there collateral information: No         Risk Assessment  [place] Suicide Severity Rating Scale Full Clinical Version:             [place] Suicide Severity Rating Scale Recent:   Suicidal Ideation (Recent)  Q1 Wished to be Dead (Past Month): no  Q2 Suicidal Thoughts (Past Month): no          Environmental or Psychosocial Events: challenging interpersonal relationships, ongoing abuse of substances  Protective Factors: Protective Factors: help seeking, cultural, spiritual , or Faith beliefs associated with meaning and value in life, optimistic outlook - identification of future goals, lives in a responsibly safe and stable environment    Does the patient have thoughts of harming others? Feels Like Hurting Others: no  Previous Attempt to Hurt Others: no  Is the patient engaging in sexually inappropriate behavior?: no    Is the patient engaging in sexually inappropriate behavior?  no        Mental Status Exam   Affect: Constricted  Appearance: Appropriate  Attention Span/Concentration: Attentive  Eye Contact: Engaged    Fund of Knowledge: Delayed   Language /Speech Content: Fluent  Language /Speech Volume: Normal  Language /Speech Rate/Productions: Normal  Recent Memory: Variable  Remote Memory: Variable  Mood: Normal  Orientation to Person: Yes   Orientation to Place: Yes  Orientation to Time of Day: Yes  Orientation to Date: Yes     Situation (Do they understand why they are here?): Yes  Psychomotor Behavior: Normal  Thought Content: Clear  Thought Form: Intact, Goal Directed     Medication  Psychotropic medications:   Medication Orders - Psychiatric (From admission, onward)      Start     Dose/Rate Route Frequency Ordered Stop    11/30/23 1045  nicotine polacrilex (NICORETTE) gum 4 mg        Note to Pharmacy: DO NOT USE THIS FIELD FOR ADMIN INSTRUCTIONS; INFORMATION DOES NOT SHOW ON MAR. USE THE FIELD ABOVE MARKED ADMIN INSTRUCTIONS    4 mg Buccal ONCE 11/30/23 1041                Current Care Team  Patient Care Team:  Clinic - [facility] as PCP - Altagracia Colin APRN CNP as Nurse Practitioner (Nurse Practitioner)  Sheri Rutherford APRN CNP as Nurse Practitioner (Nurse Practitioner - Pediatrics)  Onofre Castillo APRN CNP as Nurse Practitioner (Nurse Practitioner)  Karla Herrera MD as Assigned PCP    Diagnosis  Patient Active Problem List   Diagnosis Code    Urinary incontinence, unspecified type R32    Frequent UTI N39.0    ADHD (attention deficit hyperactivity disorder), combined type F90.2    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    Obesity E66.9    Foster care child Z62.21    Chemical dependency (H) F19.20    Depo-Provera contraceptive status Z30.42    Generalized anxiety disorder F41.1    Recurrent major depression (H24) F33.9    BV (bacterial vaginosis) N76.0, B96.89    Mixed stress and urge urinary incontinence N39.46    Schizophrenia (H) F20.9    Posttraumatic stress disorder F43.10    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    Auditory hallucinations R44.0       Primary Problem This Admission  Active Hospital Problems    Amphetamine dependence (H)      Posttraumatic stress disorder      *Schizophrenia (H)      Generalized anxiety disorder      Reactive attachment disorder      ADHD (attention deficit hyperactivity disorder), combined type        Clinical Summary and Substantiation of Recommendations   Patient was given medication and time to clear from substances.  She has a hx of methamphetamine use and drug screen was positive.  Patient was dysregulated and experiencing hallucinations at intial presentation.  She is calm and cooperative currently.  She reports feeling better and requests to discharge.  She denies SI, SIB, HI and states she is no longer having hallucinations.  Patient is able to engage in safety planning.  Offered multiple resources which patient declined.  Patient reports she will be moving out of her apartment into sober housing today at  [place].      Patient coping skills attempted to reduce the crisis:  Unknown    Disposition  Recommended disposition: Medication Management, Substance Abuse Disorder Treatment        Reviewed case and recommendations with attending provider. Attending Name: Dr Lawler       Attending concurs with disposition: yes       Patient and/or validated legal guardian concurs with disposition:   yes       Final disposition:  discharge    Legal status on admission:      Assessment Details   Total duration spent with the patient: 30 min     CPT code(s) utilized: 36186 - Psychotherapy for Crisis - 60 (30-74*) min    CHRIS Tovar, Psychotherapist  DEC - Triage & Transition Services  Callback: [phone]

## 2023-11-30 NOTE — DISCHARGE INSTRUCTIONS
OK home with father.  You are planning to move today to sober living environment.  Follow up with MD for further help with mental health and medical concerns;  REturn if any concerns at all.      Aftercare Plan  It is recommended that you follow up with your established providers (psychiatrist, mental health therapist, and/or primary care doctor - as relevant) as soon as possible. Coordinators from [facility] will be calling you in the next 24-48 hours to ensure that you have the resources you need. You can also contact [facility] coordinators directly at [phone].       If I am feeling unsafe or I am in a crisis, I will:   Contact my established care providers  [facility]: [phone]   Call the National Suicide Prevention Lifeline: 988  Go to the nearest emergency room   Call 911     Warning signs that I or other people might notice when a crisis is developing for me:  Changes in sleep  Withdrawing from others  Increased substance use  Increased hallucinations    Things I am able to do on my own to cope or help me feel better:   Go on a walk  Get outside  Watch your favorite movie or tv show  Journal  Read a book  Draw  Art/crafts  Listen to music  Stretch    Things that I am able to do with others to cope or help me better:   Call a friend, family member or other support  Play a game  Grab a cup of coffee or tea  Go for a walk together    Changes I can make to support my mental health and wellness:   Engage with my outpatient providers  Consider engaging with a mental health therapist and psychiatrist  Take my medications as prescribed  Abstain from substances  Engage in 3 self care activities per day    People in my life that I can ask for help:   Friends   Family  Outpatient Providers    Your [facility] has a mental health crisis team you can call 24/7: [facility]  [phone]     Additional resources and information:     Substance Use Disorder Direct Access Resources  It is  recommended that you abstain from all mood altering chemicals. Please contact the sober support hotline ([phone]) as needed; phones are answered 24 hours a day, 7 days a week.    To access substance use treatment you must have a comprehensive assessment completed to begin any treatment program.     If uninsured, please contact your county of residence for eligibility screen to substance use disorder evaluation and treatment:    [place] - [phone]   [place] - [phone]   [place] - [phone]   [place] - [phone]   [place]  [phone]   [place] - [phone]   [facility] [phone]   [place] - [phone]     If you have private insurance, call the customer service number on the back of your insurance card to find an in-network substance abuse use disorder assessment. The ideal provider will be a treatment facility, licensed in the [facility].     Community AJ Evaluations: Clients may call their [facility] for a full list of providers - Availability and services listed belo are subject to change, please call the provider to confirm    [facility]  [phone]  [address] [place] [address], [address]  *Please call the above number to schedule a comprehensive assessment for determination of level of care needs. In person and virtual appointments available Mon-Fri.    [facility], [address] [place] [address], First Floor, [address], [place] [address] (next to the outpatient lab)    Phone: [phone]   Provides bridging services to people with Opiate Use Disorders (OUD) seeking care. This is a front door to Medication Assisted Treatments (MAT), ages 16+  Walk In hours: Monday-Friday 9:00am-3:00pm    [facility]  [phone]  Walk in Assessments: Mon-Friday 7a-1:45p  [address], [place], [address]    [facility]  Central Access [phone]  [address] [place] [address], [address]  *by appointment  only    [facility]  [phone] (phone consultation available )  Locations in: [place], [place], [place], [place], and [place], [place]  Citizen of [place] virtual IOP [phone] or visit [email or web address]   Also offers LGBTQ programming     [facility]  [phone]  [address]  [place], [place], [address]  *Currently only offered via telehealth - call to set up an appointment    [facility]  [address] [place] [address], [address]  [facility]  For more information to to make a referral call:  [phone]  Walk-in on   9-11 a.m.    [facility]  [phone]  [address] [place], [place], [address]  *available by appointments only    [facility]  [phone]  [address] [place], [place], [address]  *available by appointment only    [facility]  [phone]  [address] [place], [place], [address]  *walk in assessments available M-F starting at 7 am.    [facility]  [phone]  Locations: [facility], [facility], and [place]  *Walk in assessments availble M-F starting at 8 am -virtual only    [facility]  [phone]  [address] [place], [place] [address]    [facility]  Virtual + Locations: [place], [place], [place], [place], [facility], [facility], [place], [place]   [phone]  *available by appointment only    [facility]  [phone]  [address]  [place], [place], [address]    [facility]  [phone]  [address], [place], [address]  *available by appointment    [facility]  [phone]  [address] [place], [place], [address]  *walk ins available - from 9-3    [facility]  MAT program: [phone]  [address]  [place], [place], [address]    [place]  [phone]  Same day substance use disorder assessments are  available Monday - Friday, via walk-in or by appointment at the [place] location.  [address] [facility], [address], [place] [address]     [facility] - adolescent and adult SUDs services  [phone]  Offer services Monday through Friday, as well as evening hours Monday through Thursday. Normally, a first appointment will be scheduled within one week  [email or web address]  Locations all over [place]    Ways to help cope with sobriety:  -- Take prescribed medicines as scheduled  -- Keep follow-up appointments  -- Talk to others about your concerns  -- Get regular exercise  -- Practice deep breathing skills  -- Eat a healthy diet  -- Use community resources, including hotline numbers, [facility] crisis and support meetings  -- Stay sober and avoid places/people/things associated with substance use  --Maintain a daily schedule/routine  --Get at least 7-8 hours of sleep per night  --Create a list 10--20 healthy activities that you can do that are enjoyable and do not involve substance use  --Create daily goals (approx. 1-4 goals) per day and work to achieve them throughout the day.    Free Resources:  [place] Recovery [facility] ([facility])  [facility] connects people seeking recovery to resources that help foster and sustain long-term recovery. Whether you are seeking resources for treatment, transportation, housing, job training, education, health care or other pathways to recovery, [facility] is a great place to start.  Phone: 125.149.5146. www.[place]Global Filmdemic.org (Great listing of all types of recovery and non-recovery related resources)    Alcoholics Anonymous  Phone: 8-824-ALCOHOL  Website: HTTP://WWW.AA.ORG/  [facility] [place] (807-278-4940 or http://a[place].org)  [facility] [place] (840-483-2192 or www.aastpaul.org)     Narcotics Anonymous  Phone: 980.644.6473  Website: www.RFMicron.us.    [facility]  [address] [place] [address], [address], [place], [place],  Phone:  "323-674-2000  Drop-in Hours: Monday-Friday 9-11:30 am. By appointment at other times.  Provides: [facility] is a drop-in center on the [facility] side [facility] that provides a safe space for individuals who are homeless and have a history of chemical use. Sobriety is not a requirement but drugs and alcohol are not allowed on the property.  Services: Non-clients can access drop-in services such as Recovery and Harm Reduction Groups, referrals to case management, community activities, shower facilities, and a pool table. Individuals who are homeless and have chemical health needs may be eligible for enrollment into [facility] [employer]'s case management program. Clients and  work together to access benefits, treatment, health care, shelter, and external housing resources.      Crisis Lines  Crisis Text Line  Text 079961  You will be connected with a trained live crisis counselor to provide support.    Por marcy, texto  REGLA a 658208 o texto a 442-AYUDAME en WhatsApp    [facility] (LGBTQ Youth Crisis Line)  3.554.327.3029  text START to 948-668      Community Resources  Fast Tracker  Linking people to mental health and substance use disorder resources  fastMightyHiveckIntegromicsn.org     [place] Mental Health Warm Line  Peer to peer support  Monday thru Saturday, 12 pm to 10 pm  302.373.3052 or 8.857.200.7393  Text \"Support\" to 55018    National [place] on Mental Illness (YESSENIA)  683.607.2352 or 1.888.YESSENIA.HELPS      Mental Health Apps  My3  https://Pipit Interactivepp.org/    VirtualHopeBox  https://[employer].org/apps/virtual-hope-box/      Additional Information  Today you were seen by a licensed mental health professional through Triage and Transition services, [facility])  for a crisis assessment in the Emergency Department at [facility]. It is recommended that you follow up with your established providers (psychiatrist, mental health therapist, and/or primary care " doctor - as relevant) as soon as possible. Coordinators from [facility] will be calling you in the next 24-48 hours to ensure that you have the resources you need. You can also contact [facility] coordinators directly at [phone]. You may have been scheduled for or offered an appointment with a mental health provider. [facility] maintains an extensive network of licensed behavioral health providers to connect patients with the services they need. We do not charge providers a fee to participate in our referral network. We match patients with providers based on a patient's specific needs, insurance coverage, and location. Our first effort will be to refer you to a provider within your care system, and will utilize providers outside your care system as needed.

## 2023-12-01 NOTE — PROGRESS NOTES
4/1/2019 [facility]  Group Chart Note -  Number of clients attending the group:  4      Ej Bryan attended 1 hour Dual Process group covering the following topics Relapse Prevention and the negative long and short term effects of drugs.  Client was Attentive and Engaged.  Client's response:  Client actively participated in group session.    Female

## 2023-12-11 ENCOUNTER — HOSPITAL ENCOUNTER (OUTPATIENT)
Facility: CLINIC | Age: 23
Setting detail: OBSERVATION
Discharge: NURSING, REHAB OR LONG-TERM CARE | End: 2023-12-12
Attending: FAMILY MEDICINE | Admitting: FAMILY MEDICINE
Payer: MEDICAID

## 2023-12-11 DIAGNOSIS — F15.10 METHAMPHETAMINE ABUSE (H): ICD-10-CM

## 2023-12-11 DIAGNOSIS — F25.0 SCHIZOAFFECTIVE DISORDER, BIPOLAR TYPE (H): ICD-10-CM

## 2023-12-11 DIAGNOSIS — F20.2 CATATONIC SCHIZOPHRENIA (H): ICD-10-CM

## 2023-12-11 DIAGNOSIS — R44.0 AUDITORY HALLUCINATIONS: Primary | ICD-10-CM

## 2023-12-11 LAB
ALBUMIN SERPL BCG-MCNC: 3.9 G/DL (ref 3.5–5.2)
ALP SERPL-CCNC: 91 U/L (ref 40–150)
ALT SERPL W P-5'-P-CCNC: 12 U/L (ref 0–50)
ANION GAP SERPL CALCULATED.3IONS-SCNC: 11 MMOL/L (ref 7–15)
AST SERPL W P-5'-P-CCNC: 14 U/L (ref 0–45)
BASOPHILS # BLD AUTO: 0.1 10E3/UL (ref 0–0.2)
BASOPHILS NFR BLD AUTO: 1 %
BILIRUB SERPL-MCNC: 0.2 MG/DL
BUN SERPL-MCNC: 9.3 MG/DL (ref 6–20)
CALCIUM SERPL-MCNC: 9 MG/DL (ref 8.6–10)
CHLORIDE SERPL-SCNC: 101 MMOL/L (ref 98–107)
CREAT SERPL-MCNC: 0.61 MG/DL (ref 0.51–0.95)
DEPRECATED HCO3 PLAS-SCNC: 25 MMOL/L (ref 22–29)
EGFRCR SERPLBLD CKD-EPI 2021: >90 ML/MIN/1.73M2
EOSINOPHIL # BLD AUTO: 0.1 10E3/UL (ref 0–0.7)
EOSINOPHIL NFR BLD AUTO: 1 %
ERYTHROCYTE [DISTWIDTH] IN BLOOD BY AUTOMATED COUNT: 12 % (ref 10–15)
GLUCOSE SERPL-MCNC: 96 MG/DL (ref 70–99)
HCT VFR BLD AUTO: 43.6 % (ref 35–47)
HGB BLD-MCNC: 14.4 G/DL (ref 11.7–15.7)
IMM GRANULOCYTES # BLD: 0.1 10E3/UL
IMM GRANULOCYTES NFR BLD: 1 %
LYMPHOCYTES # BLD AUTO: 2.4 10E3/UL (ref 0.8–5.3)
LYMPHOCYTES NFR BLD AUTO: 25 %
MCH RBC QN AUTO: 29.3 PG (ref 26.5–33)
MCHC RBC AUTO-ENTMCNC: 33 G/DL (ref 31.5–36.5)
MCV RBC AUTO: 89 FL (ref 78–100)
MONOCYTES # BLD AUTO: 0.8 10E3/UL (ref 0–1.3)
MONOCYTES NFR BLD AUTO: 8 %
NEUTROPHILS # BLD AUTO: 6.3 10E3/UL (ref 1.6–8.3)
NEUTROPHILS NFR BLD AUTO: 64 %
NRBC # BLD AUTO: 0 10E3/UL
NRBC BLD AUTO-RTO: 0 /100
PLATELET # BLD AUTO: 519 10E3/UL (ref 150–450)
POTASSIUM SERPL-SCNC: 4 MMOL/L (ref 3.4–5.3)
PROT SERPL-MCNC: 7.2 G/DL (ref 6.4–8.3)
RBC # BLD AUTO: 4.91 10E6/UL (ref 3.8–5.2)
SODIUM SERPL-SCNC: 137 MMOL/L (ref 135–145)
WBC # BLD AUTO: 9.7 10E3/UL (ref 4–11)

## 2023-12-11 PROCEDURE — 99285 EMERGENCY DEPT VISIT HI MDM: CPT | Mod: 25

## 2023-12-11 PROCEDURE — 80053 COMPREHEN METABOLIC PANEL: CPT | Performed by: FAMILY MEDICINE

## 2023-12-11 PROCEDURE — 36415 COLL VENOUS BLD VENIPUNCTURE: CPT | Performed by: FAMILY MEDICINE

## 2023-12-11 PROCEDURE — 85025 COMPLETE CBC W/AUTO DIFF WBC: CPT | Performed by: FAMILY MEDICINE

## 2023-12-11 PROCEDURE — 250N000013 HC RX MED GY IP 250 OP 250 PS 637: Performed by: FAMILY MEDICINE

## 2023-12-11 PROCEDURE — G0378 HOSPITAL OBSERVATION PER HR: HCPCS

## 2023-12-11 PROCEDURE — 99222 1ST HOSP IP/OBS MODERATE 55: CPT | Performed by: FAMILY MEDICINE

## 2023-12-11 RX ORDER — HALOPERIDOL 5 MG/1
5 TABLET ORAL 2 TIMES DAILY
Status: DISCONTINUED | OUTPATIENT
Start: 2023-12-11 | End: 2023-12-12 | Stop reason: HOSPADM

## 2023-12-11 RX ORDER — HALOPERIDOL 5 MG/1
5 TABLET ORAL ONCE
Status: COMPLETED | OUTPATIENT
Start: 2023-12-11 | End: 2023-12-11

## 2023-12-11 RX ORDER — BENZTROPINE MESYLATE 1 MG/1
1 TABLET ORAL 2 TIMES DAILY
Status: DISCONTINUED | OUTPATIENT
Start: 2023-12-11 | End: 2023-12-12 | Stop reason: HOSPADM

## 2023-12-11 RX ORDER — HYDROXYZINE HYDROCHLORIDE 25 MG/1
25 TABLET, FILM COATED ORAL EVERY 6 HOURS PRN
Status: DISCONTINUED | OUTPATIENT
Start: 2023-12-11 | End: 2023-12-12 | Stop reason: HOSPADM

## 2023-12-11 RX ADMIN — BENZTROPINE MESYLATE 1 MG: 1 TABLET ORAL at 20:42

## 2023-12-11 RX ADMIN — HYDROXYZINE HYDROCHLORIDE 25 MG: 25 TABLET, FILM COATED ORAL at 17:57

## 2023-12-11 RX ADMIN — HALOPERIDOL 5 MG: 5 TABLET ORAL at 17:54

## 2023-12-11 RX ADMIN — HALOPERIDOL 5 MG: 5 TABLET ORAL at 20:42

## 2023-12-11 ASSESSMENT — ACTIVITIES OF DAILY LIVING (ADL)
ADLS_ACUITY_SCORE: 35

## 2023-12-11 NOTE — ED TRIAGE NOTES
Pt BIBA c/o anxiety and dark thoughts. As per EMS pt not HI/SI. Pt use meth daily but not today.

## 2023-12-11 NOTE — ED PROVIDER NOTES
"      [facility] EMERGENCY DEPARTMENT ([facility])    12/11/23      ED PROVIDER NOTE    History     Chief Complaint   Patient presents with    Hallucinations     Pt stated she is hearing dark voice before coming to the ED. Pt deny hearing voices right now.     Anxiety     HPI  Landyjosselyn Dunaway is a 23 year old female with past medical history notable for psychosis, anxiety, reactive attachment disorder, and substance abuse brought in by ambulance to the ED with complaints of anxiety and auditory hallucinations.  Patient states she was having auditory hallucinations which are saying bad things.  She denies that these are command hallucinations.  She states she had \"fleeting\" suicidal thoughts yesterday while intoxicated with methamphetamine.  She states she last used by inhaled maintenance yesterday.  Denies other drug use.  Denies medical complaints.  Was seen at [facility] on 12/8/2023 after taking extra doses of her Haldol and experiencing increased hallucinations and anxiety, patient was recommended but the patient declined and she was not felt to be holdable.  Per that note she may have been in chemical dependency treatment at that time but now states she is staying at her cousin's house and plans to go to teen [place].  She does report that there are other people who use drugs in the home she is staying at.  As an aside the patient states she feels she may have had lice that she feels that there are bugs crawling on her scalp.    Past Medical History  Past Medical History:   Diagnosis Date    Abnormal lead level in blood     10/15/2002    ADHD (attention deficit hyperactivity disorder), combined type     Adjustment disorder with mixed disturbance of emotions and conduct     Suicidal ideation 6/22/12, placed with new foster parents    Anxiety     Chlamydial infection     8/2015    Conduct disorder     5/2007    Constipation     Depressive disorder     Foster child     Adopted 2013    Personal " history of other medical treatment (CODE)     No Comments Provided    Recurrent UTI     Toxic effect of lead and its compounds, accidental (unintentional), initial encounter     2004    Urinary leakage     Urinary tract infection     No Comments Provided     Past Surgical History:   Procedure Laterality Date    NO HISTORY OF SURGERY       benztropine (COGENTIN) 0.5 MG tablet  haloperidol (HALDOL) 5 MG tablet      Allergies   Allergen Reactions    Seasonal Allergies      Family History  Family History   Problem Relation Age of Onset    Family History Negative Father         Good Health    Substance Abuse Father     Mental Illness Father     Substance Abuse Sister     Substance Abuse Brother     Other - See Comments Mother         Psychiatric illness,schizophrenia    Cervical Cancer Mother     Cancer Mother     Substance Abuse Mother     Mental Illness Mother     Cancer Maternal Grandmother         Cancer,brain    Breast Cancer Maternal Grandmother     Asthma No family hx of     Coronary Artery Disease No family hx of     Mental Illness No family hx of      Social History   Social History     Tobacco Use    Smoking status: Every Day     Packs/day: .5     Types: Cigarettes, Vaping Device    Smokeless tobacco: Never   Vaping Use    Vaping Use: Every day    Substances: Nicotine    Devices: Disposable, Pre-filled or refillable cartridge, Pre-filled pod   Substance Use Topics    Alcohol use: Not Currently     Alcohol/week: 0.0 standard drinks of alcohol    Drug use: Not Currently     Types: Marijuana, Methamphetamines, Other, Opiates     Comment: Fentanyl      Past medical history, past surgical history, medications, allergies, family history, and social history were reviewed with the patient. No additional pertinent items.      A medically appropriate review of systems was performed with pertinent positives and negatives noted in the HPI, and all other systems negative.    Physical Exam     BP: 111/64  Pulse: 65  Temp:  98.5  F (36.9  C)  Resp: 19  SpO2: 100 %    Physical Exam  Vitals and nursing note reviewed.   Constitutional:       General: She is not in acute distress.     Appearance: Normal appearance. She is not diaphoretic.   HENT:      Head: Atraumatic.      Comments: I Do not visualize any nits or other signs objectively of head lice     Mouth/Throat:      Mouth: Mucous membranes are moist.   Eyes:      General: No scleral icterus.     Conjunctiva/sclera: Conjunctivae normal.   Cardiovascular:      Rate and Rhythm: Normal rate.      Heart sounds: Normal heart sounds.   Pulmonary:      Effort: No respiratory distress.      Breath sounds: Normal breath sounds.   Abdominal:      General: Abdomen is flat.   Musculoskeletal:      Cervical back: Neck supple.   Skin:     General: Skin is warm.      Findings: No rash.   Neurological:      Mental Status: She is alert.   Psychiatric:         Attention and Perception: She perceives auditory hallucinations.         Mood and Affect: Mood is anxious. Affect is flat.         Speech: Speech is delayed (Patient with concrete answers, flat affect, answers are often delayed but appropriate).         Thought Content: Thought content is paranoid and delusional. Thought content does not include homicidal or suicidal ideation.         Cognition and Memory: Cognition normal.         Judgment: Judgment normal.           ED Course, Procedures, & Data        Procedures         Mental Health Risk Assessment        PSS-3      Date and Time Over the past 2 weeks have you felt down, depressed, or hopeless? Over the past 2 weeks have you had thoughts of killing yourself? Have you ever attempted to kill yourself? When did this last happen? User   12/11/23 1528 yes no yes more than 6 months ago JPA                Suicide assessment completed by mental health (D.E.C., LCSW, etc.)       Results for orders placed or performed during the hospital encounter of 12/11/23   Comprehensive metabolic panel      Status: Normal   Result Value Ref Range    Sodium 137 135 - 145 mmol/L    Potassium 4.0 3.4 - 5.3 mmol/L    Carbon Dioxide (CO2) 25 22 - 29 mmol/L    Anion Gap 11 7 - 15 mmol/L    Urea Nitrogen 9.3 6.0 - 20.0 mg/dL    Creatinine 0.61 0.51 - 0.95 mg/dL    GFR Estimate >90 >60 mL/min/1.73m2    Calcium 9.0 8.6 - 10.0 mg/dL    Chloride 101 98 - 107 mmol/L    Glucose 96 70 - 99 mg/dL    Alkaline Phosphatase 91 40 - 150 U/L    AST 14 0 - 45 U/L    ALT 12 0 - 50 U/L    Protein Total 7.2 6.4 - 8.3 g/dL    Albumin 3.9 3.5 - 5.2 g/dL    Bilirubin Total 0.2 <=1.2 mg/dL   CBC with platelets and differential     Status: Abnormal   Result Value Ref Range    WBC Count 9.7 4.0 - 11.0 10e3/uL    RBC Count 4.91 3.80 - 5.20 10e6/uL    Hemoglobin 14.4 11.7 - 15.7 g/dL    Hematocrit 43.6 35.0 - 47.0 %    MCV 89 78 - 100 fL    MCH 29.3 26.5 - 33.0 pg    MCHC 33.0 31.5 - 36.5 g/dL    RDW 12.0 10.0 - 15.0 %    Platelet Count 519 (H) 150 - 450 10e3/uL    % Neutrophils 64 %    % Lymphocytes 25 %    % Monocytes 8 %    % Eosinophils 1 %    % Basophils 1 %    % Immature Granulocytes 1 %    NRBCs per 100 WBC 0 <1 /100    Absolute Neutrophils 6.3 1.6 - 8.3 10e3/uL    Absolute Lymphocytes 2.4 0.8 - 5.3 10e3/uL    Absolute Monocytes 0.8 0.0 - 1.3 10e3/uL    Absolute Eosinophils 0.1 0.0 - 0.7 10e3/uL    Absolute Basophils 0.1 0.0 - 0.2 10e3/uL    Absolute Immature Granulocytes 0.1 <=0.4 10e3/uL    Absolute NRBCs 0.0 10e3/uL   CBC with platelets differential     Status: Abnormal    Narrative    The following orders were created for panel order CBC with platelets differential.  Procedure                               Abnormality         Status                     ---------                               -----------         ------                     CBC with platelets and d...[[id number]]  Abnormal            Final result                 Please view results for these tests on the individual orders.     Medications   benztropine (COGENTIN) tablet 1 mg  (has no administration in time range)   haloperidol (HALDOL) tablet 5 mg (has no administration in time range)     Labs Ordered and Resulted from Time of ED Arrival to Time of ED Departure   CBC WITH PLATELETS AND DIFFERENTIAL - Abnormal       Result Value    WBC Count 9.7      RBC Count 4.91      Hemoglobin 14.4      Hematocrit 43.6      MCV 89      MCH 29.3      MCHC 33.0      RDW 12.0      Platelet Count 519 (*)     % Neutrophils 64      % Lymphocytes 25      % Monocytes 8      % Eosinophils 1      % Basophils 1      % Immature Granulocytes 1      NRBCs per 100 WBC 0      Absolute Neutrophils 6.3      Absolute Lymphocytes 2.4      Absolute Monocytes 0.8      Absolute Eosinophils 0.1      Absolute Basophils 0.1      Absolute Immature Granulocytes 0.1      Absolute NRBCs 0.0     COMPREHENSIVE METABOLIC PANEL - Normal    Sodium 137      Potassium 4.0      Carbon Dioxide (CO2) 25      Anion Gap 11      Urea Nitrogen 9.3      Creatinine 0.61      GFR Estimate >90      Calcium 9.0      Chloride 101      Glucose 96      Alkaline Phosphatase 91      AST 14      ALT 12      Protein Total 7.2      Albumin 3.9      Bilirubin Total 0.2     HCG QUALITATIVE URINE     No orders to display      -----  Observation Addendum  With this Addendum, this ED Provider Note may also serve as an Observation H&P    Observation Initiation Date: Dec 11, 2023    Patient presenting with increased anxiety, increased auditory and visual hallucinations, methamphetamine intoxication.    A DEC assessment was completed, and the case was discussed with the . The  recommended a period of time to metabolize methamphetamine with ultimate disposition to a crisis residence.  Recommendation is for ED observation status, psych consult, likely disposition to crisis residence tomorrow. See separate DEC note from today's date for details on the assessment.    During the initial care period, the patient did not require medications for agitation,  and did not require restraints/seclusion for patient and/or provider safety.     The patient's outpatient medications were reconciled and ordered.     The patient was found to have a psychiatric condition that would benefit from an observation stay in the emergency department for further psychiatric stabilization and/or coordination of a safe disposition. The observation plan includes serial assessments of psychiatric condition, potential administration of medications if indicated, further disposition pending the patient's psychiatric course during the monitoring period.   -----      Critical care was not performed.     Medical Decision Making  The patient's presentation was of high complexity (a chronic illness severe exacerbation, progression, or side effect of treatment).    The patient's evaluation involved:  review of external note(s) from 1 sources (reviewed note from [facility]ea dated 12/8/2023)  ordering and/or review of 3+ test(s) in this encounter (see separate area of note for details)  discussion of management or test interpretation with another health professional (DEC )    The patient's management necessitated moderate risk (prescription drug management including medications given in the ED) and high risk (a decision regarding hospitalization).    Assessment & Plan      Patient with multiple previous psychiatric diagnosis including anxiety, methamphetamine abuse, reactive attachment disorder, and psychosis.  Patient recently relapsed using methamphetamine and is presenting with increasing hallucinations paranoia and fleeting suicidal thoughts.  And exam she is cooperative but her answers are delayed, thought processes concrete, admits to active hallucinations but denies any active suicidal thoughts and denies any thoughts of harm to others.  Reports her auditory hallucinations are more derogatory in nature.  She also reported that she believes she may have had lice although I did not find  any objective evidence on my exam.  Cleared medically for behavioral assessment.  The patient was also seen by the [facility] , please refer to their extensive note/evaluation which was reviewed with me and is documented in EPIC on 12/11/2023 for further details.  She makes clear denials of suicidal ideation to the  but continues to experience increased derogatory auditory hallucinations in the context of recent methamphetamine relapse.  She was at a sober living facility, relapsed and was discharged from there 3 days ago, subsequently became homeless and had to stay at a family member's house where people are using methamphetamine.  She does not feel safe there.  She would agree to a crisis residence, we are pursuing that, here to be openings, however, would not be able to go there until tomorrow.  We will prescribe her regular medications, place her in observation status, and pursue psychiatry consult to review her medications.  In the meantime she will have an opportunity to metabolize methamphetamine.  He is being signed out to Dr. Iraheta at shift change.      I have reviewed the nursing notes. I have reviewed the findings, diagnosis, plan and need for follow up with the patient.    New Prescriptions    No medications on file       Final diagnoses:   Schizoaffective disorder, bipolar type (H)   Methamphetamine abuse (H)     Link Tian MD  [facility] EMERGENCY DEPARTMENT  12/11/2023     Link Tian MD  12/11/23 8901

## 2023-12-11 NOTE — ED NOTES
Bed: DEVANG-OSMAR  Expected date: 12/11/23  Expected time: 3:00 PM  Means of arrival:   Comments:  [employer] [place] [employer] 24 yo F hallucinations

## 2023-12-11 NOTE — DISCHARGE INSTRUCTIONS
You may self refer for most Crisis Residence services. This is a short-term service, typically 3-10 days, for stabilization when experiencing a mental health crisis. They provide housing and treatment services that integrate mental health, medical, and substance use care.    [facility] [place]  Main phone: [phone]   Direct: [phone]   [address] [place] [address]     [facility]   [phone]   [address] [place] [address]     [facility]  Main phone: [phone]   Direct: [phone]   [address] [place] [address]     Karlee Pérez   Main phone: [phone]   Direct: [phone] [address], [place] [address]    [facility] [place] [facility] [place]  [phone]   [address] [place] [address]     Hiram Akbar  [phone] [address] [place] [address]    Wherever you attend for a crisis residence, if possible bring any medications you may have in their prescribed bottles.

## 2023-12-11 NOTE — ED TRIAGE NOTES
Triage Assessment (Adult)       Row Name 12/11/23 1528          Triage Assessment    Airway WDL WDL        Respiratory WDL    Respiratory WDL WDL        Skin Circulation/Temperature WDL    Skin Circulation/Temperature WDL WDL        Cardiac WDL    Cardiac WDL WDL        Peripheral/Neurovascular WDL    Peripheral Neurovascular WDL WDL        Cognitive/Neuro/Behavioral WDL    Cognitive/Neuro/Behavioral WDL WDL

## 2023-12-12 VITALS
SYSTOLIC BLOOD PRESSURE: 135 MMHG | TEMPERATURE: 97.8 F | RESPIRATION RATE: 18 BRPM | HEART RATE: 99 BPM | DIASTOLIC BLOOD PRESSURE: 68 MMHG | OXYGEN SATURATION: 97 %

## 2023-12-12 PROCEDURE — 250N000013 HC RX MED GY IP 250 OP 250 PS 637: Performed by: FAMILY MEDICINE

## 2023-12-12 PROCEDURE — G0378 HOSPITAL OBSERVATION PER HR: HCPCS

## 2023-12-12 PROCEDURE — 99255 IP/OBS CONSLTJ NEW/EST HI 80: CPT

## 2023-12-12 RX ORDER — HALOPERIDOL 5 MG/1
5 TABLET ORAL 2 TIMES DAILY
Qty: 60 TABLET | Refills: 0 | Status: SHIPPED | OUTPATIENT
Start: 2023-12-12 | End: 2023-12-25

## 2023-12-12 RX ORDER — HYDROXYZINE HYDROCHLORIDE 25 MG/1
25 TABLET, FILM COATED ORAL 3 TIMES DAILY PRN
COMMUNITY
End: 2023-12-12

## 2023-12-12 RX ORDER — HALOPERIDOL 5 MG/1
5 TABLET ORAL 2 TIMES DAILY
Qty: 60 TABLET | Refills: 0 | Status: SHIPPED | OUTPATIENT
Start: 2023-12-12 | End: 2023-12-12

## 2023-12-12 RX ORDER — HYDROXYZINE HYDROCHLORIDE 25 MG/1
25 TABLET, FILM COATED ORAL 3 TIMES DAILY PRN
Qty: 90 TABLET | Refills: 0 | Status: SHIPPED | OUTPATIENT
Start: 2023-12-12 | End: 2024-07-20

## 2023-12-12 RX ORDER — POLYETHYLENE GLYCOL 3350 17 G/17G
17 POWDER, FOR SOLUTION ORAL DAILY
COMMUNITY
Start: 2023-11-22 | End: 2023-12-12

## 2023-12-12 RX ORDER — HYDROXYZINE HYDROCHLORIDE 25 MG/1
25 TABLET, FILM COATED ORAL 3 TIMES DAILY PRN
Qty: 90 TABLET | Refills: 0 | Status: SHIPPED | OUTPATIENT
Start: 2023-12-12 | End: 2023-12-12

## 2023-12-12 RX ORDER — BENZTROPINE MESYLATE 0.5 MG/1
1 TABLET ORAL 2 TIMES DAILY
Qty: 120 TABLET | Refills: 0 | Status: SHIPPED | OUTPATIENT
Start: 2023-12-12 | End: 2023-12-25

## 2023-12-12 RX ORDER — BENZTROPINE MESYLATE 0.5 MG/1
1 TABLET ORAL 2 TIMES DAILY
Qty: 120 TABLET | Refills: 0 | Status: SHIPPED | OUTPATIENT
Start: 2023-12-12 | End: 2023-12-12

## 2023-12-12 RX ORDER — METRONIDAZOLE 500 MG/1
500 TABLET ORAL 2 TIMES DAILY
COMMUNITY
Start: 2023-11-24 | End: 2023-12-12

## 2023-12-12 RX ADMIN — BENZTROPINE MESYLATE 1 MG: 1 TABLET ORAL at 09:00

## 2023-12-12 RX ADMIN — HYDROXYZINE HYDROCHLORIDE 25 MG: 25 TABLET, FILM COATED ORAL at 13:31

## 2023-12-12 RX ADMIN — HALOPERIDOL 5 MG: 5 TABLET ORAL at 09:00

## 2023-12-12 ASSESSMENT — ACTIVITIES OF DAILY LIVING (ADL)
ADLS_ACUITY_SCORE: 35

## 2023-12-12 ASSESSMENT — COLUMBIA-SUICIDE SEVERITY RATING SCALE - C-SSRS
TOTAL  NUMBER OF INTERRUPTED ATTEMPTS SINCE LAST CONTACT: NO
1. SINCE LAST CONTACT, HAVE YOU WISHED YOU WERE DEAD OR WISHED YOU COULD GO TO SLEEP AND NOT WAKE UP?: NO
SUICIDE, SINCE LAST CONTACT: NO
ATTEMPT SINCE LAST CONTACT: NO
6. HAVE YOU EVER DONE ANYTHING, STARTED TO DO ANYTHING, OR PREPARED TO DO ANYTHING TO END YOUR LIFE?: NO
2. HAVE YOU ACTUALLY HAD ANY THOUGHTS OF KILLING YOURSELF?: NO
TOTAL  NUMBER OF ABORTED OR SELF INTERRUPTED ATTEMPTS SINCE LAST CONTACT: NO

## 2023-12-12 NOTE — PROGRESS NOTES
"Triage & Transition Services, Extended Care     Therapy Progress Note    Patient: Ej goes by \"Ej,\" uses she/her pronouns  Date of Service: December 12, 2023  Site of Service: [facility] EMERGENCY DEPARTMENT                             [id number]  Patient was seen yes  Mode of Assessment: In person    Presentation Summary: Pt reported that she is doing okay.  She denied current suicidal thoughts, plan and intent.  She stated that she continues to have auditory hallucinations.  However, pt refused to provide details.  She stated that she is taking her medication as prescribed.  Pt stated that she plans to go to [facility] treatment at [facility].  We discussed the recommendation for a crisis bed.  Pt was open to this.  We reviewed her safety plan that was in place from a previous visit.    Therapeutic Intervention(s) Provided: Engaged in safety planning    Current Symptoms:       auditory hallucinations      Mental Status Exam   Affect: Flat  Appearance: Disheveled (Pt was woken up to attend meeting)  Attention Span/Concentration: Attentive  Eye Contact: Engaged    Fund of Knowledge: Appropriate   Language /Speech Content: Fluent  Language /Speech Volume: Normal  Language /Speech Rate/Productions: Normal  Recent Memory: Intact  Remote Memory: Variable  Mood: Normal  Orientation to Person: Yes   Orientation to Place: Yes  Orientation to Time of Day: Yes  Orientation to Date: Yes     Situation (Do they understand why they are here?): Yes  Psychomotor Behavior: Normal  Thought Content: Hallucinations  Thought Form: Goal Directed    Treatment Objective(s) Addressed: identifying an appropriate aftercare plan, assessing safety, identifying additional supports    Patient Response to Interventions: acceptance expressed    Progress Towards Goals: Patient Reports Symptoms Are: improving  Patient Progress Toward Goals: is making progress  Comment: Pt denied suicidal thoughts.  She stated that she is " stable to discharge from the hospital to a lower level of care (crisis bed).  Next Step to Work Toward Discharge: follow up on referrals  Symptom Stabilization Comment: Referral to [facility]    [place] Suicide Severity Rating Scale Since Last Contact: 12/12/23  Suicidal Ideation (Since Last Contact)  1. Wish to be Dead (Since Last Contact): No  2. Non-Specific Active Suicidal Thoughts (Since Last Contact): No  Suicidal Behavior (Since Last Contact)  Actual Attempt (Since Last Contact): No  Has subject engaged in non-suicidal self-injurious behavior? (Since Last Contact): No  Interrupted Attempts (Since Last Contact): No  Aborted or Self-Interrupted Attempt (Since Last Contact): No  Preparatory Acts or Behavior (Since Last Contact): No  Suicide (Since Last Contact): No     C-SSRS Risk (Since Last Contact)  Calculated C-SSRS Risk Score (Since Last Contact): No Risk Indicated       Case Management: Case Management Included: completing or following up on referrals  Details on completing or following up on referrals: Referral made to [facility].  Referral information provided to [facility].  PARKER's signed for [facility], and [facility]    Plan: discharge  yes provider         Clinical Substantiation: Pt was admitted on observation status due to concerns about hallucinations.  She had been using meth.  She did not meet criteria for admission and a crisis bed was recommended, but none were available.  Upon reassessment today,  Pt reported willingness to discharge to [facility].  She was accepted at [facility].  She is also planning to go to [facility] and made contact with the program today.        Session Status: Time session started: 0931  Time session ended: 0949  Session Duration (minutes): 18 minutes  Session Number: 1  Anticipated number of sessions or this episode of care: 1  Mode: In person    Time Spent: 18 minutes    CPT Code: CPT Codes:  44515 - Psychotherapy (with patient) - 30 (16-37*) min    Diagnosis:   Patient Active Problem List   Diagnosis Code    Urinary incontinence, unspecified type R32    Frequent UTI N39.0    ADHD (attention deficit hyperactivity disorder), combined type F90.2    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    Obesity E66.9    Foster care child Z62.21    Chemical dependency (H) F19.20    Depo-Provera contraceptive status Z30.42    Generalized anxiety disorder F41.1    Recurrent major depression (H24) F33.9    BV (bacterial vaginosis) N76.0, B96.89    Mixed stress and urge urinary incontinence N39.46    Schizophrenia (H) F20.9    Posttraumatic stress disorder F43.10    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

## 2023-12-12 NOTE — ED NOTES
Pt appeared to be sleeping throughout 15mins checks without any signs of distress. Breathing was regular and unlabored. No behavioral issues noted. Safety checks done as required. Will continue to evaluate.

## 2023-12-12 NOTE — CONSULTS
Ej Dunaway MRN# [id number]   Age: 23 year old YOB: 2000   Date of Admission to ED: 12/11/2023    In person visit Details:     Patient was assessed and interviewed face-to-face in person with this writer jluis. Patient was observed to be able to participate in the assessment as evidenced by verbal consent. Assessment methods included conducting a formal interview with patient, review of medical records, collaboration with medical staff, and obtaining relevant collateral information from family and community providers when available.        Reason for Consult:   This note is being entered to supplement the psychiatry consultation note that was completed on December 11, 2023 by the licensed mental health professional Viky Rdz, [facility]  have reviewed the pertinent clinical details related to their encounter. I am being consulted to offer additional guidance on psychiatric pharmacological interventions  Patient refused to engage with this writer, saying that she is fine she would like to get be discharged with 30-day of supply of her medications.  Patient was able to engage safety plan with [facility] Aminah Lima , will give 30-day supply of her medication and discharge patient back to crisis bed.        Patient grossly appears to be cognitively intact. Insight and judgement have improved since coming to ER. Patient is aware of consequences of medication non-compliance .  Patient has not exhibited aggressive or violence behaviors while staying in ER. . However, risk is mitigated by ability to volunteer a safety plan and history of seeking help when needed. Patient does not have immediate access to firearms. Therefore, based on all available evidence including the factors cited above, she  does not appear to be at imminent risk for self-harm, does not meet criteria for a 72-hr hold, and therefore remains appropriate for ongoing outpatient level of care. Patient agreed to further reduce risk of  self-harm by adhering to the safety plan and agreed to remain medication adherent. Additional steps taken to minimize risk include: medication optimization, close psychiatric follow up and provision of crisis resources. Patient expressed understanding of risk associated with medication non-adherence including increased risk of harm to self or others.         Protective Factors:  good treatment engagement, sense of importance of health and wellness, able to access care without barriers, supportive ongoing medical and mental health care relationships, help seeking, good problem-solving, coping, and conflict resolution skills, sense of belonging, sense of self-efficacy and/or positive self-esteem, cultural, spiritual , or Mormonism beliefs associated with meaning and value in life, optimistic outlook - identification of future goals, constructive use of leisure time, enjoyable activities, resilience and reality testing ability    There is genetic loading for known.  Medical history does not appear to be significant.  Substance use does  appear to be playing a contributing role in the patient's presentation patient is positive interrogatory urine toxicology for methamphetamine.  Patient appears to cope with stress/frustration/emotion by withdrawing.  Stressors include chronic mental health issues, school issues, peer issues, and family dynamics.  Patient's support system includes family.    I have reviewed the nursing notes. I have reviewed the findings, diagnosis, plan and need for follow up with the patient.         HPI:     Ej Dunaway is a 23 year old female with past medical history notable for psychosis, anxiety, reactive attachment disorder, and substance abuse brought in by ambulance to the ED with complaints of anxiety and auditory hallucinations.  Patient states she was having auditory hallucinations which are saying bad things.  She denies that these are command hallucinations.  She states she  "had \"fleeting\" suicidal thoughts yesterday while intoxicated with methamphetamine.  She states she last used by inhaled maintenance yesterday.  Denies other drug use.  Denies medical complaints.  Was seen at [facility] on 12/8/2023 after taking extra doses of her Haldol and experiencing increased hallucinations and anxiety, patient was recommended but the patient declined and she was not felt to be holdable.  Per that note she may have been in chemical dependency treatment at that time but now states she is staying at her cousin's house and plans to go to [facility].  She does report that there are other people who use drugs in the home she is staying at.  As an aside the patient states she feels she may have had lice that she feels that there are bugs crawling on her scalp.        Pt has  required locked seclusion or restraints in the past 24 hours to maintain safety, please refer to RN documentation for further details.  Substance use does not appear to be playing a contributing role in the patient's presentation.  Brief Therapeutic Intervention(s):   Patient was not able to engage in any conversation with this writer        Past Psychiatric History:     See DEC  note        Substance Use and History:     Severe stimulant use disorder        Past Medical History:   PAST MEDICAL HISTORY:   Past Medical History:   Diagnosis Date    Abnormal lead level in blood     10/15/2002    ADHD (attention deficit hyperactivity disorder), combined type     Adjustment disorder with mixed disturbance of emotions and conduct     Suicidal ideation 6/22/12, placed with new foster parents    Anxiety     Chlamydial infection     8/2015    Conduct disorder     5/2007    Constipation     Depressive disorder     Foster child     Adopted 2013    Personal history of other medical treatment (CODE)     No Comments Provided    Recurrent UTI     Toxic effect of lead and its compounds, accidental (unintentional), initial encounter     " 2004    Urinary leakage     Urinary tract infection     No Comments Provided       PAST SURGICAL HISTORY:   Past Surgical History:   Procedure Laterality Date    NO HISTORY OF SURGERY                 Allergies:     Allergies   Allergen Reactions    Seasonal Allergies              Medications:   I have reviewed this patient's current medications  Current Facility-Administered Medications   Medication    benztropine (COGENTIN) tablet 1 mg    haloperidol (HALDOL) tablet 5 mg    hydrOXYzine HCl (ATARAX) tablet 25 mg     Current Outpatient Medications   Medication Sig    benztropine (COGENTIN) 0.5 MG tablet Take 2 tablets (1 mg) by mouth 2 times daily    haloperidol (HALDOL) 5 MG tablet Take 1 tablet (5 mg) by mouth 2 times daily    hydrOXYzine HCl (ATARAX) 25 MG tablet Take 1 tablet (25 mg) by mouth 3 times daily as needed for itching or anxiety              Family History:   FAMILY HISTORY:   Family History   Problem Relation Age of Onset    Family History Negative Father         Good Health    Substance Abuse Father     Mental Illness Father     Substance Abuse Sister     Substance Abuse Brother     Other - See Comments Mother         Psychiatric illness,schizophrenia    Cervical Cancer Mother     Cancer Mother     Substance Abuse Mother     Mental Illness Mother     Cancer Maternal Grandmother         Cancer,brain    Breast Cancer Maternal Grandmother     Asthma No family hx of     Coronary Artery Disease No family hx of     Mental Illness No family hx of               Social History:        - Collateral information from the famly/friend: none         PTA Medications:   (Not in a hospital admission)         Allergies:     Allergies   Allergen Reactions    Seasonal Allergies           Labs:     Recent Results (from the past 48 hour(s))   Comprehensive metabolic panel    Collection Time: 12/11/23  4:06 PM   Result Value Ref Range    Sodium 137 135 - 145 mmol/L    Potassium 4.0 3.4 - 5.3 mmol/L    Carbon Dioxide (CO2)  25 22 - 29 mmol/L    Anion Gap 11 7 - 15 mmol/L    Urea Nitrogen 9.3 6.0 - 20.0 mg/dL    Creatinine 0.61 0.51 - 0.95 mg/dL    GFR Estimate >90 >60 mL/min/1.73m2    Calcium 9.0 8.6 - 10.0 mg/dL    Chloride 101 98 - 107 mmol/L    Glucose 96 70 - 99 mg/dL    Alkaline Phosphatase 91 40 - 150 U/L    AST 14 0 - 45 U/L    ALT 12 0 - 50 U/L    Protein Total 7.2 6.4 - 8.3 g/dL    Albumin 3.9 3.5 - 5.2 g/dL    Bilirubin Total 0.2 <=1.2 mg/dL   CBC with platelets and differential    Collection Time: 12/11/23  4:06 PM   Result Value Ref Range    WBC Count 9.7 4.0 - 11.0 10e3/uL    RBC Count 4.91 3.80 - 5.20 10e6/uL    Hemoglobin 14.4 11.7 - 15.7 g/dL    Hematocrit 43.6 35.0 - 47.0 %    MCV 89 78 - 100 fL    MCH 29.3 26.5 - 33.0 pg    MCHC 33.0 31.5 - 36.5 g/dL    RDW 12.0 10.0 - 15.0 %    Platelet Count 519 (H) 150 - 450 10e3/uL    % Neutrophils 64 %    % Lymphocytes 25 %    % Monocytes 8 %    % Eosinophils 1 %    % Basophils 1 %    % Immature Granulocytes 1 %    NRBCs per 100 WBC 0 <1 /100    Absolute Neutrophils 6.3 1.6 - 8.3 10e3/uL    Absolute Lymphocytes 2.4 0.8 - 5.3 10e3/uL    Absolute Monocytes 0.8 0.0 - 1.3 10e3/uL    Absolute Eosinophils 0.1 0.0 - 0.7 10e3/uL    Absolute Basophils 0.1 0.0 - 0.2 10e3/uL    Absolute Immature Granulocytes 0.1 <=0.4 10e3/uL    Absolute NRBCs 0.0 10e3/uL          Physical and Psychiatric Examination:     /76   Pulse 79   Temp 98.5  F (36.9  C) (Oral)   Resp 18   SpO2 97%   Weight is 0 lbs 0 oz  There is no height or weight on file to calculate BMI.    Mental Status Exam:    General was not participating in any exam       Diagnoses:      Schizoaffective disorder, bipolar type (H)  Methamphetamine abuse (H)  Catatonic schizophrenia (H)  Auditory hallucinations         Recommendations:     1.  Discharge to crisis bed or homeless shelter, patient is back to her baseline  2.  Order 30-day supply of Haldol and Cogentin, hydroxyzine  4.  Consult psychiatry as needed  4.   Refer to  psychiatric provider for medication management.   treatment per ED team    - Consulted with Romi Lima [facility], pharmacist outpatient  patient's ED RN regarding this case.    Please call [facility]/DEC at [phone] if you have follow-up questions or wish to place another consult.  Chavez Turcios, Psychiatric Nurse practitioner    Attestation:  Time with:  Patient: 10 minutes  Treatment Team: 30 Minutes  Chart Review: 40 minutes    Total time spent was 80 minutes. Over 50% of times was spent counseling and coordination of care.      I, Chavez Turcios, CNP, APRN, Psychiatric Nurse Practitioner have personally performed an examination of this patient.  I have edited the note to reflect all relevant changes.  I have discussed this patient with the care team December 12, 2023.  I have reviewed all vitals and laboratory findings.    Disclaimer: This note consists of symbols derived from keyboarding,

## 2023-12-12 NOTE — ED NOTES
Pt c/o anxiety and hallucinations. Did not want Hydroxyzine 25 mg prn . Spoke w/ Chavez , Provider. No new orders received. Pt agreed to hydroxyzine. This was given .

## 2023-12-12 NOTE — MEDICATION SCRIBE - ADMISSION MEDICATION HISTORY
Medication Scribe Admission Medication History    Admission medication history is complete. The information provided in this note is only as accurate as the sources available at the time of the update.    Information Source(s): Patient and CareEverywhere/SureScripts via in-person    Pertinent Information: None.    Changes made to PTA medication list:  Added: metronidazole 500 mg BID, PEG 17 gm's daily, hydroxyzine 25 mg TID PRN.  Deleted: None  Changed: None    Medication Affordability:  Not including over the counter (OTC) medications, was there a time in the past 3 months when you did not take your medications as prescribed because of cost?: No    Allergies reviewed with patient and updates made in EHR: yes    Medication History Completed By: Alec Finch MD 12/12/2023 9:08 AM    PTA Med List   Medication Sig Last Dose    benztropine (COGENTIN) 0.5 MG tablet Take 2 tablets (1 mg) by mouth 2 times daily 12/11/2023 at am    haloperidol (HALDOL) 5 MG tablet Take 1 tablet (5 mg) by mouth 2 times daily 12/11/2023 at am    hydrOXYzine HCl (ATARAX) 25 MG tablet Take 25 mg by mouth 3 times daily as needed for itching or anxiety 12/11/2023 at am    metroNIDAZOLE (FLAGYL) 500 MG tablet Take 500 mg by mouth 2 times daily 12/11/2023 at am    polyethylene glycol (MIRALAX) 17 GM/Dose powder Take 17 g by mouth daily 12/11/2023 at am

## 2023-12-12 NOTE — PLAN OF CARE
Ej Dunaway  December 11, 2023  Plan of Care Hand-off Note     Patient Care Path: observation    Plan for Care:   Patient arrived to the ED via EMS with concerns of auditory hallucinations - non command type, and paranoia. Patient called the crisis phone number, and the crisis providers called EMS. Patient reports relapsing on methamphetamines over the past several days - last use was yesterday. She said she does not have any providers, but plans to attend [employer] this week. She said she is coordinating the admission herself. Patient would benefit from a crisis bed. However, a crisis bed is not available this evening. Writer is recommending observation tonight, and crisis bed tomorrow (if patient's symptoms remain at the current level), co-occurring treatment, and medication management. Patient denied SI with writer (she told the provider she has fleeting passive SI on occasions), denied NSSIB, denied HI and delusions. The recommendations are made based on patient's history of schizoaffective disorder - bipolar type, methamphetamine abuse, and psychosis unspecified. Her symptoms do not meet inpatient admission criteria. She will benefit from observation, crisis bed in the morning (if her symptoms stay at the current level), co-occurring treatment, and medication management.    Identified Goals and Safety Issues: Observations of symptoms.    Legal Status:  voluntary    Psychiatry Consult: yes       Updated   regarding plan of care.    She will benefit from observation, crisis bed in the morning (if her symptoms stay at the current level), co-occurring treatment, and medication management.    Viky Rdz, [facility]

## 2023-12-12 NOTE — PROGRESS NOTES
Triage & Transition Services, Extended Care     Client Name: Ej Dunaway    Date: December 12, 2023    Patient was seen yes  Mode of Assessment: In person    Service Type: refused to attend group session  Session Start Time:  1030    Session End Time: 1100  Session Length: 30  Site Location: [facility] EMERGENCY DEPARTMENT                             [id number]  Total Number ofAttendees: 2  Topic:   self-care activities   Response:       CHRIS Blue   Licensed Mental Health Professional (LMHP), Extended Care  [phone]

## 2023-12-12 NOTE — PROGRESS NOTES
The patient is pleasant and cooperative with the care and staff. He is medication-compliant. PT contracted for safety. Denied pain and HI/SI but endorsed auditory hallucinations. Spent most of the day sleeping. PT is discharged to a crisis home. Medication and AVS were handed to the patient. Staff escorted the patient to the taxi.

## 2023-12-12 NOTE — ED NOTES
"Pt arrived unit on wheelchair from ED at 8pm. Alert and oriented x4. Pleasant on approach and cooperative with admission. Vital signs WNL. Pt oriented to unit.   Endorsed Auditory hallucination, and did not elaborate on it. She just stated \"They are very mean to me, but God got my back\" Pt denied SI,HI, SIB. Rated anxiety 7/10 and depression zero. Denied pain. Med compliant, and contracted for safety. Will continue to evaluate.   "

## 2023-12-12 NOTE — CONSULTS
Diagnostic Evaluation Consultation  Crisis Assessment    Patient Name: Ej Dunaway  Age:  23 year old  Legal Sex: female  Gender Identity: female  Pronouns: unknown  Race: White  Ethnicity: Not  or   Language: English      Patient was assessed: In person      Patient location: [facility] EMERGENCY DEPARTMENT                             [id number]    Referral Data and Chief Complaint  Ej Dunaway presents to the ED via EMS. Patient is presenting to the ED for the following concerns: Paranoia, Worsening psychosocial stress, Substance use, Other (see comment) (Patient reports auditory hallucinations that started a few days ago. Patient has a history of substance use disorder and schizoaffective disorder - bipolar type, psychosis unspecified.).   Factors that make the mental health crisis life threatening or complex are:  Patient presents to the ED via EMS for concerns of auditory hallucinations. She presents with flat affect, did not respond to some questions, and needed questions repeated. She was calm, and did not sit up in bed. Patient has a history of schizoaffective disorder - bipolar type, psychosis unspecified, substance use disorder, PTSD, MDD, and anxiety. She reports the auditory hallucinations began since she started using methamphetamines again - relapsed a few days ago. Patient states it could be from her schizoaffective disorder. She is not taking medications, has no providers, is homeless, and is planning on attending [facility] this week for inpatient CD treatment. At the time of assessment, patient denied SI to writer (however, she told the provider she has fleeting passive SI occasionally), denied NSSIB, denied HI, and endorses non-command auditory hallucinations, denied depression symptoms, endorses anxiety symptoms. Patient called the crisis number, and the crisis provider called EMS.      Informed Consent and Assessment Methods  Explained the  crisis assessment process, including applicable information disclosures and limits to confidentiality, assessed understanding of the process, and obtained consent to proceed with the assessment.  Assessment methods included conducting a formal interview with patient, review of medical records, collaboration with medical staff, and obtaining relevant collateral information from family and community providers when available.  : done     Patient response to interventions: acceptance expressed  Coping skills were attempted to reduce the crisis:  Patient called the crisis phone number and exhibited help seeking skills.     History of the Crisis   Patient has a history of schizoaffective disorder - bipolar type, psychosis unspecified, MDD, anxiety, RAD, PTSD and AJ, 5 inpatient admissions - most recent on 5/13/23.    Brief Psychosocial History  Family:  Single, Children no  Support System:  None  Employment Status:  unemployed  Source of Income:  unable to assess  Financial Environmental Concerns:  other (see comments) (patient is homeless)  Current Hobbies:  other (see comments) (unable to assess)  Barriers in Personal Life:  mental health concerns    Significant Clinical History  Current Anxiety Symptoms:  anxious, excessive worry  Current Depression/Trauma:   (Patient denied depression symptoms with writer, but told the provider she has fleeting passive SI.)  Current Somatic Symptoms:  anxious, excessive worry  Current Psychosis/Thought Disturbance:  auditory hallucinations  Current Eating Symptoms:   (unable to assess)  Chemical Use History:  Alcohol: None  Benzodiazepines: None  Opiates: None  Cocaine: None  Marijuana: None  Other Use: Methamphetamines  Last Use:: 12/10/23  Withdrawal Symptoms:  (unable to assess)  Addictions:  (unable to assess)   Past diagnosis:  ADHD, Anxiety Disorder, Depression, PTSD, Substance Use Disorder, Schizophrenia, Other (unspecified psychosis)  Family history:  Schizophrenia  Past  treatment:  Case management, Psychiatric Medication Management, Inpatient Hospitalization, Other  Details of most recent treatment:  Priti was hospitalized for psychosis at [facility] from 05/13-06/06/23. Patient stated she has no providers, and is planning to attend [facility] for CD treatment this week. She is said she is coordinating the admission herself.    Collateral Information  Is there collateral information: No (Patient said she is estranged from her family, and would not give writer other contact information.)     Risk Assessment  [place] Suicide Severity Rating Scale Full Clinical Version:  Suicidal Ideation  Q1 Wish to be Dead (Lifetime): Yes  Q2 Non-Specific Active Suicidal Thoughts (Lifetime): Yes  3. Active Suicidal Ideation with any Methods (Not Plan) Without Intent to Act (Lifetime): Yes  Q4 Active Suicidal Ideation with Some Intent to Act, Without Specific Plan (Lifetime): Yes  Q5 Active Suicidal Ideation with Specific Plan and Intent (Lifetime): Yes  Q6 Suicide Behavior (Lifetime): yes     Suicidal Behavior (Lifetime)  Actual Attempt (Lifetime): Yes  Total Number of Actual Attempts (Lifetime):  (unknown, unable to assess)  Has subject engaged in non-suicidal self-injurious behavior? (Lifetime):  (unknown unable to assess)  Interrupted Attempts (Lifetime):  (unable to assess)  Aborted or Self-Interrupted Attempt (Lifetime):  (unable to assess)  Preparatory Acts or Behavior (Lifetime):  (unable to assess)    Anderson Suicide Severity Rating Scale Recent:   Suicidal Ideation (Recent)  Q1 Wished to be Dead (Past Month): no  Q2 Suicidal Thoughts (Past Month): no       Environmental or Psychosocial Events: ongoing abuse of substances, challenging interpersonal relationships  Protective Factors: Protective Factors: help seeking    Does the patient have thoughts of harming others? Feels Like Hurting Others: no  Previous Attempt to Hurt Others: no  Current presentation:  (Patient presents flat,  calm.)  Is the patient engaging in sexually inappropriate behavior?:  (unable to assess)    Is the patient engaging in sexually inappropriate behavior?   (unable to assess)        Mental Status Exam   Affect: Flat  Appearance: Disheveled  Attention Span/Concentration: Inattentive  Eye Contact: Avoidant    Fund of Knowledge: Delayed   Language /Speech Content: Fluent  Language /Speech Volume: Normal  Language /Speech Rate/Productions: Normal  Recent Memory: Intact  Remote Memory: Variable  Mood: Anxious  Orientation to Person: Yes   Orientation to Place: Yes  Orientation to Time of Day: Yes  Orientation to Date: Yes     Situation (Do they understand why they are here?): Yes  Psychomotor Behavior: Underactive  Thought Content: Hallucinations  Thought Form: Paranoia     Medication  Psychotropic medications:   Medication Orders - Psychiatric (From admission, onward)      Start     Dose/Rate Route Frequency Ordered Stop    12/11/23 2000  haloperidol (HALDOL) tablet 5 mg         5 mg Oral 2 TIMES DAILY 12/11/23 1741 12/11/23 1747  hydrOXYzine HCl (ATARAX) tablet 25 mg         25 mg Oral EVERY 6 HOURS PRN 12/11/23 1747               Current Care Team  Patient Care Team:  Clinic - [facility] as PCP - Altagracia Colin APRN CNP as Nurse Practitioner (Nurse Practitioner)  Sheri Rutherford APRN CNP as Nurse Practitioner (Nurse Practitioner - Pediatrics)  Onofre Castillo APRN CNP as Nurse Practitioner (Nurse Practitioner)  Karla Herrera MD as Assigned PCP    Diagnosis  Patient Active Problem List   Diagnosis Code    Urinary incontinence, unspecified type R32    Frequent UTI N39.0    ADHD (attention deficit hyperactivity disorder), combined type F90.2    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    Obesity E66.9    Foster care child Z62.21    Chemical dependency (H) F19.20    Depo-Provera contraceptive status Z30.42    Generalized anxiety disorder F41.1    Recurrent major depression (H24) F33.9    BV  (bacterial vaginosis) N76.0, B96.89    Mixed stress and urge urinary incontinence N39.46    Schizophrenia (H) F20.9    Posttraumatic stress disorder F43.10    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 Patient called the crisis phone number, and the crisis providers called EMS. Patient reports relapsing on methamphetamines over the past several days - last use was yesterday. She said she does not have any providers, but plans to attend [facility] this week. She said she is coordinating the admission herself. Patient would benefit from a crisis bed. However, a crisis bed is not available this evening. Writer is recommending observation tonight, and crisis bed tomorrow (if patient's symptoms remain at the current level), co-occurring treatment, and medication management. Patient denied SI with writer (she told the provider she has fleeting passive SI on occasions), denied NSSIB, denied HI and delusions. The recommendations are made based on patient's history of schizoaffective disorder - bipolar type, methamphetamine abuse, and psychosis unspecified. Her symptoms do not meet inpatient admission criteria. She will benefit from observation, crisis bed in the morning (if her symptoms stay at the current level), co-occurring treatment, and medication management.    Patient coping skills attempted to reduce the crisis:  Patient called the crisis phone number and exhibited help seeking skills.    Disposition  Recommended disposition: Substance Abuse Disorder Treatment, Observation, Medication Management         Reviewed case and recommendations with attending provider. Attending Name: Dr. Link Tian       Attending concurs with disposition: yes       Patient and/or validated legal guardian concurs with disposition:   yes       Final disposition:  observation    Legal status on admission:  voluntary    Assessment Details   Total duration spent with the patient: 30 min     CPT code(s) utilized: 92674 - Psychotherapy for Crisis - 60 (30-74*) min    Viky Rdz [facility], Psychotherapist  [facility] - Triage & Transition Services  Callback: [phone]

## 2023-12-25 ENCOUNTER — HOSPITAL ENCOUNTER (EMERGENCY)
Facility: CLINIC | Age: 23
Discharge: HOME | End: 2023-12-25
Attending: EMERGENCY MEDICINE | Admitting: EMERGENCY MEDICINE
Payer: MEDICAID

## 2023-12-25 VITALS
OXYGEN SATURATION: 99 % | RESPIRATION RATE: 16 BRPM | HEART RATE: 96 BPM | DIASTOLIC BLOOD PRESSURE: 82 MMHG | SYSTOLIC BLOOD PRESSURE: 131 MMHG | WEIGHT: 210 LBS | TEMPERATURE: 98 F | HEIGHT: 66 IN | BODY MASS INDEX: 33.75 KG/M2

## 2023-12-25 DIAGNOSIS — F25.0 SCHIZOAFFECTIVE DISORDER, BIPOLAR TYPE (H): ICD-10-CM

## 2023-12-25 DIAGNOSIS — F20.2 CATATONIC SCHIZOPHRENIA (H): ICD-10-CM

## 2023-12-25 PROBLEM — F29 PSYCHOSIS (H): Status: ACTIVE | Noted: 2023-12-25

## 2023-12-25 PROCEDURE — 99284 EMERGENCY DEPT VISIT MOD MDM: CPT | Performed by: EMERGENCY MEDICINE

## 2023-12-25 PROCEDURE — 99232 SBSQ HOSP IP/OBS MODERATE 35: CPT | Performed by: PSYCHIATRY & NEUROLOGY

## 2023-12-25 PROCEDURE — 250N000013 HC RX MED GY IP 250 OP 250 PS 637: Performed by: EMERGENCY MEDICINE

## 2023-12-25 RX ORDER — QUETIAPINE FUMARATE 25 MG/1
25 TABLET, FILM COATED ORAL AT BEDTIME
Status: DISCONTINUED | OUTPATIENT
Start: 2023-12-25 | End: 2023-12-25

## 2023-12-25 RX ORDER — BENZTROPINE MESYLATE 0.5 MG/1
1 TABLET ORAL 2 TIMES DAILY
Qty: 120 TABLET | Refills: 0 | Status: SHIPPED | OUTPATIENT
Start: 2023-12-25 | End: 2023-12-26

## 2023-12-25 RX ORDER — GABAPENTIN 100 MG/1
100 CAPSULE ORAL 3 TIMES DAILY
Status: DISCONTINUED | OUTPATIENT
Start: 2023-12-25 | End: 2023-12-25 | Stop reason: HOSPADM

## 2023-12-25 RX ORDER — HALOPERIDOL 5 MG/1
5 TABLET ORAL 2 TIMES DAILY
Qty: 60 TABLET | Refills: 0 | Status: SHIPPED | OUTPATIENT
Start: 2023-12-25 | End: 2023-12-29

## 2023-12-25 RX ORDER — HALOPERIDOL 5 MG/1
5 TABLET ORAL ONCE
Status: COMPLETED | OUTPATIENT
Start: 2023-12-25 | End: 2023-12-25

## 2023-12-25 RX ORDER — HYDROXYZINE HYDROCHLORIDE 50 MG/1
50 TABLET, FILM COATED ORAL EVERY 6 HOURS PRN
Status: DISCONTINUED | OUTPATIENT
Start: 2023-12-25 | End: 2023-12-25 | Stop reason: HOSPADM

## 2023-12-25 RX ADMIN — NICOTINE POLACRILEX 4 MG: 4 GUM, CHEWING BUCCAL at 15:04

## 2023-12-25 RX ADMIN — HALOPERIDOL 5 MG: 5 TABLET ORAL at 13:34

## 2023-12-25 RX ADMIN — NICOTINE POLACRILEX 4 MG: 4 GUM, CHEWING BUCCAL at 11:52

## 2023-12-25 ASSESSMENT — ACTIVITIES OF DAILY LIVING (ADL)
ADLS_ACUITY_SCORE: 35

## 2023-12-25 NOTE — ED NOTES
Bed: [facility]  Expected date: 12/25/23  Expected time: 10:21 AM  Means of arrival:   Comments:  [facility]  Hallucinations

## 2023-12-25 NOTE — ED TRIAGE NOTES
Patient claims she needs a dose of haldol to help with her hallucinations.      Triage Assessment (Adult)       Row Name 12/25/23 1042          Triage Assessment    Airway WDL WDL        Skin Circulation/Temperature WDL    Skin Circulation/Temperature WDL WDL        Cardiac WDL    Cardiac WDL WDL        Peripheral/Neurovascular WDL    Peripheral Neurovascular WDL WDL

## 2023-12-25 NOTE — CONSULTS
"Diagnostic Evaluation Consultation  Crisis Assessment    Patient Name: Ej Dunaway  Age:  23 year old  Legal Sex: female  Gender Identity: female  Race: White  Ethnicity: Not  or   Language: English      Patient was assessed: In person      Patient location: [facility] EMERGENCY DEPARTMENT                             [facility]    Referral Data and Chief Complaint  Ej Dunaway presents to the ED via EMS. Patient is presenting to the ED for the following concerns:  (auditory hallucinations.).   Factors that make the mental health crisis life threatening or complex are:  Patient has history of polysubstance abuse and mental health concerns.      Informed Consent and Assessment Methods  Explained the crisis assessment process, including applicable information disclosures and limits to confidentiality, assessed understanding of the process, and obtained consent to proceed with the assessment.  Assessment methods included conducting a formal interview with patient, review of medical records, collaboration with medical staff, and obtaining relevant collateral information from family and community providers when available.  : done     Patient response to interventions: verbalizes understanding  Coping skills were attempted to reduce the crisis:  Patient has [facility] providers who she has been utilizing for support.     History of the Crisis   Patient reports presenting to our emergency department from [facility]. Patient has been there for a week due to methamphetamine abuse, last smoked methamphetamine a week ago. Patient reports she likes her treatment program. Patient reports she told [facility] she did not want to be on psychiatric medications anymore because \"because I was doing okay for a while\". Patient reports her hallucinations \"happened to come back\" shortly thereafter. Patient reports it has been difficult to focus on therapy " "groups or sleep well as a result. Patient reports she has \"just been hearing some voices\". Patient appears to be attending to internal stimuli as evidenced by delayed responses and inappropriate smiling. Patient reports hearing \"demons tell me they are going to take me away from [facility]\". Patient reports \"everyone is scared of [facility]\" but \"I love [facility], I'm not afraid\". Patient denies hearing anything commanding in nature. Patient denies any visual hallucinations. Patient denies any current SIB, SI, or HI. Patient reports seeking Haldol prescription, \"is not willing to try other medication than Haldol\" because \"[facility] told me to com ehere and get some Haldol\". Patient states \"make it quick, I'm looking to go have lunch\". Patient reports \"the lunch sucks here\" and \"I'm not staying here long at all\". Attending informed of patient's request for Haldol and discharge back to [facility] \"to eat some good dinner\".    Brief Psychosocial History  Family:  Single, Children no  Support System:  Parent(s)  Employment Status:  unemployed  Source of Income:  none  Financial Environmental Concerns:  none  Current Hobbies:     Barriers in Personal Life:  mental health concerns    Significant Clinical History  Current Anxiety Symptoms:  anxious  Current Depression/Trauma:  difficulty concentrating  Current Somatic Symptoms:     Current Psychosis/Thought Disturbance:  auditory hallucinations, distractability  Current Eating Symptoms:     Chemical Use History:  Alcohol: None  Benzodiazepines: None  Opiates: None  Cocaine: None  Marijuana: None  Other Use: Methamphetamines (denies use in past 1 week)   Past diagnosis:  Schizophrenia  Family history:  No known history of mental health or chemical health concerns  Past treatment:  Inpatient Hospitalization, Psychiatric Medication Management, Individual therapy, Supportive Living Environment (group home, intermediate house, etc), Residential Treatment, Case management  Details of most recent treatment:  " "Patient has history of inpatient mental health admissions, most recently 5/13/2023 - 6/6/2023 (24 days) at [facility]. Patient has been at [facility] [place] for the past week for methamphetamine abuse. Patient reports medication management through [facility]. Patient reportedly discontinued her psychiatric medication upon arrival due to feeling she no longer needed it.  Other relevant history:          Collateral Information  Is there collateral information: Yes     Collateral information name, relationship, phone number:  Flako Bryan [phone]    What happened today: Patient's father thought she was in treatment, visited her yesterday. Patient seemed \"alright\". Patient's father denies noticing any changes in her mood or behavior. Patient's father denied any concern for suicide or homicide. Patient reports patient \"mentioned something\" regarding symptoms of psychosis, but cannot recall exactly what she said. Patient's father declined to speak with her in the emergency department        Risk Assessment  [place] Suicide Severity Rating Scale Full Clinical Version:  Suicidal Ideation  Q1 Wish to be Dead (Lifetime): Yes  Q2 Non-Specific Active Suicidal Thoughts (Lifetime): Yes  3. Active Suicidal Ideation with any Methods (Not Plan) Without Intent to Act (Lifetime): Yes  Q4 Active Suicidal Ideation with Some Intent to Act, Without Specific Plan (Lifetime): Yes  Q5 Active Suicidal Ideation with Specific Plan and Intent (Lifetime): Yes  Q6 Suicide Behavior (Lifetime): yes     Suicidal Behavior (Lifetime)  Actual Attempt (Lifetime): Yes  Has subject engaged in non-suicidal self-injurious behavior? (Lifetime): Yes  Interrupted Attempts (Lifetime): Yes  Aborted or Self-Interrupted Attempt (Lifetime): Yes  Preparatory Acts or Behavior (Lifetime): Yes    [place] Suicide Severity Rating Scale Recent:   Suicidal Ideation (Recent)  Q1 Wished to be Dead (Past Month): " no  Q2 Suicidal Thoughts (Past Month): no     Suicidal Behavior (Recent)  Actual Attempt (Past 3 Months): No  Has subject engaged in non-suicidal self-injurious behavior? (Past 3 Months): No  Interrupted Attempts (Past 3 Months): No  Aborted or Self-Interrupted Attempt (Past 3 Months): No  Preparatory Acts or Behavior (Past 3 Months): No    Environmental or Psychosocial Events: other use of prescription medication  Protective Factors: Protective Factors: help seeking, supportive ongoing medical and mental health care relationships, lives in a responsibly safe and stable environment    Does the patient have thoughts of harming others? Feels Like Hurting Others: no  Previous Attempt to Hurt Others: no  Is the patient engaging in sexually inappropriate behavior?: no    Is the patient engaging in sexually inappropriate behavior?  no        Mental Status Exam   Affect: Labile  Appearance: Appropriate  Attention Span/Concentration: Attentive  Eye Contact: Variable    Fund of Knowledge: Appropriate   Language /Speech Content: Fluent  Language /Speech Volume: Normal  Language /Speech Rate/Productions: Normal (some pauses/delay before speaking)  Recent Memory: Variable  Remote Memory: Variable  Mood: Irritable, Anxious  Orientation to Person: Yes   Orientation to Place: Yes  Orientation to Time of Day: Yes  Orientation to Date: Yes     Situation (Do they understand why they are here?): Yes  Psychomotor Behavior: Normal  Thought Content: Hallucinations  Thought Form: Goal Directed       Medication  Psychotropic medications:   Medication Orders - Psychiatric (From admission, onward)      None             Current Care Team  Patient Care Team:  Clinic - [facility] as PCP - General  Altagracia Tavarez APRN CNP as Nurse Practitioner (Nurse Practitioner)  Sheri Rutherford APRN CNP as Nurse Practitioner (Nurse Practitioner - Pediatrics)  Onofre Castillo APRN CNP as Nurse Practitioner (Nurse Practitioner)  Javier  MD Karla as Assigned PCP    Diagnosis  Patient Active Problem List   Diagnosis Code    Urinary incontinence, unspecified type R32    Frequent UTI N39.0    ADHD (attention deficit hyperactivity disorder), combined type F90.2    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    Obesity E66.9    Foster care child Z62.21    Chemical dependency (H) F19.20    Depo-Provera contraceptive status Z30.42    Generalized anxiety disorder F41.1    Recurrent major depression (H24) F33.9    BV (bacterial vaginosis) N76.0, B96.89    Mixed stress and urge urinary incontinence N39.46    Schizophrenia (H) F20.9    Posttraumatic stress disorder F43.10    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 Patient was cooperative with the assessment process. Patient is observed responding to internal stimuli as evidenced by pauses before speaking, smiling inappropriately and self-report. Patient reports hearing [facility] talking to her, including telling her to come to the emergency department to get back on her psychiatric medication. Patient reports discontinuing her Haldol sometime last week due to feeling her symptoms were stable. Patient reported belief she no longer needed it, but has since experienced a recurrence of her auditory hallucinations. Patient denies hearing anything commanding. Patient denies thoughts of suicide, homicide, or self-injury. Patient denies any substance use at this time. Patient is seeking Haldol prescription and to follow up with substance use treatment program psychiatrist. Patient declines need for a voluntary admission to address her auditory hallucinations and medication needs. Patient requests discharge back to her  "treatment facility once medication prescription is complete.    UPDATE 3PM:  received a call from attending physician who reports patient is not being accepted back at [facility].  spoke with [facility] treatment clinical director Shanika Montelongo at [phone]: Patient has been in emergency departments 3 times in the week she has been in their program. Patient has been head banging against the walls and burning things. Patient has been talking about the devil and telling peers they are going to hell. Patient has had \"ongoing suicidal ideation\" but can be redirected. Patient has made \"veiled threats to peers\", \"I know she expressed homicidal threats\" such as telling peers they are going to hell. Patient's clinical director cannot provide additional examples or details of such threats. Patient's clinical director believes patient \"needs to be stabilized in a locked facility\", but cannot return to their program even if she is discharged from the hospital.    Patient was updated of termination of services from [facility]. Patient reports concern for her belongings.  was told patient's belongings would be held at the program for 30 days. Patient called her father who will pick them up tomorrow. Patient reports she does not want to stay in the hospital with \"no cigarettes, no TV, and nothing to do\". Patient reports she took her Haldol and would like a prescription prior to discharge. Patient had psychiatry consult with Dr. Gibson to confirm dose/schedule of medication. Patient will have medications prescribed by emergency department attending physician. Patient reports seeking crisis or homeless shelter, and ultimately hopes to secure IRTS placement. Patient was accepted to [facility] and  spoke with staff Gonzalez who reports they will assist patient getting back into a different substance use treatment program.       Patient coping skills attempted to reduce the crisis:  Patient " has [facility] treatment [facility] providers who she has been utilizing for support.    Disposition  Recommended disposition: Substance Abuse Disorder Treatment        Reviewed case and recommendations with attending provider. Attending Name: Dr. Leann Cortez       Attending concurs with disposition: yes       Patient and/or validated legal guardian concurs with disposition:   yes       Final disposition:  discharge    Legal status on admission: Voluntary/Patient has signed consent for treatment    Assessment Details   Total duration spent with the patient: 30 min     CPT code(s) utilized: 40882    CHRIS Berry, Psychotherapist  [facility] - Triage & Transition Services  Callback: [phone]

## 2023-12-25 NOTE — ED PROVIDER NOTES
"ED Provider Note  [facility]      History     Chief Complaint   Patient presents with    Hallucinations     Here from [facility], endorsing auditory and visual hallucination.      LUIS F Pagan EMMA [place] is a 23 year old female PMH of chemical dependency, schizophrenia, amphetamine dependence, meth abuse, bipolar disorder who presents to the ER for evaluation of hallucinations.     23-year-old female she has a history of schizoaffective disorder.she also has a history of substance abuse. she reports currently being in substance use treatment program called [facility]. she comes into the hospital today because she thinks she reports hearing voices and thinks that she needs to restart taking medicine that take the medication Haldol.                                                 She reports that this is some that has been prescribed for her in the past but she is not exactly sure when she last took it and may have been a few weeks ago.  Per review of her chart she was seen at [facility] on 12/23 just  2 days ago and had a psychiatry consult and some medication adjustment. it appears that at that visit she had was started on gabapentin 100 mg 3 times daily with the possibility of increasing up to 300 MG times daily in the long-term also had an increase in hydroxyzine 50 mg 3 times daily sound like there was some discussion about starting her on Seroquel Haldol or Zyprexa but she was resistant to that at that visit.  She states that today she does not think that she has a psychiatrist and that she says \"BHAVANI is my psychiatrist\".     she reports hearing voices that are speaking to her she is not exactly sure if she knows the people who is who the voices belong to.  She states that they are telling her that they are going to take Bhavani away from her.  She denies visual hallucinations, no SI or HI at this time she is interested in restarting Haldol because she thinks has been " helpful for her in the past.  She is hopeful to continuing her parent in her therapy program or start in her CD program.  She has no reports of traumatic injuries, cough, cold or URI symptoms, she is not certain when her last menstrual period, she may have had a miscarriage in the past we will plan to check a urine.    Past Medical History  Past Medical History:   Diagnosis Date    Abnormal lead level in blood     10/15/2002    ADHD (attention deficit hyperactivity disorder), combined type     Adjustment disorder with mixed disturbance of emotions and conduct     Suicidal ideation 6/22/12, placed with new foster parents    Anxiety     Chlamydial infection     8/2015    Conduct disorder     5/2007    Constipation     Depressive disorder     Foster child     Adopted 2013    Personal history of other medical treatment (CODE)     No Comments Provided    Recurrent UTI     Toxic effect of lead and its compounds, accidental (unintentional), initial encounter     2004    Urinary leakage     Urinary tract infection     No Comments Provided     Past Surgical History:   Procedure Laterality Date    NO HISTORY OF SURGERY       benztropine (COGENTIN) 0.5 MG tablet  haloperidol (HALDOL) 5 MG tablet  hydrOXYzine HCl (ATARAX) 25 MG tablet      Allergies   Allergen Reactions    Seasonal Allergies      Family History  Family History   Problem Relation Age of Onset    Family History Negative Father         Good Health    Substance Abuse Father     Mental Illness Father     Substance Abuse Sister     Substance Abuse Brother     Other - See Comments Mother         Psychiatric illness,schizophrenia    Cervical Cancer Mother     Cancer Mother     Substance Abuse Mother     Mental Illness Mother     Cancer Maternal Grandmother         Cancer,brain    Breast Cancer Maternal Grandmother     Asthma No family hx of     Coronary Artery Disease No family hx of     Mental Illness No family hx of      Social History   Social History     Tobacco  "Use    Smoking status: Every Day     Packs/day: .5     Types: Cigarettes, Vaping Device    Smokeless tobacco: Never   Vaping Use    Vaping Use: Every day    Substances: Nicotine    Devices: Disposable, Pre-filled or refillable cartridge, Pre-filled pod   Substance Use Topics    Alcohol use: Not Currently     Alcohol/week: 0.0 standard drinks of alcohol    Drug use: Not Currently     Types: Marijuana, Methamphetamines, Other, Opiates     Comment: Fentanyl         A medically appropriate review of systems was performed with pertinent positives and negatives noted in the HPI, and all other systems negative.    Physical Exam   BP: 131/82  Pulse: 96  Temp: 98  F (36.7  C)  Resp: 16  Height: 167.6 cm (5' 6\")  Weight: 95.3 kg (210 lb)  SpO2: 99 %  Physical Exam  Gen: Cooperative, slightly hyperactive but with occasional pauses in speech  HEENT:PERRL, head atraumatic, mucous membranes moist  CV:RRR without murmurs  PULM:Clear to auscultation bilaterally  Abd:soft, nontender, nondistended. Bowel sounds present and normal  UE:No traumatic injuries, skin normal  LE:no traumatic injuries, skin normal  Neuro: gait and coordination grossly normal  Skin: no rashes or ecchymoses      ED Course, Procedures, & Data      Procedures       No results found for any visits on 12/25/23.  Medications   gabapentin (NEURONTIN) capsule 100 mg (100 mg Oral Not Given 12/25/23 1627)   hydrOXYzine HCl (ATARAX) tablet 50 mg (has no administration in time range)   nicotine polacrilex (NICORETTE) gum 4 mg (4 mg Buccal $Given 12/25/23 1152)   haloperidol (HALDOL) tablet 5 mg (5 mg Oral $Given 12/25/23 1334)   nicotine polacrilex (NICORETTE) gum 4 mg (4 mg Buccal $Given 12/25/23 1504)     Labs Ordered and Resulted from Time of ED Arrival to Time of ED Departure - No data to display  No orders to display          Critical care was not performed.     Medical Decision Making  The patient's presentation was of high complexity (a chronic illness severe " exacerbation, progression, or side effect of treatment).    The patient's evaluation involved:  an assessment requiring an independent historian (discussed with [facility] staff)  ordering and/or review of 2 test(s) in this encounter (HCG and UDS ordered, pt declined to provide sample)  discussion of management or test interpretation with another health professional (DEC  and coordinator, psych consultant)    The patient's management necessitated moderate risk (prescription drug management including medications given in the ED).    Assessment & Plan     23-year-old female with history of schizoaffective disorder who presents with reports of auditory hallucinations, asking for medication adjustment.  She arrives afebrile hemodynamically stable, no SI or HI. she is cooperative and calm  throughout my evaluation though does report auditory hallucinations and some delusions.  Will plan for DEC assessment, psychiatric consult regarding possible medication adjustment, urine drug screen and pregnancy test.  At this time I do not think the patient is holdable as she does not appear to be a danger to herself and the psychotic features do appear to be more subacute.    We learned that pt is no longer welcome back at [facility] due to her leaving the program on multiple occasions to come to the ED.     Seen by psych consult. They agree that pt may restart haldol 5mg BID. She declines to restart cogentin with it. She also does not want to continue Quetiapine. She would like to continue gabapentin and hydroxyzine.     Pt's father is to  her medications from [facility] and we are arranging cab transport to [facility]. [facility] also has referral for IRRTs program in the works.     Signed out to Dr. Matamoros at 3:30pm awaiting Dad's arrival with medications.     I have reviewed the nursing notes. I have reviewed the findings, diagnosis, plan and need for follow up with the patient.    Discharge Medication List as of 12/25/2023  3:57 PM           Final diagnoses:   Schizoaffective disorder, bipolar type (H)     This part of the medical record was transcribed by Jaqui Hills, Medical Scribe, from a dictation done by Leann Cortez MD.     Leann oCrtez MD  [facility] ED EMERGENCY DEPARTMENT  12/25/2023     Leann Cortez MD  12/25/23 9124

## 2023-12-25 NOTE — CONSULTS
Psychiatry Consultation; Follow up         Labs and imaging reviewed.     Total time spent in chart review, patient interview and coordination of care; 25mns    ID:  is a 23-year-old female who carries a diagnosis of schizoaffective disorder and methamphetamine use.  She was being treated at a chemical dependency program when she had a relapse of her auditory hallucinations.  I am asked to evaluate her hallucinations by CHRIS Berry.         Interim history:    The patient has a long history of schizoaffective disorder though at times she has been diagnosed with schizophrenia and her current symptoms do seem consistent with chronic schizophrenia with methamphetamine abuse, however her official diagnosis has been schizoaffective disorder.  She is treated with Haldol usually 5 mg twice daily recently low-dose gabapentin and hydroxyzine.  After CD treatment program she thought things were going quite well so she stopped her psychiatric medications but unfortunately noted a return of disturbing auditory hallucinations.  These hallucinations are commenting on her activities but so for not telling her to hurt herself or anyone else.    In reviewing the chart it appears that this has happened before and the patient generally does quite well when her regular medications were restarted.  The patient denies a history of extrapyramidal side effects so I will not be restarting the benztropine.  The chart suggests that she may have been on Seroquel at bedtime but the patient is quite convinced that she has been on Haldol 5 twice daily and is requesting that medication back    Unfortunately [facility] was staying at his refusing to take her back.  Geni Winchester is working on a safe discharge plan.  It is unclear to me why her treatment program will not take her back.  (I did have an opportunity to discuss this case with Geni Winchester and also Dr. Cortez the emergency room physician  "Geni Winchester has an initial behavioral consultation in the chart which I have reviewed and will consider this a follow-up.)        Current Medications:      gabapentin  100 mg Oral TID    QUEtiapine  25 mg Oral At Bedtime    Chart suggests recent Haldol 5 bid           MSE:     On my interview the patient was laying on a gurney in the emergency department and appeared to occasionally respond to internal stimulation. Patient was pleasant and cooperative, mood neutral, affect restricted, speech was coherent and goal oriented with long latencies when she seemed to be dealing with auditory hallucinations. Associations tight. Thought process was logical and linear. Content of thought with auditory hallucinations but without suicidal ideation. Patient alert and oriented x 3.  Recent and remote memory, concentration, fund of knowledge, and use of language were baseline i Insight and judgement intact.     Vital signs:  Temp: 98  F (36.7  C) Temp src: Oral BP: 131/82 Pulse: 96   Resp: 16 SpO2: 99 % O2 Device: None (Room air)   Height: 167.6 cm (5' 6\") Weight: 95.3 kg (210 lb)  Estimated body mass index is 33.89 kg/m  as calculated from the following:    Height as of this encounter: 1.676 m (5' 6\").    Weight as of this encounter: 95.3 kg (210 lb).                Assessment:   Schizoaffective disorder, consider chronic schizophrenia with intermittent methamphetamine use          Plan:   Restart Haldol 5 mg twice a day, gabapentin 100 mg 3 times daily, and hydroxyzine 50 mg twice a day.  ROMAN Berry, SW will work on appropriate discharge plan.          Terrance Gibson MD  \"Much or all of the text in this note was generated through the use of Dragon Dictate voice to text software. Errors in spelling or words which appear to be out of contact are unintentional, may be present due having escaped editing\"          "

## 2023-12-26 ENCOUNTER — HOSPITAL ENCOUNTER (EMERGENCY)
Facility: CLINIC | Age: 23
Discharge: PSYCHIATRIC OR SUBSTANCE USE FACILITY | End: 2023-12-28
Attending: FAMILY MEDICINE | Admitting: FAMILY MEDICINE
Payer: MEDICAID

## 2023-12-26 ENCOUNTER — TELEPHONE (OUTPATIENT)
Dept: BEHAVIORAL HEALTH | Facility: CLINIC | Age: 23
End: 2023-12-26
Payer: MEDICAID

## 2023-12-26 DIAGNOSIS — F25.0 SCHIZOAFFECTIVE DISORDER, BIPOLAR TYPE (H): ICD-10-CM

## 2023-12-26 PROCEDURE — 250N000013 HC RX MED GY IP 250 OP 250 PS 637: Performed by: FAMILY MEDICINE

## 2023-12-26 PROCEDURE — 99284 EMERGENCY DEPT VISIT MOD MDM: CPT

## 2023-12-26 PROCEDURE — 250N000013 HC RX MED GY IP 250 OP 250 PS 637: Performed by: PSYCHIATRY & NEUROLOGY

## 2023-12-26 PROCEDURE — 99285 EMERGENCY DEPT VISIT HI MDM: CPT | Performed by: FAMILY MEDICINE

## 2023-12-26 RX ORDER — HYDROXYZINE HYDROCHLORIDE 25 MG/1
25 TABLET, FILM COATED ORAL 3 TIMES DAILY PRN
Status: DISCONTINUED | OUTPATIENT
Start: 2023-12-26 | End: 2023-12-26

## 2023-12-26 RX ORDER — GABAPENTIN 100 MG/1
100 CAPSULE ORAL 3 TIMES DAILY
COMMUNITY
Start: 2023-12-23 | End: 2024-07-20

## 2023-12-26 RX ORDER — HALOPERIDOL 5 MG/1
5 TABLET ORAL 2 TIMES DAILY
Status: DISCONTINUED | OUTPATIENT
Start: 2023-12-26 | End: 2023-12-27

## 2023-12-26 RX ORDER — HYDROXYZINE HYDROCHLORIDE 25 MG/1
25 TABLET, FILM COATED ORAL ONCE
Status: COMPLETED | OUTPATIENT
Start: 2023-12-26 | End: 2023-12-26

## 2023-12-26 RX ORDER — HALOPERIDOL 5 MG/1
5 TABLET ORAL ONCE
Status: COMPLETED | OUTPATIENT
Start: 2023-12-26 | End: 2023-12-26

## 2023-12-26 RX ORDER — OLANZAPINE 5 MG/1
5 TABLET, ORALLY DISINTEGRATING ORAL 3 TIMES DAILY PRN
Status: DISCONTINUED | OUTPATIENT
Start: 2023-12-26 | End: 2023-12-28 | Stop reason: HOSPADM

## 2023-12-26 RX ORDER — HYDROXYZINE HYDROCHLORIDE 25 MG/1
50 TABLET, FILM COATED ORAL 3 TIMES DAILY PRN
Status: DISCONTINUED | OUTPATIENT
Start: 2023-12-26 | End: 2023-12-28 | Stop reason: HOSPADM

## 2023-12-26 RX ORDER — TRAZODONE HYDROCHLORIDE 50 MG/1
150 TABLET, FILM COATED ORAL
Status: DISCONTINUED | OUTPATIENT
Start: 2023-12-26 | End: 2023-12-28 | Stop reason: HOSPADM

## 2023-12-26 RX ORDER — BENZTROPINE MESYLATE 1 MG/1
1 TABLET ORAL 2 TIMES DAILY
Status: DISCONTINUED | OUTPATIENT
Start: 2023-12-26 | End: 2023-12-28 | Stop reason: HOSPADM

## 2023-12-26 RX ADMIN — HALOPERIDOL 5 MG: 5 TABLET ORAL at 14:01

## 2023-12-26 RX ADMIN — NICOTINE POLACRILEX 4 MG: 4 GUM, CHEWING BUCCAL at 16:25

## 2023-12-26 RX ADMIN — NICOTINE POLACRILEX 4 MG: 4 GUM, CHEWING BUCCAL at 14:44

## 2023-12-26 RX ADMIN — HYDROXYZINE HYDROCHLORIDE 25 MG: 25 TABLET, FILM COATED ORAL at 14:01

## 2023-12-26 RX ADMIN — HYDROXYZINE HYDROCHLORIDE 50 MG: 25 TABLET, FILM COATED ORAL at 14:41

## 2023-12-26 RX ADMIN — HYDROXYZINE HYDROCHLORIDE 25 MG: 25 TABLET ORAL at 13:35

## 2023-12-26 RX ADMIN — BENZTROPINE MESYLATE 1 MG: 1 TABLET ORAL at 21:07

## 2023-12-26 RX ADMIN — NICOTINE POLACRILEX 4 MG: 4 GUM, CHEWING BUCCAL at 13:46

## 2023-12-26 RX ADMIN — TRAZODONE HYDROCHLORIDE 150 MG: 50 TABLET ORAL at 21:06

## 2023-12-26 RX ADMIN — HALOPERIDOL 5 MG: 5 TABLET ORAL at 21:07

## 2023-12-26 ASSESSMENT — ACTIVITIES OF DAILY LIVING (ADL)
ADLS_ACUITY_SCORE: 35

## 2023-12-26 NOTE — ED PROVIDER NOTES
[facility] ED Mental Health Handoff Note:       Brief HPI:  This is a 23 year old female signed out to me by Dr. Tian.  See initial ED Provider note for full details of the presentation. Interval history is pertinent for transfer to [facility] from adult ED for ongoing boarding. No acute concerns nor issues after transfer..    Home meds reviewed and ordered/administered: Yes    Medically stable for inpatient mental health admission: Yes.    Evaluated by mental health: Yes. The recommendation is for inpatient mental health treatment. Bed search in process    Safety concerns: At the time I received sign out, there were no safety concerns.    Hold Status:  Active Orders   N/A       Exam:   Patient Vitals for the past 24 hrs:   BP Temp Temp src Pulse Resp SpO2 Weight   12/26/23 1415 123/82 97.7  F (36.5  C) Oral 95 18 97 % --   12/26/23 1054 111/70 98.5  F (36.9  C) Oral 93 17 99 % 95.3 kg (210 lb)       ED Course:    Medications   benztropine (COGENTIN) tablet 1 mg (has no administration in time range)   haloperidol (HALDOL) tablet 5 mg (has no administration in time range)   nicotine polacrilex (NICORETTE) gum 4 mg (4 mg Buccal $Given 12/26/23 1346)   hydrOXYzine HCl (ATARAX) tablet 50 mg (has no administration in time range)   hydrOXYzine HCl (ATARAX) tablet 25 mg (25 mg Oral $Given 12/26/23 1401)   haloperidol (HALDOL) tablet 5 mg (5 mg Oral $Given 12/26/23 1401)            There were no significant events during my shift.      Impression:    ICD-10-CM    1. Schizoaffective disorder, bipolar type (H)  F25.0           Plan:    Awaiting mental health evaluation/recommendations.      RESULTS:   No results found for this visit on 12/26/23 (from the past 24 hour(s)).          MD Jose Alberto Romo Dung Hoang, MD  12/26/23 4273

## 2023-12-26 NOTE — CONSULTS
Diagnostic Evaluation Consultation  Crisis Assessment    Patient Name: Ej Dunaway  Age:  23 year old  Legal Sex: female  Gender Identity: female  Pronouns:   Race: White  Ethnicity: Not  or   Language: English      Patient was assessed: In person      Patient location: [facility] EMERGENCY DEPARTMENT                             [id number]    Referral Data and Chief Complaint  Ej Dunaway presents to the ED via EMS. Patient is presenting to the ED for the following concerns: Verbal agitation, Paranoia.   Factors that make the mental health crisis life threatening or complex are:   .      Informed Consent and Assessment Methods  Explained the crisis assessment process, including applicable information disclosures and limits to confidentiality, assessed understanding of the process, and obtained consent to proceed with the assessment.  Assessment methods included conducting a formal interview with patient, review of medical records, collaboration with medical staff, and obtaining relevant collateral information from family and community providers when available.  : done     Patient response to interventions: acceptance expressed  Coping skills were attempted to reduce the crisis:  Taking medication, going to [facility] residence     History of the Crisis   Patient was recently seen at [place] ED yesterday, had been discharged with prescriptions to [facility].  Patient returns to the emergency room today via EMS.  Per [facility] staff patient grew increasingly agitated, was insistent that [place] was talking to her and telling her to go back to the ED.  [facility] staff attempted to de-escalate patient however she continued to insist that [place] was talking to her and telling her to return to the ED.  Patient did not sleep overnight while at [facility], reports having trouble sleep for the last several days.  Indicates the voices are very loud and keep her  from falling asleep.  Patient denies any outpatient supports including medication management and therapy, also denies having additional supports such as family or friends.  States that she is unable to deal with society at this time.  Feels she needs an inpatient hospitalization for stabilization, would like to go to an IRTS following hospitalization and then be referred to [place] [facility].  Patient reports her supports are Hood and the angels, but they tell her what is good.  Reports that the demons have been trying to take away from [place], does acknowledge experiencing visual hallucinations, also hearing the demons while visiting with this writer.  Reports that demons are present in the room.  Also indicates that this writer is a demon.  Patient appears to be responding to internal stimuli, often will have a blank stare, not responding to questions, asking for questions to be repeated.    Brief Psychosocial History  Family:  Single, Children no  Support System:     Employment Status:  unemployed  Source of Income:  none  Financial Environmental Concerns:  unemployed, other (see comments) (Homeless)  Current Hobbies:     Barriers in Personal Life:  lack of companionship, mental health concerns    Significant Clinical History  Current Anxiety Symptoms:  anxious  Current Depression/Trauma:  impaired decision making, difficulty concentrating  Current Somatic Symptoms:     Current Psychosis/Thought Disturbance:  visual hallucinations, auditory hallucinations, inattentive, distractability (Irritability)  Current Eating Symptoms:  loss of appetite  Chemical Use History:  Other Use: Methamphetamines  Last Use:: 12/19/23   Past diagnosis:  Other, ADHD, Substance Use Disorder (Schizoaffective disorder, bipolar type)  Family history:  No known history of mental health or chemical health concerns  Past treatment:  Inpatient Hospitalization, Psychiatric Medication Management, Individual therapy, Supportive Living  Environment (group home, [facility] house, etc), Residential Treatment, Case management, Civil Commitment  Details of most recent treatment:  Patient has history of inpatient mental health admissions, most recently 5/13/2023 - 6/6/2023 (24 days) at [facility]. Patient was at [facility] treatment [place] for the past week for methamphetamine abuse. She is not able to return to the program due worsening mental health symptoms  Pt was not taking medicaiton consistently, stopped taking Haldol prior going to last program.  Has restarted Haldol as of 12/25.  Pt was sent to a Crisis Residence last evening, was unable to sleep overnight, agitated with delusional thinking and hallucinations in am.  wanted to return to ED as 'Hood was telling her to'.  Other relevant history:  Ej denied having any other outpatient services. Per chart review she previously shared that she participated in AJ treatment at [place] [facility] this year.       Collateral Information  Is there collateral information: Yes     Collateral information name, relationship, phone number:  Danette Staff, [phone]    What happened today: Patient arrived to crisis residence last evening, did not sleep through the night.  This morning was insistent on returning to the emergency department, reportedly making statements that Hood was talking to her and telling her to return to the ED.  Patient was wanting to contact 911.  Staff attempted to help de-escalate the situation, reassure patient was safe.  Unable to lessen patient's insistence, patient experiencing auditory and visual hallucinations.  No concerns for aggression while at the facility, no threats to self or others.     What is different about patient's functioning: Unable to discern, patient is experiencing high levels of psychosis including auditory and visual hallucinations, is not sleeping, high levels of agitation.     Has patient made comments about wanting to kill  themselves/others: no    If d/c is recommended, can they take part in safety/aftercare planning:  yes (Additional referral to crisis residence would be needed)    Additional collateral information:  Patient had just been discharged from [place] on 12/25/2023     Risk Assessment  [place] Suicide Severity Rating Scale Recent:   Suicidal Ideation (Recent)  Q1 Wished to be Dead (Past Month): no  Q2 Suicidal Thoughts (Past Month): no     Suicidal Behavior (Recent)  Actual Attempt (Past 3 Months): No  Has subject engaged in non-suicidal self-injurious behavior? (Past 3 Months): No  Interrupted Attempts (Past 3 Months): No  Preparatory Acts or Behavior (Past 3 Months): No    Environmental or Psychosocial Events: ongoing abuse of substances, challenging interpersonal relationships, unemployment/underemployment, unstable housing, homelessness  Protective Factors: Protective Factors: help seeking    Does the patient have thoughts of harming others? Feels Like Hurting Others: no  Previous Attempt to Hurt Others: no  Is the patient engaging in sexually inappropriate behavior?: no    Is the patient engaging in sexually inappropriate behavior?  no        Mental Status Exam   Affect: Blunted  Appearance: Disheveled  Attention Span/Concentration: Inattentive  Eye Contact: Variable    Fund of Knowledge: Appropriate   Language /Speech Content: Fluent  Language /Speech Volume: Normal  Language /Speech Rate/Productions: Minimally Responsive  Recent Memory: Variable  Remote Memory: Variable  Mood: Irritable, Anxious  Orientation to Person: Yes   Orientation to Place: Yes  Orientation to Time of Day: Yes  Orientation to Date: Yes     Situation (Do they understand why they are here?): Yes  Psychomotor Behavior: Agitated  Thought Content: Hallucinations  Thought Form: Loose Associations        Medication  Psychotropic medications:   Medication Orders - Psychiatric (From admission, onward)      Start     Dose/Rate Route Frequency Ordered  Stop    12/26/23 2000  haloperidol (HALDOL) tablet 5 mg         5 mg Oral 2 TIMES DAILY 12/26/23 1210      12/26/23 1336  hydrOXYzine HCl (ATARAX) tablet 50 mg         50 mg Oral 3 TIMES DAILY PRN 12/26/23 1336      12/26/23 1336  nicotine polacrilex (NICORETTE) gum 4 mg        Note to Pharmacy: DO NOT USE THIS FIELD FOR ADMIN INSTRUCTIONS; INFORMATION DOES NOT SHOW ON MAR. USE THE FIELD ABOVE MARKED ADMIN INSTRUCTIONS    4 mg Buccal EVERY 1 HOUR PRN 12/26/23 1336               Current Care Team  Patient Care Team:  Clinic - [facility] as PCP - General  Altagracia Tavarez APRN CNP as Nurse Practitioner (Nurse Practitioner)  Sheri Rutherford APRN CNP as Nurse Practitioner (Nurse Practitioner - Pediatrics)  Onofre Castillo APRN CNP as Nurse Practitioner (Nurse Practitioner)  Karla Herrera MD as Assigned PCP    Diagnosis  Patient Active Problem List   Diagnosis Code    Urinary incontinence, unspecified type R32    Frequent UTI N39.0    ADHD (attention deficit hyperactivity disorder), combined type F90.2    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    Obesity E66.9    Foster care child Z62.21    Chemical dependency (H) F19.20    Depo-Provera contraceptive status Z30.42    Generalized anxiety disorder F41.1    Recurrent major depression (H24) F33.9    BV (bacterial vaginosis) N76.0, B96.89    Mixed stress and urge urinary incontinence N39.46    Schizophrenia (H) F20.9    Posttraumatic stress disorder F43.10    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  Patient had been discharged emergency department on 12/25/2023 to crisis  residence.  On presentation to the hospital patient continues to report persistent auditory and visual hallucinations, reports demons are trying to get her way from [place], patient reports demons are in the room while talking with this writer.  Made a statement that this writer was a demon.  Patient states she is unable to function, has not been sleeping, has been taking medication however is not yet experiencing reduction in symptoms.  Patient is not experiencing HI, SI, SIB however is at heightened risk of harm to self or others due to active psychosis.  Recommendation is for inpatient hospitalization for further safety, stabilization of symptoms,  and further assessment.       Imminent risk of harm: Homicidal Thoughts or Behaviors  Severe psychiatric, behavioral or other comorbid conditions are appropriate for management at inpatient mental health as indicated by at least one of the following: Psychiatric Symptoms, Impaired impulse control, judgement, or insight, Symptoms of impact to function, Comorbid substance use disorder  Severe dysfunction in daily living is present as indicated by at least one of the following: Extreme deterioration in social interactions, Complete neglect of self care with associated impairment in physical status, Complete inability to maintain any appropriate aspect of personal responsibility in any adult roles  Situation and expectations are appropriate for inpatient care: Patient management/treatment at lower level of care is not feasible or is inappropriate, Around-the-clock medical and nursing care to address symptoms and initiate intervention is required  Inpatient mental health services are necessary to meet patient needs and at least one of the following: Specific condition related to admission diagnosis is present and judged likely to further improve at proposed level of care      Patient coping skills attempted to reduce the crisis:  Taking medication, going to crisis  residence    Disposition  Recommended disposition: Inpatient Mental Health        Reviewed case and recommendations with attending provider. Attending Name: Dr Tian       Attending concurs with disposition: yes       Patient and/or validated legal guardian concurs with disposition:   yes       Final disposition:  inpatient mental health    Legal status on admission: Voluntary/Patient has signed consent for treatment    Assessment Details   Total duration spent with the patient: 15 min     CPT code(s) utilized: Non-Billable    CHRIS Blue, Psychotherapist  [facility] - Triage & Transition Services  Callback: [phone]

## 2023-12-26 NOTE — ED NOTES
Bed: [facility]  Expected date:   Expected time:   Means of arrival:   Comments:  [facility] [place] [facility].  24 yo F, hallucinations-calm/cooperative

## 2023-12-26 NOTE — MEDICATION SCRIBE - ADMISSION MEDICATION HISTORY
Medication Scribe Admission Medication History    Admission medication history is complete. The information provided in this note is only as accurate as the sources available at the time of the update.    Information Source(s): Patient and CareEverywhere/SureScripts via in-person    Pertinent Information: None.    Changes made to PTA medication list:  Added: Gabapentin 100 mg TID.  Deleted: Benztropine 0.5 mg, 2 tablets BID.  Changed: None    Medication Affordability:  Not including over the counter (OTC) medications, was there a time in the past 3 months when you did not take your medications as prescribed because of cost?: No    Allergies reviewed with patient and updates made in EHR: yes    Medication History Completed By: Alec Finch MD 12/26/2023 12:32 PM    PTA Med List   Medication Sig Last Dose    gabapentin (NEURONTIN) 100 MG capsule Take 100 mg by mouth 3 times daily 12/26/2023 at am    haloperidol (HALDOL) 5 MG tablet Take 1 tablet (5 mg) by mouth 2 times daily 12/26/2023 at am    hydrOXYzine HCl (ATARAX) 25 MG tablet Take 1 tablet (25 mg) by mouth 3 times daily as needed for itching or anxiety 12/26/2023 at am

## 2023-12-26 NOTE — TELEPHONE ENCOUNTER
S: [facility] , DEC  Altagracia  calling at 12:28 PM  about a 23 year old/Female presenting with Psychotic, AH/VH     B: Pt arrived via EMS. Presenting problem, stressors: Pt has been in and out of ED several times this week. Agitated, not sleeping,AH and VH, reports neva are trying to take her from Hood and today Hood told her to come back to ED. Actively psychotic, decision making impaired. States voices are so loud and cannot sleep. Was at [facility]. Severe dysfunction in daily living.    Pt affect in ED: Blunted, Irritable , and Distractible  Pt Dx: Generalized Anxiety Disorder, Schizoaffective Disorder, ADHD, Substance Use Disorder: meth, last use was a week ago, and Reactive Attachment Disorder  Previous IPMH hx? Yes: last one at [place]  May 2023  Pt denies SI   Hx of suicide attempt? Yes: unsure  Pt denies SIB  Pt denies HI   Pt endorses auditory hallucinations  and endorses visual hallucination .   Pt RARS Score: 2    Hx of aggression/violence, sexual offenses, legal concerns, Epic care plan? describe: No  Current concerns for aggression this visit? No  Does pt have a history of Civil Commitment? Yes, most recent commitment 2022  Is Pt their own guardian? Yes    Pt is prescribed medication. Is patient medication compliant? Yes  Pt denies OP services   CD concerns: Pt is in recovery with a hx of meth use Was at CD program  Acute or chronic medical concerns: No  Does Pt present with specific needs, assistive devices, or exclusionary criteria? None      Pt is ambulatory  Pt is able to perform ADLs independently      A: Pt to be reviewed for [facility] admission. Pt is Voluntary  Preferred placement: Statewide    COVID Symptoms: No  If yes, COVID test required   Utox: Ordered, not yet collected   CMP: N/A  CBC: N/A  HCG: Ordered, not yet collected    R: Patient cleared and ready for behavioral bed placement: Yes  Pt placed on [facility] worklist? Yes    Does Patient need a Transfer Center  request created? No, Pt is located within [facility] ED, [facility] ED, or [facility]

## 2023-12-26 NOTE — PLAN OF CARE
Ej CARTER Charliemauricioarpan Gume  December 26, 2023  Plan of Care Hand-off Note     Patient Care Path: inpatient mental health    Plan for Care:   Patient presented to the emergency room via EMS directly from crisis residence.  Patient had been discharged emergency department on 12/25/2023 to crisis residence.  On presentation to the hospital patient continues to report persistent auditory and visual hallucinations, reports demons are trying to get her way from [place], patient reports demons are in the room while talking with this writer.  Made a statement that this writer was a demon.  Patient states she is unable to function, has not been sleeping, has been taking medication however is not yet experiencing reduction in symptoms.  Patient is not experiencing HI, SI, SIB however is at heightened risk of harm to self or others due to active psychosis.  Recommendation is for inpatient hospitalization for further safety, stabilization of symptoms,  and further assessment.    Identified Goals and Safety Issues: Reduction of hallucinations, safety, stabilization    Overview:  Patient continues to experience high levels of psychosis, auditory and visual hallucinations, states that demons are trying to take her from [place].  Patient is unable to function in society, increased vulnerability due to psychosis.               Legal Status: Legal Status at Admission: Voluntary/Patient has signed consent for treatment    Psychiatry Consult: consult was completed on 12/25/23       Updated  Dr Tian regarding plan of care.           CHRIS Blue

## 2023-12-27 ENCOUNTER — TELEPHONE (OUTPATIENT)
Dept: BEHAVIORAL HEALTH | Facility: CLINIC | Age: 23
End: 2023-12-27
Payer: MEDICAID

## 2023-12-27 LAB
AMPHETAMINES UR QL SCN: NORMAL
BARBITURATES UR QL SCN: NORMAL
BENZODIAZ UR QL SCN: NORMAL
BZE UR QL SCN: NORMAL
CANNABINOIDS UR QL SCN: NORMAL
FENTANYL UR QL: NORMAL
HCG UR QL: NEGATIVE
OPIATES UR QL SCN: NORMAL
PCP QUAL URINE (ROCHE): NORMAL
SARS-COV-2 RNA RESP QL NAA+PROBE: NEGATIVE

## 2023-12-27 PROCEDURE — 87635 SARS-COV-2 COVID-19 AMP PRB: CPT | Performed by: EMERGENCY MEDICINE

## 2023-12-27 PROCEDURE — 99284 EMERGENCY DEPT VISIT MOD MDM: CPT | Performed by: STUDENT IN AN ORGANIZED HEALTH CARE EDUCATION/TRAINING PROGRAM

## 2023-12-27 PROCEDURE — 81025 URINE PREGNANCY TEST: CPT | Performed by: FAMILY MEDICINE

## 2023-12-27 PROCEDURE — 250N000013 HC RX MED GY IP 250 OP 250 PS 637: Performed by: STUDENT IN AN ORGANIZED HEALTH CARE EDUCATION/TRAINING PROGRAM

## 2023-12-27 PROCEDURE — 80307 DRUG TEST PRSMV CHEM ANLYZR: CPT | Performed by: FAMILY MEDICINE

## 2023-12-27 PROCEDURE — 250N000013 HC RX MED GY IP 250 OP 250 PS 637: Performed by: FAMILY MEDICINE

## 2023-12-27 PROCEDURE — 250N000013 HC RX MED GY IP 250 OP 250 PS 637: Performed by: PSYCHIATRY & NEUROLOGY

## 2023-12-27 RX ORDER — GABAPENTIN 100 MG/1
100 CAPSULE ORAL 3 TIMES DAILY
Status: DISCONTINUED | OUTPATIENT
Start: 2023-12-27 | End: 2023-12-28 | Stop reason: HOSPADM

## 2023-12-27 RX ORDER — QUETIAPINE FUMARATE 50 MG/1
50 TABLET, EXTENDED RELEASE ORAL AT BEDTIME
Status: COMPLETED | OUTPATIENT
Start: 2023-12-27 | End: 2023-12-27

## 2023-12-27 RX ORDER — QUETIAPINE FUMARATE 50 MG/1
100 TABLET, EXTENDED RELEASE ORAL AT BEDTIME
Status: DISCONTINUED | OUTPATIENT
Start: 2023-12-28 | End: 2023-12-28 | Stop reason: HOSPADM

## 2023-12-27 RX ADMIN — NICOTINE POLACRILEX 4 MG: 4 GUM, CHEWING BUCCAL at 21:04

## 2023-12-27 RX ADMIN — QUETIAPINE FUMARATE 50 MG: 50 TABLET, EXTENDED RELEASE ORAL at 21:04

## 2023-12-27 RX ADMIN — BENZTROPINE MESYLATE 1 MG: 1 TABLET ORAL at 19:41

## 2023-12-27 RX ADMIN — NICOTINE POLACRILEX 4 MG: 4 GUM, CHEWING BUCCAL at 07:59

## 2023-12-27 RX ADMIN — NICOTINE POLACRILEX 4 MG: 4 GUM, CHEWING BUCCAL at 16:53

## 2023-12-27 RX ADMIN — GABAPENTIN 100 MG: 100 CAPSULE ORAL at 19:42

## 2023-12-27 RX ADMIN — BENZTROPINE MESYLATE 1 MG: 1 TABLET ORAL at 08:30

## 2023-12-27 RX ADMIN — HALOPERIDOL 5 MG: 5 TABLET ORAL at 08:30

## 2023-12-27 RX ADMIN — NICOTINE POLACRILEX 4 MG: 4 GUM, CHEWING BUCCAL at 18:12

## 2023-12-27 RX ADMIN — GABAPENTIN 100 MG: 100 CAPSULE ORAL at 14:29

## 2023-12-27 RX ADMIN — HYDROXYZINE HYDROCHLORIDE 50 MG: 25 TABLET, FILM COATED ORAL at 08:33

## 2023-12-27 RX ADMIN — NICOTINE POLACRILEX 4 MG: 4 GUM, CHEWING BUCCAL at 09:12

## 2023-12-27 RX ADMIN — TRAZODONE HYDROCHLORIDE 150 MG: 50 TABLET ORAL at 21:04

## 2023-12-27 RX ADMIN — OLANZAPINE 5 MG: 5 TABLET, ORALLY DISINTEGRATING ORAL at 01:41

## 2023-12-27 ASSESSMENT — ACTIVITIES OF DAILY LIVING (ADL)
ADLS_ACUITY_SCORE: 35

## 2023-12-27 NOTE — ED NOTES
Pt slept almost all night without difficulty. Up x1 briefly for fluid. Requested and received prn Zyprexa for Hallucination at the time. No behavior issues observed this shift. Respirations noted regular and unlabored. No acute distress or physical discomfort. Staff to continue to assess/monitor

## 2023-12-27 NOTE — ED PROVIDER NOTES
[facility] ED Mental Health Handoff Note:       Brief HPI:  This is a 23 year old female signed out to me.  See initial ED Provider note for full details of the presentation.     Home meds reviewed and ordered/administered: Yes    Evaluated by mental health: Yes.    Safety concerns: At the time I received sign out, there were no safety concerns.    Hold Status:  Active Orders   N/A       Exam:   Patient Vitals for the past 24 hrs:   BP Temp Temp src Pulse Resp SpO2   12/27/23 0827 134/79 97.6  F (36.4  C) Oral 90 18 97 %   12/26/23 1415 123/82 97.7  F (36.5  C) Oral 95 18 97 %           ED Course:    Medications   benztropine (COGENTIN) tablet 1 mg (1 mg Oral $Given 12/27/23 0830)   nicotine polacrilex (NICORETTE) gum 4 mg (4 mg Buccal $Given 12/27/23 0912)   hydrOXYzine HCl (ATARAX) tablet 50 mg (50 mg Oral $Given 12/27/23 0833)   traZODone (DESYREL) tablet 150 mg (150 mg Oral $Given 12/26/23 2106)   OLANZapine zydis (zyPREXA) ODT tab 5 mg (5 mg Oral $Given 12/27/23 0141)   QUEtiapine (SEROquel XR) 24 hr tablet 50 mg (has no administration in time range)   gabapentin (NEURONTIN) capsule 100 mg (has no administration in time range)   QUEtiapine (SEROquel XR) 24 hr tablet 100 mg (has no administration in time range)   hydrOXYzine HCl (ATARAX) tablet 25 mg (25 mg Oral $Given 12/26/23 1401)   haloperidol (HALDOL) tablet 5 mg (5 mg Oral $Given 12/26/23 1401)            There were no significant events during my shift.    Patient was signed out to the oncoming provider.       Impression:    ICD-10-CM    1. Schizoaffective disorder, bipolar type (H)  F25.0           Plan:    Patient continues to social board waiting for safe community placement.       RESULTS:   No results found for this visit on 12/26/23 (from the past 24 hour(s)).          MD Nicolás Thomson Cara, MD  12/27/23 2986

## 2023-12-27 NOTE — TELEPHONE ENCOUNTER
R: [place]  Access Inpatient [place] Call Log 12/27/23 8:25 AM    Intake has called facilities that have not updated the bed status within the last 12 hours.                             [facility] is at capacity           [facility] is posting 0 beds. [phone] Per call @8:17am, at cap  [facility] is posting 0 beds. Negative covid required                [facility] is posting 2 beds. Neg covid. No high school or [place]-[facility]. [phone] Per call @8:18am, low acuity only.  Per call with Lizeth @12:18 PM no beds available  [facility] is posting 2 beds. [phone].    Per call with Noa @12:24 PM no beds at [facility] is posting 0 beds. [phone]   [facility] is posting 2 beds for Young Adults age 18-25. Negative covid. [phone] Per call @8:07am.  Pt needs Covid lab collected.  [facility] is posting 0 beds          [facility] is posting 0 beds [phone]    [facility] is posting 4 beds. LOW acuity ONLY. Mixed unit 12+. Negative covid- [phone].  Pt needs Covid lab collected.  [facility] has 1 bed posted. No aggression. Negative Covid. Low acuity  Pt needs Covid lab collected.  [facility] is posting 0 beds. Negative covid. [phone]   [facility] is posting 1 bed. Low acuity only. Negative covid.  [phone] Pt needs Covid lab collected.  [facility] is posting 4 beds. Low acuity. No current aggression. [phone] Pt needs Covid lab collected.  [facility] ([place]) is posting 4 beds available. Negative covid.  [phone].    Pt needs Covid lab collected.  [facility] is posting 0 beds. Low acuity. 72 HH hold preferred. Negative covid required. [phone]   [facility] ([place]) is posting 7 beds. Low acuity only. Negative covid.  [phone] Pt needs Covid lab collected.  [facility] is posting 7 beds.  Negative covid  required.   Vol only, No history of aggression, violence, or assault. No sexual offenders. No 72 HH holds. [phone]  Pt needs Covid lab collected.    [facility] is posting 7 beds. Negative covid required.  (Must have the cognitive ability to do programming. No aggressive or violent behavior or recent HX in the last 2 yrs. MH must be primary.) Always low acuity. [phone] Pt needs Covid lab collected.  [facility] has 0 beds posted. Negative covid required.  Low acuity only. Violence and aggression capped.  [phone]   [facility] is posting 2 beds. Low acuity, Negative covid required. [phone] Pt needs Covid lab collected.  [facility] is posting 0 beds. Unit is a combined unit (14+). No aggressive patients. Voluntary only. Must be accompanied by a guardian.  Negative covid. [phone]    [facility] posting 0 beds Negative covid required.  [phone]   [facility] is posting 5 beds. Negative covid. LOW acuity. (No lines, drains, or tubes, oxygen, CPAP, IV, etc.) Must Have a Ride Home. [phone] Must Have a Ride Home before reviewing  [facility] is posting 5 beds. Negative covid. (No. lines, drains, or tubes, oxygen, CPAP, IV, etc.). [phone] Per call @8:11am, RN in meeting Must Have a Ride Home before reviewing  [facility] is posting 20 beds. No covid test required. [phone] Per call @8:05am   Out of state    Pt remains on the work list pending appropriate bed availability.

## 2023-12-27 NOTE — ED NOTES
"Pt started of shift with anxiety, demanding, pacing and reporting hallucinations. Pt said she is seeing demons that are saying \"they are taking me away from [place]\" Pt was given scheduled meds, PRN Vistaril,  emotional support, and reassured that they are safe. Pt reported feeling relieve with interventions given. Med compliant and contracted for safety. Will continue to evaluate.   "

## 2023-12-27 NOTE — TELEPHONE ENCOUNTER
R: [place]  Access Inpatient Bed Call Log 12/25/23 6:30PM   Intake has called facilities that have not updated the bed status within the last 12 hours.             Pt wants Statewide only.  Pt is not appropriate for available beds.     [facility] is at capacity            [facility] is posting 0 beds. [phone]   [facility] is posting 0 beds. Negative covid required                [facility] is posting 2 beds. Neg covid. No high school or [place]-psych. [phone]   [facility] is posting 2 beds. [phone] Per [place], they are reviewing at capacity.   [facility] is posting 0 beds. [phone]    [facility] is posting 2 beds. Negative covid. [phone] Young adult only. No psychosis.  [facility] is posting 0 beds           [facility] is posting 3 beds [phone] -reviewing at capacity.     [facility] is posting 4 beds. LOW acuity ONLY. Mixed unit 12+. Negative covid- [phone]   [facility] has 1 beds posted. No aggression. Negative Covid. Low acuity    [facility] is posting 0 beds. Negative covid. [phone]    [facility]) is posting 2 beds. Low acuity only. Negative covid.  [phone]    [facility] is posting 4 beds. Low acuity. No current aggression. [phone]    [facility] is posting 5 beds available. Negative covid.  [phone].       [facility] is posting 0 beds. Low acuity. 72 HH hold preferred. Negative covid required. [phone]   [facility] ([place]) is posting 7 beds. Low acuity only. Negative covid.  [phone]      [facility] [place] is posting 3 beds.  Negative covid required.   Vol only, No history of aggression, violence, or assault. No sexual offenders. No 72 HH holds. [phone]    [facility] is posting 7 beds. Negative covid required.  (Must have the cognitive ability to do  programming. No aggressive or violent behavior or recent HX in the last 2 yrs.  must be primary.) Always low acuity. [phone]    [facility] has 0 beds posted. Negative covid required.  Low acuity only. Violence and aggression capped.  [phone]   [facility] is posting 2 beds. Low acuity, Negative covid required. [phone] Per call at   [facility] is posting 0 beds. Unit is a combined unit (14+). No aggressive patients. Voluntary only. Must be accompanied by a guardian.  Negative covid. [phone]   [facility] posting 0 beds Negative covid required.  [phone]      [facility] [place] is posting 0 beds. Negative covid. LOW acuity. (No lines, drains, or tubes, oxygen, CPAP, IV, etc.) Must Have a Ride Home. [phone] Per call at 7:48am to [facility] not currently can change later today.   [facility] is posting 5 beds. Negative covid. (No. lines, drains, or tubes, oxygen, CPAP, IV, etc.). [phone]    [facility] is posting 24 beds. No covid test required. [phone]     Pt remains on the work list pending appropriate bed availability.

## 2023-12-27 NOTE — PROGRESS NOTES
"Triage & Transition Services, Extended Care     Therapy Progress Note    Patient: Ej goes by \"Ej,\" uses she/her pronouns  Date of Service: December 27, 2023  Site of Service: [facility] EMERGENCY DEPARTMENT                             [id number]  Patient was seen yes  Mode of Assessment: In person    Presentation Summary: Pt presents with blunted affect, intense eye contact and 'super anxious' mood. She states that the voices 'are saying they're going to take me away from Hood.' She reports that they tell her that they are going to try to make her the anti-nash. She asks writer 'Do you know what the anti-nash is? Are you the anti-nash?' When writer answers no, she states 'I don't believe you. Even demons believe in Hood.' She stares intently at writer and is delayed in her responses and often requires the question to be repeated, appears pre-occupied. She answers appropriately. She reports hearing voices 'constantly' and that nothing helps to quiet them. She finds the stressful. She states that she tries to avoid sleep and does not intend to sleep because when she sleeps they try to 'attack' her, 'try to change my brain pathways.' She denies SI/HI/plan/intent. She reports being bored, is not sure she wants to stay in the hospital. She inquires about going to an IRTS, crisis residence or sober house. Remind her that she was recently referred to crisis residence and then returned to ED shortly after due to severity of symptoms so it may benefit her to stabilize more in the ED. She agrees, states that she needs [facility] so she can go to an IRTS and then [facility]. Attempted to work on grounding techniques but pt struggled, stating that everything that is happening to her is real and is from 'the spirit' and grounding will not help. Focused more then on relaxation/distraction techniques that can be used while in the [facility].    Therapeutic Intervention(s) Provided: Provided positive reinforcement for " progress towards goals, gains in knowledge, and application of skills previously taught., Coached on coping techniques/relaxation skills to help improve distress tolerance and managing intense emotions.    Current Symptoms: excessive worry, anxious difficulty concentrating, impaired decision making anxious, excessive worry auditory hallucinations, distractability, inattentive      Mental Status Exam   Affect: Blunted  Appearance: Disheveled  Attention Span/Concentration: Inattentive  Eye Contact: Intense    Fund of Knowledge: Appropriate   Language /Speech Content: Fluent  Language /Speech Volume: Normal  Language /Speech Rate/Productions: Normal  Recent Memory: Variable  Remote Memory: Variable  Mood: Anxious  Orientation to Person: Yes   Orientation to Place: Yes  Orientation to Time of Day: Yes  Orientation to Date: Yes     Situation (Do they understand why they are here?): Yes  Psychomotor Behavior: Normal  Thought Content: Hallucinations  Thought Form: Loose Associations    Treatment Objective(s) Addressed: assessing safety, identifying treatment goals, building distress tolerance    Patient Response to Interventions: acceptance expressed, needs reinforcement    Progress Towards Goals: Patient Reports Symptoms Are: ongoing  Patient Progress Toward Goals: is not making progress  Next Step to Work Toward Discharge: symptom stabilization    Case Management:      Plan: inpatient mental health  yes provider, RN Pending  yes    Clinical Substantiation: Pt continues to present with persistent auditory and visual hallucinations, religiously focused. She is not able to function day to day, is actively avoiding sleep due to fear that her brain is being attacked when she sleeps. Multiple recent ED visits, referral made to crisis residence but pt was too symptomatic to benefit at that level of care. Recommend [facility] for safety and stabilization.    Legal Status: Legal Status at Admission: Voluntary/Patient has signed consent  for treatment    Session Status: Time session started: 0845  Time session ended: 0905  Session Duration (minutes): 20 minutes  Session Number: 1  Anticipated number of sessions or this episode of care: 4    Time Spent: 20 minutes    CPT Code: CPT Codes: 92225 - Psychotherapy (with patient) - 30 (16-37*) min    Diagnosis:   Patient Active Problem List   Diagnosis Code    Urinary incontinence, unspecified type R32    Frequent UTI N39.0    ADHD (attention deficit hyperactivity disorder), combined type F90.2    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    Obesity E66.9    Foster care child Z62.21    Chemical dependency (H) F19.20    Depo-Provera contraceptive status Z30.42    Generalized anxiety disorder F41.1    Recurrent major depression (H24) F33.9    BV (bacterial vaginosis) N76.0, B96.89    Mixed stress and urge urinary incontinence N39.46    Schizophrenia (H) F20.9    Posttraumatic stress disorder F43.10    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

## 2023-12-27 NOTE — TELEPHONE ENCOUNTER
Called BEC @ 01:45 and spoke to PATI Tobin to request Covid test for outside reviews. Also requested that UDS and HCG be collected when able     R: [place]  Access Inpatient Bed Call Log  12/27/2023 12:39 AM  Intake has called facilities that have not updated their bed status within the last 12 hours.??      *[facility]     [place] -- [facility]: @ cap per website.  [place] -- [facility]:  @ cap per website.  [place] -- [facility]: @ Cap per website  [place] -- [facility]: @ cap per website.  [place] -- [facility]: Posting 2 beds. Per Carri @ 00:48, no beds available currently  [facility] -- [facility]: @ cap per website.  [place] -- [facility]/ beds Posting 7 beds. Ages 18-25, Voluntary only, COVID test req'd, NO aggression, physical or sexual assault, violence hx or drug abuse, or psychosis. Per Sherry @ 01:44, pt not appropriate d/t psychosis sxs  [place] -- [facility]: @ cap per website.  [place] -- [facility]: @ cap per website.  [place] -- [facility]:  Posting 3 beds. Per Carri @ 00:48, no beds available currently     [facility]: Posting 4 beds. Mixed unit/Low acuity only.   [facility]: Posting 1 bed.  Low acuity, No aggression. Per Carri @ 00:48, no beds available currently  [facility]: @ cap per website   [facility]:  Posting 4 bed. Low acuity only. No current aggression. Per Carri @ 00:48, no beds available currently  [facility]:  Posting 1 beds. COVID negative test req. Lower acuity only  [facility]: Posting 4 beds. Low acuity only. Prefer med adjustments placement.  [facility]: Posting 7 beds. No aggression  [place] -- [facility]: @ cap per website. NO reviews after 10PM. Low acuity only.   [place] -- [facility]: Posting 7 beds. No hx of aggression. No sexual offenders. Voluntary patients only  [place] -- [facility]:  Posting 7 beds. Low acuity only. Must have the cognitive ability to do programming. No  aggressive or violent behavior or recent HX in the last 2 yrs. MH must be primary.   [place] -- [facility]: @ Cap per website. COVID negative test. Must be low acuity ONLY.  [place] -- [facility]: @ Posting 2 beds. Low acuity. Negative Covid.   [place] -- [facility]: @ Cap per website. Covid neg. Voluntary only. Mixed unit of adults & adol. No aggressive or violent behavior. No registered sex offenders.  - 4:49 PM Per Yareli, they are closed.  [place] -- [facility]: @ Cap per website. COVID negative test-  [facility]: @ Cap per website. No hx of aggression/assault. No lines, drains or tubes. Does not provide detox or CD treatment.   [place] -- [facility]: Posting 24 beds. Out-of-State Facility.  [place] -- [facility]: Posting 5 bed. Mixed unit/Low acuity/no medical devices - IV, CPAP etc. Negative Covid.     Pt remains on waitlist pending appropriate placement availability.

## 2023-12-27 NOTE — CONSULTS
"    Initial Psychiatric Consult   Consult date: December 27, 2023         Reason for Consult, requesting source:    Reason for Consult: Psychosis   Requesting source: [place]    Labs and imaging reviewed.     Total time spent in chart review, patient interview and coordination of care; 85 minutes         HPI:   Ej Dunaway is a 23 year old with past diagnosis of schizoaffective disorder vs substance induced psychosis, as well as past diagnoses of depression and ADHD, and medical history of elevated lead levels who presented to [facility] ED 12/26/23 (after ED visit 12/25) with ongoing symptoms of hallucinations, psychosis, and delusions. Psychiatry saw patient 12/25 and started Haldol 5mg BID. Patient had been at [facility] program but apparently was not allowed to return due to her acute mental health. She was discharged 12/25 to a crisis residence, however returned the next day via EMS as she was agitated and psychotic. She is now awaiting inpatient  bed.     On interview today, Ej says she is going \"in and out of psychotic lizandro\" which she describes as hearing AH of spiritual figures such as [place], angels, the Devil, and being very paranoid that people are trying to take her away from [place]. Says talking to people about this and being told it is not real helps. She says when she uses meth these symptoms tend to get a lot worse for a while. Reports daily meth use recently, last used about a week ago. When asked why she keeps using meth despite knowing it makes her psychotic says \"I like to get high\". She knows sobriety should be her goal. Was sober 1.5 years from age [age]. Started meth age 17.     Regarding her medications, she thinks Haldol makes her face stiff/expressionless and she feels anxious, restless. Reports Abilify caused weight gain in the past and Risperidone caused muscle stiffness. She says she is having a lot of trouble sleeping and the voices are worse at night. " "As such she is agreeable to trying Seroquel. She reports taking one dose of this in the past and it made her \"feel all my emotions\", but she is willing to try again.         Past Psychiatric History:   Past Diagnoses: Schizoaffective disorder vs substance induced psychosis  Medication Trials: Risperidone, Abilify, Haldol, Seroquel, Lexapro, Mirtazapine  Past/Current Providers: Most recent failed appt was with Lexie Nair NP at [facility]  Psychiatric Hospitalizations: Yes several for psychosis, most recent May 2023  Suicide Attempts: Yes, many  Self Injury: None  Past Psychosis: Yes, generally in context of meth use  Past Jacki: Yes, in context of meth use  Outpatient Programs: N/a        Substance Use and History:     Long history of drug use, primarily methamphetamine and cannabis. Reports used cannabis and meth starting age 17. Was sober and attended [facility] from [age], then relapsed. Reports up to daily meth use recently with last reported use about a week ago. She has not given a UDS this admission.     Social History     Tobacco Use    Smoking status: Every Day     Packs/day: .5     Types: Cigarettes, Vaping Device    Smokeless tobacco: Never   Substance Use Topics    Alcohol use: Not Currently     Alcohol/week: 0.0 standard drinks of alcohol           Past Medical History:   PAST MEDICAL HISTORY:   Past Medical History:   Diagnosis Date    Abnormal lead level in blood     10/15/2002    ADHD (attention deficit hyperactivity disorder), combined type     Adjustment disorder with mixed disturbance of emotions and conduct     Suicidal ideation 6/22/12, placed with new foster parents    Anxiety     Chlamydial infection     8/2015    Conduct disorder     5/2007    Constipation     Depressive disorder     Foster child     Adopted 2013    Personal history of other medical treatment (CODE)     No Comments Provided    Recurrent UTI     Toxic effect of lead and its compounds, accidental (unintentional), initial " encounter     2004    Urinary leakage     Urinary tract infection     No Comments Provided       PAST SURGICAL HISTORY:   Past Surgical History:   Procedure Laterality Date    NO HISTORY OF SURGERY               Family History:   FAMILY HISTORY:   Family History   Problem Relation Age of Onset    Family History Negative Father         Good Health    Substance Abuse Father     Mental Illness Father     Substance Abuse Sister     Substance Abuse Brother     Other - See Comments Mother         Psychiatric illness,schizophrenia    Cervical Cancer Mother     Cancer Mother     Substance Abuse Mother     Mental Illness Mother     Cancer Maternal Grandmother         Cancer,brain    Breast Cancer Maternal Grandmother     Asthma No family hx of     Coronary Artery Disease No family hx of     Mental Illness No family hx of            Social History:     Most recently was at crisis housing, and a sober home before that. Says she lost her apartment in November due to drug use. Most recent job was at [employer], lost this due to drug use. Reports support in the area as her father.          Physical ROS:   The 10 point Review of Systems is negative other than noted in the HPI or here.           Medications:      benztropine  1 mg Oral BID    haloperidol  5 mg Oral BID              Allergies:     Allergies   Allergen Reactions    Seasonal Allergies           Labs:   No results found for this or any previous visit (from the past 48 hour(s)).       Physical and Psychiatric Examination:     /79   Pulse 90   Temp 97.6  F (36.4  C) (Oral)   Resp 18   Wt 95.3 kg (210 lb)   SpO2 97%   BMI 33.89 kg/m    Weight is 210 lbs 0 oz  Body mass index is 33.89 kg/m .    Physical Exam:  I have reviewed the physical exam as documented by by the medical team and agree with findings and assessment and have no additional findings to add at this time.         MSE:   Appearance: awake, alert, adequately groomed, and dressed in hospital  "scrubs  Attitude:  cooperative  Eye Contact:  fair  Mood:   \"nervous\"  Affect:  intensity is heightened  Speech:  clear, coherent. Rate is increased.   Psychomotor Behavior:  no evidence of tardive dyskinesia, dystonia, or tics. Fidgeting.   Muscle strength and tone: Grossly normal  Thought Process:  circumstantial  Associations:  no loose associations  Thought Content:  no evidence of suicidal ideation or homicidal ideation and auditory hallucinations present  Insight:  limited  Judgement:  limited  Oriented to:  time, person, and place  Attention Span and Concentration:  fair  Recent and Remote Memory:  fair         DSM-5 Diagnosis:     Substance Induced Psychosis  Rule out Schizoaffective Disorder  History of ADHD          Assessment:     Ej Dunaway is a 23 year old with past diagnosis of schizoaffective disorder vs substance induced psychosis, as well as past diagnoses of depression and ADHD, and medical history of elevated lead levels who presented to [facility] ED 12/26/23 (after ED visit 12/25) with ongoing symptoms of hallucinations, psychosis, and delusions. Psychiatry saw patient 12/25 and started Haldol 5mg BID. Patient had been at [facility] program but apparently was not allowed to return due to her acute mental health. She was discharged 12/25 to a crisis residence, however returned the next day via EMS as she was agitated and psychotic. She is now awaiting inpatient  bed.     Diagnoses are consistent with above, it does not appear she has had a period of sobriety long enough to truly disentangle substance induced psychosis vs a primary disorder. Today she is reporting likely EPS of Parkinsonism and akathisia after starting Haldol two days ago. She says she wants to switch to a different medication. Because she looks hypomanic, sleep is an issue, and hallucinations are worse at night, will start Seroquel tonight. She continues to be voluntary for psychiatric admission.            Summary " of Recommendations:     - Discontinue scheduled Haldol  - Start Seroquel 50mg tonight, increase to 100mg tomorrow night  - Restarted PTA gabapentin 100mg TID  - Continue Benztropine 1mg BID  - Continue hydroxyzine, olanzapine, trazodone PRNs as ordered    James Luis MD    Department of Psychiatry  [phone]

## 2023-12-27 NOTE — PROGRESS NOTES
Triage & Transition Services, Extended Care     Client Name: Ej Dunaway    Date: December 27, 2023    Patient was seen yes  Mode of Assessment: In person    Service Type: refused to attend group session  Session Start Time:  1015    Session End Time: 1015  Session Length: 0  Site Location: [facility] EMERGENCY DEPARTMENT                             [id number]  Total Number ofAttendees: 0  Topic:   self-care activities, balanced lifestyle   Response: refused to participate     Padmini Nickerson, [facility]   Licensed Mental Health Professional (LMHP), Extended Care  [phone]

## 2023-12-27 NOTE — ED NOTES
Pt was out in the milieu start of shift with intermittent pacing. Before dinner Pt reported AVH that is making her scared of sleeping,and requested Zyprexa. As soon as pt finished eating dinner she slept, and did not get the PRN Zyprexa. Writer prompted pt like three times before she managed to take her night meds. Pt refused vitals before meds. Will continue to evaluate.

## 2023-12-28 ENCOUNTER — TELEPHONE (OUTPATIENT)
Dept: BEHAVIORAL HEALTH | Facility: CLINIC | Age: 23
End: 2023-12-28
Payer: MEDICAID

## 2023-12-28 VITALS
RESPIRATION RATE: 17 BRPM | SYSTOLIC BLOOD PRESSURE: 108 MMHG | DIASTOLIC BLOOD PRESSURE: 64 MMHG | WEIGHT: 210 LBS | BODY MASS INDEX: 33.89 KG/M2 | HEART RATE: 98 BPM | OXYGEN SATURATION: 96 % | TEMPERATURE: 98.3 F

## 2023-12-28 PROCEDURE — 250N000013 HC RX MED GY IP 250 OP 250 PS 637: Performed by: FAMILY MEDICINE

## 2023-12-28 PROCEDURE — 250N000013 HC RX MED GY IP 250 OP 250 PS 637: Performed by: STUDENT IN AN ORGANIZED HEALTH CARE EDUCATION/TRAINING PROGRAM

## 2023-12-28 RX ORDER — QUETIAPINE FUMARATE 50 MG/1
50 TABLET, FILM COATED ORAL AT BEDTIME
Qty: 30 TABLET | Refills: 0 | Status: SHIPPED | OUTPATIENT
Start: 2023-12-28 | End: 2024-07-20

## 2023-12-28 RX ORDER — GABAPENTIN 100 MG/1
300 CAPSULE ORAL 3 TIMES DAILY
Qty: 30 CAPSULE | Refills: 0 | Status: SHIPPED | OUTPATIENT
Start: 2023-12-28 | End: 2024-07-20

## 2023-12-28 RX ORDER — TRAZODONE HYDROCHLORIDE 150 MG/1
150 TABLET ORAL AT BEDTIME
Qty: 30 TABLET | Refills: 0 | Status: SHIPPED | OUTPATIENT
Start: 2023-12-28 | End: 2024-07-20

## 2023-12-28 RX ADMIN — GABAPENTIN 100 MG: 100 CAPSULE ORAL at 08:46

## 2023-12-28 RX ADMIN — GABAPENTIN 100 MG: 100 CAPSULE ORAL at 13:06

## 2023-12-28 RX ADMIN — HYDROXYZINE HYDROCHLORIDE 50 MG: 25 TABLET, FILM COATED ORAL at 11:09

## 2023-12-28 RX ADMIN — NICOTINE POLACRILEX 4 MG: 4 GUM, CHEWING BUCCAL at 08:55

## 2023-12-28 RX ADMIN — BENZTROPINE MESYLATE 1 MG: 1 TABLET ORAL at 08:46

## 2023-12-28 ASSESSMENT — ACTIVITIES OF DAILY LIVING (ADL)
ADLS_ACUITY_SCORE: 35

## 2023-12-28 ASSESSMENT — COLUMBIA-SUICIDE SEVERITY RATING SCALE - C-SSRS
TOTAL  NUMBER OF ABORTED OR SELF INTERRUPTED ATTEMPTS SINCE LAST CONTACT: NO
2. HAVE YOU ACTUALLY HAD ANY THOUGHTS OF KILLING YOURSELF?: NO
TOTAL  NUMBER OF INTERRUPTED ATTEMPTS SINCE LAST CONTACT: NO
1. SINCE LAST CONTACT, HAVE YOU WISHED YOU WERE DEAD OR WISHED YOU COULD GO TO SLEEP AND NOT WAKE UP?: NO
SUICIDE, SINCE LAST CONTACT: NO
6. HAVE YOU EVER DONE ANYTHING, STARTED TO DO ANYTHING, OR PREPARED TO DO ANYTHING TO END YOUR LIFE?: NO
ATTEMPT SINCE LAST CONTACT: NO

## 2023-12-28 NOTE — PROGRESS NOTES
"Triage and Transition Services Extended Care Reassessment     Patient: Ej goes by \"Ej,\" uses she/her pronouns  Date of Service: December 28, 2023  Site of Service: [facility] EMERGENCY DEPARTMENT                               Patient was seen yes  Mode of Assessment: In person     Reason for Reassessment: significant behavior change and patient request for disposition change.    History of Patient's Original Emergency Room Encounter: Patient was recently seen at [facility] yesterday, had been discharged with prescriptions to [facility].  Patient returns to the emergency room today via EMS.  Per [facility] staff patient grew increasingly agitated, was insistent that [facility] was talking to her and telling her to go back to the ED.  [facility] staff attempted to de-escalate patient however she continued to insist that [facility] was talking to her and telling her to return to the ED.  Patient did not sleep overnight while at [facility], reports having trouble sleep for the last several days.  Indicates the voices are very loud and keep her from falling asleep.  Patient denies any outpatient supports including medication management and therapy, also denies having additional supports such as family or friends.  States that she is unable to deal with society at this time.  Feels she needs an inpatient hospitalization for stabilization, would like to go to an IRTS following hospitalization and then be referred to [place] [facility].  Patient reports her supports are Hood and the angels, but they tell her what is good.  Reports that the demons have been trying to take away from [facility], does acknowledge experiencing visual hallucinations, also hearing the demons while visiting with this writer.  Reports that demons are present in the room.  Also indicates that this writer is a demon.  Patient appears to be responding to internal stimuli, often will have a blank stare, not responding to " "questions, asking for questions to be repeated.    Current Patient Presentation: Patient presented alert and orientated x4. Patient maintained appropriate eye contact and was able to engage in and track conversation appropriately. Patient shared she is \"really good\" and that she spoke with her sober house about returning today. Patient reports she is \"back to reality\" and that she had been experiencing a \"spiritual psychosis\". She reports she has had similar experience of increased auditory hallucinations and Protestant delusions such as believing her family is demons in the past when using. She reports auditory hallucinations are \"basically gone\" and are back to baseline. Patient denies SI/HI/NSSIB and shared that she only experiences SI when in psychosis. Patient was future orientated and actively engage in safety planning and scheduling outpatient services. Patient reports she has a strong outpatient support system that includes family, MHR , , and sober housing. Patient actively engaged in scheduling outpatient services to including individual therapy, medication management and an intake with [facility].    Changes Observed Since Initial Assessment: decrease in presenting symptoms, patient/family request    Therapeutic Interventions Provided: Engaged in safety planning, Coached on coping techniques/relaxation skills to help improve distress tolerance and managing intense emotions.    Current Symptoms: anxious     Mental Status Exam   Affect: Appropriate  Appearance: Appropriate  Attention Span/Concentration: Attentive  Eye Contact: Engaged    Fund of Knowledge: Appropriate   Language /Speech Content: Fluent  Language /Speech Volume: Normal  Language /Speech Rate/Productions: Normal  Recent Memory: Intact  Remote Memory: Intact  Mood: Normal  Orientation to Person: Yes   Orientation to Place: Yes  Orientation to Time of Day: Yes  Orientation to Date: Yes     Situation (Do " they understand why they are here?): Yes  Psychomotor Behavior: Normal  Thought Content: Clear  Thought Form: Intact    Treatment Objective(s) Addressed: rapport building, identifying and practicing coping strategies, safety planning, identifying an appropriate aftercare plan, assessing safety    Patient Response to Interventions: eager to participate    Progress Towards Goals:  Patient Reports Symptoms Are: stable  Patient Progress Toward Goals: is making progress as evidence by Patient denied SI/HI/NSSIB. Patient reports significant decrease in auditory hallucinations. Patient actively engaged with care team on creating a safety plan and scheduling outpatient services.    Case Management:   Spiritual healing [facility] Clarice  Summary of Interaction: Spoke with Clarice via phone about patient returning. She reports patient is able to return this afternoon if she has an intake with [facility] scheduled.    C-SSRS Since Last Contact:   1. Wish to be Dead (Since Last Contact): No  2. Non-Specific Active Suicidal Thoughts (Since Last Contact): No     Actual Attempt (Since Last Contact): No  Has subject engaged in non-suicidal self-injurious behavior? (Since Last Contact): No  Interrupted Attempts (Since Last Contact): No  Aborted or Self-Interrupted Attempt (Since Last Contact): No  Preparatory Acts or Behavior (Since Last Contact): No  Suicide (Since Last Contact): No     Calculated C-SSRS Risk Score (Since Last Contact): No Risk Indicated    Plan: Discharge to [facility] with scheduled appointment for individual therapy, medication management and [facility].   Clinical Substantiation: After brief therapeutic intervention, observation, and aftercare planning by Extended Care and in consultation with attending provider and psychiatry consult, the patient's initial crisis appears to have resolved and patients current mental state is appropriate for outpatient management.  It is the recommendation of  this clinician that pt discharge with OP MH support. At this time the pt is not presenting as an acute risk to self or others due to the following factors: Denied SI/HI/NSSIB, actively engaged in safety planning and scheduling outpatient care.    Plan for Care reviewed with assigned Medical Provider: yes- Dr. Pa  Plan for Care Team Review: RN  Patient and/or validated legal guardian concurs: yes      Session Status: Time session started: 0915  Time session ended: 1000  Session Duration (minutes): 45 minutes  Session Number: 2  Anticipated number of sessions or this episode of care: 2    Session Start Time: 0915  Session Stop Time: 1000  CPT codes: 28307 - Psychotherapy (with patient) - 45 (38-52*) min  Time Spent: 45 minutes      CPT code(s) utilized: 37559 - Psychotherapy (with patient) - 45 (38-52*) min    Diagnosis:   Patient Active Problem List   Diagnosis Code    Urinary incontinence, unspecified type R32    Frequent UTI N39.0    ADHD (attention deficit hyperactivity disorder), combined type F90.2    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    Obesity E66.9    Foster care child Z62.21    Chemical dependency (H) F19.20    Depo-Provera contraceptive status Z30.42    Generalized anxiety disorder F41.1    Recurrent major depression (H24) F33.9    BV (bacterial vaginosis) N76.0, B96.89    Mixed stress and urge urinary incontinence N39.46    Schizophrenia (H) F20.9    Posttraumatic stress disorder F43.10    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   Licensed Mental Health Professional (LMHP), Extended Care  [phone]

## 2023-12-28 NOTE — PROGRESS NOTES
Triage & Transition Services, Extended Care     Client Name: Ej Dunaway    Date: December 28, 2023    Patient was seen yes  Mode of Assessment: In person    Service Type: refused to attend group session  Session Start Time:  1212    Session End Time: 1212  Session Length: 0  Site Location: [facility] EMERGENCY DEPARTMENT                             [id number]  Total Number ofAttendees: 0  Topic:   emotions/expression   Response: refused to participate     Altagracia Marcus [facility]   Licensed Mental Health Professional (LMHP), Extended Care  [phone]

## 2023-12-28 NOTE — ED NOTES
IP MH Referral Acuity Rating Score (RARS)    LMHP complete at referral to IP MH, with DEC; and, daily while awaiting IP MH placement. Call score to PPS.  CRITERIA SCORING   New 72 HH and Involuntary for IP MH (not adolescent) 0/1   Boarding over 24 hours 1/1   Vulnerable adult at least 55+ with multiple co morbidities; or, Patient age 11 or under 0/1   Suicide ideation without relief of precipitating factors 0/1   Current plan for suicide 0/1   Current plan for homicide 0/1   Imminent risk or actual attempt to seriously harm another without relief of factors precipitating the attempt 0/1   Severe dysfunction in daily living (ex: complete neglect for self care, extreme disruption in vegetative function, extreme deterioration in social interactions) 1/1   Recent (last 2 weeks) or current physical aggression in the ED 0/1   Restraints or seclusion episode in ED 0/1   Verbal aggression, agitation, yelling, etc., while in the ED 0/1   Active psychosis with psychomotor agitation or catatonia 1/1   Need for constant or near constant redirection (from leaving, from others, etc).  0/1   Intrusive or disruptive behaviors 0/1   TOTAL Acuity Total Score: 3    0

## 2023-12-28 NOTE — TELEPHONE ENCOUNTER
R: [place]  Access Inpatient Bed Call Log 12/28/23 12AM:      Intake has called facilities that have not updated the bed status within the last 12 hours.                                 [facility] is at capacity             [facility] is posting 0 beds. [phone]     [facility] is posting 0 beds. Negative covid required                  [facility] is posting 0 beds. Neg covid. No high school or [place]-[facility]. [phone] low acuity only    [facility] is posting 0 beds. [phone]    [facility] is posting 0 beds. [phone]     [facility] is posting 4 beds. Negative covid. [phone]. Per Sherry @ 12AM, beds available     [facility] is posting 0 beds            [facility] ([facility] is posting 2 beds [phone]. Per Carri @ 12:03AM, they are reviewing other referrals, but PPS can call back later        [facility] is posting 5 beds. LOW acuity ONLY. Mixed unit 12+. Negative covid- [phone]    [facility] has 1 bed posted. No aggression. Negative Covid. Low acuity. Per Carri @ 12:03AM, they are reviewing other referrals, but PPS can call back later     [facility] is posting 0 beds. Negative covid. [phone]     [facility] ([place]) is posting 3 beds. Low acuity only. Negative covid.  [phone]. Per Eze @ 12:01AM, low acuity beds available and they are capped on aggression    [facility] is posting 2 beds. Low acuity. No current aggression. [phone]. Per Carri @ 12:03AM, they are reviewing other referrals, but PPS can call back later     [facility] ([place]) is posting 3 beds available. Negative covid.  [phone]. Per Eze @ 12:01AM, no beds available         [facility] is posting 1 bed. Low acuity. 72 HH hold preferred. Negative covid required. [phone] *Does not review after 10PM*     [facility] ([place]) is posting 7 beds. Low acuity only. Negative covid.  [phone]. Per  Eze @ 12:01AM, low acuity beds available and they are capped on aggression      [facility] [place] is posting 5 beds.  Negative covid required.   Vol only, No history of aggression, violence, or assault. No sexual offenders. No 72 HH holds. [phone]          [facility] is posting 7 beds. Negative covid required.  (Must have the cognitive ability to do programming. No aggressive or violent behavior or recent HX in the last 2 yrs. MH must be primary.) Always low acuity. [phone]     [facility] has 0 beds posted. Negative covid required.  Low acuity only. Violence and aggression capped.  [phone]     [facility] is posting 1 bed. Low acuity, Negative covid required. [phone]. Per Gracie @ 12:06AM, no beds and they have a waiting list      [facility] is posting 0 beds. Unit is a combined unit (14+). No aggressive patients. Voluntary only. Must be accompanied by a guardian.  Negative covid. [phone]      [facility] Negative covid required.  [phone]     [facility] [place] is posting 5 beds. Negative covid. LOW acuity. (No lines, drains, or tubes, oxygen, CPAP, IV, etc.) Must Have a Ride Home. [phone]. Per Elier @ 12:07AM, beds available     [facility] is posting 5 beds. Negative covid. Must Have a Ride Home. (No. lines, drains, or tubes, oxygen, CPAP, IV, etc.). [phone]. Per Caridad @ 12:07AM, beds available for pts appropriate for a mixed unit         Pt remains on the work list pending appropriate bed availability.           Hospital chart

## 2023-12-28 NOTE — ED PROVIDER NOTES
[facility] ED Mental Health Handoff Note:       Brief HPI:  This is a 23 year old female signed out to me.  See initial ED Provider note for full details of the presentation. Interval history is pertinent for continued stabilization and plan to discharge back to group home/sober living.    Home meds reviewed and ordered/administered: Yes    Medically stable for inpatient mental health admission: Yes.    Evaluated by mental health: Yes. The recommendation is for outpatient mental health treatment. Resources and plan given to patient.    Safety concerns: At the time I received sign out, there were no safety concerns.    Hold Status:  Active Orders   N/A           Exam:   Patient Vitals for the past 24 hrs:   BP Temp Temp src Pulse Resp SpO2   12/28/23 1003 108/64 98.3  F (36.8  C) Oral 98 17 96 %   12/27/23 1641 116/79 98.8  F (37.1  C) Oral 94 17 96 %           ED Course:    Medications   benztropine (COGENTIN) tablet 1 mg (1 mg Oral $Given 12/28/23 0846)   nicotine polacrilex (NICORETTE) gum 4 mg (4 mg Buccal $Given 12/28/23 0855)   hydrOXYzine HCl (ATARAX) tablet 50 mg (50 mg Oral $Given 12/28/23 1109)   traZODone (DESYREL) tablet 150 mg (150 mg Oral $Given 12/27/23 2104)   OLANZapine zydis (zyPREXA) ODT tab 5 mg (5 mg Oral $Given 12/27/23 0141)   gabapentin (NEURONTIN) capsule 100 mg (100 mg Oral $Given 12/28/23 1306)   QUEtiapine (SEROquel XR) 24 hr tablet 100 mg (has no administration in time range)   hydrOXYzine HCl (ATARAX) tablet 25 mg (25 mg Oral $Given 12/26/23 1401)   haloperidol (HALDOL) tablet 5 mg (5 mg Oral $Given 12/26/23 1401)   QUEtiapine (SEROquel XR) 24 hr tablet 50 mg (50 mg Oral $Given 12/27/23 2104)            There were no significant events during my shift.          Impression:    ICD-10-CM    1. Schizoaffective disorder, bipolar type (H)  F25.0           Plan:    Discharged.      RESULTS:   Results for orders placed or performed during the hospital encounter of 12/26/23 (from the past  24 hour(s))   Urine Drug Screen     Status: Normal    Collection Time: 12/27/23  5:19 PM    Narrative    The following orders were created for panel order Urine Drug Screen.  Procedure                               Abnormality         Status                     ---------                               -----------         ------                     Urine Drug Screen Panel[[id number]]      Normal              Final result                 Please view results for these tests on the individual orders.   Urine Drug Screen Panel     Status: Normal    Collection Time: 12/27/23  5:19 PM   Result Value Ref Range    Amphetamines Urine Screen Negative Screen Negative    Barbituates Urine Screen Negative Screen Negative    Benzodiazepine Urine Screen Negative Screen Negative    Cannabinoids Urine Screen Negative Screen Negative    Cocaine Urine Screen Negative Screen Negative    Fentanyl Qual Urine Screen Negative Screen Negative    Opiates Urine Screen Negative Screen Negative    PCP Urine Screen Negative Screen Negative   HCG qualitative urine (UPT)     Status: Normal    Collection Time: 12/27/23  5:20 PM   Result Value Ref Range    hCG Urine Qualitative Negative Negative             MD Ember Mesa Eric Girard, MD  12/28/23 2386

## 2023-12-28 NOTE — ED NOTES
Uneventful night. Safety precautons in place. Up x1 briefly to restroom. No behavior issues over this shift. Appeared to be asleep on all other safety checks. No complaint of pain distress/discomfort. Staff to continue to assess/monitor

## 2023-12-28 NOTE — TELEPHONE ENCOUNTER
R:  [facility] notified Intake pt is D/Cing home with outpatient services and can be removed from the worklist

## 2023-12-28 NOTE — DISCHARGE INSTRUCTIONS
You are scheduled for the following:     Type: Teletherapy  Date: Saturday, 12/30/2023  Time: 10:00 am - 11:00 am  Provider: Kai Lopez  Location: [facility], [place] [address]  Phone: [phone]    Type: Telepsychiatry  Date: Monday, 1/1/2024  Time: 11:00 am - 12:00 pm  Provider: Laura Tamayo  MSN  CNP,PMHNP  Location: [address] [place], [place] [address]  Phone: [phone]  Patient Instructions: I do in person appointments only on Thursdays at this location from 10am until 4pm with some exceptions. I do virtual visits the rest of the weekdays from 9am until 5pm. Please call  to clarify anything you need to know prior to your appointment. Anything that needs to be faxed e.g discharge paperwork should be done prior to the appointment. Our fax number is [phone]. The cover sheet should indicate my name  Laura Tamayo.     It is your responsibility to contact your insurance company directly to verify coverage, eligibility, payment, and benefit information for any appointments or referrals listed above.    [facility] maintains an extensive network of licensed behavioral health providers to connect patients with the services they need. We do not charge providers a fee to participate in our referral network. We match patients with providers based on a patient's specific treatment needs, insurance coverage, and location. Our first effort will be to refer you to a provider within your care system and will utilize providers outside your care system as needed.     It is recommended that you follow up with your established providers (psychiatrist, mental health therapist, and/or primary care doctor - as relevant) as soon as possible. Coordinators from [facility] will be calling you in the next 24-48 hours to ensure that you have the resources you need.  You can also contact [facility] coordinators directly at [phone].

## 2023-12-28 NOTE — ED NOTES
Pt is pleasant, cooperative with care. Was visible on unit socialized appropriately with staffs and peers. Pt reported feeling better this shift. Reduced AVH, anxiety and depression. Denied pain. Vital signs within normal limit. Med compliant and contracted for safety. Will continue to evaluate.

## 2023-12-28 NOTE — ED NOTES
Pt is visible in the milieu. She is observed to be social and engaged with peers and staff. Denies pain, VSS. Denies SI HI and hallucinations. Endorses anxiety 7/10. Requested/administered PRN hydroxyzine 50 mg. Pt remains behaviorally controlled. Staff will continue to monitor.

## 2023-12-28 NOTE — ED NOTES
Pt discharged to Sober living facility.  Discharge instructions were reviewed by writer. Pt verbalized  understanding discharge orders. Stated that she will remain safe in the community, will take all her meds as ordered, and that she will follow up with aftercare appointments. Pt denied SI, HI, and hallucinations. Pt was discharged with medications that were ordered other than one  that was not covered by insurance per pharmacy. Pt reported she has an appointment with her psychiatrist on Monday and will follow up. Pt took all her personal belongings and was escorted to the main entrance to catch a taxi to her destination.

## 2023-12-29 ENCOUNTER — OFFICE VISIT (OUTPATIENT)
Dept: INTERNAL MEDICINE | Facility: CLINIC | Age: 23
End: 2023-12-29
Payer: MEDICAID

## 2023-12-29 VITALS
RESPIRATION RATE: 16 BRPM | BODY MASS INDEX: 36.55 KG/M2 | WEIGHT: 227.4 LBS | SYSTOLIC BLOOD PRESSURE: 122 MMHG | HEIGHT: 66 IN | HEART RATE: 108 BPM | DIASTOLIC BLOOD PRESSURE: 64 MMHG | OXYGEN SATURATION: 98 %

## 2023-12-29 DIAGNOSIS — Z11.3 SCREEN FOR STD (SEXUALLY TRANSMITTED DISEASE): Primary | ICD-10-CM

## 2023-12-29 LAB
CLUE CELLS: ABNORMAL
HBV SURFACE AG SERPL QL IA: NONREACTIVE
HCG UR QL: NEGATIVE
HIV 1+2 AB+HIV1 P24 AG SERPL QL IA: NONREACTIVE
HSV1 IGG SERPL QL IA: 59.3 INDEX
HSV1 IGG SERPL QL IA: ABNORMAL
HSV2 IGG SERPL QL IA: 0.09 INDEX
HSV2 IGG SERPL QL IA: ABNORMAL
T PALLIDUM AB SER QL: NONREACTIVE
TRICHOMONAS, WET PREP: ABNORMAL
WBC'S/HIGH POWER FIELD, WET PREP: ABNORMAL
YEAST, WET PREP: ABNORMAL

## 2023-12-29 PROCEDURE — 87491 CHLMYD TRACH DNA AMP PROBE: CPT | Performed by: INTERNAL MEDICINE

## 2023-12-29 PROCEDURE — 36415 COLL VENOUS BLD VENIPUNCTURE: CPT | Performed by: INTERNAL MEDICINE

## 2023-12-29 PROCEDURE — 87210 SMEAR WET MOUNT SALINE/INK: CPT | Performed by: INTERNAL MEDICINE

## 2023-12-29 PROCEDURE — 87522 HEPATITIS C REVRS TRNSCRPJ: CPT | Performed by: INTERNAL MEDICINE

## 2023-12-29 PROCEDURE — 87389 HIV-1 AG W/HIV-1&-2 AB AG IA: CPT | Performed by: INTERNAL MEDICINE

## 2023-12-29 PROCEDURE — 87591 N.GONORRHOEAE DNA AMP PROB: CPT | Performed by: INTERNAL MEDICINE

## 2023-12-29 PROCEDURE — 86696 HERPES SIMPLEX TYPE 2 TEST: CPT | Performed by: INTERNAL MEDICINE

## 2023-12-29 PROCEDURE — 86695 HERPES SIMPLEX TYPE 1 TEST: CPT | Performed by: INTERNAL MEDICINE

## 2023-12-29 PROCEDURE — 99213 OFFICE O/P EST LOW 20 MIN: CPT | Performed by: INTERNAL MEDICINE

## 2023-12-29 PROCEDURE — 87340 HEPATITIS B SURFACE AG IA: CPT | Performed by: INTERNAL MEDICINE

## 2023-12-29 PROCEDURE — 86780 TREPONEMA PALLIDUM: CPT | Performed by: INTERNAL MEDICINE

## 2023-12-29 PROCEDURE — 81025 URINE PREGNANCY TEST: CPT | Performed by: INTERNAL MEDICINE

## 2023-12-29 ASSESSMENT — PATIENT HEALTH QUESTIONNAIRE - PHQ9
SUM OF ALL RESPONSES TO PHQ QUESTIONS 1-9: 16
SUM OF ALL RESPONSES TO PHQ QUESTIONS 1-9: 16
10. IF YOU CHECKED OFF ANY PROBLEMS, HOW DIFFICULT HAVE THESE PROBLEMS MADE IT FOR YOU TO DO YOUR WORK, TAKE CARE OF THINGS AT HOME, OR GET ALONG WITH OTHER PEOPLE: VERY DIFFICULT

## 2023-12-29 NOTE — PROGRESS NOTES
Assessment & Plan     Screen for STD (sexually transmitted disease)  24 yo female with h./o gonorrhea few years ago, had 2 unprotected sexual intercourse with 2 different partners.  She reports sore throat and dry mouth, and some increase oral and vaginal discharge.    Wet prep was done and labs will be done.  She had multiple gynecological questions for which she will be referred to GYN.    - Wet prep - Clinic Collect  - Neisseria gonorrhoeae PCR; Future  - Chlamydia trachomatis PCR; Future  - Neisseria gonorrhoeae PCR; Future  - Hepatitis B surface antigen; Future  - Hepatitis C RNA, Quantitative by PCR with Confirmatory Reflex to Genotyping; Future  - Wet preparation  - HIV Antigen Antibody Combo Cascade; Future  - Herpes Simplex Virus 1 and 2 IgG; Future  - Treponema Abs w Reflex to RPR and Titer; Future  - Ob/Gyn  Referral; Future  - Hepatitis B surface antigen  - Hepatitis C RNA, Quantitative by PCR with Confirmatory Reflex to Genotyping  - HIV Antigen Antibody Combo Cascade  - Herpes Simplex Virus 1 and 2 IgG  - Treponema Abs w Reflex to RPR and Titer             Nicotine/Tobacco Cessation:  She reports that she has been smoking cigarettes and vaping device. She has been smoking an average of 0.5 packs per day. She has never been exposed to tobacco smoke. She has never used smokeless tobacco.  Nicotine/Tobacco Cessation Plan:   Information offered: Patient not interested at this time      Return in about 1 year (around 12/29/2024) for Routine preventive.     Twaanda Galaviz MD  [facility]    Gerardo Pagan is a 23 year old, presenting for the following health issues:  Follow Up (F/U Potential STD)      12/29/2023    12:48 PM   Additional Questions   Roomed by Dede       History of Present Illness       Reason for visit:  Std  Symptom onset:  3-4 weeks ago  Symptoms include:  Throat hurts mucus too much discharge smell  Symptom intensity:  Severe  Symptom  "progression:  Staying the same  Had these symptoms before:  Yes  Has tried/received treatment for these symptoms:  Yes  Previous treatment was successful:  Yes  Prior treatment description:  Antibiotics  What makes it worse:  Waking up in the morning  What makes it better:  Cold drinks    She eats 0-1 servings of fruits and vegetables daily.She consumes 2 sweetened beverage(s) daily.She exercises with enough effort to increase her heart rate 9 or less minutes per day.  She exercises with enough effort to increase her heart rate 3 or less days per week.   She is taking medications regularly.                 Review of Systems         Objective    /64   Pulse 108   Resp 16   Ht 1.676 m (5' 6\")   Wt 103.1 kg (227 lb 6.4 oz)   SpO2 98%   BMI 36.70 kg/m    Body mass index is 36.7 kg/m .  Physical Exam                         "

## 2023-12-30 LAB
C TRACH DNA SPEC QL NAA+PROBE: NEGATIVE
HCV RNA SERPL NAA+PROBE-ACNC: NOT DETECTED IU/ML
N GONORRHOEA DNA SPEC QL NAA+PROBE: NEGATIVE

## 2024-01-04 NOTE — COMMUNITY RESOURCES LIST (ENGLISH)
01/04/2024   [facility]  N/A  For questions about this resource list or additional care needs, please contact your primary care clinic or care manager.  Phone: [phone]   Email: N/A   Address: [address] [place] [id number]   Hours: N/A        Food and Nutrition       Food pantry  1  [facility] Food Market Distance: 0.4 miles      Pickup   [address] [place] [address]  Language: Romanian, English, Hmong, Natalia, Stateless  Hours: Mon 1:00 PM - 4:00 PM , Mon 5:00 PM - 6:30 PM , Tue - Fri 9:00 AM - 11:30 AM , Tue - Fri 1:00 PM - 3:30 PM  Fees: Free   Phone: (433) 326-3830 Website: https://[employer]/     2  [facility] and [facility] [place] [facility] Distance: 1.78 miles      Pickup   [address] [place] [address]  Language: English, Hmong, Tamazight, Stateless  Hours: Mon 11:00 AM - 3:00 PM , Wed 11:00 AM - 3:00 PM , Fri 11:00 AM - 3:00 PM  Fees: Free, Self Pay   Phone: (389) 523-8001 Email: Ismael@[email or web address].org Website: http://[facility].org/[facility]/[address]/     [facility] application assistance  3  [facility] Outreach Distance: 0.4 miles      Phone/Virtual   [address] [place] [address]  Language: Romanian, English, Hmong, Natalia, Stateless  Hours: Mon - Fri 10:00 AM - 12:00 PM , Mon - Fri 2:00 PM - 4:00 PM  Fees: Free   Phone: (786) 730-6949 Website: https://[employer].Campus Job/     4  [facility] and [facility] Distance: 1.78 miles      In-Person, Phone/Virtual   [address] [place] [address]  Language: English  Hours: Mon - Fri 8:00 AM - 4:30 PM  Fees: Free   Phone: (805) 879-6167 Email: natali@[facility]. Website: https://www.[facility]./your-government/departments/health-and-wellness     Soup kitchen or free meals  5  [facility] Office - [place] and [place] Distance: 0.49 miles       Pickup   [address] [place] [address]  Language: English  Hours: Mon - Fri 5:00 PM - 6:00 PM  Fees: Free   Phone: (404) 194-4602 Email: norm@[facility] Website: http://www.[facility] NetworkMedi[facility] Health NetworkDiet4Life/     6  [facility] Distance: 1.8 miles      Pickup   [address] [place] [address]  Language: English  Hours: Mon - Fri 10:00 AM - 6:00 PM  Fees: Free   Phone: (257) 466-4267 Email: development@[employer] Website: https://[employer]bel.[facility]/support/youth/          Transportation       Free or low-cost transportation  7  [employer]  [facility] Circulator Bus Distance: 2.02 miles      In-Person   [address] [place] [address]  Language: English  Hours: Tue 9:00 AM - 2:00 PM  Fees: Self Pay   Phone: (860) 450-7882 Email: info@[employer] Website: http://www.[employer].[facility]/     8  [facility] Distance: 6.33 miles      In-Person   [address] [place] [address]  Language: English, Qatari  Hours: Mon 9:00 AM - 5:00 PM , Tue 9:30 AM - 7:00 PM , Wed 9:00 AM - 5:00 PM , Thu 9:30 AM - 7:00 PM , Fri 9:00 AM - 5:00 PM  Fees: Free   Phone: (911) 826-4068 Email: jay@[employer] Website: http://www.[employer]     Transportation to medical appointments  9  [facility] - Non-Emergency Medical Transportation Distance: 1.76 miles      In-Person   [address] [place] [address]  Language: English  Hours: Mon - Fri 8:00 AM - 4:00 PM Appt. Only  Fees: Self Pay   Phone: (776) 756-8831 Website: http://www.[employer].org/Medical-Services/Emergency-medical-services/Non-emergency-transportation/     10  [facility] Transportation Programs - Non-Emergency Medical Transportation Distance: 4.5 miles      In-Person   [address] [facility] [address] [place] [address]  Language: English, Uruguayan, Qatari  Hours: Mon - Fri 7:00 AM - 6:00 PM  Fees: Insurance   Phone: (943) 298-8544 Ext.7919 Email: efra@[employer].net Website: http://www.[facility].net/[place]/           Important Numbers & Websites       Emergency Services   [address]  [place] [address]  Poison Control   (315) 710-6652  Suicide Prevention Lifeline   (706) 853-3322 (TALK)  Child Abuse Hotline   (788) 473-3386 (4-A-Child)  Sexual Assault Hotline   (201) 950-4028 (HOPE)  National Runaway Safeline   (319) 351-9001 (RUNAWAY)  All-Options Talkline   (635) 948-1155  Substance Abuse Referral   (157) 791-6582 (HELP)

## 2024-01-12 ENCOUNTER — VIRTUAL VISIT (OUTPATIENT)
Dept: INTERNAL MEDICINE | Facility: CLINIC | Age: 24
End: 2024-01-12
Payer: MEDICAID

## 2024-01-12 DIAGNOSIS — H53.8 BLURRED VISION: Primary | ICD-10-CM

## 2024-01-12 PROBLEM — F20.9 SCHIZOPHRENIA (H): Status: ACTIVE | Noted: 2021-01-05

## 2024-01-12 PROBLEM — F19.11 HISTORY OF DRUG ABUSE (H): Status: ACTIVE | Noted: 2024-01-12

## 2024-01-12 PROBLEM — F17.213 NICOTINE DEPENDENCE, CIGARETTES, WITH WITHDRAWAL: Status: ACTIVE | Noted: 2022-06-06

## 2024-01-12 PROBLEM — F12.20 CANNABIS DEPENDENCE (H): Status: ACTIVE | Noted: 2021-01-05

## 2024-01-12 PROBLEM — F28 OTHER PSYCHOTIC DISORDER NOT DUE TO A SUBSTANCE OR KNOWN PHYSIOLOGICAL CONDITION (H): Status: ACTIVE | Noted: 2022-06-06

## 2024-01-12 PROBLEM — F43.10 POSTTRAUMATIC STRESS DISORDER: Status: ACTIVE | Noted: 2022-06-01

## 2024-01-12 PROBLEM — Z30.42 DEPO-PROVERA CONTRACEPTIVE STATUS: Status: RESOLVED | Noted: 2019-05-07 | Resolved: 2024-01-12

## 2024-01-12 PROBLEM — F15.20 METHAMPHETAMINE USE DISORDER, SEVERE (H): Status: ACTIVE | Noted: 2020-11-14

## 2024-01-12 PROBLEM — F10.20 ALCOHOL USE DISORDER, MODERATE, DEPENDENCE (H): Status: ACTIVE | Noted: 2021-01-05

## 2024-01-12 PROBLEM — F33.0 MAJOR DEPRESSIVE DISORDER, RECURRENT, MILD (H): Status: ACTIVE | Noted: 2024-01-12

## 2024-01-12 PROBLEM — R44.3 HALLUCINATION: Status: ACTIVE | Noted: 2022-06-20

## 2024-01-12 PROCEDURE — 99207 PR NO SHOW FOR SCHEDULED APPT: CPT | Performed by: INTERNAL MEDICINE

## 2024-01-12 NOTE — PROGRESS NOTES
Ej is a 23 year old female contacting the clinic today via video, who will use the platform: [employer] or [employer] for the visit.  Phone # for [employer], or if Amwell drops:     Amwell invitation sent to email and phone listed    [employer] invitation sent to mobile phone listed as below    No response    Staff notes phone call straight to voicemail    Will need to reschedule    Telephone Information:   Mobile [phone]   Mobile Not on file.          ASSESSMENT and PLAN:  Video Start Time: 2:47 PM  Video End time:  3:03 PM  Face to face plus orders:   minutes  Documentation time:  3 minutes     The visit lasted a total of  minutes       Answers submitted by the patient for this visit:  General Questionnaire (Submitted on 1/12/2024)  Chief Complaint: Chronic problems general questions HPI Form  What is the reason for your visit today? : diabetes

## 2024-07-20 ENCOUNTER — HOSPITAL ENCOUNTER (EMERGENCY)
Facility: CLINIC | Age: 24
Discharge: HOME | End: 2024-07-20
Attending: EMERGENCY MEDICINE | Admitting: EMERGENCY MEDICINE
Payer: MEDICAID

## 2024-07-20 VITALS
SYSTOLIC BLOOD PRESSURE: 150 MMHG | HEIGHT: 66 IN | HEART RATE: 87 BPM | OXYGEN SATURATION: 98 % | TEMPERATURE: 98.8 F | DIASTOLIC BLOOD PRESSURE: 83 MMHG | WEIGHT: 246.4 LBS | BODY MASS INDEX: 39.6 KG/M2 | RESPIRATION RATE: 18 BRPM

## 2024-07-20 DIAGNOSIS — F25.9 SCHIZOAFFECTIVE DISORDER, UNSPECIFIED TYPE (H): ICD-10-CM

## 2024-07-20 DIAGNOSIS — F23 ACUTE PSYCHOSIS (H): ICD-10-CM

## 2024-07-20 PROBLEM — F25.0 SCHIZOAFFECTIVE DISORDER, BIPOLAR TYPE (H): Status: ACTIVE | Noted: 2023-12-11

## 2024-07-20 PROCEDURE — 99283 EMERGENCY DEPT VISIT LOW MDM: CPT

## 2024-07-20 PROCEDURE — 99204 OFFICE O/P NEW MOD 45 MIN: CPT | Performed by: NURSE PRACTITIONER

## 2024-07-20 PROCEDURE — 250N000013 HC RX MED GY IP 250 OP 250 PS 637: Performed by: NURSE PRACTITIONER

## 2024-07-20 RX ORDER — QUETIAPINE FUMARATE 100 MG/1
100 TABLET, FILM COATED ORAL AT BEDTIME
Qty: 30 TABLET | Refills: 0 | Status: SHIPPED | OUTPATIENT
Start: 2024-07-20

## 2024-07-20 RX ORDER — QUETIAPINE FUMARATE 50 MG/1
50 TABLET, FILM COATED ORAL ONCE
Status: COMPLETED | OUTPATIENT
Start: 2024-07-20 | End: 2024-07-20

## 2024-07-20 RX ORDER — OLANZAPINE 15 MG/1
15 TABLET ORAL AT BEDTIME
COMMUNITY
End: 2024-07-20

## 2024-07-20 RX ADMIN — QUETIAPINE FUMARATE 50 MG: 50 TABLET ORAL at 20:18

## 2024-07-20 ASSESSMENT — ACTIVITIES OF DAILY LIVING (ADL)
ADLS_ACUITY_SCORE: 35

## 2024-07-20 ASSESSMENT — COLUMBIA-SUICIDE SEVERITY RATING SCALE - C-SSRS
1. IN THE PAST MONTH, HAVE YOU WISHED YOU WERE DEAD OR WISHED YOU COULD GO TO SLEEP AND NOT WAKE UP?: NO
6. HAVE YOU EVER DONE ANYTHING, STARTED TO DO ANYTHING, OR PREPARED TO DO ANYTHING TO END YOUR LIFE?: NO
2. HAVE YOU ACTUALLY HAD ANY THOUGHTS OF KILLING YOURSELF IN THE PAST MONTH?: NO

## 2024-07-20 NOTE — PROGRESS NOTES
Patient is 23 year old female with a history of schizoaffective disorder, received from ED due to having auditory hallucinations, and delusions. Reports she has been hearing voices, people talking to her that she is familiar with. She notes the voices comes and go. And  sometimes hard to know if they are real or not.  Pt denies any SI/HI. She is staying at the treatment center for about 3 weeks now. she has been sober for 50 days. Pt has been taking Zyprexa, doesn't see any improvement in the symptoms. She would like medication change.    Nursing and risk assessments completed. Assessments reviewed with LMHP and physician. Admission information reviewed with patient. Patient given a tour of [facility] and instructions on using the facility. Questions regarding [facility] addressed. Pt safety search completed.

## 2024-07-20 NOTE — ED PROVIDER NOTES
Emergency Department Note      History of Present Illness     Chief Complaint   Hallucinations    LUIS F Dunaway is a 23 year old female brought in by her treatment facility staff due to intermittent hallucinations.  The patient has a history of schizoaffective disorder and states that she has been having hallucinations off and on this past week.  Sometimes she believes them, and sometimes she does not.  She has been for about 50 days sober from methamphetamines and has been in the treatment facility now for about 3 weeks.  She has been receiving her medications and has not had any access to drugs or alcohol.  She denies any thoughts of self-harm or wanting to harm others.  She denies any fevers, cough, sore throat, or other symptoms.    Independent Historian   None    Review of External Notes   I reviewed the [facility] emergency department note from 4/30/2024.    Past Medical History     Medical History and Problem List   Past Medical History:   Diagnosis Date    Abnormal lead level in blood     ADHD (attention deficit hyperactivity disorder), combined type     Adjustment disorder with mixed disturbance of emotions and conduct     Anxiety     Chlamydial infection     Conduct disorder     Constipation     Depo-Provera contraceptive status 05/07/2019    Depressive disorder     Foster child     Personal history of other medical treatment (CODE)     Recurrent UTI     Toxic effect of lead and its compounds, accidental (unintentional), initial encounter     Urinary leakage     Urinary tract infection        Medications   fluconazole (DIFLUCAN) 150 MG tablet  gabapentin (NEURONTIN) 100 MG capsule  gabapentin (NEURONTIN) 100 MG capsule  hydrOXYzine (VISTARIL) 50 MG capsule  hydrOXYzine HCl (ATARAX) 25 MG tablet  hydrOXYzine robbin (VISTARIL) 25 MG capsule  QUEtiapine (SEROQUEL) 50 MG tablet  traZODone (DESYREL) 150 MG tablet  vitamin D3 (CHOLECALCIFEROL) 50 mcg (2000 units) tablet        Surgical History    Past Surgical History:   Procedure Laterality Date    NO HISTORY OF SURGERY         Physical Exam     Patient Vitals for the past 24 hrs:   BP Temp Temp src Pulse Resp SpO2   07/20/24 1636 (!) 140/66 98.5  F (36.9  C) Oral 83 18 97 %     Physical Exam  Nursing note and vitals reviewed.  Constitutional:  Oriented to person, place, and time. Cooperative.   HENT:   Nose:    Nose normal.   Mouth/Throat:   Mucous membranes are normal.   Eyes:    Conjunctivae normal and EOM are normal.      Pupils are equal, round, and reactive to light.   Neck:    Trachea normal.   Cardiovascular:  Normal rate, regular rhythm, normal heart sounds and normal pulses. No murmur heard.  Pulmonary/Chest:  Effort normal and breath sounds normal.   Abdominal:   Soft. Normal appearance and bowel sounds are normal.      There is no tenderness.      There is no rebound and no CVA tenderness.   Musculoskeletal:  Extremities atraumatic x 4.   Lymphadenopathy:  No cervical adenopathy.   Neurological:   Alert and oriented to person, place, and time. Normal strength.      No cranial nerve deficit or sensory deficit. GCS eye subscore is 4. GCS verbal subscore is 5. GCS motor subscore is 6.   Skin:    Skin is intact. No rash noted.   Psychiatric:   Endorses intermittent hallucinations but otherwise normal mood and affect.  Denies any suicidal or homicidal ideation.      Diagnostics     Lab Results   Labs Ordered and Resulted from Time of ED Arrival to Time of ED Departure - No data to display    Imaging   No orders to display       Independent Interpretation   None    ED Course      Medications Administered   Medications - No data to display    Procedures   Procedures     Discussion of Management   None    ED Course        Optional/Additional Documentation  None    Medical Decision Making / Diagnosis     CMS Diagnoses: None    MIPS       None    CARLOS Bryan Gume is a 23 year old female who came in for further evaluation of intermittent  hallucinations.  This is a patient who has a history of schizoaffective disorder, but she has been compliant with her medications.  She also has been sober now from methamphetamines for about 3 weeks as well.  She is calm and cooperative here and here voluntarily.  I feel that she is medically cleared to go to [facility] for further evaluation and management.    Disposition   The patient was transferred to [facility].     Diagnosis     ICD-10-CM    1. Acute psychosis (H)  F23       2. Schizoaffective disorder, unspecified type (H)  F25.9                MD Lenard Barragan Seth H, MD  07/20/24 1721

## 2024-07-20 NOTE — ED TRIAGE NOTES
Pt currently resides at [facility], hearing voices and noises, nothing is telling her to hurt herself. Pt reports not being able to report to group therapy due to these voices. On zyprexa and prozac

## 2024-07-20 NOTE — ED NOTES
[facility]  ED to EMPATH Checklist:      Goal for EMPATH: Medication management    Current Behavior: Calm and Cooperative    Safety Concerns: Auditory Hallucinations    Legal Hold Status: Voluntary    Medically Cleared by ED provider: Yes    Patient Therapeutically Searched: Not searched - Currently in triage    Belongings: Remain with patient    Independent Ambulation at Baseline: Yes/No: Yes    Participates in Care/Conversation: Yes/No: Yes    Patient Informed about EMPATH: Yes/No: Yes    DEC: Not ordered    Patient Ready to be Transferred to EMPATH? Yes/No: Yes

## 2024-07-21 ENCOUNTER — TELEPHONE (OUTPATIENT)
Dept: BEHAVIORAL HEALTH | Facility: CLINIC | Age: 24
End: 2024-07-21
Payer: MEDICAID

## 2024-07-21 NOTE — CONSULTS
Diagnostic Evaluation Consultation  Crisis Assessment    Patient Name: Ej Dunaway  Age:  23 year old  Legal Sex: female  Gender Identity: female  Pronouns:   Race: White  Ethnicity: Not  or   Language: English      Patient was assessed: In person   Crisis Assessment Start Date: 07/20/24  Crisis Assessment Start Time: 1815  Crisis Assessment Stop Time: 1845  Patient location: [facility] EMERGENCY DEPT                             [id number]    Referral Data and Chief Complaint  Ej Dunaway presents to the ED per community partner(s) ([facility] staff). Patient is presenting to the ED for the following concerns: Paranoia (AH and delusions).   Factors that make the mental health crisis life threatening or complex are:  Pt is a 23 year female with established diagnosis of schizoaffective disorder. Pt reported that she has bene having increased AH and delusions over the last two weeks along with depressed mood. She explained that she has beliefs about being able to communicate telepathically with others along with spiritual delusions. She explained that she goes in and out of these delusions and lately she has been going to bed and waking up with these symptoms. Pt reported that she has been taking Zyprexa and boo snot find it helpful. She has taken Seroquel in the past and has found this more effective. She request medication management and wants to discharge after the assessment.She denies SI, SIB and HI..      Informed Consent and Assessment Methods  Explained the crisis assessment process, including applicable information disclosures and limits to confidentiality, assessed understanding of the process, and obtained consent to proceed with the assessment.  Assessment methods included conducting a formal interview with patient, review of medical records, collaboration with medical staff, and obtaining relevant collateral information from family and community  providers when available.  : done     Patient response to interventions: eager to participate  Coping skills were attempted to reduce the crisis:  Seeking out support from nursing staff at facility and coming to the ED.     History of the Crisis   Pt reported that she has bene having increased AH and delusions over the last two weeks along with depressed mood. She explained that she has beliefs about being able to communicate telepathically with others along with spiritual delusions.  She explained that she goes in and out of these delusions and lately she has been going  to bed and waking up with these symptoms. She denies SI, SIB and HI. Pt has a history of poly substance use but reported that she has been sober for 50 days from meth and 14 months from fentanyl. Pt reported that her mood and AH symptoms appear to increase as she gets closer to the first day of her period. Pt reported three past suicide attempts. She attempted to hang herself in 2018 and ingested pills 1x in 2016 and drank hydrogen peroxide 1x in 2016. She reported that she has been hospitalized more than 20 times. Chart review indicates that pt has 16 [facility] ED visits in the last 12 month, most were for mental health reasons, but some where for physical health issues.    Brief Psychosocial History  Family:  Single, Children no  Support System:  Parent(s) (Foster mom and respite mom)  Employment Status:  unemployed  Source of Income:  none  Financial Environmental Concerns:  unemployed  Current Hobbies:  reading  Barriers in Personal Life:  mental health concerns    Significant Clinical History  Current Anxiety Symptoms:     Current Depression/Trauma:  withdrawl/isolation, sadness  Current Somatic Symptoms:  anxious  Current Psychosis/Thought Disturbance:  auditory hallucinations (delusions)  Current Eating Symptoms:     Chemical Use History:  Alcohol: None  Benzodiazepines: None  Opiates: None  Cocaine: None  Marijuana: None  Other Use: None   Past  diagnosis:  Substance Use Disorder, Other (Scizoaffective DO)  Family history:  Schizophrenia (Pt reported that her mother has schizophrenia and lives in a group home. Her dad has schizoaffective disorder. Her brother talks to himself and has substance use struggles.)  Past treatment:  Individual therapy, Case management, Residential Treatment, Supportive Living Environment (group home, long-term house, etc), Day Treatment, Civil Commitment  Details of most recent treatment:  Pt is doing a residental dual diagnosis program At [facility] and is set to gradiate on August 6th.  Other relevant history:  She reported that she has been hospitalized more than 20 times. Chart review indicates that pt has 16 [facility] ED visits in the last 12 month, most were for mental health reasons, but some where for physical health issues.       Collateral Information  Is there collateral information: Yes     Collateral information name, relationship, phone number:  [facility] staff at [facility] in [facility]    What happened today: Pt came to nursing staff this morning and reported concern about telepathic communication and AH.     What is different about patient's functioning: Pt has been isolating in her room in the last few days and has not attended groups. She has reported that she has been feeling depressed.     Concern about alcohol/drug use:  Unsure. Staff has recently found drugs in the laundry room at the facility. They do not which residents have used and if this involves pt.     What do you think the patient needs:  Medication management.     Has patient made comments about wanting to kill themselves/others: no    If d/c is recommended, can they take part in safety/aftercare planning:  yes    Additional collateral information:  No      Risk Assessment  [place] Suicide Severity Rating Scale Full Clinical Version:  Suicidal Ideation  Q6 Suicide Behavior (Lifetime): yes          [place] Suicide Severity Rating Scale Recent:   Suicidal  Ideation (Recent)  Q1 Wished to be Dead (Past Month): no  Q2 Suicidal Thoughts (Past Month): no  If yes to Q6, within past 3 months?: no  Level of Risk per Screen: moderate risk     Suicidal Behavior (Recent)  Actual Attempt (Past 3 Months): No  Has subject engaged in non-suicidal self-injurious behavior? (Past 3 Months): No  Interrupted Attempts (Past 3 Months): No  Aborted or Self-Interrupted Attempt (Past 3 Months): No    Environmental or Psychosocial Events: neither working nor attending school  Protective Factors: Protective Factors: lives in a responsibly safe and stable environment, good treatment engagement, sense of importance of health and wellness, supportive ongoing medical and mental health care relationships, help seeking, other (see comment) (Pt displays insight re own symptomalogy)    Does the patient have thoughts of harming others? Feels Like Hurting Others: no  Previous Attempt to Hurt Others: no  Is the patient engaging in sexually inappropriate behavior?: no    Is the patient engaging in sexually inappropriate behavior?  no        Mental Status Exam   Affect: Appropriate  Appearance: Appropriate  Attention Span/Concentration: Attentive  Eye Contact: Engaged    Fund of Knowledge: Appropriate   Language /Speech Content: Fluent  Language /Speech Volume: Normal  Language /Speech Rate/Productions: Normal  Recent Memory: Intact  Remote Memory: Intact  Mood: Normal  Orientation to Person: Yes   Orientation to Place: Yes  Orientation to Time of Day: Yes  Orientation to Date: Yes     Situation (Do they understand why they are here?): Yes  Psychomotor Behavior: Normal  Thought Content: Clear  Thought Form: Intact     Mini-Cog Assessment  Number of Words Recalled:    Clock-Drawing Test:     Three Item Recall:    Mini-Cog Total Score:       Medication  Psychotropic medications:   Medication Orders - Psychiatric (From admission, onward)      Start     Dose/Rate Route Frequency Ordered Stop    07/20/24 1500   QUEtiapine (SEROquel) tablet 50 mg         50 mg Oral ONCE 07/20/24 1909      07/20/24 0000  QUEtiapine (SEROQUEL) 100 MG tablet         100 mg Oral AT BEDTIME 07/20/24 1908               Current Care Team  Patient Care Team:  Clinic - [facility] as PCP - General  Altagracia Tavarez APRN CNP as Nurse Practitioner (Nurse Practitioner)  Sheri Rutherford APRN CNP as Nurse Practitioner (Nurse Practitioner - Pediatrics)  Onofre Castillo APRN CNP as Nurse Practitioner (Nurse Practitioner)  Tawanda Galaviz MD as Assigned PCP    Diagnosis  Patient Active Problem List   Diagnosis Code    Attention deficit hyperactivity disorder (ADHD) F90.9    Unspecified psychosis not due to a substance or known physiological condition (H) F29    Urinary incontinence, unspecified type R32    Constipation, unspecified constipation type K59.00    Frequent UTI N39.0    ADHD (attention deficit hyperactivity disorder), combined type F90.2    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    Stimulant dependence (H) F15.20    Foster care child Z62.21    Chemical dependency (H) F19.20    Anxiety disorder, unspecified F41.9    Recurrent major depression (H24) F33.9    BV (bacterial vaginosis) N76.0, B96.89    Mixed stress and urge urinary incontinence N39.46    Schizophrenia (H) F20.9    Posttraumatic stress disorder F43.10    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    Hallucination R44.3    Schizoaffective disorder, bipolar type (H) F25.0    Methamphetamine use disorder, severe (H) F15.20    Other psychotic disorder not due to a substance or known physiological condition (H) F28    Nicotine dependence, cigarettes, with withdrawal F17.213    Major depressive disorder, recurrent, mild (H24) F33.0    Ingestion of corrosive chemical T54.91XA    History of drug abuse (H) F19.11    Cannabis dependence (H) F12.20    Alcohol use disorder, moderate, dependence (H) F10.20       Primary Problem This Admission  Active Hospital Problems    Schizoaffective  disorder, bipolar type (H)        Clinical Summary and Substantiation of Recommendations   Pt is a 23 year female with established diagnosis of schizoaffective disorder. Pt reported that she has bene having increased AH and delusions over the last two weeks along with depressed mood. She explained that she has beliefs about being able to communicate telepathically with others along with spiritual delusions.  She explained that she goes in and out of these delusions and lately she has been going  to bed and waking up with these symptoms. She denies SI, SIB and HI. Pt is currently sober from meth and fentanyl and is a dual diagnosis residential treatment facility.  Pt reported that she has been taking Zyprexa and boo snot find it helpful. She has taken Seroquel in the past and has found this more effective. She request medication management and wants to discharge after the assessment.    After therapeutic assessment, intervention and aftercare planning by ED care team and LM and in consultation with attending provider, the patient's circumstances and mental state were safe for outpatient management.  The patient was discharged. Close follow-up with a psychiatrist and/or therapist was recommended and community psychiatric resources were provided. Patient is to return to the ED if any urgent or potentially life-threatening concerns arise.       At the time of discharge, the patient's acute suicide risk was determined to be low due to the following factors: reduction in the intensity of mood/anxiety symptoms that preceded the admission, denial of suicidal thoughts, denies feeling helpless or hopeless, not currently under the influence of alcohol or illicit substances, denies experiencing command hallucinations, and no immediate access to firearms. Protective factors include: voluntarily seeking mental health support, established relationship community mental health provider(s), bradley system, future focused thinking,  displays insight, expresses desire to engage in treatment, and safe/stable housing.           Patient coping skills attempted to reduce the crisis:  Seeking out support from nursing staff at facility and coming to the ED.    Disposition  Recommended disposition: Individual Therapy, Medication Management, Programmatic Care        Reviewed case and recommendations with attending provider. Attending Name: JUAN Daly DNP       Attending concurs with disposition: yes       Patient and/or validated legal guardian concurs with disposition:   yes       Final disposition:  discharge    Legal status on admission: Voluntary/Patient has signed consent for treatment    Assessment Details   Total duration spent with the patient: 30 min     CPT code(s) utilized: 45340 - Psychotherapy for Crisis - 60 (30-74*) min    Silvina Clifton Psychotherapist  DEC - Triage & Transition Services  Callback: [phone]

## 2024-07-21 NOTE — ED PROVIDER NOTES
[facility] Unit - Psychiatric Consultation  [facility] Emergency Department    Ej Dunaway MRN: [id number]   Age: 23 year old YOB: 2000     History     Chief Complaint   Patient presents with    Hallucinations            HPI  Ej Dunaway is a 23 year old female with history notable for schizaffective disorder who has been experiencing more frequent hallucinations off and on over the past week. She presented voluntarily from a treatment facility. She was cleared medically and transferred to [facility] for additional assessment and treatment planning. At the time of the assessment today, 7/20/24, she is nearing 2.5 hours in emergency care.    Please see the [facility] assessment & reassessment (if available), ED physician notes and nursing notes for additional information and collateral content if available. All were reviewed prior to meeting with the patient. Pertinent content includes the following:    From Dr. Weinberg in the ED:    23 year old female brought in by her treatment facility staff due to intermittent hallucinations.  The patient has a history of schizoaffective disorder and states that she has been having hallucinations off and on this past week.  Sometimes she believes them, and sometimes she does not.  She has been for about 50 days sober from methamphetamines and has been in the treatment facility now for about 3 weeks.  She has been receiving her medications and has not had any access to drugs or alcohol.  She denies any thoughts of self-harm or wanting to harm others.  She denies any fevers, cough, sore throat, or other symptoms.     From ROSY Bolanos from today:    Ej Dunaway presents to the ED per community partner(s) ([facility] staff). Patient is presenting to the ED for the following concerns: Paranoia (AH and delusions).   Factors that make the mental health crisis life threatening or complex are:  Pt is a 23 year female with established  diagnosis of schizoaffective disorder. Pt reported that she has bene having increased AH and delusions over the last two weeks along with depressed mood. She explained that she has beliefs about being able to communicate telepathically with others along with spiritual delusions. She explained that she goes in and out of these delusions and lately she has been going to bed and waking up with these symptoms. Pt reported that she has been taking Zyprexa and boo snot find it helpful. She has taken Seroquel in the past and has found this more effective. She request medication management and wants to discharge after the assessment.She denies SI, SIB and HI.     On approach today, Ej is found sitting in a chair in the milieu. She was fully engaged throughout the interview and appeared to be a reliable informant. She reports that she has had off and on paranoia, auditory hallucinations and Yarsanism delusions for several years. She also reports long-standing substance use disorders, she reports 50 days of total sobriety. She was most recently prescribed Zyprexa, which she reports has not worked. She reports she previously took Seroquel which helped in the past. She reports that she has not been able to fully participate in group activities. She denies SI/HI or visual hallucinations. She is here seeking medication changes and to discharge back to her treatment  facility this evening.    Past Medical History  Past Medical History:   Diagnosis Date    Abnormal lead level in blood     10/15/2002    ADHD (attention deficit hyperactivity disorder), combined type     Adjustment disorder with mixed disturbance of emotions and conduct     Suicidal ideation 6/22/12, placed with new foster parents    Anxiety     Chlamydial infection     8/2015    Conduct disorder     5/2007    Constipation     Depo-Provera contraceptive status 05/07/2019    Depressive disorder     Foster child     Adopted 2013    Personal history of other medical  "treatment (CODE)     No Comments Provided    Recurrent UTI     Toxic effect of lead and its compounds, accidental (unintentional), initial encounter     2004    Urinary leakage     Urinary tract infection     No Comments Provided     Past Surgical History:   Procedure Laterality Date    NO HISTORY OF SURGERY       QUEtiapine (SEROQUEL) 100 MG tablet      Allergies   Allergen Reactions    Seasonal Allergies      Family History  Family History   Problem Relation Age of Onset    Family History Negative Father         Good Health    Substance Abuse Father     Mental Illness Father     Substance Abuse Sister     Substance Abuse Brother     Other - See Comments Mother         Psychiatric illness,schizophrenia    Cervical Cancer Mother     Cancer Mother     Substance Abuse Mother     Mental Illness Mother     Cancer Maternal Grandmother         Cancer,brain    Breast Cancer Maternal Grandmother     Asthma No family hx of     Coronary Artery Disease No family hx of     Mental Illness No family hx of      Social History   Social History     Tobacco Use    Smoking status: Every Day     Current packs/day: 0.50     Types: Cigarettes, Vaping Device     Passive exposure: Never    Smokeless tobacco: Never   Vaping Use    Vaping status: Every Day    Substances: Nicotine    Devices: Disposable, Pre-filled or refillable cartridge, Pre-filled pod   Substance Use Topics    Alcohol use: Not Currently     Alcohol/week: 0.0 standard drinks of alcohol    Drug use: Not Currently     Types: Marijuana, Methamphetamines, Other, Opiates     Comment: Fentanyl          Review of Systems  A medically appropriate review of systems was performed with pertinent positives and negatives noted in the HPI, and all other systems negative.    Physical Examination   BP: (!) 140/66  Pulse: 83  Temp: 98.5  F (36.9  C)  Resp: 18  Height: 167.6 cm (5' 6\")  Weight: 111.8 kg (246 lb 6.4 oz)  SpO2: 97 %    Physical Exam  General: Appears stated age.   Neuro: Alert " and fully oriented. Extremities appear to demonstrate normal strength on visual inspection.   Integumentary/Skin: no rash visualized, normal color    Psychiatric Examination   Appearance: awake, alert  Attitude:  cooperative  Eye Contact:  good  Mood:  better  Affect:  appropriate and in normal range  Speech:  clear, coherent  Psychomotor Behavior:  no evidence of tardive dyskinesia, dystonia, or tics  Thought Process:  linear and goal oriented  Associations:  no loose associations  Thought Content:  no evidence of suicidal ideation or homicidal ideation and auditory hallucinations present  Insight:  good  Judgement:  intact  Oriented to:  time, person, and place  Attention Span and Concentration:  intact  Recent and Remote Memory:  intact  Language: able to name/identify objects without impairment  Fund of Knowledge: intact with awareness of current and past events    ED Course        Labs Ordered and Resulted from Time of ED Arrival to Time of ED Departure - No data to display    Assessments & Plan (with Medical Decision Making)   Patient presenting with a history of schizoaffective disorder and substance use disorders who presented voluntarily to the emergency room seeking a change in antipsychotic medication. She was recently prescribed olanzapine, which she did not find helpful. She denies SI/HI, endorses AH, paranoia and delusions. She has significant insight into her situation, and she also reports significant trauma dating back to her childhood. Nursing notes reviewed noting no acute issues.     I have reviewed the assessment completed by the [facility].     Preliminary diagnosis:    ICD-10-CM    1. Acute psychosis (H)  F23       2. Schizoaffective disorder, unspecified type (H)  F25.9            Treatment Plan:    Current Outpatient Medications   Medication Sig Dispense Refill    QUEtiapine (SEROQUEL) 100 MG tablet Take 1 tablet (100 mg) by mouth at bedtime 30 tablet 0     - Stop olanzapine.  -Medication  education provided this visit includes, rationale for medication, importance of compliance, medication interactions, and common side effects.   -Supportive psychotherapy provided regarding patient's stressors and past mental health symptoms problem solving ways to improve overall wellness and cope with acute and chronic mental health symptoms.  -Follow up with outpatient appointments as scheduled.  - Continue to refrain from substance use.  - Discharge back to the treatment center.  - Return if symptoms worsen.      --  JUAN Daly CNP   [facility] EMERGENCY DEPT  EmPATH Unit      Nancie Heller APRN CNP  07/25/24 [id number]

## 2024-09-24 ENCOUNTER — HOSPITAL ENCOUNTER (EMERGENCY)
Facility: CLINIC | Age: 24
Discharge: HOME | End: 2024-09-25
Attending: INTERNAL MEDICINE | Admitting: INTERNAL MEDICINE
Payer: MEDICAID

## 2024-09-24 DIAGNOSIS — R44.0 AUDITORY HALLUCINATIONS: ICD-10-CM

## 2024-09-24 DIAGNOSIS — R45.851 SUICIDAL IDEATION: ICD-10-CM

## 2024-09-24 PROCEDURE — 99284 EMERGENCY DEPT VISIT MOD MDM: CPT | Performed by: INTERNAL MEDICINE

## 2024-09-24 PROCEDURE — 99283 EMERGENCY DEPT VISIT LOW MDM: CPT | Performed by: INTERNAL MEDICINE

## 2024-09-24 ASSESSMENT — COLUMBIA-SUICIDE SEVERITY RATING SCALE - C-SSRS
3. HAVE YOU BEEN THINKING ABOUT HOW YOU MIGHT KILL YOURSELF?: NO
4. HAVE YOU HAD THESE THOUGHTS AND HAD SOME INTENTION OF ACTING ON THEM?: NO
5. HAVE YOU STARTED TO WORK OUT OR WORKED OUT THE DETAILS OF HOW TO KILL YOURSELF? DO YOU INTEND TO CARRY OUT THIS PLAN?: NO
2. HAVE YOU ACTUALLY HAD ANY THOUGHTS OF KILLING YOURSELF IN THE PAST MONTH?: YES
6. HAVE YOU EVER DONE ANYTHING, STARTED TO DO ANYTHING, OR PREPARED TO DO ANYTHING TO END YOUR LIFE?: YES
1. IN THE PAST MONTH, HAVE YOU WISHED YOU WERE DEAD OR WISHED YOU COULD GO TO SLEEP AND NOT WAKE UP?: YES

## 2024-09-24 ASSESSMENT — ACTIVITIES OF DAILY LIVING (ADL): ADLS_ACUITY_SCORE: 35

## 2024-09-25 VITALS
DIASTOLIC BLOOD PRESSURE: 91 MMHG | BODY MASS INDEX: 39.54 KG/M2 | OXYGEN SATURATION: 95 % | HEART RATE: 86 BPM | SYSTOLIC BLOOD PRESSURE: 150 MMHG | RESPIRATION RATE: 16 BRPM | WEIGHT: 245 LBS | TEMPERATURE: 98.3 F

## 2024-09-25 LAB
AMPHETAMINES UR QL SCN: NORMAL
BARBITURATES UR QL SCN: NORMAL
BENZODIAZ UR QL SCN: NORMAL
BZE UR QL SCN: NORMAL
CANNABINOIDS UR QL SCN: NORMAL
FENTANYL UR QL: NORMAL
HCG UR QL: NEGATIVE
OPIATES UR QL SCN: NORMAL
PCP QUAL URINE (ROCHE): NORMAL

## 2024-09-25 PROCEDURE — 80307 DRUG TEST PRSMV CHEM ANLYZR: CPT | Performed by: INTERNAL MEDICINE

## 2024-09-25 PROCEDURE — 250N000013 HC RX MED GY IP 250 OP 250 PS 637: Performed by: INTERNAL MEDICINE

## 2024-09-25 PROCEDURE — 250N000013 HC RX MED GY IP 250 OP 250 PS 637: Performed by: STUDENT IN AN ORGANIZED HEALTH CARE EDUCATION/TRAINING PROGRAM

## 2024-09-25 PROCEDURE — 81025 URINE PREGNANCY TEST: CPT | Performed by: INTERNAL MEDICINE

## 2024-09-25 RX ADMIN — NICOTINE POLACRILEX 2 MG: 2 GUM, CHEWING BUCCAL at 01:05

## 2024-09-25 RX ADMIN — NICOTINE POLACRILEX 4 MG: 4 GUM, CHEWING BUCCAL at 10:42

## 2024-09-25 ASSESSMENT — ACTIVITIES OF DAILY LIVING (ADL)
ADLS_ACUITY_SCORE: 35

## 2024-09-25 NOTE — ED NOTES
Received call from pt's mental health resources worker- Maxwell Kate @ [phone].  Worker is advocating inpatient health admission as pt has schizoaffective disorder with increased paranoia and delusions with increased general homicidal and suicidal thoughts.    Worker notes pt has 2 episodes yesterday where she resides where they had to call 911.    Nurse noted message and will let primary nurse know.  If any questions or concerns, will give her a call back.

## 2024-09-25 NOTE — DISCHARGE INSTRUCTIONS
You are seen in Emergency Department due to concerns about your mental health.  You were evaluated here and found to have the ability to make your own decisions.  Because you wanted to leave we cannot hold you at this time against her well.  However if you have worsening severe symptoms including worsening thoughts of wanting to harm yourself, thoughts of wanting to harm others, or any other commands.  Ideations telling her to harm others or yourself, we would recommend that you return to the emergency department.    Please follow up with your , Maxwell Kate, [phone]    Return to  treatment at [facility].

## 2024-09-25 NOTE — ED NOTES
Attempted to meet with pt to complete DEC. She opened her eyes briefly and said she could not talk. Attempted several more times to speak with her, turned on lights etc. She mumbled no and then stopped responding. ED MD informed and advised to move to other patients.   CHRIS Coreas  9/25/2024  12:16 PM

## 2024-09-25 NOTE — ED PROVIDER NOTES
Emergency Department Patient Sign-out       Brief HPI and ED course:  Patient is a 24 year old female signed out to me by the previous physician.  See initial ED Provider note for details of the presentation. In brief, 24-year-old female with a past medical history of schizoaffective/bipolar disorder, polysubstance abuse remotely but currently in remission, PTSD presenting to the emergency department due to auditory hallucinations with command to harm others, as well as some vague depression but no SI.    Vitals:   Patient Vitals for the past 24 hrs:   BP Temp Temp src Pulse Resp SpO2 Weight   09/24/24 2225 126/83 98  F (36.7  C) Oral 80 12 95 % 111.1 kg (245 lb)       Received Sign-out Plan:    Pending:   -DEC evaluation    Events after assuming care:  After care was assumed, a focused history and physical was performed. Agree with findings relayed by previous provider.     This is her second evaluation in the last 24 hours for similar presentation.  She was seen at [facility], evaluated at bedtime and found to be not holdable, and was discharged to her own recognizance.  States that shortly after this she started having increasing auditory hallucinations and as what she came back yesterday.  Now stating that these auditory hallucinations while persistent are not telling her to harm herself or anyone else at this time.  She understands the risks of having decompensated psychiatric disorder, and at this point in time states that she has a very good plan to come back if there is worsens.  The DEC evaluator did speak with her  who had strongly recommended she be admitted.  However at this time she is wanting to be discharged and I do not believe that she is holdable.  She is at least organized enough to be able to have a conversation about the risks and the benefits, and I do not believe that she poses a threat to herself or others at this moment.  Will plan for discharge    --  Mitzi Pimentel MD   Emergency  Medicine         Mitzi Pimentel MD  09/25/24 3009

## 2024-09-25 NOTE — ED NOTES
"Pt approached writer and stated \"I would like my discharge papers.\" Writer explained that she needs to see the  first. Pt stated she has waited but has things to do and wants to discharge. Writer explained that they attempted to see her the previous evening but she did not meet with them for some reason, and because she came in stating she has homicidal ideations, we have to have her seen by the mental health assessors before she can be discharged, that she must be safe to discharge. Pt walked away and is currently pacing by her room.   "

## 2024-09-25 NOTE — ED PROVIDER NOTES
[facility] EMERGENCY DEPARTMENT ([facility])    9/24/24      ED PROVIDER NOTE    History     Chief Complaint   Patient presents with    Suicidal     Pt c/o suicidal thoughts that started today/ Pt c/o increasing stress. Denies plan     HPI  Ej Dunaway is a 24 year old female with history notable for schizoaffective/bipolar, depression/anxiety, PTSD, polysubstance abuse, who presents to the ED with complaints of HI. Of note, this is her 3rd ED visit today with this same complaint. She reports 2 days of generalized HI, not targeted at any specific person. Denies any other complaints. Patient currently resides in sober house, denies any recent drug use.       Past Medical History  Past Medical History:   Diagnosis Date    Abnormal lead level in blood     10/15/2002    ADHD (attention deficit hyperactivity disorder), combined type     Adjustment disorder with mixed disturbance of emotions and conduct     Suicidal ideation 6/22/12, placed with new foster parents    Anxiety     Chlamydial infection     8/2015    Conduct disorder     5/2007    Constipation     Depo-Provera contraceptive status 05/07/2019    Depressive disorder     Foster child     Adopted 2013    Personal history of other medical treatment (CODE)     No Comments Provided    Recurrent UTI     Toxic effect of lead and its compounds, accidental (unintentional), initial encounter     2004    Urinary leakage     Urinary tract infection     No Comments Provided     Past Surgical History:   Procedure Laterality Date    NO HISTORY OF SURGERY       QUEtiapine (SEROQUEL) 100 MG tablet      Allergies   Allergen Reactions    Seasonal Allergies      Family History  Family History   Problem Relation Age of Onset    Family History Negative Father         Good Health    Substance Abuse Father     Mental Illness Father     Substance Abuse Sister     Substance Abuse Brother     Other - See Comments Mother         Psychiatric illness,schizophrenia     Cervical Cancer Mother     Cancer Mother     Substance Abuse Mother     Mental Illness Mother     Cancer Maternal Grandmother         Cancer,brain    Breast Cancer Maternal Grandmother     Asthma No family hx of     Coronary Artery Disease No family hx of     Mental Illness No family hx of      Social History   Social History     Tobacco Use    Smoking status: Every Day     Current packs/day: 0.50     Types: Cigarettes, Vaping Device     Passive exposure: Never    Smokeless tobacco: Never   Vaping Use    Vaping status: Every Day    Substances: Nicotine    Devices: Disposable, Pre-filled or refillable cartridge, Pre-filled pod   Substance Use Topics    Alcohol use: Not Currently     Alcohol/week: 0.0 standard drinks of alcohol    Drug use: Not Currently     Types: Marijuana, Methamphetamines, Other, Opiates     Comment: Fentanyl      Past medical history, past surgical history, medications, allergies, family history, and social history were reviewed with the patient. No additional pertinent items.     A complete review of systems was performed with pertinent positives and negatives noted in the HPI, and all other systems negative.    Physical Exam   BP: 126/83  Pulse: 80  Temp: 98  F (36.7  C)  Resp: 12  Weight: 111.1 kg (245 lb)  SpO2: 95 %  Physical Exam  Vitals and nursing note reviewed.   Constitutional:       General: She is not in acute distress.     Appearance: Normal appearance. She is not diaphoretic.   HENT:      Head: Normocephalic and atraumatic.      Mouth/Throat:      Mouth: Mucous membranes are moist.   Eyes:      General: No scleral icterus.     Extraocular Movements: Extraocular movements intact.      Conjunctiva/sclera: Conjunctivae normal.   Cardiovascular:      Rate and Rhythm: Normal rate and regular rhythm.      Heart sounds: Normal heart sounds. No murmur heard.     No friction rub. No gallop.   Pulmonary:      Effort: Pulmonary effort is normal. No respiratory distress.      Breath sounds:  Normal breath sounds. No stridor. No wheezing, rhonchi or rales.   Chest:      Chest wall: No tenderness.   Abdominal:      General: Abdomen is flat. Bowel sounds are normal. There is no distension.      Palpations: Abdomen is soft. There is no mass.      Tenderness: There is no abdominal tenderness. There is no right CVA tenderness, left CVA tenderness, guarding or rebound.      Hernia: No hernia is present.   Musculoskeletal:         General: No tenderness. Normal range of motion.      Cervical back: Normal range of motion and neck supple.   Skin:     General: Skin is warm.      Findings: No rash.   Neurological:      General: No focal deficit present.      Mental Status: She is alert.      Cranial Nerves: No cranial nerve deficit.      Sensory: No sensory deficit.      Motor: No weakness.      Coordination: Coordination normal.      Gait: Gait normal.      Deep Tendon Reflexes: Reflexes normal.   Psychiatric:         Attention and Perception: Attention normal.         Mood and Affect: Mood is anxious and depressed.         Speech: Speech normal.         Behavior: Behavior is slowed and withdrawn.         Thought Content: Thought content is not paranoid or delusional. Thought content includes homicidal and suicidal ideation. Thought content does not include homicidal or suicidal plan.           ED Course, Procedures, & Data      Procedures            Results for orders placed or performed during the hospital encounter of 09/24/24   Urine Drug Screen *Canceled*     Status: None ()    Narrative    The following orders were created for panel order Urine Drug Screen.  Procedure                               Abnormality         Status                     ---------                               -----------         ------                     Urine Drug Screen Panel[[id number]]                                                       Please view results for these tests on the individual orders.     Medications - No data to  display  Labs Ordered and Resulted from Time of ED Arrival to Time of ED Departure - No data to display  No orders to display          Critical care was not performed.     Medical Decision Making  The patient's presentation was of high complexity (a chronic illness severe exacerbation, progression, or side effect of treatment).    The patient's evaluation involved:  history and exam without other MDM data elements    The patient's management necessitated further care after sign-out to incoming EP (see their note for further management).    Assessment & Plan    Another episode of HI SI in the pt with schizoaffective disorder, h/o polysubstance abuse, but no plans,    Agreed to be assessed by DEC -awaiting.    Will sign off to incoming EP.    I have reviewed the nursing notes. I have reviewed the findings, diagnosis, plan and need for follow up with the patient.    New Prescriptions    No medications on file       Final diagnoses:   None   Suha VALDOVINOS, am serving as a trained medical scribe to document services personally performed by Brigitte Reyez Md MD based on the provider's statements to me on September 24, 2024.  This document has been checked and approved by the attending provider.    IBrigitte Md MD, was physically present and have reviewed and verified the accuracy of this note documented by Suha Coffey medical scribe.      Brigitte Reyez Md MD  [facility] EMERGENCY DEPARTMENT  9/24/2024     Brigitte Reyez MD  09/26/24 0257

## 2024-09-25 NOTE — CONSULTS
Diagnostic Evaluation Consultation  Crisis Assessment    Patient Name: Ej Dunaway  Age:  24 year old  Legal Sex: female  Gender Identity: female  Pronouns:   Race: White  Ethnicity: Not  or   Language: English      Patient was assessed: In person   Crisis Assessment Start Date: 09/25/24  Crisis Assessment Start Time: 1215  Crisis Assessment Stop Time: 1235  Patient location: [facility] EMERGENCY DEPARTMENT                                 Referral Data and Chief Complaint  Ej Dunaway presents to the ED by  self. Patient is presenting to the ED for the following concerns: Paranoia, Suicidal ideation (homicidal ideation).   Factors that make the mental health crisis life threatening or complex are:  Patient presented to the ED on her own for general suicidal and homicidal ideation.  Patient had called 911x 2 yesterday and was [place] [facility] yesterday..      Informed Consent and Assessment Methods  Explained the crisis assessment process, including applicable information disclosures and limits to confidentiality, assessed understanding of the process, and obtained consent to proceed with the assessment.  Assessment methods included conducting a formal interview with patient, review of medical records, collaboration with medical staff, and obtaining relevant collateral information from family and community providers when available.  : done     Patient response to interventions:    Coping skills were attempted to reduce the crisis:  Pt has sought out help as needed and presented to the ED on her own.     History of the Crisis   Patient reports hx of schizoaffective disorder bipolar type, MDD, Anxiety, RAD, and PTSD.  Pt has hx of substance use disorder, methamphetamine, fentanyl, and alcohol.  Patient denies current use and UDS is negative.  Patient reports she has resided at  a sober house for 3 weeks and is currently in [facility].  Patient states she  "had homicidal ideation yesterday.  She denies any target or plan.  Pt states she is not feeling that way anymore and states she was having a flare up of her schizoaffective disorder.  Pt states when that happens she knows to come in.  Patient states, \"I'm  feeling okay now.\"  Patient denies SI currently.  She identifies she had some SI yesterday.  She denies a plan or intent and clarifies she had been feeling more HI than SI.  Patient reports the HI was due to not trusting people and the voices telling her to do bad things.  Pt indentifes the voices told her to hurt people.  She denies they are telling her to harm others now, she states they are better.  Pt appears to be attending to internal stimuli during session, at times appearing that she is listening to something else. Patient states when the voices get bad, she comes in.  Patient is requesting to discharge.  She has been living at sober house the last 3 weeks.  Pt states she can return there or go to her father's home. Pt declines medication management or other outpatient supports at this time.  Patient states she will work with her  and Micaela.  Pt is tracking appropriately.  She does not meet the requirements of a hold at this time.  Offered to connect pt with her .  Pt states she met with her CM yesterday at [facility] and does not feel it is necessary to speak with her CM again.    Brief Psychosocial History  Family:  Single, Children no  Support System:  Parent(s)  Employment Status:  unemployed  Source of Income:  none  Financial Environmental Concerns:  unemployed  Current Hobbies:  reading  Barriers in Personal Life:  mental health concerns    Significant Clinical History  Current Anxiety Symptoms:  anxious  Current Depression/Trauma:  difficulty concentrating  Current Somatic Symptoms:  anxious  Current Psychosis/Thought Disturbance:  auditory hallucinations  Current Eating Symptoms:   (denies)  Chemical Use History:  Alcohol:  " "(reports being in tx at [facility] for alcohol, fentanyl and meth.  Last use known.)  Benzodiazepines: None  Opiates:  (Hx of Fentanyl, last use unknown.  UDS negative.)  Cocaine: None  Marijuana: None  Other Use: Methamphetamines (hx of methamphetamine, currently in Tx, denies current use.)  Withdrawal Symptoms:  (denies)  Addictions:  (denies)   Past diagnosis:  Substance Use Disorder, Other  Family history:  Schizophrenia  Past treatment:  Individual therapy, Case management, Residential Treatment, Supportive Living Environment (group home, custodial house, etc), Day Treatment, Civil Commitment  Details of most recent treatment:  Pt reports currently being in CD treatment at [facility] and residing at a sober home.  Other relevant history:  Hx of many hospitalizations.  She reports last was 4 months ago in [place].  Patient has hx of multiple ED visits and was seen yesterday at [facility].       Collateral Information  Is there collateral information: Yes     Collateral information name, relationship, phone number:  Spoke with  , Maxwell Kate, [phone]    What happened today: CM called to advocate for pt to be admitted.  Pt discharged from [facility] yesterday. Last night she was evicted from her sober house.  CM states if pt is not admitted, she will be homeless.  Discussed pt may be not hold-able and clinician can not force pt to go to a crisis residence.     What is different about patient's functioning: Pt has called 911 x2 in the last 48 hours, for HI, SI, increased hallucinations and paranoia. Talking to herself and having Caodaism delusions.  CM met with pt at [facility] yesterday.  Pt had general HI and SI yesterday, no specific target or plan.  Patient had passive SI and did \"not want to spiritually exist.\"  CM reports pt has not beeing acting like herself.  CM reports patient's phone is broken and outside of seeing her at [facility] yesterday, has not been able to reach her.     Has patient made " comments about wanting to kill themselves/others: yes    If d/c is recommended, can they take part in safety/aftercare planning:  yes      Risk Assessment  [place] Suicide Severity Rating Scale Full Clinical Version:  Suicidal Ideation  Q6 Suicide Behavior (Lifetime): yes          [place] Suicide Severity Rating Scale Recent:   Suicidal Ideation (Recent)  Q1 Wished to be Dead (Past Month): yes  Q2 Suicidal Thoughts (Past Month): yes  Q3 Suicidal Thought Method: no  Q4 Suicidal Intent without Specific Plan: no  Q5 Suicide Intent with Specific Plan: no  If yes to Q6, within past 3 months?: no  Level of Risk per Screen: moderate risk     Suicidal Behavior (Recent)  Actual Attempt (Past 3 Months): No  Total Number of Actual Attempts (Past 3 Months): 0  Has subject engaged in non-suicidal self-injurious behavior? (Past 3 Months): No  Interrupted Attempts (Past 3 Months): No  Total Number of Interrupted Attempts (Past 3 Months): 0  Aborted or Self-Interrupted Attempt (Past 3 Months): No  Preparatory Acts or Behavior (Past 3 Months): No    Environmental or Psychosocial Events: neither working nor attending school  Protective Factors: Protective Factors: lives in a responsibly safe and stable environment, good treatment engagement, sense of importance of health and wellness, supportive ongoing medical and mental health care relationships, help seeking, other (see comment)    Does the patient have thoughts of harming others? Feels Like Hurting Others: no  Previous Attempt to Hurt Others: no  Is the patient engaging in sexually inappropriate behavior?: no    Is the patient engaging in sexually inappropriate behavior?  no        Mental Status Exam   Affect: Blunted  Appearance: Appropriate  Attention Span/Concentration: Attentive  Eye Contact: Engaged    Fund of Knowledge: Appropriate   Language /Speech Content: Fluent  Language /Speech Volume: Normal  Language /Speech Rate/Productions: Normal  Recent Memory: Intact  Remote  Memory: Intact  Mood: Anxious  Orientation to Person: Yes   Orientation to Place: Yes  Orientation to Time of Day: Yes  Orientation to Date: Yes     Situation (Do they understand why they are here?): Yes  Psychomotor Behavior: Normal, Other (please comment) (some restlessness, pacing)  Thought Content: Hallucinations  Thought Form: Intact     Mini-Cog Assessment  Number of Words Recalled:    Clock-Drawing Test:     Three Item Recall:    Mini-Cog Total Score:       Medication  Psychotropic medications:   Medication Orders - Psychiatric (From admission, onward)      Start     Dose/Rate Route Frequency Ordered Stop    09/25/24 1034  nicotine polacrilex (NICORETTE) gum 4 mg         4 mg Buccal EVERY 1 HOUR PRN 09/25/24 1035               Current Care Team  Patient Care Team:  Clinic - [facility] as PCP - General  Altagracia Tavarez APRN CNP as Nurse Practitioner (Nurse Practitioner)  Sheri Rutherford APRN CNP as Nurse Practitioner (Nurse Practitioner - Pediatrics)  Onofre Castillo APRN CNP as Nurse Practitioner (Nurse Practitioner)  Tawanda Galaviz MD as Assigned PCP    Diagnosis  Patient Active Problem List   Diagnosis Code    Attention deficit hyperactivity disorder (ADHD) F90.9    Unspecified psychosis not due to a substance or known physiological condition (H) F29    Urinary incontinence, unspecified type R32    Constipation, unspecified constipation type K59.00    Frequent UTI N39.0    ADHD (attention deficit hyperactivity disorder), combined type F90.2    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    Stimulant dependence (H) F15.20    Foster care child Z62.21    Chemical dependency (H) F19.20    Anxiety disorder, unspecified F41.9    Recurrent major depression (H24) F33.9    BV (bacterial vaginosis) N76.0, B96.89    Mixed stress and urge urinary incontinence N39.46    Schizophrenia (H) F20.9    Posttraumatic stress disorder F43.10    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    Hallucination R44.3     Schizoaffective disorder, bipolar type (H) F25.0    Methamphetamine use disorder, severe (H) F15.20    Other psychotic disorder not due to a substance or known physiological condition (H) F28    Nicotine dependence, cigarettes, with withdrawal F17.213    Major depressive disorder, recurrent, mild (H24) F33.0    Ingestion of corrosive chemical T54.91XA    History of drug abuse (H) F19.11    Cannabis dependence (H) F12.20    Alcohol use disorder, moderate, dependence (H) F10.20       Primary Problem This Admission  Active Hospital Problems    *Schizoaffective disorder, bipolar type (H)      Hallucination      Posttraumatic stress disorder      Reactive attachment disorder      ADHD (attention deficit hyperactivity disorder), combined type        Clinical Summary and Substantiation of Recommendations   Patient presented to the ED on her own  Patient reports HI, SI, paranoia, and auditory hallucinations.  Patient stayed overnight in the ED.  She is no longer endorsing HI, or SI.  She denies having a plan or intent when she had HI and SI yesterday.  She reports yesterday her voices were telling her to do bad things so she knew she needed to come to the hospital.  Pt states the voices are present, always present, and better today, they are not telling her to harm anyone.  Patient appears to attend to internal stimuli during assessment.  She is tracking appropriately and is not verbalizing any significant delusional thoughts, only stating she does not trust people.  Patient does not meet the requirements of a hold and requesting to discharge.  Pt will follow up with , sober home, and [facility].  She declined offers for additional supports, medication management.      Patient coping skills attempted to reduce the crisis:  Pt has sought out help as needed and presented to the ED on her own.    Disposition  Recommended disposition: Individual Therapy, Medication Management, Programmatic Care        Reviewed case and  recommendations with attending provider. Attending Name: Dr Pimentel       Attending concurs with disposition: yes       Patient and/or validated legal guardian concurs with disposition:   yes       Final disposition:  discharge    Legal status on admission: Voluntary/Patient has signed consent for treatment    Assessment Details   Total duration spent with the patient: 20 min     CPT code(s) utilized: Non-Billable    CHRIS Tovar, Psychotherapist  DEC - Triage & Transition Services  Callback: [phone]

## 2024-09-28 ENCOUNTER — TELEPHONE (OUTPATIENT)
Dept: BEHAVIORAL HEALTH | Facility: CLINIC | Age: 24
End: 2024-09-28
Payer: MEDICAID

## 2024-10-23 ENCOUNTER — PATIENT OUTREACH (OUTPATIENT)
Dept: CARE COORDINATION | Facility: CLINIC | Age: 24
End: 2024-10-23
Payer: MEDICAID

## 2024-11-06 ENCOUNTER — PATIENT OUTREACH (OUTPATIENT)
Dept: CARE COORDINATION | Facility: CLINIC | Age: 24
End: 2024-11-06
Payer: MEDICAID

## 2025-01-04 ENCOUNTER — HEALTH MAINTENANCE LETTER (OUTPATIENT)
Age: 25
End: 2025-01-04

## 2025-08-29 ENCOUNTER — HOSPITAL ENCOUNTER (OUTPATIENT)
Facility: CLINIC | Age: 25
Setting detail: OBSERVATION
Discharge: NURSING, REHAB OR LONG-TERM CARE | End: 2025-08-30
Attending: EMERGENCY MEDICINE | Admitting: EMERGENCY MEDICINE
Payer: COMMERCIAL

## 2025-08-29 DIAGNOSIS — F25.0 SCHIZOAFFECTIVE DISORDER, BIPOLAR TYPE (H): ICD-10-CM

## 2025-08-29 DIAGNOSIS — F19.10 SUBSTANCE ABUSE (H): ICD-10-CM

## 2025-08-29 DIAGNOSIS — Z76.5 MALINGERING: ICD-10-CM

## 2025-08-29 DIAGNOSIS — A59.9 TRICHOMONIASIS: Primary | ICD-10-CM

## 2025-08-29 LAB
ANION GAP SERPL CALCULATED.3IONS-SCNC: 10 MMOL/L (ref 7–15)
BASOPHILS # BLD AUTO: 0.04 10E3/UL (ref 0–0.2)
BASOPHILS NFR BLD AUTO: 0.3 %
BUN SERPL-MCNC: 15.1 MG/DL (ref 6–20)
CALCIUM SERPL-MCNC: 9.1 MG/DL (ref 8.8–10.4)
CHLORIDE SERPL-SCNC: 103 MMOL/L (ref 98–107)
CLUE CELLS: PRESENT
CREAT SERPL-MCNC: 0.56 MG/DL (ref 0.51–0.95)
EGFRCR SERPLBLD CKD-EPI 2021: >90 ML/MIN/1.73M2
EOSINOPHIL # BLD AUTO: 0.25 10E3/UL (ref 0–0.7)
EOSINOPHIL NFR BLD AUTO: 2.1 %
ERYTHROCYTE [DISTWIDTH] IN BLOOD BY AUTOMATED COUNT: 12.8 % (ref 10–15)
GLUCOSE SERPL-MCNC: 92 MG/DL (ref 70–99)
HCO3 SERPL-SCNC: 24 MMOL/L (ref 22–29)
HCT VFR BLD AUTO: 41.7 % (ref 35–47)
HGB BLD-MCNC: 14 G/DL (ref 11.7–15.7)
HIV 1+2 AB+HIV1 P24 AG SERPL QL IA: NONREACTIVE
IMM GRANULOCYTES # BLD: 0.04 10E3/UL
IMM GRANULOCYTES NFR BLD: 0.3 %
LYMPHOCYTES # BLD AUTO: 3.27 10E3/UL (ref 0.8–5.3)
LYMPHOCYTES NFR BLD AUTO: 28.1 %
MCH RBC QN AUTO: 29 PG (ref 26.5–33)
MCHC RBC AUTO-ENTMCNC: 33.6 G/DL (ref 31.5–36.5)
MCV RBC AUTO: 86.3 FL (ref 78–100)
MONOCYTES # BLD AUTO: 0.65 10E3/UL (ref 0–1.3)
MONOCYTES NFR BLD AUTO: 5.6 %
NEUTROPHILS # BLD AUTO: 7.39 10E3/UL (ref 1.6–8.3)
NEUTROPHILS NFR BLD AUTO: 63.6 %
NRBC # BLD AUTO: <0.03 10E3/UL
NRBC BLD AUTO-RTO: 0 /100
PLATELET # BLD AUTO: 404 10E3/UL (ref 150–450)
POTASSIUM SERPL-SCNC: 4.4 MMOL/L (ref 3.4–5.3)
RBC # BLD AUTO: 4.83 10E6/UL (ref 3.8–5.2)
SODIUM SERPL-SCNC: 137 MMOL/L (ref 135–145)
T PALLIDUM AB SER QL: NONREACTIVE
T VAGINALIS DNA SPEC QL NAA+PROBE: DETECTED
TRICHOMONAS, WET PREP: PRESENT
TSH SERPL DL<=0.005 MIU/L-ACNC: 1.56 UIU/ML (ref 0.3–4.2)
WBC # BLD AUTO: 11.64 10E3/UL (ref 4–11)
WBC'S/HIGH POWER FIELD, WET PREP: ABNORMAL
YEAST, WET PREP: ABNORMAL

## 2025-08-29 PROCEDURE — 99284 EMERGENCY DEPT VISIT MOD MDM: CPT | Performed by: EMERGENCY MEDICINE

## 2025-08-29 PROCEDURE — 80051 ELECTROLYTE PANEL: CPT | Performed by: EMERGENCY MEDICINE

## 2025-08-29 PROCEDURE — 86780 TREPONEMA PALLIDUM: CPT | Performed by: EMERGENCY MEDICINE

## 2025-08-29 PROCEDURE — 87591 N.GONORRHOEAE DNA AMP PROB: CPT | Performed by: EMERGENCY MEDICINE

## 2025-08-29 PROCEDURE — 84443 ASSAY THYROID STIM HORMONE: CPT | Performed by: EMERGENCY MEDICINE

## 2025-08-29 PROCEDURE — G0378 HOSPITAL OBSERVATION PER HR: HCPCS

## 2025-08-29 PROCEDURE — 250N000013 HC RX MED GY IP 250 OP 250 PS 637: Performed by: EMERGENCY MEDICINE

## 2025-08-29 PROCEDURE — 99285 EMERGENCY DEPT VISIT HI MDM: CPT | Performed by: EMERGENCY MEDICINE

## 2025-08-29 PROCEDURE — 250N000013 HC RX MED GY IP 250 OP 250 PS 637: Performed by: NURSE PRACTITIONER

## 2025-08-29 PROCEDURE — 87210 SMEAR WET MOUNT SALINE/INK: CPT | Performed by: EMERGENCY MEDICINE

## 2025-08-29 PROCEDURE — 87661 TRICHOMONAS VAGINALIS AMPLIF: CPT | Performed by: EMERGENCY MEDICINE

## 2025-08-29 PROCEDURE — 36415 COLL VENOUS BLD VENIPUNCTURE: CPT | Performed by: EMERGENCY MEDICINE

## 2025-08-29 PROCEDURE — 87389 HIV-1 AG W/HIV-1&-2 AB AG IA: CPT | Performed by: EMERGENCY MEDICINE

## 2025-08-29 PROCEDURE — 85018 HEMOGLOBIN: CPT | Performed by: EMERGENCY MEDICINE

## 2025-08-29 RX ORDER — QUETIAPINE FUMARATE 100 MG/1
100 TABLET, FILM COATED ORAL AT BEDTIME
Status: DISCONTINUED | OUTPATIENT
Start: 2025-08-29 | End: 2025-08-30 | Stop reason: HOSPADM

## 2025-08-29 RX ORDER — HYDROXYZINE HYDROCHLORIDE 50 MG/1
50 TABLET, FILM COATED ORAL EVERY 6 HOURS PRN
Status: DISCONTINUED | OUTPATIENT
Start: 2025-08-29 | End: 2025-08-30 | Stop reason: HOSPADM

## 2025-08-29 RX ORDER — METRONIDAZOLE 500 MG/1
500 TABLET ORAL ONCE
Status: COMPLETED | OUTPATIENT
Start: 2025-08-29 | End: 2025-08-29

## 2025-08-29 RX ORDER — METRONIDAZOLE 500 MG/1
500 TABLET ORAL 2 TIMES DAILY
Qty: 14 TABLET | Refills: 0 | Status: SHIPPED | OUTPATIENT
Start: 2025-08-29 | End: 2025-09-05

## 2025-08-29 RX ORDER — QUETIAPINE FUMARATE 200 MG/1
200 TABLET, FILM COATED ORAL AT BEDTIME
COMMUNITY
Start: 2025-02-05 | End: 2025-08-30

## 2025-08-29 RX ORDER — HYDROXYZINE HYDROCHLORIDE 25 MG/1
25 TABLET, FILM COATED ORAL EVERY 6 HOURS PRN
Status: DISCONTINUED | OUTPATIENT
Start: 2025-08-29 | End: 2025-08-30 | Stop reason: HOSPADM

## 2025-08-29 RX ORDER — QUETIAPINE FUMARATE 50 MG/1
50 TABLET, FILM COATED ORAL DAILY
COMMUNITY
Start: 2025-04-02 | End: 2025-08-30

## 2025-08-29 RX ADMIN — QUETIAPINE FUMARATE 100 MG: 100 TABLET ORAL at 22:48

## 2025-08-29 RX ADMIN — HYDROXYZINE HYDROCHLORIDE 50 MG: 25 TABLET ORAL at 19:03

## 2025-08-29 RX ADMIN — METRONIDAZOLE 500 MG: 500 TABLET ORAL at 19:03

## 2025-08-29 ASSESSMENT — ACTIVITIES OF DAILY LIVING (ADL)
ADLS_ACUITY_SCORE: 46

## 2025-08-30 VITALS
HEIGHT: 66 IN | TEMPERATURE: 98.3 F | OXYGEN SATURATION: 96 % | DIASTOLIC BLOOD PRESSURE: 72 MMHG | BODY MASS INDEX: 36.96 KG/M2 | RESPIRATION RATE: 20 BRPM | WEIGHT: 230 LBS | SYSTOLIC BLOOD PRESSURE: 124 MMHG | HEART RATE: 77 BPM

## 2025-08-30 LAB
C TRACH DNA SPEC QL PROBE+SIG AMP: NEGATIVE
N GONORRHOEA DNA SPEC QL NAA+PROBE: NEGATIVE
SPECIMEN TYPE: NORMAL

## 2025-08-30 PROCEDURE — G0378 HOSPITAL OBSERVATION PER HR: HCPCS

## 2025-08-30 PROCEDURE — 250N000013 HC RX MED GY IP 250 OP 250 PS 637: Performed by: NURSE PRACTITIONER

## 2025-08-30 RX ORDER — METRONIDAZOLE 500 MG/1
500 TABLET ORAL 2 TIMES DAILY
Status: DISCONTINUED | OUTPATIENT
Start: 2025-08-30 | End: 2025-08-30 | Stop reason: HOSPADM

## 2025-08-30 RX ORDER — METRONIDAZOLE 500 MG/1
500 TABLET ORAL 2 TIMES DAILY
Qty: 14 TABLET | Refills: 0 | Status: SHIPPED | OUTPATIENT
Start: 2025-08-30 | End: 2025-09-06

## 2025-08-30 RX ORDER — ARIPIPRAZOLE 5 MG/1
5 TABLET ORAL DAILY
Qty: 14 TABLET | Refills: 0 | Status: SHIPPED | OUTPATIENT
Start: 2025-08-30

## 2025-08-30 RX ORDER — ARIPIPRAZOLE 5 MG/1
5 TABLET ORAL DAILY
Status: DISCONTINUED | OUTPATIENT
Start: 2025-08-30 | End: 2025-08-30 | Stop reason: HOSPADM

## 2025-08-30 RX ADMIN — ARIPIPRAZOLE 5 MG: 5 TABLET ORAL at 12:58

## 2025-08-30 RX ADMIN — METRONIDAZOLE 500 MG: 500 TABLET ORAL at 12:58

## 2025-08-30 ASSESSMENT — ACTIVITIES OF DAILY LIVING (ADL)
ADLS_ACUITY_SCORE: 46

## 2025-08-30 ASSESSMENT — COLUMBIA-SUICIDE SEVERITY RATING SCALE - C-SSRS
2. HAVE YOU ACTUALLY HAD ANY THOUGHTS OF KILLING YOURSELF?: NO
TOTAL  NUMBER OF ABORTED OR SELF INTERRUPTED ATTEMPTS SINCE LAST CONTACT: NO
TOTAL  NUMBER OF INTERRUPTED ATTEMPTS SINCE LAST CONTACT: NO
6. HAVE YOU EVER DONE ANYTHING, STARTED TO DO ANYTHING, OR PREPARED TO DO ANYTHING TO END YOUR LIFE?: NO
ATTEMPT SINCE LAST CONTACT: NO
1. SINCE LAST CONTACT, HAVE YOU WISHED YOU WERE DEAD OR WISHED YOU COULD GO TO SLEEP AND NOT WAKE UP?: NO
SUICIDE, SINCE LAST CONTACT: NO